# Patient Record
Sex: FEMALE | Race: WHITE | NOT HISPANIC OR LATINO | Employment: OTHER | URBAN - METROPOLITAN AREA
[De-identification: names, ages, dates, MRNs, and addresses within clinical notes are randomized per-mention and may not be internally consistent; named-entity substitution may affect disease eponyms.]

---

## 2017-02-06 ENCOUNTER — GENERIC CONVERSION - ENCOUNTER (OUTPATIENT)
Dept: CARDIOLOGY CLINIC | Facility: CLINIC | Age: 82
End: 2017-02-06

## 2017-05-22 ENCOUNTER — GENERIC CONVERSION - ENCOUNTER (OUTPATIENT)
Dept: CARDIOLOGY CLINIC | Facility: CLINIC | Age: 82
End: 2017-05-22

## 2017-12-14 ENCOUNTER — ALLSCRIPTS OFFICE VISIT (OUTPATIENT)
Dept: OTHER | Facility: OTHER | Age: 82
End: 2017-12-14

## 2017-12-14 DIAGNOSIS — E78.01 FAMILIAL HYPERCHOLESTEROLEMIA: ICD-10-CM

## 2017-12-14 DIAGNOSIS — I25.10 ATHEROSCLEROTIC HEART DISEASE OF NATIVE CORONARY ARTERY WITHOUT ANGINA PECTORIS: ICD-10-CM

## 2017-12-14 DIAGNOSIS — I65.29 OCCLUSION AND STENOSIS OF UNSPECIFIED CAROTID ARTERY: ICD-10-CM

## 2017-12-14 DIAGNOSIS — I72.4 ANEURYSM OF ARTERY OF LOWER EXTREMITY (HCC): ICD-10-CM

## 2017-12-14 DIAGNOSIS — I10 ESSENTIAL (PRIMARY) HYPERTENSION: ICD-10-CM

## 2017-12-15 NOTE — CONSULTS
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)   4  Carotid artery obstruction (433 10) (I65 29)   5  Familial hypercholesteremia (272 0) (E78 01)    Plan  Atrial fibrillation, unspecified type    · EKG/ECG- POC; Status:Complete;   Done: [id number]   Perform: In Office; Due:26Ysn9377; Last Updated By:Tsering Simeon; 12/14/2017 2:49:16 PM;Ordered; For:Atrial fibrillation, unspecified type; Ordered By:Andrew Hull;  Benign essential hypertension, Carotid artery obstruction, Coronary artery disease,Familial hypercholesteremia, Popliteal artery aneurysm    · (1) COMPREHENSIVE METABOLIC PANEL; Status:Active; Requested for:10Yky3854;    Perform:[facility]; Due:70Ana1037; Ordered; For:Benign essential hypertension, Carotid artery obstruction, Coronary artery disease, Familial hypercholesteremia, Popliteal artery aneurysm; Ordered By:Andrew Hull;   · ECHO COMPLETE WITH CONTRAST IF INDICATED; Status:Active; Requestedfor:28Rzb1590;    Perform:[facility]; Due:87Zpr8548; Last Updated By:Megan Noyola; 12/14/2017 3:39:39 PM;Ordered; For:Benign essential hypertension, Carotid artery obstruction, Coronary artery disease, Familial hypercholesteremia, Popliteal artery aneurysm; Ordered By:Andrew Hull;   · Follow-up visit in 3 months Evaluation and Treatment  Follow-up  Status: Complete Done: 47JTO5199   Ordered; For: Benign essential hypertension, Carotid artery obstruction, Coronary artery disease, Familial hypercholesteremia, Popliteal artery aneurysm; Ordered By: Leda Antoine Performed:  Due: 01XJT4242; Last Updated By: Omer Kinney; 12/14/2017 3:39:39 PM  Familial hypercholesteremia    · Rosuvastatin Calcium 5 MG Oral Tablet; TAKE 1 TABLET Weekly   Rx By: Leda Antoine; Dispense: 90 Days ; #:12 Tablet;  Refill: 3;For: Familial hypercholesteremia; RENNY = N; Verified Transmission to [facility]; Last Updated By: System, Torey; 12/14/2017 3:31:33 PM    Discussion/Summary    1  CAD prior PCI LAD and CIRC2  LVEF 60%3  Lone XKSX8  HTN5  HLD with statin intolerance6  Mild Carotid stenosisRec: She has familial hyperlipidemia and coronary artery disease  We'll try rosuvastatin 5 mg one tablet a week and see if she can tolerate this  Recommended she stop red yeast Rice at this time  I'll add coenzyme Q10  Coronary disease is stable with no complaints of angina  Blood pressure is been well-controlled  There was a single episode of lone atrial fibrillation no full anticoagulation she is a fall risk and she's due for an echocardiogram  Follow-up in 3 months  Chief Complaint  New patient  Patient has no cardiac complaints  History of Present Illness  Cardiology HPI Free Text Note Form [facility]: Mrs Cecilia Rehman Is a very pleasant 66-year-old female with a history of coronary artery disease with her PCI of the LAD and circumflex, alone atrial fibrillation, hypertension, hyperlipidemia, mild carotid plaque presents for a new patient evaluation  She is transferring from another cardiologist  Echocardiogram previously has shown normal LV systolic function  There has been mild valvular heart disease  Overall she's doing well with no complaints  She is limited by lower extremity arthritis and overall has a rather sedentary lifestyle area she denies any exertional chest pain or pressure, shortness of breath, palpitations, lightheadedness, dizziness, or syncope  She has familial hyperlipidemia with very high LDL cholesterol she's been tried on multiple statin medications without success  She gets muscle aches and cramps on these pills  She's tried them every other day but nothing less than that  There is been no lower extremity edema, PND, orthopnea  She denies any recurrence of atrial fibrillation        Review of Systems     Cardiac: No complaints of chest pain, no palpitations, no fainting  Skin: No complaints of nonhealing sores or skin rash  Genitourinary: No complaints of recurrent urinary tract infections, frequent urination at night, difficult urination, blood in urine, kidney stones, loss of bladder control, kidney problems, denies any birth control or hormone replacement, is not post menopausal, not currently pregnant  Psychological: No complaints of feeling depressed, anxiety, panic attacks, or difficulty concentrating  General: No complaints of trouble sleeping, lack of energy, fatigue, appetite changes, weight changes, fever, frequent infections, or night sweats  Respiratory: No complaints of shortness of breath, cough with sputum, or wheezing  HEENT: No complaints of serious problems, hearing problems, nose problems, throat problems, or snoring  Gastrointestinal: No complaints of liver problems, nausea, vomiting, heartburn, constipation, bloody stools, diarrhea, problems swallowing, adbominal pain, or rectal bleeding  Hematologic: No complaints of bleeding disorders, anemia, blood clots, or excessive brusing  Neurological: No complaints of numbness, tingling, dizziness, weakness, seizures, headaches, syncope or fainting, AM fatigue, daytime sleepiness, no witnessed apnea episodes  Musculoskeletal: No complaints of arthritis, back pain, or painfull swelling  Active Problems  1  Atrial fibrillation, unspecified type (427 31) (I48 91)   2  Benign essential hypertension (401 1) (I10)   3  Colitis (558 9) (K52 9)   4  Coronary artery disease (414 00) (I25 10)   5  Mesenteric ischemia, chronic (557 1) (K55 1)   6  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)    Past Medical History   · Colitis (558 9) (K52 9)   · History of TIA (transient ischemic attack) and stroke (V12 54) (Z86 73)    The active problems and past medical history were reviewed and updated today        Surgical History   · History of Appendectomy   · History of Cataract Surgery   · History of Cath Stent Placement   · History of Cholecystectomy   · History of Complete Colonoscopy   · History of Hysterectomy   · History of Total Knee Replacement Right   · History of Upper Gastrointestinal Endoscopy (Therapeutic)    The surgical history was reviewed and updated today  Family History  Father    · Family history of leukemia (V16 6) (Z80 6)  Family History Reviewed: The family history was reviewed and updated today  Social History   · Never a smoker   · No alcohol use   · No drug use  The social history was reviewed and updated today  Current Meds   1  Allegra 60 MG CAPS; Therapy: (Recorded:07Apr2016) to Recorded   2  AmLODIPine Besylate 5 MG Oral Tablet; TAKE 1 TABLET DAILY; Therapy: 20NEH6928 to Recorded   3  Aspirin 81 MG TABS; Therapy: (Recorded:07Apr2016) to Recorded   4  Calcium 500 + D TABS; Therapy: (Recorded:07Apr2016) to Recorded   5  Carvedilol 12 5 MG Oral Tablet; take 2 tablet twice daily; Therapy: (Recorded:39Ika8701) to Recorded   6  CloNIDine HCl - 0 1 MG Oral Tablet; Take 1 tablet 4 times daily; Therapy: (Recorded:63Cvh8741) to Recorded   7  Cranberry 500 MG Oral Capsule; take 1 capsule daily; Therapy: (Recorded:68Agz5050) to Recorded   8  Cyanocobalamin 1000 MCG TABS; Therapy: (Recorded:07Apr2016) to Recorded   9  Escitalopram Oxalate 10 MG Oral Tablet; TAKE 1 TABLET DAILY; Therapy: 77BLF2852 to Recorded   10  Famotidine 20 MG Oral Tablet; TAKE 1 TABLET EVERY 12 HOURS DAILY; Therapy: (Recorded:85Jtb2322) to Recorded   11  Fiber CAPS; Therapy: (Recorded:07Apr2016) to Recorded   12  Fish Oil CAPS; Therapy: (Recorded:07Apr2016) to Recorded   13  KLS Natural Psyllium Fiber POWD;  Therapy: (Recorded:07Apr2016) to Recorded   14  Lisinopril 40 MG Oral Tablet; TAKE 1 TABLET DAILY; Therapy: (Recorded:23Ahz4753) to Recorded   15  Ocuvite Eye + Multi TABS; Therapy: (Recorded:07Apr2016) to Recorded   16   Potassium Chloride Vero ER 10 MEQ Oral Tablet Extended Release; TAKE 3 TABLET Daily; Therapy: (Recorded:73Viy1364) to Recorded   17  PredniSONE 2 5 MG Oral Tablet; TAKE 1 TABLET DAILY; Therapy: (Recorded:68Azi9831) to Recorded   18  PredniSONE 5 MG Oral Tablet; Take 1 tablet daily; Therapy: (Recorded:88Zhc8561) to Recorded   19  PreviDent 5000 Booster Plus 1 1 % Dental Paste; Therapy: (Recorded:07Apr2016) to Recorded   20  Repaglinide 0 5 MG Oral Tablet; TAKE 1 TABLET 3 TIMES DAILY ; Therapy: (Recorded:22Mcg2996) to Recorded   21  Vitamin D3 1000 UNIT Oral Capsule; Therapy: (Recorded:07Apr2016) to Recorded   22  Voltaren 1 % Transdermal Gel; Therapy: (Recorded:07Apr2016) to Recorded    Allergies  1  Adhesive Tape TAPE   2  Amoxicillin TABS   3  Erythromycin TABS   4  flecainide   5  Mevacor TABS   6  Sulfa Antibiotics   7  thioridazine   8  Tricor   9  Zetia    Vitals  Signs   Heart Rate: 57  Systolic: 467, RUE, Sitting  Diastolic: 56, RUE, Sitting  Height: 5 ft 2 in  Weight: 137 lb   BMI Calculated: 25 06  BSA Calculated: 1 63    Physical Exam   Constitutional  General appearance: No acute distress, well appearing and well nourished  Eyes  Conjunctiva and Sclera examination: Conjunctiva pink, sclera anicteric  Ears, Nose, Mouth, and Throat - Oropharynx: Clear, nares are clear, mucous membranes are moist   Neck  Neck and thyroid: Normal, supple, trachea midline, no thyromegaly  Pulmonary  Respiratory effort: No increased work of breathing or signs of respiratory distress  Auscultation of lungs: Clear to auscultation, no rales, no rhonchi, no wheezing, good air movement  Cardiovascular  Auscultation of heart: Normal rate and rhythm, normal S1 and S2, no murmurs  Carotid pulses: Normal, 2+ bilaterally  Peripheral vascular exam: Normal pulses throughout, no tenderness, erythema or swelling  Pedal pulses: Normal, 2+ bilaterally  Examination of extremities for edema and/or varicosities: Normal    Abdomen  Abdomen: Non-tender and no distention     Liver and spleen: No hepatomegaly or splenomegaly  Musculoskeletal Gait and station: Normal gait  -- Digits and nails: Normal without clubbing or cyanosis  -- Inspection/palpation of joints, bones, and muscles: Normal, ROM normal    Skin - Skin and subcutaneous tissue: Normal without rashes or lesions  Skin is warm and well perfused, normal turgor  Neurologic - Cranial nerves: II - XII intact  -- Speech: Normal    Psychiatric - Orientation to person, place, and time: Normal -- Mood and affect: Normal       Results/Data  Sinus bradycardia and incomplete right bundle-branch block      End of Encounter Meds  1  Rosuvastatin Calcium 5 MG Oral Tablet; TAKE 1 TABLET Weekly; Therapy: 25BSP2478 to (Jacob Hayden)  Requested for: 52MMM6671; Last Rx:34Qvz9871 Ordered  2  Allegra 60 MG CAPS; Therapy: (Recorded:07Apr2016) to Recorded   3  AmLODIPine Besylate 5 MG Oral Tablet; TAKE 1 TABLET DAILY; Therapy: 09VZP8715 to Recorded   4  Aspirin 81 MG TABS; Therapy: (Recorded:07Apr2016) to Recorded   5  Calcium 500 + D TABS; Therapy: (Recorded:07Apr2016) to Recorded   6  Carvedilol 12 5 MG Oral Tablet; take 2 tablet twice daily; Therapy: (Recorded:46Ybk9558) to Recorded   7  CloNIDine HCl - 0 1 MG Oral Tablet; Take 1 tablet 4 times daily; Therapy: (Recorded:26Qac3387) to Recorded   8  Cranberry 500 MG Oral Capsule; take 1 capsule daily; Therapy: (Recorded:64Iwv1461) to Recorded   9  Cyanocobalamin 1000 MCG TABS; Therapy: (Recorded:07Apr2016) to Recorded   10  Escitalopram Oxalate 10 MG Oral Tablet; TAKE 1 TABLET DAILY; Therapy: 67HIH3552 to Recorded   11  Famotidine 20 MG Oral Tablet; TAKE 1 TABLET EVERY 12 HOURS DAILY; Therapy: (Recorded:50Ctj4150) to Recorded   12  Fiber CAPS; Therapy: (Recorded:07Apr2016) to Recorded   13  Fish Oil CAPS; Therapy: (Recorded:07Apr2016) to Recorded   14  KLS Natural Psyllium Fiber POWD;  Therapy: (Recorded:07Apr2016) to Recorded   15  Lisinopril 40 MG Oral Tablet; TAKE 1 TABLET DAILY;   Therapy: (Recorded:22Rvo4936) to Recorded   16  Ocuvite Eye + Multi TABS; Therapy: (Recorded:07Apr2016) to Recorded   17  Potassium Chloride Vero ER 10 MEQ Oral Tablet Extended Release; TAKE 3 TABLET  Daily; Therapy: (Recorded:78Tkl1051) to Recorded   18  PredniSONE 2 5 MG Oral Tablet; TAKE 1 TABLET DAILY; Therapy: (Recorded:92Npt7911) to Recorded   19  PredniSONE 5 MG Oral Tablet; Take 1 tablet daily; Therapy: (Recorded:02Cpd4530) to Recorded   20  PreviDent 5000 Booster Plus 1 1 % Dental Paste; Therapy: (Recorded:07Apr2016) to Recorded   21  Repaglinide 0 5 MG Oral Tablet; TAKE 1 TABLET 3 TIMES DAILY ; Therapy: (Recorded:55Fos0727) to Recorded   22  Vitamin D3 1000 UNIT Oral Capsule; Therapy: (Recorded:07Apr2016) to Recorded   23  Voltaren 1 % Transdermal Gel (Diclofenac Sodium);   Therapy: (Recorded:07Apr2016) to Recorded    Signatures   Electronically signed by : Mary Rizo DO; Dec 14 2017  4:09PM EST                       (Author)

## 2018-01-10 LAB
A/G RATIO (HISTORICAL): 1.5 (CALC) (ref 1–2.5)
ALBUMIN SERPL BCP-MCNC: 3.8 G/DL (ref 3.6–5.1)
ALP SERPL-CCNC: 76 U/L (ref 33–130)
ALT SERPL W P-5'-P-CCNC: 18 U/L (ref 6–29)
AST SERPL W P-5'-P-CCNC: 15 U/L (ref 10–35)
BILIRUB SERPL-MCNC: 0.4 MG/DL (ref 0.2–1.2)
BUN SERPL-MCNC: 27 MG/DL (ref 7–25)
BUN/CREA RATIO (HISTORICAL): 26 (CALC) (ref 6–22)
CALCIUM SERPL-MCNC: 9.8 MG/DL (ref 8.6–10.4)
CHLORIDE SERPL-SCNC: 105 MMOL/L (ref 98–110)
CO2 SERPL-SCNC: 28 MMOL/L (ref 20–31)
CREAT SERPL-MCNC: 1.05 MG/DL (ref 0.6–0.88)
EGFR AFRICAN AMERICAN (HISTORICAL): 55 ML/MIN/1.73M2
EGFR-AMERICAN CALC (HISTORICAL): 48 ML/MIN/1.73M2
GAMMA GLOBULIN (HISTORICAL): 2.6 G/DL (CALC) (ref 1.9–3.7)
GLUCOSE (HISTORICAL): 109 MG/DL (ref 65–99)
POTASSIUM SERPL-SCNC: 4.6 MMOL/L (ref 3.5–5.3)
SODIUM SERPL-SCNC: 140 MMOL/L (ref 135–146)
TOTAL PROTEIN (HISTORICAL): 6.4 G/DL (ref 6.1–8.1)

## 2018-01-23 VITALS
WEIGHT: 137 LBS | DIASTOLIC BLOOD PRESSURE: 56 MMHG | SYSTOLIC BLOOD PRESSURE: 138 MMHG | HEIGHT: 62 IN | BODY MASS INDEX: 25.21 KG/M2 | HEART RATE: 57 BPM

## 2018-02-14 ENCOUNTER — HOSPITAL ENCOUNTER (OUTPATIENT)
Dept: NON INVASIVE DIAGNOSTICS | Facility: CLINIC | Age: 83
Discharge: HOME | End: 2018-02-14
Payer: MEDICARE

## 2018-02-14 DIAGNOSIS — I72.4 ANEURYSM OF ARTERY OF LOWER EXTREMITY (HCC): ICD-10-CM

## 2018-02-14 DIAGNOSIS — E78.01 FAMILIAL HYPERCHOLESTEROLEMIA: ICD-10-CM

## 2018-02-14 DIAGNOSIS — I25.10 ATHEROSCLEROTIC HEART DISEASE OF NATIVE CORONARY ARTERY WITHOUT ANGINA PECTORIS: ICD-10-CM

## 2018-02-14 DIAGNOSIS — I65.29 OCCLUSION AND STENOSIS OF UNSPECIFIED CAROTID ARTERY: ICD-10-CM

## 2018-02-14 DIAGNOSIS — I10 ESSENTIAL (PRIMARY) HYPERTENSION: ICD-10-CM

## 2018-02-14 PROCEDURE — 93306 TTE W/DOPPLER COMPLETE: CPT

## 2018-02-14 PROCEDURE — 93306 TTE W/DOPPLER COMPLETE: CPT | Performed by: INTERNAL MEDICINE

## 2018-02-28 LAB
ALBUMIN SERPL-MCNC: 3.9 G/DL (ref 3.6–5.1)
ALBUMIN/GLOB SERPL: 1.7 (CALC) (ref 1–2.5)
ALP SERPL-CCNC: 57 U/L (ref 33–130)
ALT SERPL-CCNC: 11 U/L (ref 6–29)
AST SERPL-CCNC: 12 U/L (ref 10–35)
BASOPHILS # BLD AUTO: 54 CELLS/UL (ref 0–200)
BASOPHILS NFR BLD AUTO: 0.8 %
BILIRUB SERPL-MCNC: 0.3 MG/DL (ref 0.2–1.2)
BUN SERPL-MCNC: 22 MG/DL (ref 7–25)
BUN/CREAT SERPL: 21 (CALC) (ref 6–22)
CALCIUM SERPL-MCNC: 9.4 MG/DL (ref 8.6–10.4)
CHLORIDE SERPL-SCNC: 105 MMOL/L (ref 98–110)
CHOLEST SERPL-MCNC: 292 MG/DL
CHOLEST/HDLC SERPL: 5.6 (CALC)
CO2 SERPL-SCNC: 27 MMOL/L (ref 20–31)
CREAT SERPL-MCNC: 1.07 MG/DL (ref 0.6–0.88)
EOSINOPHIL # BLD AUTO: 154 CELLS/UL (ref 15–500)
EOSINOPHIL NFR BLD AUTO: 2.3 %
ERYTHROCYTE [DISTWIDTH] IN BLOOD BY AUTOMATED COUNT: 13.3 % (ref 11–15)
GLOBULIN SER CALC-MCNC: 2.3 G/DL (CALC) (ref 1.9–3.7)
GLUCOSE SERPL-MCNC: 96 MG/DL (ref 65–99)
HBA1C MFR BLD: 5.8 % OF TOTAL HGB
HCT VFR BLD AUTO: 32.7 % (ref 35–45)
HDLC SERPL-MCNC: 52 MG/DL
HGB BLD-MCNC: 10.9 G/DL (ref 11.7–15.5)
LYMPHOCYTES # BLD AUTO: 2586 CELLS/UL (ref 850–3900)
LYMPHOCYTES NFR BLD AUTO: 38.6 %
MCH RBC QN AUTO: 31.6 PG (ref 27–33)
MCHC RBC AUTO-ENTMCNC: 33.3 G/DL (ref 32–36)
MCV RBC AUTO: 94.8 FL (ref 80–100)
MONOCYTES # BLD AUTO: 596 CELLS/UL (ref 200–950)
MONOCYTES NFR BLD AUTO: 8.9 %
NEUTROPHILS # BLD AUTO: 3310 CELLS/UL (ref 1500–7800)
NEUTROPHILS NFR BLD AUTO: 49.4 %
NONHDLC SERPL-MCNC: 240 MG/DL (CALC)
PLATELET # BLD AUTO: 303 THOUSAND/UL (ref 140–400)
PMV BLD REES-ECKER: 9.1 FL (ref 7.5–12.5)
POTASSIUM SERPL-SCNC: 4.5 MMOL/L (ref 3.5–5.3)
PROT SERPL-MCNC: 6.2 G/DL (ref 6.1–8.1)
RBC # BLD AUTO: 3.45 MILLION/UL (ref 3.8–5.1)
SL AMB EGFR AFRICAN AMERICAN: 54 ML/MIN/1.73M2
SL AMB EGFR NON AFRICAN AMERICAN: 47 ML/MIN/1.73M2
SODIUM SERPL-SCNC: 138 MMOL/L (ref 135–146)
TRIGL SERPL-MCNC: 418 MG/DL
WBC # BLD AUTO: 6.7 THOUSAND/UL (ref 3.8–10.8)

## 2018-03-01 RX ORDER — CYCLOPENTOLATE HYDROCHLORIDE 10 MG/ML
SOLUTION/ DROPS OPHTHALMIC
COMMUNITY
End: 2018-03-05 | Stop reason: CLARIF

## 2018-03-01 RX ORDER — ATORVASTATIN CALCIUM 20 MG/1
TABLET, FILM COATED ORAL
COMMUNITY
End: 2018-03-05 | Stop reason: CLARIF

## 2018-03-01 RX ORDER — AMLODIPINE BESYLATE 5 MG/1
1 TABLET ORAL
COMMUNITY
Start: 2017-12-14 | End: 2020-06-03 | Stop reason: SDUPTHER

## 2018-03-01 RX ORDER — TITANIUM DIOXIDE, OCTINOXATE, ZINC OXIDE 4.61; 1.6; .78 G/40ML; G/40ML; G/40ML
1 CREAM TOPICAL 2 TIMES DAILY
COMMUNITY

## 2018-03-01 RX ORDER — CARVEDILOL 12.5 MG/1
2 TABLET ORAL 2 TIMES DAILY
COMMUNITY
End: 2020-06-03 | Stop reason: SDUPTHER

## 2018-03-01 RX ORDER — ROSUVASTATIN CALCIUM 5 MG/1
1 TABLET, COATED ORAL WEEKLY
COMMUNITY
Start: 2017-12-14 | End: 2018-04-24 | Stop reason: SDUPTHER

## 2018-03-01 RX ORDER — PREDNISONE 1 MG/1
7.5 TABLET ORAL DAILY
Refills: 5 | Status: ON HOLD | COMMUNITY
Start: 2018-02-07 | End: 2020-01-04 | Stop reason: SDUPTHER

## 2018-03-01 RX ORDER — BIOTIN 1 MG
TABLET ORAL DAILY
COMMUNITY

## 2018-03-01 RX ORDER — LISINOPRIL 40 MG/1
1 TABLET ORAL DAILY
COMMUNITY
End: 2019-07-01 | Stop reason: HOSPADM

## 2018-03-01 RX ORDER — FEXOFENADINE HYDROCHLORIDE 60 MG/1
TABLET, FILM COATED ORAL
COMMUNITY
End: 2018-03-05 | Stop reason: CLARIF

## 2018-03-01 RX ORDER — REPAGLINIDE 0.5 MG/1
0.5 TABLET ORAL
COMMUNITY
End: 2020-05-20 | Stop reason: ALTCHOICE

## 2018-03-01 RX ORDER — CLONIDINE HYDROCHLORIDE 0.1 MG/1
1 TABLET ORAL 4 TIMES DAILY
COMMUNITY
End: 2020-08-18 | Stop reason: SDUPTHER

## 2018-03-01 RX ORDER — POTASSIUM CHLORIDE 750 MG/1
10 TABLET, EXTENDED RELEASE ORAL 3 TIMES DAILY
COMMUNITY
End: 2020-12-15 | Stop reason: SDUPTHER

## 2018-03-01 RX ORDER — ESCITALOPRAM OXALATE 10 MG/1
1 TABLET ORAL DAILY
COMMUNITY
Start: 2017-12-14 | End: 2020-08-18 | Stop reason: SDUPTHER

## 2018-03-01 RX ORDER — FAMOTIDINE 20 MG/1
20 TABLET, FILM COATED ORAL AS NEEDED
COMMUNITY
End: 2019-08-27 | Stop reason: SDUPTHER

## 2018-03-05 ENCOUNTER — OFFICE VISIT (OUTPATIENT)
Dept: VASCULAR SURGERY | Facility: CLINIC | Age: 83
End: 2018-03-05
Payer: MEDICARE

## 2018-03-05 ENCOUNTER — OFFICE VISIT (OUTPATIENT)
Dept: CARDIOLOGY CLINIC | Facility: CLINIC | Age: 83
End: 2018-03-05
Payer: MEDICARE

## 2018-03-05 VITALS
HEIGHT: 62 IN | HEART RATE: 68 BPM | BODY MASS INDEX: 24.84 KG/M2 | WEIGHT: 135 LBS | SYSTOLIC BLOOD PRESSURE: 142 MMHG | DIASTOLIC BLOOD PRESSURE: 88 MMHG | RESPIRATION RATE: 14 BRPM | TEMPERATURE: 97.3 F

## 2018-03-05 VITALS
WEIGHT: 139.7 LBS | SYSTOLIC BLOOD PRESSURE: 88 MMHG | HEIGHT: 62 IN | HEART RATE: 60 BPM | BODY MASS INDEX: 25.71 KG/M2 | DIASTOLIC BLOOD PRESSURE: 40 MMHG

## 2018-03-05 DIAGNOSIS — I73.9 PERIPHERAL ARTERIAL DISEASE (HCC): Primary | ICD-10-CM

## 2018-03-05 DIAGNOSIS — I65.29 OBSTRUCTION OF CAROTID ARTERY, UNSPECIFIED LATERALITY: ICD-10-CM

## 2018-03-05 DIAGNOSIS — I48.0 PAROXYSMAL ATRIAL FIBRILLATION (HCC): ICD-10-CM

## 2018-03-05 DIAGNOSIS — I10 BENIGN ESSENTIAL HYPERTENSION: ICD-10-CM

## 2018-03-05 DIAGNOSIS — I25.10 CORONARY ARTERY DISEASE INVOLVING NATIVE HEART WITHOUT ANGINA PECTORIS, UNSPECIFIED VESSEL OR LESION TYPE: Primary | ICD-10-CM

## 2018-03-05 DIAGNOSIS — E78.01 FAMILIAL HYPERCHOLESTEREMIA: ICD-10-CM

## 2018-03-05 DIAGNOSIS — I72.4 POPLITEAL ARTERY ANEURYSM (HCC): ICD-10-CM

## 2018-03-05 PROCEDURE — 99213 OFFICE O/P EST LOW 20 MIN: CPT | Performed by: NURSE PRACTITIONER

## 2018-03-05 PROCEDURE — 99214 OFFICE O/P EST MOD 30 MIN: CPT | Performed by: INTERNAL MEDICINE

## 2018-03-05 RX ORDER — LANOLIN ALCOHOL/MO/W.PET/CERES
325 CREAM (GRAM) TOPICAL DAILY
COMMUNITY

## 2018-03-05 RX ORDER — ACETAMINOPHEN 325 MG/1
650 TABLET ORAL EVERY 8 HOURS PRN
COMMUNITY

## 2018-03-05 RX ORDER — MULTIVIT WITH MINERALS/LUTEIN
1000 TABLET ORAL DAILY
COMMUNITY

## 2018-03-05 NOTE — PROGRESS NOTES
Assessment/Plan:  27-year-old female with hypertension, hyperlipidemia, paroxysmal atrial fibrillation, CAD, PAD, mesenteric artery stenosis, left lower extremity varicosities who is referred by Dr Ketty Nina for evaluation slowly healing left 2nd toe wound x 7-8 months   -I believe she has a palpable DP pulse on exam   Given her slowly healing wound and prior arterial study with underlying arterial disease will re-evaluate with lower extremity arterial duplex now  We will also evaluate popliteal artery size  -Return to the office after study to review and make further recommendations  Problem List Items Addressed This Visit        Cardiovascular and Mediastinum    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HCC)    Relevant Orders    VAS lower limb arterial duplex, complete bilateral    Peripheral arterial disease ([facility] [address] ) - Primary    Relevant Orders    VAS lower limb arterial duplex, complete bilateral                 Patient ID: Adina Kincaid is a 80 y o  female  Chief Complaint: Pt is here to eval non-healing sore to left second toe that has been ongoing for 7 months  Pt is going to podiatry every 10 weeks  Pt is putting dressing to toe once daily  Pt states she has numbness to bilateral feet L>R Pt states she has some pain to area  Pt denies drainage  Pt is taking aspirin daily  HPI  27-year-old female with hypertension, hyperlipidemia, paroxysmal atrial fibrillation, CAD, PAD, mesenteric artery stenosis, left lower extremity varicosities who is referred by Dr Ketty Nina for evaluation slowly healing left 2nd toe wound x 7-8 months  Her daughter is present for visit today  She has a small wound to the distal tip of the left second toe  She had an ingrown toenail removed several month ago and since then has wound present  Wound has healed or nearly healed and comes back  She was seen at [facility] 2-3 times and discharged when wound healed  All along she's had pain and varying redness of the left second toe  She recently had a debridement by podiatry and had temporary relief of her pain  She had been on prophylactic dose of cephalexin for recurring UTIs and with initial onset of left 2nd toe cellulitis/wound her cephalexin was increased to therapeutic level  This was several months ago and she is no longer on prophylactic or therapeutic antibiotics  She had previously seen Dr Damon Worley in 2016 for mesenteric artery stenosis for which she was asymptomatic  At that time she had prominent popliteal pulse and was evaluated with arterial duplex which noted popliteal ectasia on the left lower extremity and noted right lower extremity diffuse disease, MONA was unreliable and on the left 50-75% common femoral artery stenosis, MONA unreliable and metatarsal great toe pressures within healing ranges  She continues to be asymptomatic from a mesenteric standpoint  She has left lower extremity varicosities with intermittent ankle edema  She is wearing an over-the-counter foot/ankle support  If she skips wearing compression sleeve for a few days her ankle swells up  She is ambulating with a walker since fall 3 weeks ago  She previously used a cane  She has constant pain in left 2nd toe though no nocturnal pain that relieves with dependency  The following portions of the patient's history were reviewed and updated as appropriate: allergies, current medications, past family history, past medical history, past social history, past surgical history and problem list     Review of Systems   Constitutional: Negative  HENT: Positive for hearing loss, postnasal drip and tinnitus  Eyes: Negative  Respiratory: Negative  Cardiovascular: Negative  Gastrointestinal: Positive for diarrhea  Endocrine: Negative  Genitourinary: Negative  Musculoskeletal: Positive for back pain, gait problem, myalgias, neck pain and neck stiffness  Skin: Negative  Allergic/Immunologic: Negative  Neurological: Positive for numbness  Hematological: Bruises/bleeds easily  Psychiatric/Behavioral: Negative  Objective:    Vitals:    03/05/18 1435   BP: 142/88   BP Location: Right arm   Patient Position: Sitting   Cuff Size: Adult   Pulse: 68   Resp: 14   Temp: (!) 97 3 °F (36 3 °C)   TempSrc: Tympanic   Weight: 61 2 kg (135 lb)   Height: 5' 2" (1 575 m)       Patient Active Problem List   Diagnosis    Atrial fibrillation (HCC)    Benign essential hypertension    Coronary artery disease    Familial hypercholesteremia    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HCC)    Carotid artery obstruction    Peripheral arterial disease (HCC)       Past Surgical History:   Procedure Laterality Date    APPENDECTOMY      CATARACT EXTRACTION      CHOLECYSTECTOMY      CORONARY ANGIOPLASTY      stent x4 07/11/1996x1 10/09/2009 x3    HYSTERECTOMY      REPLACEMENT TOTAL KNEE Right        Family History   Problem Relation Age of Onset    Leukemia Father        Social History     Social History    Marital status:      Spouse name: N/A    Number of children: N/A    Years of education: N/A     Occupational History    Not on file       Social History Main Topics    Smoking status: Never Smoker    Smokeless tobacco: Never Used    Alcohol use No    Drug use: No    Sexual activity: Not on file     Other Topics Concern    Not on file     Social History Narrative    No narrative on file       Allergies   Allergen Reactions    Amoxicillin     Erythromycin     Ezetimibe     Fenofibrate     Flecainide     Lovastatin     Sulfa Antibiotics     Thioridazine          Current Outpatient Prescriptions:     acetaminophen (TYLENOL) 325 mg tablet, Take 650 mg by mouth every 6 (six) hours as needed for mild pain, Disp: , Rfl:     amLODIPine (NORVASC) 5 mg tablet, Take 1 tablet by mouth daily, Disp: , Rfl:     Ascorbic Acid (VITAMIN C) 1000 MG tablet, Take 1,000 mg by mouth daily, Disp: , Rfl:     aspirin 81 MG tablet, Take by mouth, Disp: , Rfl:    Calcium Citrate-Vitamin D (CALCIUM + D PO), Take by mouth, Disp: , Rfl:     carvedilol (COREG) 12 5 mg tablet, Take 2 tablets by mouth 2 (two) times a day, Disp: , Rfl:     Cholecalciferol (VITAMIN D3) 1000 units CAPS, Take by mouth, Disp: , Rfl:     cloNIDine (CATAPRES) 0 1 mg tablet, Take 1 tablet by mouth 4 (four) times a day, Disp: , Rfl:     Cranberry 500 MG CAPS, Take 1 capsule by mouth daily, Disp: , Rfl:     cyanocobalamin 1000 MCG tablet, Take by mouth, Disp: , Rfl:     escitalopram (LEXAPRO) 10 mg tablet, Take 1 tablet by mouth daily, Disp: , Rfl:     famotidine (PEPCID) 20 mg tablet, Take 1 tablet by mouth every 12 (twelve) hours, Disp: , Rfl:     ferrous sulfate 325 (65 FE) MG EC tablet, Take 325 mg by mouth daily, Disp: , Rfl:     lisinopril (ZESTRIL) 40 mg tablet, Take 1 tablet by mouth daily, Disp: , Rfl:     potassium chloride (K-DUR,KLOR-CON) 10 mEq tablet, Take 3 tablets by mouth daily, Disp: , Rfl:     predniSONE 5 mg tablet, Take 7 5 mg by mouth daily  , Disp: , Rfl: 5    repaglinide (PRANDIN) 0 5 mg tablet, Take 0 5 mg by mouth 3 (three) times a day before meals  , Disp: , Rfl:     rosuvastatin (CRESTOR) 5 mg tablet, Take 1 tablet by mouth once a week, Disp: , Rfl:   There were no vitals taken for this visit  Physical Exam   Constitutional: She is oriented to person, place, and time  HENT:   Resolving periorbital ecchymosis   Eyes: EOM are normal    glasses   Cardiovascular: Normal heart sounds  Pulses:       Femoral pulses are 1+ on the right side, and 2+ on the left side  Popliteal pulses are 1+ on the right side, and 1+ on the left side  Dorsalis pedis pulses are 2+ on the right side, and 1+ on the left side  Reticular/truncal varicosities left anterior shin  Skin is dry   Pulmonary/Chest: Breath sounds normal    Abdominal: Soft  Bowel sounds are normal    Neurological: She is alert and oriented to person, place, and time     Skin:   Distal tip of left 2nd toe is mildly erythematous and swollen with small pinpoint area of tissue loss   See photo

## 2018-03-05 NOTE — PATIENT INSTRUCTIONS
59-year-old female with hypertension, hyperlipidemia, paroxysmal atrial fibrillation, CAD, PAD, mesenteric artery stenosis, left lower extremity varicosities who is referred by Dr Diana Sales for evaluation slowly healing left 2nd toe wound x 7-8 months   -I believe she has a palpable DP pulse on exam   Given her slowly healing wound and prior arterial study with underlying arterial disease will re-evaluate with lower extremity arterial duplex now  We will also evaluate popliteal artery size  -Return to the office after study to review and make further recommendations

## 2018-03-05 NOTE — PROGRESS NOTES
Cardiology Follow Up    Rachele Ragland  4/6/1930  [id number]  [address] [facility]  [address] [place] [address]    1  Coronary artery disease involving native heart without angina pectoris, unspecified vessel or lesion type     2  Benign essential hypertension     3  Obstruction of carotid artery, unspecified laterality     4  Paroxysmal atrial fibrillation (HCC)     5  Familial hypercholesteremia         Discussion/Summary:  Overall she has been doing well since our last appointment  Coronary disease stable without active angina  Unfortunately she had a fall which sound mechanical in nature but has recovered nicely  She is tolerating Crestor 5 mg 1 day a week  Will increase to 2 days a week  Blood pressure is a bit low however she is asymptomatic at encouraged her to hydrate  Echocardiogram was reviewed  See her back in 6 months  Interval History:   Presents for 3 month follow-up to go over echocardiogram   Denies any chest pain, shortness of breath, palpitations, lightheadedness, dizziness, or syncope  She suffered a mechanical fall she tripped over her foot there was no lightheadedness dizziness or syncope  She had some bruising but did not seek hospitalization  Overall now she states she is doing well and is using a walker for assistance with ambulation  She is taking all medications as prescribed      Problem List     Atrial fibrillation ([facility] [address] )    Benign essential hypertension    Coronary artery disease    Familial hypercholesteremia    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HCC)    Carotid artery obstruction        Past Medical History:   Diagnosis Date    Atrial fibrillation (HCC)     Benign essential hypertension     CAD (coronary artery disease)     Carotid artery obstruction     Mesenteric ischemia, chronic (HCC)     TIA (transient ischemic attack)      Social History     Social History    Marital status:      Spouse name: N/A    Number of children: N/A    Years of education: N/A     Occupational History    Not on file       Social History Main Topics    Smoking status: Never Smoker    Smokeless tobacco: Never Used    Alcohol use No    Drug use: No    Sexual activity: Not on file     Other Topics Concern    Not on file     Social History Narrative    No narrative on file      Family History   Problem Relation Age of Onset    Leukemia Father      Past Surgical History:   Procedure Laterality Date    APPENDECTOMY      CATARACT EXTRACTION      CHOLECYSTECTOMY      CORONARY ANGIOPLASTY      stent x4 07/11/1996x1 10/09/2009 x3    HYSTERECTOMY      REPLACEMENT TOTAL KNEE Right        Current Outpatient Prescriptions:     acetaminophen (TYLENOL) 325 mg tablet, Take 650 mg by mouth every 6 (six) hours as needed for mild pain, Disp: , Rfl:     amLODIPine (NORVASC) 5 mg tablet, Take 1 tablet by mouth daily, Disp: , Rfl:     Ascorbic Acid (VITAMIN C) 1000 MG tablet, Take 1,000 mg by mouth daily, Disp: , Rfl:     aspirin 81 MG tablet, Take by mouth, Disp: , Rfl:     Calcium Citrate-Vitamin D (CALCIUM + D PO), Take by mouth, Disp: , Rfl:     carvedilol (COREG) 12 5 mg tablet, Take 2 tablets by mouth 2 (two) times a day, Disp: , Rfl:     Cholecalciferol (VITAMIN D3) 1000 units CAPS, Take by mouth, Disp: , Rfl:     cloNIDine (CATAPRES) 0 1 mg tablet, Take 1 tablet by mouth 4 (four) times a day, Disp: , Rfl:     Cranberry 500 MG CAPS, Take 1 capsule by mouth daily, Disp: , Rfl:     cyanocobalamin 1000 MCG tablet, Take by mouth, Disp: , Rfl:     escitalopram (LEXAPRO) 10 mg tablet, Take 1 tablet by mouth daily, Disp: , Rfl:     famotidine (PEPCID) 20 mg tablet, Take 1 tablet by mouth every 12 (twelve) hours, Disp: , Rfl:     ferrous sulfate 325 (65 FE) MG EC tablet, Take 325 mg by mouth daily, Disp: , Rfl:     lisinopril (ZESTRIL) 40 mg tablet, Take 1 tablet by mouth daily, Disp: , Rfl:   potassium chloride (K-DUR,KLOR-CON) 10 mEq tablet, Take 3 tablets by mouth daily, Disp: , Rfl:     predniSONE 5 mg tablet, Take 7 5 mg by mouth daily  , Disp: , Rfl: 5    repaglinide (PRANDIN) 0 5 mg tablet, Take 0 5 mg by mouth 3 (three) times a day before meals  , Disp: , Rfl:     rosuvastatin (CRESTOR) 5 mg tablet, Take 1 tablet by mouth once a week, Disp: , Rfl:   Allergies   Allergen Reactions    Amoxicillin     Erythromycin     Ezetimibe     Fenofibrate     Flecainide     Lovastatin     Sulfa Antibiotics     Thioridazine        Labs:     Chemistry        Component Value Date/Time     01/09/2018 0842    K 4 6 01/09/2018 0842     01/09/2018 0842    CO2 28 01/09/2018 0842    BUN 22 02/27/2018 0933    CREATININE 1 07 (H) 02/27/2018 0933    CREATININE 1 05 (H) 01/09/2018 0842        Component Value Date/Time    CALCIUM 9 4 02/27/2018 0933    ALKPHOS 76 01/09/2018 0842    AST 15 01/09/2018 0842    ALT 18 01/09/2018 0842    BILITOT 0 4 01/09/2018 0842            No results found for: CHOL  No results found for: HDL  No results found for: [facility]  Lab Results   Component Value Date    TRIG 418 (H) 02/27/2018     No components found for: CHOLHDL    Imaging: No results found  ECG:        Review of Systems   Constitution: Negative  HENT: Negative  Eyes: Negative  Cardiovascular: Negative  Respiratory: Negative  Endocrine: Negative  Hematologic/Lymphatic: Negative  Skin: Negative  Musculoskeletal: Negative  Gastrointestinal: Negative  Genitourinary: Negative  Neurological: Negative  Psychiatric/Behavioral: Negative  Vitals:    03/05/18 0913   BP: (!) 88/40   Pulse: 60     Vitals:    03/05/18 0913   Weight: 63 4 kg (139 lb 11 2 oz)     Height: 5' 2" (157 5 cm)   Body mass index is 25 55 kg/m²  Physical Exam:    Vital signs reviewed    General appearance:  Appears stated age, alert, well appearing and in no distress  HEENT:  PERRLA, EOMI, no scleral icterus, no conjunctival pallor  NECK:  Supple, No elevated JVP, no thyromegaly, no carotid bruits  HEART:  Regular rate and rhythm, normal S1/S2, no S3/S4, no murmur or rub  LUNGS:  Clear to auscultation bilaterally, no wheezes rales or rhonchi  ABDOMEN:  Soft, non-tender, positive bowel sounds, no rebound or guarding, no organomegaly   EXTREMITIES:  No edema, normal range of motion  VASCULAR:  Normal pedal pulses, good pulse volume   SKIN: No lesions or rashes on exposed skin  NEURO:  CN II-XII intact, no focal deficits

## 2018-03-06 ENCOUNTER — HOSPITAL ENCOUNTER (OUTPATIENT)
Dept: NON INVASIVE DIAGNOSTICS | Facility: CLINIC | Age: 83
Discharge: HOME | End: 2018-03-06
Payer: MEDICARE

## 2018-03-06 DIAGNOSIS — I72.4 POPLITEAL ARTERY ANEURYSM (HCC): ICD-10-CM

## 2018-03-06 DIAGNOSIS — I73.9 PERIPHERAL ARTERIAL DISEASE (HCC): ICD-10-CM

## 2018-03-06 PROCEDURE — 93923 UPR/LXTR ART STDY 3+ LVLS: CPT

## 2018-03-06 PROCEDURE — 93922 UPR/L XTREMITY ART 2 LEVELS: CPT | Performed by: SURGERY

## 2018-03-06 PROCEDURE — 93925 LOWER EXTREMITY STUDY: CPT

## 2018-03-06 PROCEDURE — 93925 LOWER EXTREMITY STUDY: CPT | Performed by: SURGERY

## 2018-04-24 DIAGNOSIS — E78.5 HYPERLIPIDEMIA, UNSPECIFIED HYPERLIPIDEMIA TYPE: Primary | ICD-10-CM

## 2018-04-24 RX ORDER — ROSUVASTATIN CALCIUM 5 MG/1
5 TABLET, COATED ORAL WEEKLY
Qty: 4 TABLET | Refills: 5 | Status: SHIPPED | OUTPATIENT
Start: 2018-04-24 | End: 2018-04-25 | Stop reason: SDUPTHER

## 2018-04-24 NOTE — TELEPHONE ENCOUNTER
Pt is req a refill on Rosuvastatin 5 mg 2 tablets a week   Please call it in to [facility] [phone]

## 2018-04-25 DIAGNOSIS — E78.5 HYPERLIPIDEMIA, UNSPECIFIED HYPERLIPIDEMIA TYPE: ICD-10-CM

## 2018-04-26 RX ORDER — ROSUVASTATIN CALCIUM 5 MG/1
5 TABLET, COATED ORAL WEEKLY
Qty: 4 TABLET | Refills: 10 | Status: ON HOLD | OUTPATIENT
Start: 2018-04-26 | End: 2019-07-01 | Stop reason: SDUPTHER

## 2018-04-30 ENCOUNTER — TELEPHONE (OUTPATIENT)
Dept: CARDIOLOGY CLINIC | Facility: CLINIC | Age: 83
End: 2018-04-30

## 2018-04-30 NOTE — TELEPHONE ENCOUNTER
Gray Jimenez called upset that she only received 4 tablets of Rosuvastatin 5 mg for "an entire month"  She is trying to take 2 tablets per week since her last visit with you  She is leaving for [place] this week  Can we process Rosuvastatin 1 tablet daily? Or should I try to get the pharmacy to fill an additional 4 pills now so she can have 8 tablets to take on her trip?

## 2018-04-30 NOTE — TELEPHONE ENCOUNTER
I called the pharmacy and explained and they will process an order for 4 more tablets  I called Lenny Pollard and advised

## 2018-06-26 ENCOUNTER — TELEPHONE (OUTPATIENT)
Dept: CARDIOLOGY CLINIC | Facility: CLINIC | Age: 83
End: 2018-06-26

## 2018-06-26 NOTE — TELEPHONE ENCOUNTER
Home health physical therapist visited pt for the first time today, and called to report Tiffany's HR is irregular  He said HR is 60  He was not aware pt has hx of a fib  She has no complaints    JORDAN

## 2018-06-28 ENCOUNTER — CLINICAL SUPPORT (OUTPATIENT)
Dept: CARDIOLOGY CLINIC | Facility: CLINIC | Age: 83
End: 2018-06-28
Payer: MEDICARE

## 2018-06-28 VITALS
SYSTOLIC BLOOD PRESSURE: 124 MMHG | HEIGHT: 62 IN | BODY MASS INDEX: 25.75 KG/M2 | DIASTOLIC BLOOD PRESSURE: 50 MMHG | WEIGHT: 139.9 LBS

## 2018-06-28 DIAGNOSIS — I48.91 ATRIAL FIBRILLATION, UNSPECIFIED TYPE (HCC): Primary | ICD-10-CM

## 2018-06-28 PROCEDURE — 93000 ELECTROCARDIOGRAM COMPLETE: CPT | Performed by: INTERNAL MEDICINE

## 2018-06-28 NOTE — TELEPHONE ENCOUNTER
I spoke to Franchesca  She has to get a ride  She will try to come in today or tomorrow and will call me back

## 2018-09-10 ENCOUNTER — OFFICE VISIT (OUTPATIENT)
Dept: CARDIOLOGY CLINIC | Facility: CLINIC | Age: 83
End: 2018-09-10
Payer: MEDICARE

## 2018-09-10 VITALS
HEIGHT: 62 IN | DIASTOLIC BLOOD PRESSURE: 56 MMHG | SYSTOLIC BLOOD PRESSURE: 142 MMHG | WEIGHT: 140 LBS | OXYGEN SATURATION: 96 % | HEART RATE: 64 BPM | BODY MASS INDEX: 25.76 KG/M2

## 2018-09-10 DIAGNOSIS — I65.23 OBSTRUCTION OF CAROTID ARTERY ON BOTH SIDES: ICD-10-CM

## 2018-09-10 DIAGNOSIS — I73.9 PERIPHERAL ARTERIAL DISEASE (HCC): ICD-10-CM

## 2018-09-10 DIAGNOSIS — I72.4 POPLITEAL ARTERY ANEURYSM (HCC): ICD-10-CM

## 2018-09-10 DIAGNOSIS — E78.01 FAMILIAL HYPERCHOLESTEREMIA: ICD-10-CM

## 2018-09-10 DIAGNOSIS — I25.10 CORONARY ARTERY DISEASE INVOLVING NATIVE CORONARY ARTERY OF NATIVE HEART WITHOUT ANGINA PECTORIS: ICD-10-CM

## 2018-09-10 DIAGNOSIS — I48.91 ATRIAL FIBRILLATION, UNSPECIFIED TYPE (HCC): Primary | ICD-10-CM

## 2018-09-10 DIAGNOSIS — I10 BENIGN ESSENTIAL HYPERTENSION: ICD-10-CM

## 2018-09-10 PROCEDURE — 93000 ELECTROCARDIOGRAM COMPLETE: CPT | Performed by: INTERNAL MEDICINE

## 2018-09-10 PROCEDURE — 99214 OFFICE O/P EST MOD 30 MIN: CPT | Performed by: INTERNAL MEDICINE

## 2018-09-10 RX ORDER — PREDNISONE 2.5 MG
2.5 TABLET ORAL DAILY
Refills: 1 | COMMUNITY
Start: 2018-07-10 | End: 2019-08-27 | Stop reason: SDUPTHER

## 2018-09-10 NOTE — PROGRESS NOTES
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     4  Coronary artery disease involving native coronary artery of native heart without angina pectoris  Lipid Panel with Direct LDL reflex   5  Peripheral arterial disease ([facility] )     6  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[facility] )     7  Familial hypercholesteremia         Discussion/Summary:   Overall she has been doing well  Coronary artery disease stable with no complaints of angina  She has good functional capacity for her age  Blood pressures been controlled  Lipids were significantly elevated in the beginning of the year she has been agreeable to Crestor 2 days a week will check lipid profile to see if we have made any progress  She is not due for any further cardiac testing  I will see her back in 8 months  Interval History:   22-year-old female with a history of coronary artery disease, hypertension, paroxysmal atrial fibrillation, peripheral arterial disease presents for routine scheduled follow-up visit  Denies any chest pain, shortness of breath, palpitations, lightheadedness, dizziness, or syncope  She remains fairly physically active around the house  There has been no lightheadedness dizziness or syncope  She uses a walker for ambulation  She is taking all medications as prescribed      Problem List     [facility] fibrillation ([facility] )    Benign essential hypertension    Coronary artery disease    Familial hypercholesteremia    Popliteal artery aneurysm [facility])    Carotid artery obstruction        Past Medical History:   Diagnosis Date    Atrial fibrillation (HCC)     Benign essential hypertension     CAD (coronary artery disease)     Carotid artery obstruction     Mesenteric ischemia, chronic (HCC)     TIA (transient ischemic attack)      Social History     Social History    Marital status:      Spouse name: N/A    Number of children: N/A    Years of education: N/A     Occupational History    Not on file       Social History Main Topics    Smoking status: Never Smoker    Smokeless tobacco: Never Used    Alcohol use No    Drug use: No    Sexual activity: Not on file     Other Topics Concern    Not on file     Social History Narrative    No narrative on file      Family History   Problem Relation Age of Onset    Leukemia Father     Hypertension Sister     Heart attack Brother 46    Hypertension Brother     Sudden death Brother 46        scd    Heart failure Maternal Grandmother     Hypertension Maternal Grandmother     Stroke Maternal Grandfather     Hypertension Daughter     Anuerysm Neg Hx     Clotting disorder Neg Hx     Arrhythmia Neg Hx     Hyperlipidemia Neg Hx      Past Surgical History:   Procedure Laterality Date    APPENDECTOMY      CATARACT EXTRACTION      CHOLECYSTECTOMY      CORONARY ANGIOPLASTY      stent x4 07/11/1996x1 10/09/2009 x3    HYSTERECTOMY      REPLACEMENT TOTAL KNEE Right        Current Outpatient Prescriptions:     acetaminophen (TYLENOL) 325 mg tablet, Take 650 mg by mouth every 8 (eight) hours as needed for mild pain  , Disp: , Rfl:     amLODIPine (NORVASC) 5 mg tablet, Take 1 tablet by mouth daily, Disp: , Rfl:     Ascorbic Acid (VITAMIN C) 1000 MG tablet, Take 1,000 mg by mouth daily, Disp: , Rfl:     aspirin 81 MG tablet, Take 162 mg by mouth daily  , Disp: , Rfl:     Calcium Citrate-Vitamin D (CALCIUM + D PO), Take 600 mg by mouth every other day  , Disp: , Rfl:     carvedilol (COREG) 12 5 mg tablet, Take 2 tablets by mouth 2 (two) times a day, Disp: , Rfl:     Cholecalciferol (VITAMIN D3) 1000 units CAPS, Take by mouth daily  , Disp: , Rfl:     cloNIDine (CATAPRES) 0 1 mg tablet, Take 1 tablet by mouth 4 (four) times a day, Disp: , Rfl:     Cranberry 500 MG CAPS, Take 1 capsule by mouth daily, Disp: , Rfl:     cyanocobalamin 1000 MCG tablet, Take by mouth daily  , Disp: , Rfl:     escitalopram (LEXAPRO) 10 mg tablet, Take 1 tablet by mouth daily, Disp: , Rfl:     famotidine (PEPCID) 20 mg tablet, Take 1 tablet by mouth every 12 (twelve) hours, Disp: , Rfl:     ferrous sulfate 325 (65 FE) MG EC tablet, Take 325 mg by mouth daily, Disp: , Rfl:     lisinopril (ZESTRIL) 40 mg tablet, Take 1 tablet by mouth daily, Disp: , Rfl:     potassium chloride (K-DUR,KLOR-CON) 10 mEq tablet, Take 3 tablets by mouth daily, Disp: , Rfl:     predniSONE 2 5 mg tablet, Take 2 5 mg by mouth daily Take one with a 5 mg pill to make it 7 5mg daily , Disp: , Rfl: 1    predniSONE 5 mg tablet, Take 7 5 mg by mouth daily  , Disp: , Rfl: 5    repaglinide (PRANDIN) 0 5 mg tablet, Take 0 5 mg by mouth 3 (three) times a day before meals  , Disp: , Rfl:     rosuvastatin (CRESTOR) 5 mg tablet, Take 1 tablet (5 mg total) by mouth once a week (Patient taking differently: Take 5 mg by mouth 2 (two) times a week  ), Disp: 4 tablet, Rfl: 10  Allergies   Allergen Reactions    Amoxicillin     Erythromycin     Ezetimibe     Fenofibrate     Flecainide     Lovastatin     Sulfa Antibiotics     Thioridazine        Labs:     Chemistry        Component Value Date/Time     01/09/2018 0842    K 4 6 01/09/2018 0842     02/27/2018 0933    CO2 27 02/27/2018 0933    BUN 22 02/27/2018 0933    CREATININE 1 07 (H) 02/27/2018 0933    CREATININE 1 05 (H) 01/09/2018 0842        Component Value Date/Time    CALCIUM 9 4 02/27/2018 0933    ALKPHOS 57 02/27/2018 0933    AST 15 01/09/2018 0842    ALT 18 01/09/2018 0842    BILITOT 0 4 01/09/2018 0842            No results found for: CHOL  Lab Results   Component Value Date    HDL 52 02/27/2018     No results found for: [facility]  Lab Results   Component Value Date TRIG 418 (H) 02/27/2018     No components found for: CHOLHDL    Imaging: No results found  ECG:  Sinus bradycardia incomplete right bundle      Review of Systems   Constitution: Negative  HENT: Negative  Eyes: Negative  Cardiovascular: Negative  Respiratory: Negative  Endocrine: Negative  Hematologic/Lymphatic: Negative  Skin: Negative  Musculoskeletal: Negative  Gastrointestinal: Negative  Genitourinary: Negative  Neurological: Negative  Psychiatric/Behavioral: Negative  Vitals:    09/10/18 1329   BP: 142/56   Pulse: 64   SpO2: 96%     Vitals:    09/10/18 1329   Weight: 63 5 kg (140 lb)     Height: 5' 2" (157 5 cm)   Body mass index is 25 61 kg/m²  Physical Exam:    Vital signs reviewed    General appearance:  Appears stated age, alert, well appearing and in no distress  HEENT:  PERRLA, EOMI, no scleral icterus, no conjunctival pallor  NECK:  Supple, No elevated JVP, no thyromegaly, no carotid bruits  HEART:  Regular rate and rhythm, normal S1/S2, no S3/S4, no murmur or rub  LUNGS:  Clear to auscultation bilaterally, no wheezes rales or rhonchi  ABDOMEN:  Soft, non-tender, positive bowel sounds, no rebound or guarding, no organomegaly   EXTREMITIES:  No edema, normal range of motion  VASCULAR:  Normal pedal pulses, good pulse volume   SKIN: No lesions or rashes on exposed skin  NEURO:  CN II-XII intact, no focal deficits

## 2018-09-19 LAB
CHOLEST SERPL-MCNC: 245 MG/DL
CHOLEST/HDLC SERPL: 4.2 (CALC)
HDLC SERPL-MCNC: 59 MG/DL
LDLC SERPL CALC-MCNC: 144 MG/DL (CALC)
NONHDLC SERPL-MCNC: 186 MG/DL (CALC)
TRIGL SERPL-MCNC: 297 MG/DL

## 2018-12-03 DIAGNOSIS — E78.5 HYPERLIPIDEMIA, UNSPECIFIED HYPERLIPIDEMIA TYPE: ICD-10-CM

## 2018-12-03 RX ORDER — ROSUVASTATIN CALCIUM 5 MG/1
TABLET, COATED ORAL
Qty: 8 TABLET | Refills: 0 | Status: SHIPPED | OUTPATIENT
Start: 2018-12-03 | End: 2019-01-08 | Stop reason: SDUPTHER

## 2019-01-08 DIAGNOSIS — E78.5 HYPERLIPIDEMIA, UNSPECIFIED HYPERLIPIDEMIA TYPE: ICD-10-CM

## 2019-01-08 RX ORDER — ROSUVASTATIN CALCIUM 5 MG/1
TABLET, COATED ORAL
Qty: 8 TABLET | Refills: 0 | Status: SHIPPED | OUTPATIENT
Start: 2019-01-08 | End: 2019-02-13 | Stop reason: SDUPTHER

## 2019-02-12 DIAGNOSIS — E78.5 HYPERLIPIDEMIA, UNSPECIFIED HYPERLIPIDEMIA TYPE: ICD-10-CM

## 2019-02-13 RX ORDER — ROSUVASTATIN CALCIUM 5 MG/1
TABLET, COATED ORAL
Qty: 8 TABLET | Refills: 0 | Status: SHIPPED | OUTPATIENT
Start: 2019-02-13 | End: 2019-03-25 | Stop reason: SDUPTHER

## 2019-03-25 DIAGNOSIS — E78.5 HYPERLIPIDEMIA, UNSPECIFIED HYPERLIPIDEMIA TYPE: ICD-10-CM

## 2019-03-25 RX ORDER — ROSUVASTATIN CALCIUM 5 MG/1
TABLET, COATED ORAL
Qty: 8 TABLET | Refills: 0 | Status: SHIPPED | OUTPATIENT
Start: 2019-03-25 | End: 2019-04-21 | Stop reason: SDUPTHER

## 2019-04-21 DIAGNOSIS — E78.5 HYPERLIPIDEMIA, UNSPECIFIED HYPERLIPIDEMIA TYPE: ICD-10-CM

## 2019-04-23 RX ORDER — ROSUVASTATIN CALCIUM 5 MG/1
TABLET, COATED ORAL
Qty: 8 TABLET | Refills: 0 | Status: SHIPPED | OUTPATIENT
Start: 2019-04-23 | End: 2019-07-01 | Stop reason: HOSPADM

## 2019-05-31 LAB
CREAT ?TM UR-SCNC: 126 UMOL/L
EXT MICROALBUMIN URINE RANDOM: 0.7
HBA1C MFR BLD HPLC: 6.3 %
MICROALBUMIN/CREAT UR: 6 MG/G{CREAT}

## 2019-06-25 ENCOUNTER — TELEPHONE (OUTPATIENT)
Dept: CARDIOLOGY CLINIC | Facility: CLINIC | Age: 84
End: 2019-06-25

## 2019-06-28 ENCOUNTER — HOSPITAL ENCOUNTER (INPATIENT)
Facility: HOSPITAL | Age: 84
LOS: 3 days | Discharge: HOME | DRG: 281 | End: 2019-07-01
Attending: EMERGENCY MEDICINE | Admitting: INTERNAL MEDICINE
Payer: MEDICARE

## 2019-06-28 ENCOUNTER — APPOINTMENT (EMERGENCY)
Dept: RADIOLOGY | Facility: HOSPITAL | Age: 84
DRG: 281 | End: 2019-06-28
Payer: MEDICARE

## 2019-06-28 DIAGNOSIS — I21.4 NSTEMI (NON-ST ELEVATED MYOCARDIAL INFARCTION) (HCC): Primary | ICD-10-CM

## 2019-06-28 DIAGNOSIS — I25.10 CORONARY ARTERY DISEASE INVOLVING NATIVE CORONARY ARTERY OF NATIVE HEART WITHOUT ANGINA PECTORIS: ICD-10-CM

## 2019-06-28 DIAGNOSIS — I21.4 NSTEMI (NON-ST ELEVATED MYOCARDIAL INFARCTION) (HCC): ICD-10-CM

## 2019-06-28 DIAGNOSIS — E78.5 HYPERLIPIDEMIA, UNSPECIFIED HYPERLIPIDEMIA TYPE: ICD-10-CM

## 2019-06-28 LAB
ALBUMIN SERPL BCP-MCNC: 3.4 G/DL (ref 3.5–5)
ALP SERPL-CCNC: 57 U/L (ref 46–116)
ALT SERPL W P-5'-P-CCNC: 21 U/L (ref 12–78)
ANION GAP SERPL CALCULATED.3IONS-SCNC: 12 MMOL/L (ref 4–13)
APTT PPP: 23 SECONDS (ref 23–37)
AST SERPL W P-5'-P-CCNC: 12 U/L (ref 5–45)
BASOPHILS # BLD AUTO: 0.02 THOUSANDS/ΜL (ref 0–0.1)
BASOPHILS NFR BLD AUTO: 0 % (ref 0–1)
BILIRUB SERPL-MCNC: 0.2 MG/DL (ref 0.2–1)
BUN SERPL-MCNC: 42 MG/DL (ref 5–25)
CALCIUM SERPL-MCNC: 8.4 MG/DL (ref 8.3–10.1)
CHLORIDE SERPL-SCNC: 104 MMOL/L (ref 100–108)
CO2 SERPL-SCNC: 21 MMOL/L (ref 21–32)
CREAT SERPL-MCNC: 1.47 MG/DL (ref 0.6–1.3)
EOSINOPHIL # BLD AUTO: 0 THOUSAND/ΜL (ref 0–0.61)
EOSINOPHIL NFR BLD AUTO: 0 % (ref 0–6)
ERYTHROCYTE [DISTWIDTH] IN BLOOD BY AUTOMATED COUNT: 13.3 % (ref 11.6–15.1)
GFR SERPL CREATININE-BSD FRML MDRD: 31 ML/MIN/1.73SQ M
GLUCOSE SERPL-MCNC: 185 MG/DL (ref 65–140)
HCT VFR BLD AUTO: 30.6 % (ref 34.8–46.1)
HGB BLD-MCNC: 10 G/DL (ref 11.5–15.4)
IMM GRANULOCYTES # BLD AUTO: 0.15 THOUSAND/UL (ref 0–0.2)
IMM GRANULOCYTES NFR BLD AUTO: 1 % (ref 0–2)
INR PPP: 1.08 (ref 0.84–1.19)
LYMPHOCYTES # BLD AUTO: 1.25 THOUSANDS/ΜL (ref 0.6–4.47)
LYMPHOCYTES NFR BLD AUTO: 8 % (ref 14–44)
MCH RBC QN AUTO: 32.1 PG (ref 26.8–34.3)
MCHC RBC AUTO-ENTMCNC: 32.7 G/DL (ref 31.4–37.4)
MCV RBC AUTO: 98 FL (ref 82–98)
MONOCYTES # BLD AUTO: 0.64 THOUSAND/ΜL (ref 0.17–1.22)
MONOCYTES NFR BLD AUTO: 4 % (ref 4–12)
NEUTROPHILS # BLD AUTO: 13 THOUSANDS/ΜL (ref 1.85–7.62)
NEUTS SEG NFR BLD AUTO: 87 % (ref 43–75)
NRBC BLD AUTO-RTO: 0 /100 WBCS
NT-PROBNP SERPL-MCNC: 3346 PG/ML
PLATELET # BLD AUTO: 234 THOUSANDS/UL (ref 149–390)
PMV BLD AUTO: 8.9 FL (ref 8.9–12.7)
POTASSIUM SERPL-SCNC: 4.5 MMOL/L (ref 3.5–5.3)
PROT SERPL-MCNC: 6.5 G/DL (ref 6.4–8.2)
PROTHROMBIN TIME: 13.4 SECONDS (ref 11.6–14.5)
RBC # BLD AUTO: 3.12 MILLION/UL (ref 3.81–5.12)
SODIUM SERPL-SCNC: 137 MMOL/L (ref 136–145)
TROPONIN I SERPL-MCNC: 0.65 NG/ML
TROPONIN I SERPL-MCNC: 0.92 NG/ML
WBC # BLD AUTO: 15.06 THOUSAND/UL (ref 4.31–10.16)

## 2019-06-28 PROCEDURE — 84484 ASSAY OF TROPONIN QUANT: CPT | Performed by: EMERGENCY MEDICINE

## 2019-06-28 PROCEDURE — 83880 ASSAY OF NATRIURETIC PEPTIDE: CPT | Performed by: INTERNAL MEDICINE

## 2019-06-28 PROCEDURE — 80053 COMPREHEN METABOLIC PANEL: CPT | Performed by: EMERGENCY MEDICINE

## 2019-06-28 PROCEDURE — 99285 EMERGENCY DEPT VISIT HI MDM: CPT

## 2019-06-28 PROCEDURE — 96375 TX/PRO/DX INJ NEW DRUG ADDON: CPT

## 2019-06-28 PROCEDURE — 93005 ELECTROCARDIOGRAM TRACING: CPT

## 2019-06-28 PROCEDURE — 84484 ASSAY OF TROPONIN QUANT: CPT | Performed by: INTERNAL MEDICINE

## 2019-06-28 PROCEDURE — 71046 X-RAY EXAM CHEST 2 VIEWS: CPT

## 2019-06-28 PROCEDURE — 96374 THER/PROPH/DIAG INJ IV PUSH: CPT

## 2019-06-28 PROCEDURE — 36415 COLL VENOUS BLD VENIPUNCTURE: CPT | Performed by: EMERGENCY MEDICINE

## 2019-06-28 PROCEDURE — 85610 PROTHROMBIN TIME: CPT | Performed by: EMERGENCY MEDICINE

## 2019-06-28 PROCEDURE — 85025 COMPLETE CBC W/AUTO DIFF WBC: CPT | Performed by: EMERGENCY MEDICINE

## 2019-06-28 PROCEDURE — 1123F ACP DISCUSS/DSCN MKR DOCD: CPT | Performed by: INTERNAL MEDICINE

## 2019-06-28 PROCEDURE — 99285 EMERGENCY DEPT VISIT HI MDM: CPT | Performed by: EMERGENCY MEDICINE

## 2019-06-28 PROCEDURE — 85730 THROMBOPLASTIN TIME PARTIAL: CPT | Performed by: EMERGENCY MEDICINE

## 2019-06-28 PROCEDURE — 99223 1ST HOSP IP/OBS HIGH 75: CPT | Performed by: INTERNAL MEDICINE

## 2019-06-28 RX ORDER — REPAGLINIDE 0.5 MG/1
0.5 TABLET ORAL
Status: DISCONTINUED | OUTPATIENT
Start: 2019-06-29 | End: 2019-07-01 | Stop reason: HOSPADM

## 2019-06-28 RX ORDER — LISINOPRIL 20 MG/1
40 TABLET ORAL DAILY
Status: DISCONTINUED | OUTPATIENT
Start: 2019-06-29 | End: 2019-06-28

## 2019-06-28 RX ORDER — PANTOPRAZOLE SODIUM 40 MG/1
40 TABLET, DELAYED RELEASE ORAL DAILY
COMMUNITY
End: 2020-11-30 | Stop reason: SDUPTHER

## 2019-06-28 RX ORDER — SODIUM CHLORIDE 9 MG/ML
50 INJECTION, SOLUTION INTRAVENOUS ONCE
Status: COMPLETED | OUTPATIENT
Start: 2019-06-28 | End: 2019-06-28

## 2019-06-28 RX ORDER — CARVEDILOL 12.5 MG/1
25 TABLET ORAL 2 TIMES DAILY
Status: DISCONTINUED | OUTPATIENT
Start: 2019-06-28 | End: 2019-07-01 | Stop reason: HOSPADM

## 2019-06-28 RX ORDER — ASPIRIN 81 MG/1
324 TABLET, CHEWABLE ORAL ONCE
Status: COMPLETED | OUTPATIENT
Start: 2019-06-28 | End: 2019-06-28

## 2019-06-28 RX ORDER — PANTOPRAZOLE SODIUM 40 MG/1
40 TABLET, DELAYED RELEASE ORAL
Status: DISCONTINUED | OUTPATIENT
Start: 2019-06-29 | End: 2019-07-01 | Stop reason: HOSPADM

## 2019-06-28 RX ORDER — ONDANSETRON 2 MG/ML
4 INJECTION INTRAMUSCULAR; INTRAVENOUS EVERY 6 HOURS PRN
Status: DISCONTINUED | OUTPATIENT
Start: 2019-06-28 | End: 2019-07-01 | Stop reason: HOSPADM

## 2019-06-28 RX ORDER — HEPARIN SODIUM 1000 [USP'U]/ML
3900 INJECTION, SOLUTION INTRAVENOUS; SUBCUTANEOUS ONCE
Status: COMPLETED | OUTPATIENT
Start: 2019-06-28 | End: 2019-06-28

## 2019-06-28 RX ORDER — MELATONIN
1000 DAILY
Status: DISCONTINUED | OUTPATIENT
Start: 2019-06-29 | End: 2019-07-01 | Stop reason: HOSPADM

## 2019-06-28 RX ORDER — ESCITALOPRAM OXALATE 10 MG/1
10 TABLET ORAL
Status: DISCONTINUED | OUTPATIENT
Start: 2019-06-29 | End: 2019-07-01 | Stop reason: HOSPADM

## 2019-06-28 RX ORDER — FAMOTIDINE 20 MG/1
20 TABLET, FILM COATED ORAL EVERY 12 HOURS
Status: DISCONTINUED | OUTPATIENT
Start: 2019-06-28 | End: 2019-07-01 | Stop reason: HOSPADM

## 2019-06-28 RX ORDER — ACETAMINOPHEN 325 MG/1
650 TABLET ORAL EVERY 8 HOURS PRN
Status: DISCONTINUED | OUTPATIENT
Start: 2019-06-28 | End: 2019-07-01 | Stop reason: HOSPADM

## 2019-06-28 RX ORDER — CHOLECALCIFEROL (VITAMIN D3) 125 MCG
1000 CAPSULE ORAL DAILY
Status: DISCONTINUED | OUTPATIENT
Start: 2019-06-29 | End: 2019-07-01 | Stop reason: HOSPADM

## 2019-06-28 RX ORDER — B-COMPLEX WITH VITAMIN C
1 TABLET ORAL EVERY OTHER DAY
Status: DISCONTINUED | OUTPATIENT
Start: 2019-06-28 | End: 2019-07-01 | Stop reason: HOSPADM

## 2019-06-28 RX ORDER — CLONIDINE HYDROCHLORIDE 0.1 MG/1
0.1 TABLET ORAL 4 TIMES DAILY
Status: DISCONTINUED | OUTPATIENT
Start: 2019-06-28 | End: 2019-07-01 | Stop reason: HOSPADM

## 2019-06-28 RX ORDER — PRAVASTATIN SODIUM 40 MG
40 TABLET ORAL
Status: DISCONTINUED | OUTPATIENT
Start: 2019-07-02 | End: 2019-06-30

## 2019-06-28 RX ORDER — AMLODIPINE BESYLATE 5 MG/1
5 TABLET ORAL DAILY
Status: DISCONTINUED | OUTPATIENT
Start: 2019-06-29 | End: 2019-07-01 | Stop reason: HOSPADM

## 2019-06-28 RX ORDER — ASPIRIN 81 MG/1
162 TABLET, CHEWABLE ORAL DAILY
Status: DISCONTINUED | OUTPATIENT
Start: 2019-06-29 | End: 2019-07-01 | Stop reason: HOSPADM

## 2019-06-28 RX ORDER — ASCORBIC ACID 500 MG
1000 TABLET ORAL DAILY
Status: DISCONTINUED | OUTPATIENT
Start: 2019-06-29 | End: 2019-07-01 | Stop reason: HOSPADM

## 2019-06-28 RX ORDER — HEPARIN SODIUM 10000 [USP'U]/100ML
3-20 INJECTION, SOLUTION INTRAVENOUS
Status: DISCONTINUED | OUTPATIENT
Start: 2019-06-28 | End: 2019-06-30

## 2019-06-28 RX ADMIN — FAMOTIDINE 20 MG: 20 TABLET ORAL at 21:52

## 2019-06-28 RX ADMIN — OYSTER SHELL CALCIUM WITH VITAMIN D 1 TABLET: 500; 200 TABLET, FILM COATED ORAL at 21:48

## 2019-06-28 RX ADMIN — SODIUM CHLORIDE 50 ML/HR: 0.9 INJECTION, SOLUTION INTRAVENOUS at 21:52

## 2019-06-28 RX ADMIN — ASPIRIN 81 MG 324 MG: 81 TABLET ORAL at 17:47

## 2019-06-28 RX ADMIN — CLONIDINE HYDROCHLORIDE 0.1 MG: 0.1 TABLET ORAL at 21:52

## 2019-06-28 RX ADMIN — HEPARIN SODIUM 12 UNITS/KG/HR: 10000 INJECTION, SOLUTION INTRAVENOUS at 17:19

## 2019-06-28 RX ADMIN — HEPARIN SODIUM 3900 UNITS: 1000 INJECTION INTRAVENOUS; SUBCUTANEOUS at 17:19

## 2019-06-28 RX ADMIN — CARVEDILOL 25 MG: 12.5 TABLET, FILM COATED ORAL at 21:48

## 2019-06-28 NOTE — ED NOTES
Patient transported to room 301 via misty (RN)  Patient placed on telemetry and confirmed on central station   Attending RN notified of arrival       Gabriel Height  06/28/19 [id number]

## 2019-06-28 NOTE — ASSESSMENT & PLAN NOTE
· Anticoagulation - heparin drip  · Antiplatelet - aspirin given x4 in the emergency department  Patient is normal on aspirin 162 mg daily  · Beta Blocker - carvedilol 25 mg b i d  Dexter Glass · Statin - pravastatin substitution for Crestor  · Nitrate - nitroglycerin paste  · Pain Control - low-dose morphine if severe pain  · Testing -   · Trend troponins  · Determination by cardiology whether to go for cardiac catheterization  With rising troponin, patient will be NPO after midnight to allow for potential cardiac catheterization tomorrow  · Echo can be ordered but unlikely to be done till Sunday  · Telemetry monitoring  · Cardiology consultation being requested

## 2019-06-28 NOTE — ASSESSMENT & PLAN NOTE
· Continue current medication regimen  · Monitor blood pressures  Need to make sure we avoid hypotension as she has significant vascular disease (PAD, carotid, etc)

## 2019-06-28 NOTE — H&P
H&P- Rosanna Parry 4/6/1930, 80 y o  female MRN: [id number]    Unit/Bed#: -01 Encounter: [id number]    Primary Care Provider: Vanessa Issa MD   Date and time admitted to hospital: 6/28/2019  3:45 PM        * NSTEMI (non-ST elevated myocardial infarction) with known CAD [facility])  Assessment & Plan  · Anticoagulation - heparin drip  · Antiplatelet - aspirin given x4 in the emergency department  Patient is normal on aspirin 162 mg daily  · Beta Blocker - carvedilol 25 mg b i d  Karthik Ellyn · Statin - pravastatin substitution for Crestor  · Nitrate - nitroglycerin paste  · Pain Control - low-dose morphine if severe pain  · Testing -   · Trend troponins  · Determination by cardiology whether to go for cardiac catheterization  With rising troponin, patient will be NPO after midnight to allow for potential cardiac catheterization tomorrow  · Echo can be ordered but unlikely to be done till Sunday  · Telemetry monitoring  · Cardiology consultation being requested  Carotid artery obstruction  Assessment & Plan  · Monitor  Avoid letting blood pressures go too low  Familial hypercholesteremia  Assessment & Plan  · Statin therapy  · Check lipid profile  Benign essential hypertension  Assessment & Plan  · Continue current medication regimen  · Monitor blood pressures  Need to make sure we avoid hypotension as she has significant vascular disease (PAD, carotid, etc)  Atrial fibrillation (HCC)  Assessment & Plan  · Rate / Rhythm Control -   · Carvedilol  · Patient religiously takes her potassium 3 times a day stating that if she misses any potassium doses she goes into atrial fibrillation  · Anticoagulation - Will be on heparin drip currently, as noted above      VTE Prophylaxis: Heparin Drip  / sequential compression device   Code Status:  DNR, level 3  POLST: There is no POLST form on file for this patient (pre-hospital)    Anticipated Length of Stay:  Patient will be admitted on an Inpatient basis with an anticipated length of stay of  > 2 midnights  Justification for Hospital Stay: Evaluation and treatment for suspected NSTEMI in patient with known CAD  Total Time for Visit, including Counseling / Coordination of Care: 45 minutes  Greater than 50% of this total time spent on direct patient counseling and coordination of care  Chief Complaint:   Chest pain    History of Present Illness:    Marilyn Gold is a 80 y o  female who presents with chest pain that has been going on off and on for the last 1 week but became more constant in the last 24 hours  The patient stated that she initially thought it was indigestion as she would get a funny feeling in her neck when she would eat  The patient stated that this would only occur occasionally  The patient was recently prescribed Protonix in addition to her famotidine that she was already on  However she has not noticed any significant improvement with the Protonix  The patient stated that last night the pain became more constant to the point that she was even unable to get into a comfortable position  At 1 point the patient was even crying in pain but did not want to bother her daughter who needed to get up early to help her sister move today  The patient stated that the pain was in the right arm and under the right breast and radiated up into the neck  There is some increasing belching which she stated was awful  The patient states that the pain was persistent even in the emergency department  In the emergency department the patient's troponin was noted to be 0 65 and she was started on a heparin drip  The patient was also given 4 baby aspirin as well  The patient states that currently her chest pain is completely gone  The patient states that yesterday she had a cortisone injection in the left arm due to her bad rotator cuff  The patient denies any dyspnea    She does have some mild edema but she states that this is chronic for her and unchanged recently  She denies any palpitations or dizziness  Review of Systems:    Review of Systems   Constitutional: Negative  Negative for appetite change, chills, diaphoresis and fever  HENT: Negative  Negative for congestion, nosebleeds, rhinorrhea, sinus pressure, sinus pain and sore throat  Eyes: Negative  Respiratory: Negative for cough, shortness of breath and wheezing  Cardiovascular: Positive for chest pain and leg swelling (Chronic and unchanged from baseline)  Negative for palpitations  Gastrointestinal: Negative for abdominal distention, abdominal pain, constipation, diarrhea, nausea and vomiting  Increased belching   Endocrine: Negative  Genitourinary: Negative for decreased urine volume, dysuria, flank pain and frequency  Musculoskeletal: Positive for arthralgias ( chronic in all my joints)  Negative for back pain, myalgias and neck pain  Skin: Negative  Negative for rash  Allergic/Immunologic: Negative  Neurological: Negative for dizziness, syncope, light-headedness and headaches  Hematological: Negative  Psychiatric/Behavioral: Negative  All other systems reviewed and are negative  Past Medical and Surgical History:     Past Medical History:   Diagnosis Date    Atrial fibrillation ([facility] [address] )     Benign essential hypertension     CAD (coronary artery disease)     Carotid artery obstruction     Mesenteric ischemia, chronic (HCC)     TIA (transient ischemic attack)        Past Surgical History:   Procedure Laterality Date    APPENDECTOMY      CATARACT EXTRACTION      CHOLECYSTECTOMY      CORONARY ANGIOPLASTY      stent x4 07/11/1996x1 10/09/2009 x3    HYSTERECTOMY      REPLACEMENT TOTAL KNEE Right        Meds/Allergies:    Prior to Admission medications    Medication Sig Start Date End Date Taking?  Authorizing Provider   acetaminophen (TYLENOL) 325 mg tablet Take 650 mg by mouth every 8 (eight) hours as needed for mild pain     Yes Historical Provider, MD   amLODIPine (NORVASC) 5 mg tablet Take 1 tablet by mouth daily 12/14/17  Yes Historical Provider, MD   Ascorbic Acid (VITAMIN C) 1000 MG tablet Take 1,000 mg by mouth daily   Yes Historical Provider, MD   aspirin 81 MG tablet Take 81 mg by mouth 2 (two) times a week    Yes Historical Provider, MD   Calcium Citrate-Vitamin D (CALCIUM + D PO) Take 600 mg by mouth every other day     Yes Historical Provider, MD   carvedilol (COREG) 12 5 mg tablet Take 2 tablets by mouth 2 (two) times a day   Yes Historical Provider, MD   Cholecalciferol (VITAMIN D3) 1000 units CAPS Take by mouth daily     Yes Historical Provider, MD   cloNIDine (CATAPRES) 0 1 mg tablet Take 1 tablet by mouth every 12 (twelve) hours    Yes Historical Provider, MD   Cranberry 500 MG CAPS Take 1 capsule by mouth daily   Yes Historical Provider, MD   famotidine (PEPCID) 20 mg tablet Take 1 tablet by mouth every 12 (twelve) hours   Yes Historical Provider, MD   ferrous sulfate 325 (65 FE) MG EC tablet Take 325 mg by mouth daily   Yes Historical Provider, MD   lisinopril (ZESTRIL) 40 mg tablet Take 1 tablet by mouth daily   Yes Historical Provider, MD   pantoprazole (PROTONIX) 40 mg tablet Take 40 mg by mouth daily   Yes Historical Provider, MD   potassium chloride (K-DUR,KLOR-CON) 10 mEq tablet Take 3 tablets by mouth daily   Yes Historical Provider, MD   predniSONE 2 5 mg tablet Take 2 5 mg by mouth daily Take one with a 5 mg pill to make it 7 5mg daily  7/10/18  Yes Historical Provider, MD   predniSONE 5 mg tablet Take 7 5 mg by mouth daily   2/7/18  Yes Historical Provider, MD   repaglinide (PRANDIN) 0 5 mg tablet Take 0 5 mg by mouth 3 (three) times a day before meals     Yes Historical Provider, MD   rosuvastatin (CRESTOR) 5 mg tablet Take 1 tablet (5 mg total) by mouth once a week  Patient taking differently: Take 5 mg by mouth 2 (two) times a week   4/26/18  Yes Andrew Hull DO   cyanocobalamin 1000 MCG tablet Take by mouth daily      Historical Provider, MD   escitalopram (LEXAPRO) 10 mg tablet Take 1 tablet by mouth daily 12/14/17   Historical Provider, MD   rosuvastatin (CRESTOR) 5 mg tablet TAKE 2 TABLETS BY MOUTH WEEKLY AS DIRECTED 4/23/19   Trenton Bhatt DO     I have reviewed home medications with patient personally  Family also participated in medication review    Allergies: Allergies   Allergen Reactions    Adhesive [Medical Tape]      Paper tape okay    Amoxicillin     Erythromycin     Ezetimibe     Fenofibrate     Flecainide     Lovastatin     Sulfa Antibiotics     Thioridazine        Social History:     Marital Status:      Substance Use History:   Social History     Substance and Sexual Activity   Alcohol Use No     Social History     Tobacco Use   Smoking Status Never Smoker   Smokeless Tobacco Never Used     Social History     Substance and Sexual Activity   Drug Use No       Family History:    Family History   Problem Relation Age of Onset    Leukemia Father     Hypertension Sister     Heart attack Brother 46    Hypertension Brother     Sudden death Brother 46        scd    Heart failure Maternal Grandmother     Hypertension Maternal Grandmother     Stroke Maternal Grandfather     Hypertension Daughter     Anuerysm Neg Hx     Clotting disorder Neg Hx     Arrhythmia Neg Hx     Hyperlipidemia Neg Hx        Physical Exam:     Vitals:   Blood Pressure: 157/64 (06/28/19 1949)  Pulse: (!) 51 (06/28/19 1949)  Temperature: 97 5 °F (36 4 °C) (06/28/19 1949)  Temp Source: Oral (06/28/19 1949)  Respirations: 18 (06/28/19 1949)  Weight - Scale: 65 6 kg (144 lb 10 oz) (06/28/19 1550)  SpO2: 95 % (06/28/19 1949)    Physical Exam   Constitutional: She is oriented to person, place, and time  No distress  HENT:   Mouth/Throat: Oropharynx is clear and moist  No oropharyngeal exudate  Eyes: Pupils are equal, round, and reactive to light  Neck: Neck supple  No JVD present   No tracheal deviation present  Cardiovascular: Normal rate and regular rhythm  No murmur heard  Pulmonary/Chest: Effort normal  No stridor  She has no wheezes  She has rales (Bilateral lower lung fields and bases, mild)  Musculoskeletal: She exhibits edema ( mild bilaterally symmetric lower extremity edema)  She exhibits no tenderness  Lymphadenopathy:     She has no cervical adenopathy  Neurological: She is alert and oriented to person, place, and time  No cranial nerve deficit or sensory deficit  She exhibits normal muscle tone  Skin: Skin is warm and dry  No rash noted  She is not diaphoretic  No pallor  Psychiatric: She has a normal mood and affect  Vitals reviewed  Additional Data:     Lab Results: I have personally reviewed pertinent reports  Results from last 7 days   Lab Units 06/28/19  1615   WBC Thousand/uL 15 06*   HEMOGLOBIN g/dL 10 0*   HEMATOCRIT % 30 6*   PLATELETS Thousands/uL 234   NEUTROS PCT % 87*   LYMPHS PCT % 8*   MONOS PCT % 4   EOS PCT % 0     Results from last 7 days   Lab Units 06/28/19  1615   POTASSIUM mmol/L 4 5   CHLORIDE mmol/L 104   CO2 mmol/L 21   BUN mg/dL 42*   CREATININE mg/dL 1 47*   CALCIUM mg/dL 8 4   ALK PHOS U/L 57   ALT U/L 21   AST U/L 12     Results from last 7 days   Lab Units 06/28/19  1656   INR  1 08       Imaging: I have personally reviewed pertinent reports  Xr Chest 2 Views    Result Date: 6/28/2019  Narrative: CHEST INDICATION:   chest pain  COMPARISON:  None EXAM PERFORMED/VIEWS:  XR CHEST PA & LATERAL FINDINGS: Cardiomegaly  The lungs are clear  No pneumothorax or pleural effusion  Osseous structures appear within normal limits for patient age  Impression: Cardiomegaly  No acute cardiopulmonary disease  Workstation performed: [id number]       EKG, Pathology, and Other Studies Reviewed on Admission:   · EKG:  Rate of 60 beats per minute  No significant ST or T-wave abnormalities to suggest any acute ischemic disease currently      Allscripts / [facility] Records Reviewed: Yes     ** Please Note: This note has been constructed using a voice recognition system   **

## 2019-06-28 NOTE — ASSESSMENT & PLAN NOTE
· Rate / Rhythm Control -   · Carvedilol  · Patient religiously takes her potassium 3 times a day stating that if she misses any potassium doses she goes into atrial fibrillation  · Anticoagulation - Will be on heparin drip currently, as noted above

## 2019-06-28 NOTE — ED PROVIDER NOTES
History  Chief Complaint   Patient presents with    Chest Pain     Pt presents to ED from home c/o intermittent "indigestion" for the last week  Worsened in last 24 hours  Pt states pain is at base of throat and is not resolved unless she is able to burp  Episodes last 30 minutes or more  Additionally, pt states she had intermittent RIGHT breast pain and SOB during events   Heartburn     80 y o  Female presents with chief complaint of substernal pain which she describes as a pressure with radiation to her left armpit and right chest   Patient reports she has had this off and on for the past 2 weeks  She was seen by pcp who changed her from famotidine to pantaprazole without improvement  Patient reports she had nausea and vomiting last night with shortness of breath as well during an episode of this pain  Patient reports she has a cad history with 4 stents  Patient reports that belching relieves the discomfort  History provided by:  Patient   used: No    Chest Pain   Pain location:  Substernal area  Pain quality: aching and pressure    Radiates to: right chest and left armpit  Pain radiates to the back: yes    Pain severity:  Moderate  Onset quality:  Sudden  Duration:  1 week  Timing:  Intermittent  Progression:  Waxing and waning  Chronicity:  New  Context: at rest    Relieved by: belching  Worsened by:  Nothing tried  Ineffective treatments:  None tried  Associated symptoms: nausea, shortness of breath and vomiting    Associated symptoms: no abdominal pain, no diaphoresis, no fever and no palpitations    Risk factors: coronary artery disease, high cholesterol and hypertension    Heartburn   Associated symptoms: chest pain, nausea, shortness of breath and vomiting    Associated symptoms: no abdominal pain, no diarrhea, no fever and no rash        Prior to Admission Medications   Prescriptions Last Dose Informant Patient Reported? Taking?    Ascorbic Acid (VITAMIN C) 1000 MG tablet Self Yes No   Sig: Take 1,000 mg by mouth daily   Calcium Citrate-Vitamin D (CALCIUM + D PO)  Self Yes No   Sig: Take 600 mg by mouth every other day     Cholecalciferol (VITAMIN D3) 1000 units CAPS  Self Yes No   Sig: Take by mouth daily     Cranberry 500 MG CAPS  Self Yes No   Sig: Take 1 capsule by mouth daily   acetaminophen (TYLENOL) 325 mg tablet  Self Yes No   Sig: Take 650 mg by mouth every 8 (eight) hours as needed for mild pain     amLODIPine (NORVASC) 5 mg tablet  Self Yes No   Sig: Take 1 tablet by mouth daily   aspirin 81 MG tablet  Self Yes No   Sig: Take 162 mg by mouth daily     carvedilol (COREG) 12 5 mg tablet  Self Yes No   Sig: Take 2 tablets by mouth 2 (two) times a day   cloNIDine (CATAPRES) 0 1 mg tablet  Self Yes No   Sig: Take 1 tablet by mouth 4 (four) times a day   cyanocobalamin 1000 MCG tablet  Self Yes No   Sig: Take by mouth daily     escitalopram (LEXAPRO) 10 mg tablet  Self Yes No   Sig: Take 1 tablet by mouth daily   famotidine (PEPCID) 20 mg tablet  Self Yes No   Sig: Take 1 tablet by mouth every 12 (twelve) hours   ferrous sulfate 325 (65 FE) MG EC tablet  Self Yes No   Sig: Take 325 mg by mouth daily   lisinopril (ZESTRIL) 40 mg tablet  Self Yes No   Sig: Take 1 tablet by mouth daily   potassium chloride (K-DUR,KLOR-CON) 10 mEq tablet  Self Yes No   Sig: Take 3 tablets by mouth daily   predniSONE 2 5 mg tablet  Self Yes No   Sig: Take 2 5 mg by mouth daily Take one with a 5 mg pill to make it 7 5mg daily    predniSONE 5 mg tablet  Self Yes No   Sig: Take 7 5 mg by mouth daily     repaglinide (PRANDIN) 0 5 mg tablet  Self Yes No   Sig: Take 0 5 mg by mouth 3 (three) times a day before meals     rosuvastatin (CRESTOR) 5 mg tablet  Self No No   Sig: Take 1 tablet (5 mg total) by mouth once a week   Patient taking differently: Take 5 mg by mouth 2 (two) times a week     rosuvastatin (CRESTOR) 5 mg tablet   No No   Sig: TAKE 2 TABLETS BY MOUTH WEEKLY AS DIRECTED Facility-Administered Medications: None       Past Medical History:   Diagnosis Date    Atrial fibrillation (HCC)     Benign essential hypertension     CAD (coronary artery disease)     Carotid artery obstruction     Mesenteric ischemia, chronic (HCC)     TIA (transient ischemic attack)        Past Surgical History:   Procedure Laterality Date    APPENDECTOMY      CATARACT EXTRACTION      CHOLECYSTECTOMY      CORONARY ANGIOPLASTY      stent x4 07/11/1996x1 10/09/2009 x3    HYSTERECTOMY      REPLACEMENT TOTAL KNEE Right        Family History   Problem Relation Age of Onset    Leukemia Father     Hypertension Sister     Heart attack Brother 46    Hypertension Brother     Sudden death Brother 46        scd    Heart failure Maternal Grandmother     Hypertension Maternal Grandmother     Stroke Maternal Grandfather     Hypertension Daughter     Anuerysm Neg Hx     Clotting disorder Neg Hx     Arrhythmia Neg Hx     Hyperlipidemia Neg Hx      I have reviewed and agree with the history as documented  Social History     Tobacco Use    Smoking status: Never Smoker    Smokeless tobacco: Never Used   Substance Use Topics    Alcohol use: No    Drug use: No        Review of Systems   Constitutional: Negative for chills, diaphoresis and fever  Respiratory: Positive for shortness of breath  Cardiovascular: Positive for chest pain  Negative for palpitations  Gastrointestinal: Positive for nausea and vomiting  Negative for abdominal pain and diarrhea  Genitourinary: Negative for dysuria and frequency  Skin: Negative for rash  All other systems reviewed and are negative  Physical Exam  Physical Exam   Constitutional: She is oriented to person, place, and time  She appears well-developed and well-nourished  She appears distressed (mild)  HENT:   Head: Normocephalic and atraumatic  Eyes: Pupils are equal, round, and reactive to light  EOM are normal    Neck: Normal range of motion  No JVD present  Cardiovascular: Normal rate, regular rhythm, normal heart sounds and intact distal pulses  Exam reveals no gallop and no friction rub  No murmur heard  Pulmonary/Chest: Effort normal and breath sounds normal  No respiratory distress  She has no wheezes  She has no rales  She exhibits no tenderness  Musculoskeletal: Normal range of motion  She exhibits no tenderness  Neurological: She is alert and oriented to person, place, and time  Skin: Skin is warm and dry  Psychiatric: She has a normal mood and affect  Her behavior is normal  Judgment and thought content normal    Nursing note and vitals reviewed        Vital Signs  ED Triage Vitals   Temperature Pulse Respirations Blood Pressure SpO2   06/28/19 1550 06/28/19 1550 06/28/19 1548 06/28/19 1548 06/28/19 1550   97 8 °F (36 6 °C) 63 18 153/65 95 %      Temp Source Heart Rate Source Patient Position - Orthostatic VS BP Location FiO2 (%)   06/28/19 1550 06/28/19 1548 06/28/19 1548 06/28/19 1548 --   Oral Monitor Sitting Right arm       Pain Score       06/28/19 1548       No Pain           Vitals:    06/28/19 1548 06/28/19 1550   BP: 153/65    Pulse:  63   Patient Position - Orthostatic VS: Sitting          Visual Acuity      ED Medications  Medications   heparin (porcine) 25,000 units in 250 mL infusion (premix) (12 Units/kg/hr × 65 kg (Order-Specific) Intravenous New Bag 6/28/19 1719)   aspirin chewable tablet 324 mg (has no administration in time range)   heparin (porcine) injection 3,900 Units (3,900 Units Intravenous Given 6/28/19 1719)       Diagnostic Studies  Results Reviewed     Procedure Component Value Units Date/Time    APTT six (6) hours after Heparin bolus/drip initiation or dosing change [[id number]]  (Normal) Collected:  06/28/19 1656    Lab Status:  Final result Specimen:  Blood from Arm, Right Updated:  06/28/19 1714     PTT 23 seconds     Protime-INR [[id number]]  (Normal) Collected:  06/28/19 1656    Lab Status:  Final result Specimen:  Blood from Arm, Right Updated:  06/28/19 1714     Protime 13 4 seconds      INR 1 08    Troponin I [[id number]]  (Abnormal) Collected:  06/28/19 1615    Lab Status:  Final result Specimen:  Blood from Arm, Right Updated:  06/28/19 1641     Troponin I 0 65 ng/mL     Comprehensive metabolic panel [[id number]]  (Abnormal) Collected:  06/28/19 1615    Lab Status:  Final result Specimen:  Blood from Arm, Right Updated:  06/28/19 1639     Sodium 137 mmol/L      Potassium 4 5 mmol/L      Chloride 104 mmol/L      CO2 21 mmol/L      ANION GAP 12 mmol/L      BUN 42 mg/dL      Creatinine 1 47 mg/dL      Glucose 185 mg/dL      Calcium 8 4 mg/dL      AST 12 U/L      ALT 21 U/L      Alkaline Phosphatase 57 U/L      Total Protein 6 5 g/dL      Albumin 3 4 g/dL      Total Bilirubin 0 20 mg/dL      eGFR 31 ml/min/1 73sq m     Narrative:       National Kid[employer] Disease Foundation guidelines for Chronic Kidney Disease (CKD):     Stage 1 with normal or high GFR (GFR > 90 mL/min/1 73 square meters)    Stage 2 Mild CKD (GFR = 60-89 mL/min/1 73 square meters)    Stage 3A Moderate CKD (GFR = 45-59 mL/min/1 73 square meters)    Stage 3B Moderate CKD (GFR = 30-44 mL/min/1 73 square meters)    Stage 4 Severe CKD (GFR = 15-29 mL/min/1 73 square meters)    Stage 5 End Stage CKD (GFR <15 mL/min/1 73 square meters)  Note: GFR calculation is accurate only with a steady state creatinine    CBC and differential [[id number]]  (Abnormal) Collected:  06/28/19 1615    Lab Status:  Final result Specimen:  Blood from Arm, Right Updated:  06/28/19 1623     WBC 15 06 Thousand/uL      RBC 3 12 Million/uL      Hemoglobin 10 0 g/dL      Hematocrit 30 6 %      MCV 98 fL      MCH 32 1 pg      MCHC 32 7 g/dL      RDW 13 3 %      MPV 8 9 fL      Platelets 935 Thousands/uL      nRBC 0 /100 WBCs      Neutrophils Relative 87 %      Immat GRANS % 1 %      Lymphocytes Relative 8 %      Monocytes Relative 4 %      Eosinophils Relative 0 % Basophils Relative 0 %      Neutrophils Absolute 13 00 Thousands/µL      Immature Grans Absolute 0 15 Thousand/uL      Lymphocytes Absolute 1 25 Thousands/µL      Monocytes Absolute 0 64 Thousand/µL      Eosinophils Absolute 0 00 Thousand/µL      Basophils Absolute 0 02 Thousands/µL                  XR chest 2 views   ED Interpretation by Denise England MD (06/28 5938)   This film was interpreted independently by me  Cardiomegaly  OTW no infiltrate, effusion, edema  Final Result by Chadd Sheikh MD (06/28 7029)      Cardiomegaly  No acute cardiopulmonary disease              Workstation performed: [id number]                    Procedures  ECG 12 Lead Documentation Only  Date/Time: 6/28/2019 4:05 PM  Performed by: Denise England MD  Authorized by: Denise England MD     Indications / Diagnosis:  Chest pain  Patient location:  ED  Previous ECG:     Previous ECG:  Compared to current    Comparison ECG info:  6/28/2018    Similarity:  No change  Interpretation:     Interpretation: non-specific    Rate:     ECG rate:  60    ECG rate assessment: normal    Rhythm:     Rhythm: sinus rhythm    Ectopy:     Ectopy: PAC    Conduction:     Conduction: abnormal      Abnormal conduction: complete RBBB    ST segments:     ST segments:  Non-specific  T waves:     T waves: normal      CriticalCare Time  Performed by: Denise England MD  Authorized by: Denise England MD     Critical care provider statement:     Critical care time (minutes):  45    Critical care was necessary to treat or prevent imminent or life-threatening deterioration of the following conditions:  Cardiac failure    Critical care was time spent personally by me on the following activities:  Obtaining history from patient or surrogate, development of treatment plan with patient or surrogate, evaluation of patient's response to treatment, examination of patient, interpretation of cardiac output measurements, ordering and performing treatments and interventions, ordering and review of laboratory studies, ordering and review of radiographic studies, re-evaluation of patient's condition and review of old charts           ED Course                               MDM  Number of Diagnoses or Management Options  NSTEMI (non-ST elevated myocardial infarction) [facility]): new and requires workup  Diagnosis management comments: Background: 80 y o  female with chest pain    Differential DX includes but is not limited to: acs/mi, pe, pleurisy, dissection, pneumonia, musculoskeletal chest pain    Plan: cardiac workup         Amount and/or Complexity of Data Reviewed  Clinical lab tests: ordered and reviewed    Risk of Complications, Morbidity, and/or Mortality  Presenting problems: high  Diagnostic procedures: high  Management options: high    Patient Progress  Patient progress: stable      Disposition  Final diagnoses:   NSTEMI (non-ST elevated myocardial infarction) ([facility] [address] )     Time reflects when diagnosis was documented in both MDM as applicable and the Disposition within this note     Time User Action Codes Description Comment    6/28/2019  4:57 PM Gabrielle ROSADO Add [I21 4] NSTEMI (non-ST elevated myocardial infarction) [facility])       ED Disposition     ED Disposition Condition Date/Time Comment    Admit Stable Fri Jun 28, 2019  5:23 PM Case was discussed with Dr Juan Abreu and the patient's admission status was agreed to be Admission Status: inpatient status to the service of Dr Juan Abreu   Follow-up Information    None         Patient's Medications   Discharge Prescriptions    No medications on file     No discharge procedures on file      ED Provider  Electronically Signed by           Patrick Colon MD  06/28/19 9969

## 2019-06-29 PROBLEM — D72.829 LEUKOCYTOSIS: Status: ACTIVE | Noted: 2019-06-29

## 2019-06-29 LAB
ANION GAP SERPL CALCULATED.3IONS-SCNC: 11 MMOL/L (ref 4–13)
APTT PPP: 116 SECONDS (ref 23–37)
APTT PPP: 52 SECONDS (ref 23–37)
APTT PPP: 61 SECONDS (ref 23–37)
APTT PPP: 69 SECONDS (ref 23–37)
ATRIAL RATE: 54 BPM
ATRIAL RATE: 63 BPM
BASOPHILS # BLD AUTO: 0.01 THOUSANDS/ΜL (ref 0–0.1)
BASOPHILS NFR BLD AUTO: 0 % (ref 0–1)
BUN SERPL-MCNC: 39 MG/DL (ref 5–25)
CALCIUM SERPL-MCNC: 8.5 MG/DL (ref 8.3–10.1)
CHLORIDE SERPL-SCNC: 106 MMOL/L (ref 100–108)
CHOLEST SERPL-MCNC: 217 MG/DL (ref 50–200)
CO2 SERPL-SCNC: 24 MMOL/L (ref 21–32)
CREAT SERPL-MCNC: 1.23 MG/DL (ref 0.6–1.3)
EOSINOPHIL # BLD AUTO: 0 THOUSAND/ΜL (ref 0–0.61)
EOSINOPHIL NFR BLD AUTO: 0 % (ref 0–6)
ERYTHROCYTE [DISTWIDTH] IN BLOOD BY AUTOMATED COUNT: 13.4 % (ref 11.6–15.1)
EST. AVERAGE GLUCOSE BLD GHB EST-MCNC: 140 MG/DL
GFR SERPL CREATININE-BSD FRML MDRD: 39 ML/MIN/1.73SQ M
GLUCOSE SERPL-MCNC: 167 MG/DL (ref 65–140)
HBA1C MFR BLD: 6.5 % (ref 4.2–6.3)
HCT VFR BLD AUTO: 31 % (ref 34.8–46.1)
HDLC SERPL-MCNC: 64 MG/DL (ref 40–60)
HGB BLD-MCNC: 10 G/DL (ref 11.5–15.4)
IMM GRANULOCYTES # BLD AUTO: 0.12 THOUSAND/UL (ref 0–0.2)
IMM GRANULOCYTES NFR BLD AUTO: 1 % (ref 0–2)
LDLC SERPL CALC-MCNC: 126 MG/DL (ref 0–100)
LYMPHOCYTES # BLD AUTO: 1.34 THOUSANDS/ΜL (ref 0.6–4.47)
LYMPHOCYTES NFR BLD AUTO: 10 % (ref 14–44)
MCH RBC QN AUTO: 31.9 PG (ref 26.8–34.3)
MCHC RBC AUTO-ENTMCNC: 32.3 G/DL (ref 31.4–37.4)
MCV RBC AUTO: 99 FL (ref 82–98)
MONOCYTES # BLD AUTO: 1 THOUSAND/ΜL (ref 0.17–1.22)
MONOCYTES NFR BLD AUTO: 7 % (ref 4–12)
NEUTROPHILS # BLD AUTO: 11.09 THOUSANDS/ΜL (ref 1.85–7.62)
NEUTS SEG NFR BLD AUTO: 82 % (ref 43–75)
NRBC BLD AUTO-RTO: 0 /100 WBCS
P AXIS: 55 DEGREES
P AXIS: 60 DEGREES
PLATELET # BLD AUTO: 230 THOUSANDS/UL (ref 149–390)
PMV BLD AUTO: 9.4 FL (ref 8.9–12.7)
POTASSIUM SERPL-SCNC: 4 MMOL/L (ref 3.5–5.3)
PR INTERVAL: 158 MS
PR INTERVAL: 160 MS
QRS AXIS: 17 DEGREES
QRS AXIS: 23 DEGREES
QRSD INTERVAL: 128 MS
QRSD INTERVAL: 130 MS
QT INTERVAL: 440 MS
QT INTERVAL: 494 MS
QTC INTERVAL: 440 MS
QTC INTERVAL: 468 MS
RBC # BLD AUTO: 3.13 MILLION/UL (ref 3.81–5.12)
SODIUM SERPL-SCNC: 141 MMOL/L (ref 136–145)
T WAVE AXIS: -14 DEGREES
T WAVE AXIS: 5 DEGREES
TRIGL SERPL-MCNC: 136 MG/DL
TROPONIN I SERPL-MCNC: 0.81 NG/ML
TROPONIN I SERPL-MCNC: 0.85 NG/ML
TROPONIN I SERPL-MCNC: 0.88 NG/ML
TROPONIN I SERPL-MCNC: 0.94 NG/ML
VENTRICULAR RATE: 54 BPM
VENTRICULAR RATE: 60 BPM
WBC # BLD AUTO: 13.56 THOUSAND/UL (ref 4.31–10.16)

## 2019-06-29 PROCEDURE — 85730 THROMBOPLASTIN TIME PARTIAL: CPT | Performed by: INTERNAL MEDICINE

## 2019-06-29 PROCEDURE — 93010 ELECTROCARDIOGRAM REPORT: CPT | Performed by: INTERNAL MEDICINE

## 2019-06-29 PROCEDURE — 80061 LIPID PANEL: CPT | Performed by: INTERNAL MEDICINE

## 2019-06-29 PROCEDURE — 84484 ASSAY OF TROPONIN QUANT: CPT | Performed by: INTERNAL MEDICINE

## 2019-06-29 PROCEDURE — 99232 SBSQ HOSP IP/OBS MODERATE 35: CPT | Performed by: PHYSICIAN ASSISTANT

## 2019-06-29 PROCEDURE — 99221 1ST HOSP IP/OBS SF/LOW 40: CPT | Performed by: INTERNAL MEDICINE

## 2019-06-29 PROCEDURE — 93005 ELECTROCARDIOGRAM TRACING: CPT

## 2019-06-29 PROCEDURE — 80048 BASIC METABOLIC PNL TOTAL CA: CPT | Performed by: INTERNAL MEDICINE

## 2019-06-29 PROCEDURE — 83036 HEMOGLOBIN GLYCOSYLATED A1C: CPT | Performed by: INTERNAL MEDICINE

## 2019-06-29 PROCEDURE — 85025 COMPLETE CBC W/AUTO DIFF WBC: CPT | Performed by: INTERNAL MEDICINE

## 2019-06-29 RX ORDER — ISOSORBIDE MONONITRATE 30 MG/1
30 TABLET, EXTENDED RELEASE ORAL DAILY
Status: DISCONTINUED | OUTPATIENT
Start: 2019-06-29 | End: 2019-07-01 | Stop reason: HOSPADM

## 2019-06-29 RX ADMIN — FAMOTIDINE 20 MG: 20 TABLET ORAL at 21:32

## 2019-06-29 RX ADMIN — AMLODIPINE BESYLATE 5 MG: 5 TABLET ORAL at 08:59

## 2019-06-29 RX ADMIN — CLONIDINE HYDROCHLORIDE 0.1 MG: 0.1 TABLET ORAL at 11:47

## 2019-06-29 RX ADMIN — POTASSIUM CHLORIDE 30 MEQ: 1500 TABLET, EXTENDED RELEASE ORAL at 11:49

## 2019-06-29 RX ADMIN — CARVEDILOL 25 MG: 12.5 TABLET, FILM COATED ORAL at 08:57

## 2019-06-29 RX ADMIN — CLONIDINE HYDROCHLORIDE 0.1 MG: 0.1 TABLET ORAL at 16:39

## 2019-06-29 RX ADMIN — POTASSIUM CHLORIDE 30 MEQ: 1500 TABLET, EXTENDED RELEASE ORAL at 11:45

## 2019-06-29 RX ADMIN — FAMOTIDINE 20 MG: 20 TABLET ORAL at 11:47

## 2019-06-29 RX ADMIN — CARVEDILOL 25 MG: 12.5 TABLET, FILM COATED ORAL at 16:38

## 2019-06-29 RX ADMIN — POTASSIUM CHLORIDE 30 MEQ: 1500 TABLET, EXTENDED RELEASE ORAL at 16:38

## 2019-06-29 RX ADMIN — ACETAMINOPHEN 650 MG: 325 TABLET, FILM COATED ORAL at 11:43

## 2019-06-29 RX ADMIN — ASPIRIN 81 MG 162 MG: 81 TABLET ORAL at 08:57

## 2019-06-29 RX ADMIN — OXYCODONE HYDROCHLORIDE AND ACETAMINOPHEN 1000 MG: 500 TABLET ORAL at 11:46

## 2019-06-29 RX ADMIN — CLONIDINE HYDROCHLORIDE 0.1 MG: 0.1 TABLET ORAL at 08:58

## 2019-06-29 RX ADMIN — VITAMIN D, TAB 1000IU (100/BT) 1000 UNITS: 25 TAB at 11:45

## 2019-06-29 RX ADMIN — REPAGLINIDE 0.5 MG: 0.5 TABLET ORAL at 11:48

## 2019-06-29 RX ADMIN — REPAGLINIDE 0.5 MG: 0.5 TABLET ORAL at 16:39

## 2019-06-29 RX ADMIN — ESCITALOPRAM OXALATE 10 MG: 10 TABLET ORAL at 16:38

## 2019-06-29 RX ADMIN — ISOSORBIDE MONONITRATE 30 MG: 30 TABLET, EXTENDED RELEASE ORAL at 11:47

## 2019-06-29 RX ADMIN — CYANOCOBALAMIN TAB 500 MCG 1000 MCG: 500 TAB at 11:44

## 2019-06-29 RX ADMIN — PREDNISONE 7.5 MG: 1 TABLET ORAL at 08:57

## 2019-06-29 RX ADMIN — PANTOPRAZOLE SODIUM 40 MG: 40 TABLET, DELAYED RELEASE ORAL at 05:44

## 2019-06-29 NOTE — PROGRESS NOTES
Progress Note - Edwina Mejia 4/6/1930, 80 y o  female MRN: [id number]    Unit/Bed#: -01 Encounter: [id number]    Primary Care Provider: Chong Qiu MD   Date and time admitted to hospital: 6/28/2019  3:45 PM        * NSTEMI (non-ST elevated myocardial infarction) with known CAD [facility])  Assessment & Plan  · Presented with right sided chest pain  · CXR: "Cardiomegaly  No acute cardiopulmonary disease "  · Troponin 0 65, 0 92, 0 81, 0 94, 0 85  · BNP: 3,346  · Received aspirin 324 mg in the ED yesterday  · Patient takes aspirin 162 mg daily  · Continue heparin drip  · Continue carvedilol 25 mg b i d  Lester Schmid · Continue statin  · Pain Control - nitro paste; low-dose morphine if severe pain  · Denies pain since coming to the hospital   · Await cardiology evaluation; determination by cardiology whether to go for cardiac catheterization  · Keep NPO for now  · Consider echo  · Telemetry monitoring  Leukocytosis  Assessment & Plan  · WBC 13 56 this AM from 15 06 on admission  · Patient is on chronic prednisone reportedly for "arthritis" pain  · Afebrile; no evidence of infection on CXR  · Repeat CBC in AM      Benign essential hypertension  Assessment & Plan  · BP acceptable  · Continue amlodipine, clonidine and carvedilol  · Continue to monitor BP  · Avoid hypotension as patient has significant vascular disease (PAD, carotid, etc)  Atrial fibrillation (HCC)  Assessment & Plan  · Rate / Rhythm Control -   · Continue carvedilol  · Continue potassium supplements as patient reports if she misses any potassium doses she goes into atrial fibrillation  · Anticoagulation - continue heparin drip for now as per above  Carotid artery obstruction  Assessment & Plan  · Monitor  Avoid hypotension  Familial hypercholesteremia  Assessment & Plan  · Continue sttatin  · Lipid panel: cholesterol 217, , HDL 64,       VTE Pharmacologic Prophylaxis:   Pharmacologic: Heparin Drip  Mechanical VTE Prophylaxis in Place: Yes    Patient Centered Rounds: I have performed bedside rounds with nursing staff today  Discussions with Specialists or Other Care Team Provider: nursing  Education and Discussions with Family / Patient: will update patient's daughter once patient is evaluated by cardiology    Time Spent for Care: 20 minutes  More than 50% of total time spent on counseling and coordination of care as described above  Current Length of Stay: 1 day(s)    Current Patient Status: Inpatient   Certification Statement: The patient will continue to require additional inpatient hospital stay due to suspected NSTEMI, need for cardiology evaluation, possible cardiac cath  Discharge Plan: pending cardiology evaluation  Code Status: Level 3 - DNAR and DNI      Subjective:   Patient sitting in bed, reports that she slept well  Denies any recurrent chest pain since coming to the hospital  Denies SOB, cough, abdominal pain, nausea, headache or dizziness  Objective:     Vitals:   Temp (24hrs), Av 8 °F (36 6 °C), Min:97 5 °F (36 4 °C), Max:98 2 °F (36 8 °C)    Temp:  [97 5 °F (36 4 °C)-98 2 °F (36 8 °C)] 98 2 °F (36 8 °C)  HR:  [51-63] 53  Resp:  [16-18] 18  BP: (132-161)/(50-70) 153/70  SpO2:  [94 %-95 %] 94 %  Body mass index is 26 25 kg/m²  Input and Output Summary (last 24 hours): Intake/Output Summary (Last 24 hours) at 2019 0801  Last data filed at 2019 0600  Gross per 24 hour   Intake    Output 0 ml   Net 0 ml       Physical Exam:     Physical Exam   Constitutional: She is oriented to person, place, and time  She appears well-developed and well-nourished  No distress  HENT:   Head: Normocephalic and atraumatic  Eyes: Conjunctivae are normal    Neck: Normal range of motion  Cardiovascular: Normal rate, regular rhythm and normal heart sounds  Pulmonary/Chest: Effort normal and breath sounds normal  No respiratory distress  Abdominal: Soft   Bowel sounds are normal  She exhibits no distension  There is no tenderness  Musculoskeletal: She exhibits no edema  Neurological: She is alert and oriented to person, place, and time  Skin: Skin is warm and dry  She is not diaphoretic  No erythema  Psychiatric: She has a normal mood and affect  Her behavior is normal    Nursing note and vitals reviewed  Additional Data:     Labs:    Results from last 7 days   Lab Units 06/29/19  0337   WBC Thousand/uL 13 56*   HEMOGLOBIN g/dL 10 0*   HEMATOCRIT % 31 0*   PLATELETS Thousands/uL 230   NEUTROS PCT % 82*   LYMPHS PCT % 10*   MONOS PCT % 7   EOS PCT % 0     Results from last 7 days   Lab Units 06/29/19  0337 06/28/19  1615   POTASSIUM mmol/L 4 0 4 5   CHLORIDE mmol/L 106 104   CO2 mmol/L 24 21   BUN mg/dL 39* 42*   CREATININE mg/dL 1 23 1 47*   CALCIUM mg/dL 8 5 8 4   ALK PHOS U/L  --  57   ALT U/L  --  21   AST U/L  --  12     Results from last 7 days   Lab Units 06/28/19  1656   INR  1 08       * I Have Reviewed All Lab Data Listed Above  * Additional Pertinent Lab Tests Reviewed:  Darren 66 Admission Reviewed    Imaging:    Imaging Reports Reviewed Today Include: CXR  Imaging Personally Reviewed by Myself Includes:  none    Recent Cultures (last 7 days):           Last 24 Hours Medication List:     Current Facility-Administered Medications:  acetaminophen 650 mg Oral Q8H PRN Akosua Schaffer, DO    amLODIPine 5 mg Oral Daily South Sarbjit, DO    vitamin C 1,000 mg Oral Daily Akosua Schaffer, DO    aspirin 162 mg Oral Daily Akosua Schaffer, DO    calcium carbonate-vitamin D 1 tablet Oral Every Other Day Akosua Schaffer, DO    carvedilol 25 mg Oral BID Akosua Schaffer, DO    cholecalciferol 1,000 Units Oral Daily South Sarbjit, DO    cloNIDine 0 1 mg Oral 4x Daily [place] Sarbjit, DO    cyanocobalamin 1,000 mcg Oral Daily Southzaid Schaffer, DO    escitalopram 10 mg Oral Daily With Cool Lumens, DO    famotidine 20 mg Oral Q12H Rosy Feliz DO    heparin (porcine) 3-20 Units/kg/hr (Order-Specific) Intravenous Titrated Rosy Feliz DO Last Rate: 9 Units/kg/hr (06/29/19 0147)   morphine injection 2 mg Intravenous Q4H PRN Rosy Feliz DO    nitroglycerin 0 5 inch Topical Daily Rosy Feliz DO    ondansetron 4 mg Intravenous Q6H PRN Rosy Feliz DO    pantoprazole 40 mg Oral Early Morning Rosy Feliz DO    potassium chloride 30 mEq Oral TID With Meals Rosy Feliz DO    [START ON 7/2/2019] pravastatin 40 mg Oral Once per day on Tue Thu Cruz Colby DO    predniSONE 7 5 mg Oral Daily Rosy Feliz DO    repaglinide 0 5 mg Oral TID [facility] Rosy Feliz DO         Today, Patient Was Seen By: Kathi Graham PA-C    ** Please Note: Dictation voice to text software may have been used in the creation of this document   **

## 2019-06-29 NOTE — ASSESSMENT & PLAN NOTE
· Rate / Rhythm Control -   · Continue carvedilol  · Continue potassium supplements as patient reports if she misses any potassium doses she goes into atrial fibrillation  · Anticoagulation - continue heparin drip for now as per above

## 2019-06-29 NOTE — ASSESSMENT & PLAN NOTE
· WBC 13 56 this AM from 15 06 on admission  · Patient is on chronic prednisone reportedly for "arthritis" pain  · Afebrile; no evidence of infection on CXR     · Repeat CBC in AM

## 2019-06-29 NOTE — ASSESSMENT & PLAN NOTE
· Presented with right sided chest pain  · CXR: "Cardiomegaly  No acute cardiopulmonary disease "  · Troponin 0 65, 0 92, 0 81, 0 94, 0 85  · BNP: 3,346  · Received aspirin 324 mg in the ED yesterday  · Patient takes aspirin 162 mg daily  · Continue heparin drip  · Continue carvedilol 25 mg b i d  Blanca Lightning · Continue statin  · Pain Control - nitro paste; low-dose morphine if severe pain  · Denies pain since coming to the hospital   · Await cardiology evaluation; determination by cardiology whether to go for cardiac catheterization  · Keep NPO for now  · Consider echo  · Telemetry monitoring

## 2019-06-29 NOTE — CONSULTS
Consultation - Cardiology   Arvid Bridegroom 80 y o  female MRN: [id number]  Unit/Bed#: -01 Encounter: [id number]  06/29/19  10:04 AM      Assessment:  Principal Problem:    NSTEMI (non-ST elevated myocardial infarction) with known CAD [facility])  Active Problems:    Atrial fibrillation ([facility] )    Benign essential hypertension    Familial hypercholesteremia    Carotid artery obstruction    Leukocytosis    Chief Complaint   Patient presents with    Chest Pain     Pt presents to ED from home c/o intermittent "indigestion" for the last week  Worsened in last 24 hours  Pt states pain is at base of throat and is not resolved unless she is able to burp  Episodes last 30 minutes or more  Additionally, pt states she had intermittent RIGHT breast pain and SOB during events   Heartburn     Admitting diagnosis:  Chest pain [R07 9]  NSTEMI (non-ST elevated myocardial infarction) ([facility] ) [I21 4]      Impression:    80year-old with known coronary artery disease, presenting with symptoms suggestive of angina-reminiscent of her prior anginal symptoms, with a mild but flat troponin elevation, without any specific ECG changes  Plan:    Troponin elevation: At this point would treat this as a non ST elevation myocardial infarction/acute coronary syndrome  She is currently chest pain-free and troponins have already started trending down, can stop checking troponins  Continue heparin for now  Ideally, she might benefit from coronary angiography-however limited by her advanced at age, deconditioning, limited functional status and chronic kidney disease  At this point I would check an echocardiogram and a pharmacologic stress test tomorrow to assess the burden of ischemia-if significant further invasive evaluation could be considered  She is already on 25 b i d  Of carvedilol with heart rates in the 50-60 range  She appears to be on maximal beta-blockade, blood pressures are still high, will add Rj Vásquez has another option if blood pressures do not allows  Atrial fibrillation:  Apparently only had 1 episode of  atrial fibrillation  Not on anticoagulation currently  Currently in sinus rhythm  Prior coronary artery disease:  Details of stenting was obtained through her stent cards-described above  No recent coronary angiography report available  Hypertension:  Currently mildly elevated, watch with changes as above  Dyslipidemia with a history of statin intolerance:  LDL of 126, improved from 144,  she is on Crestor 5 mg twice weekly - -increase to 5 mg daily    History of Present Illness   Physician Requesting Consult: Di Reyes DO   Reason for Consult / Principal Problem:  Chest pain  HPI: Manpreet Rangel is a 70-year-old lady with a history of hypertension, dyslipidemia, coronary artery disease-prior stenting-mid LAD-1996, proximal circumflex and OM 2 with drug-eluting stents-2009, 2 stents also to the LAD-2009, probably in the setting of throat discomfort followed by positive stress testing - -all performed at [facility], had in the past followed with Minda Borjas but now follows with Dr Meet Chan of our practice  Apparently also had a prior single episode of atrial fibrillation  At baseline is on 62 mg daily alone as an antiplatelet/anticoagulant agent  She is now admitted to [facility] with right-sided chest discomfort, which she described to me mostly as lower throat discomfort-reminiscent of her prior anginal symptoms, sometimes radiating up to the jaw  Initially she thought this was indigestion  ECGs have shown sinus rhythm with right bundle-branch block with nonspecific ST/T-wave changes, not typical for ischemia  Troponins have been elevated although overall showing a flat pattern between 0 65-0 94, currently still flat  She has a right bundle branch block at least for the last 1 year    Currently she denies any chest pains    At baseline she is physically not very active-limited by severe bilateral shoulder arthritis that she needs even help to get dressed  At 1 point she admitted to becoming more short of breath for the last 2 months although later on she was not sure of it  Overall her physical capacity has been limited and she is only mildly active at baseline-getting around with a walker and is mostly housebound  Inpatient consult to Cardiology  Consult performed by: Deniz Lo MD  Consult ordered by: Rosy Feliz DO          Review of Systems:  feels well  Review of Systems   Constitutional: Negative  HENT: Negative  Eyes: Negative  Respiratory: Positive for shortness of breath  Negative for apnea, cough, choking, chest tightness, wheezing and stridor  Cardiovascular: Positive for chest pain  Negative for palpitations and leg swelling  Musculoskeletal: Positive for arthralgias, back pain and gait problem  Negative for joint swelling  Skin: Negative  Hematological: Negative          Historical Information   Past Medical History:   Diagnosis Date    Atrial fibrillation (HCC)     Benign essential hypertension     CAD (coronary artery disease)     Carotid artery obstruction     Mesenteric ischemia, chronic (HCC)     TIA (transient ischemic attack)      Past Surgical History:   Procedure Laterality Date    APPENDECTOMY      CATARACT EXTRACTION      CHOLECYSTECTOMY      CORONARY ANGIOPLASTY      stent x4 07/11/1996x1 10/09/2009 x3    HYSTERECTOMY      REPLACEMENT TOTAL KNEE Right      Social History     Substance and Sexual Activity   Alcohol Use No     Social History     Substance and Sexual Activity   Drug Use No     Social History     Tobacco Use   Smoking Status Never Smoker   Smokeless Tobacco Never Used     Family History:   Family History   Problem Relation Age of Onset    Leukemia Father     Hypertension Sister     Heart attack Brother 46    Hypertension Brother     Sudden death Brother 46        scd    Heart failure Maternal Grandmother     Hypertension Maternal Grandmother     Stroke Maternal Grandfather     Hypertension Daughter     Anuerysm Neg Hx     Clotting disorder Neg Hx     Arrhythmia Neg Hx     Hyperlipidemia Neg Hx      No family history of premature CAD or Sudden Cardiac Death    Meds/Allergies     Current Facility-Administered Medications:     acetaminophen (TYLENOL) tablet 650 mg, 650 mg, Oral, Q8H PRN, Refugia Bare, DO    amLODIPine (NORVASC) tablet 5 mg, 5 mg, Oral, Daily, Refugia Bare, DO, 5 mg at 06/29/19 0109    ascorbic acid (VITAMIN C) tablet 1,000 mg, 1,000 mg, Oral, Daily, Refugia Bare, DO    aspirin chewable tablet 162 mg, 162 mg, Oral, Daily, Refugia Bare, DO, 162 mg at 06/29/19 0857    calcium carbonate-vitamin D (OSCAL-D) 500 mg-200 units per tablet 1 tablet, 1 tablet, Oral, Every Other Day, Refugia Bare, DO, 1 tablet at 06/28/19 2148    carvedilol (COREG) tablet 25 mg, 25 mg, Oral, BID, Refugia Bare, DO, 25 mg at 06/29/19 0857    cholecalciferol (VITAMIN D3) tablet 1,000 Units, 1,000 Units, Oral, Daily, Refugia Bare, DO    cloNIDine (CATAPRES) tablet 0 1 mg, 0 1 mg, Oral, 4x Daily, Refugia Bare, DO, 0 1 mg at 06/29/19 0858    cyanocobalamin (VITAMIN B-12) tablet 1,000 mcg, 1,000 mcg, Oral, Daily, Refugia Bare, DO    escitalopram (LEXAPRO) tablet 10 mg, 10 mg, Oral, Daily With Dinner, Refugia Bare, DO    famotidine (PEPCID) tablet 20 mg, 20 mg, Oral, Q12H, Refugia Bare, DO, 20 mg at 06/28/19 2152    heparin (porcine) 25,000 units in 250 mL infusion (premix), 3-20 Units/kg/hr (Order-Specific), Intravenous, Titrated, Refugia Bare, DO, Last Rate: 5 9 mL/hr at 06/29/19 0147, 9 Units/kg/hr at 06/29/19 0147    morphine injection 2 mg, 2 mg, Intravenous, Q4H PRN, Refugia Bare, DO    nitroglycerin (NITRO-BID) 2 % TD ointment 0 5 inch, 0 5 inch, Topical, Daily, Refugia Bare, DO    ondansetron [facility]) injection 4 mg, 4 mg, Intravenous, Q6H PRN, Dali Dias DO Earnestine    pantoprazole (PROTONIX) EC tablet 40 mg, 40 mg, Oral, Early Morning, Esther Travisus, DO, 40 mg at 06/29/19 0544    potassium chloride (K-DUR,KLOR-CON) CR tablet 30 mEq, 30 mEq, Oral, TID With Meals, Esther Travisus, DO    [START ON 7/2/2019] pravastatin (PRAVACHOL) tablet 40 mg, 40 mg, Oral, Once per day on Tue Thu, Richard DO Earnestine    predniSONE tablet 7 5 mg, 7 5 mg, Oral, Daily, Esther Krishna, DO, 7 5 mg at 06/29/19 0857    repaglinide (PRANDIN) tablet 0 5 mg, 0 5 mg, Oral, TID AC, Esther Travisus, DO  Allergies   Allergen Reactions    Adhesive [Medical Tape]      Paper tape okay    Amoxicillin     Erythromycin     Ezetimibe     Fenofibrate     Flecainide     Lovastatin     Sulfa Antibiotics     Thioridazine        Objective   Vitals: Blood pressure 153/70, pulse (!) 53, temperature 98 2 °F (36 8 °C), temperature source Oral, resp  rate 18, weight 65 1 kg (143 lb 8 oz), SpO2 94 %  , Body mass index is 26 25 kg/m² ,   Orthostatic Blood Pressures      Most Recent Value   Blood Pressure  153/70 filed at 06/29/2019 0700   Patient Position - Orthostatic VS  Lying filed at 06/29/2019 0700            Intake/Output Summary (Last 24 hours) at 6/29/2019 1004  Last data filed at 6/29/2019 0900  Gross per 24 hour   Intake 0 ml   Output 0 ml   Net 0 ml       Weight (last 2 days)     Date/Time   Weight    06/29/19 0600   65 1 (143 5)    06/28/19 2123   65 3 (144)    06/28/19 1550   65 6 (144 62)              Invasive Devices     Peripheral Intravenous Line            Peripheral IV 06/28/19 Right Antecubital less than 1 day                  Physical Exam:  GEN: Gabi Sagastume appears well, alert and oriented x 3, pleasant and cooperative   HEENT: pupils equal, round, and reactive to light; extraocular muscles intact  NECK: supple, no carotid bruits or JVD  HEART: regular rhythm, normal S1 and S2, no murmurs, no clicks, gallops or rubs   LUNGS: clear to auscultation bilaterally; no wheezes or rhonchi, no rales  ABDOMEN/GI: normal bowel sounds, soft, no tenderness, no distention  EXTREMITIES/Musculoskeltal: peripheral pulses normal; no clubbing, cyanosis, or edema  NEURO: no focal motor findings   SKIN: normal without suspicious lesions on exposed skin      Lab Results:   Admission on 06/28/2019   Component Date Value    WBC 06/28/2019 15 06*    RBC 06/28/2019 3 12*    Hemoglobin 06/28/2019 10 0*    Hematocrit 06/28/2019 30 6*    MCV 06/28/2019 98     MCH 06/28/2019 32 1     MCHC 06/28/2019 32 7     RDW 06/28/2019 13 3     MPV 06/28/2019 8 9     Platelets 22/55/7748 234     nRBC 06/28/2019 0     Neutrophils Relative 06/28/2019 87*    Immat GRANS % 06/28/2019 1     Lymphocytes Relative 06/28/2019 8*    Monocytes Relative 06/28/2019 4     Eosinophils Relative 06/28/2019 0     Basophils Relative 06/28/2019 0     Neutrophils Absolute 06/28/2019 13 00*    Immature Grans Absolute 06/28/2019 0 15     Lymphocytes Absolute 06/28/2019 1 25     Monocytes Absolute 06/28/2019 0 64     Eosinophils Absolute 06/28/2019 0 00     Basophils Absolute 06/28/2019 0 02     Sodium 06/28/2019 137     Potassium 06/28/2019 4 5     Chloride 06/28/2019 104     CO2 06/28/2019 21     ANION GAP 06/28/2019 12     BUN 06/28/2019 42*    Creatinine 06/28/2019 1 47*    Glucose 06/28/2019 185*    Calcium 06/28/2019 8 4     AST 06/28/2019 12     ALT 06/28/2019 21     Alkaline Phosphatase 06/28/2019 57     Total Protein 06/28/2019 6 5     Albumin 06/28/2019 3 4*    Total Bilirubin 06/28/2019 0 20     eGFR 06/28/2019 31     Troponin I 06/28/2019 0 65*    PTT 06/28/2019 23     Protime 06/28/2019 13 4     INR 06/28/2019 1 08     Troponin I 06/28/2019 0 92*    Troponin I 06/28/2019 0 81*    NT-proBNP 06/28/2019 3,346*    Troponin I 06/29/2019 0 88*    PTT 06/29/2019 116*    Troponin I 06/29/2019 0 94*    Sodium 06/29/2019 141     Potassium 06/29/2019 4 0     Chloride 06/29/2019 106     CO2 06/29/2019 24     ANION GAP 06/29/2019 11     BUN 06/29/2019 39*    Creatinine 06/29/2019 1 23     Glucose 06/29/2019 167*    Calcium 06/29/2019 8 5     eGFR 06/29/2019 39     WBC 06/29/2019 13 56*    RBC 06/29/2019 3 13*    Hemoglobin 06/29/2019 10 0*    Hematocrit 06/29/2019 31 0*    MCV 06/29/2019 99*    MCH 06/29/2019 31 9     MCHC 06/29/2019 32 3     RDW 06/29/2019 13 4     MPV 06/29/2019 9 4     Platelets 30/14/2269 230     nRBC 06/29/2019 0     Neutrophils Relative 06/29/2019 82*    Immat GRANS % 06/29/2019 1     Lymphocytes Relative 06/29/2019 10*    Monocytes Relative 06/29/2019 7     Eosinophils Relative 06/29/2019 0     Basophils Relative 06/29/2019 0     Neutrophils Absolute 06/29/2019 11 09*    Immature Grans Absolute 06/29/2019 0 12     Lymphocytes Absolute 06/29/2019 1 34     Monocytes Absolute 06/29/2019 1 00     Eosinophils Absolute 06/29/2019 0 00     Basophils Absolute 06/29/2019 0 01     Cholesterol 06/29/2019 217*    Triglycerides 06/29/2019 136     HDL, Direct 06/29/2019 64*    LDL Calculated 06/29/2019 126*    PTT 06/29/2019 52*    Troponin I 06/29/2019 0 85*    Ventricular Rate 06/28/2019 60     Atrial Rate 06/28/2019 63     AR Interval 06/28/2019 160     QRSD Interval 06/28/2019 128     QT Interval 06/28/2019 440     QTC Interval 06/28/2019 440     P Axis 06/28/2019 60     QRS Axis 06/28/2019 23     T Wave Axis 06/28/2019 5     Ventricular Rate 06/29/2019 54     Atrial Rate 06/29/2019 54     AR Interval 06/29/2019 158     QRSD Interval 06/29/2019 130     QT Interval 06/29/2019 494     QTC Interval 06/29/2019 468     P Axis 06/29/2019 55     QRS Axis 06/29/2019 17     T Wave Axis 06/29/2019 -14          Imaging: I have personally reviewed pertinent reports  EKG/Telemetry:  No events on telemetry    Counseling / Coordination of Care  Total floor / unit time spent today 45 minutes    Greater than 50% of total time was spent with the patient and / or family counseling and / or coordination of care  We will continue to follow with the patient throughout their hospitalization  Thank you for allowing us to participate in their care     Sherral Closs, MD

## 2019-06-29 NOTE — ASSESSMENT & PLAN NOTE
· BP acceptable  · Continue amlodipine, clonidine and carvedilol  · Continue to monitor BP  · Avoid hypotension as patient has significant vascular disease (PAD, carotid, etc)

## 2019-06-30 ENCOUNTER — APPOINTMENT (INPATIENT)
Dept: NUCLEAR MEDICINE | Facility: HOSPITAL | Age: 84
DRG: 281 | End: 2019-06-30
Payer: MEDICARE

## 2019-06-30 ENCOUNTER — APPOINTMENT (INPATIENT)
Dept: NON INVASIVE DIAGNOSTICS | Facility: HOSPITAL | Age: 84
DRG: 281 | End: 2019-06-30
Payer: MEDICARE

## 2019-06-30 PROBLEM — D72.829 LEUKOCYTOSIS: Status: RESOLVED | Noted: 2019-06-29 | Resolved: 2019-06-30

## 2019-06-30 PROBLEM — D64.9 ANEMIA: Status: ACTIVE | Noted: 2019-06-30

## 2019-06-30 LAB
ANION GAP SERPL CALCULATED.3IONS-SCNC: 8 MMOL/L (ref 4–13)
APTT PPP: 78 SECONDS (ref 23–37)
BUN SERPL-MCNC: 30 MG/DL (ref 5–25)
CALCIUM SERPL-MCNC: 8.5 MG/DL (ref 8.3–10.1)
CHLORIDE SERPL-SCNC: 108 MMOL/L (ref 100–108)
CO2 SERPL-SCNC: 25 MMOL/L (ref 21–32)
CREAT SERPL-MCNC: 1.14 MG/DL (ref 0.6–1.3)
ERYTHROCYTE [DISTWIDTH] IN BLOOD BY AUTOMATED COUNT: 13.4 % (ref 11.6–15.1)
GFR SERPL CREATININE-BSD FRML MDRD: 43 ML/MIN/1.73SQ M
GLUCOSE SERPL-MCNC: 106 MG/DL (ref 65–140)
HCT VFR BLD AUTO: 29.5 % (ref 34.8–46.1)
HGB BLD-MCNC: 9.4 G/DL (ref 11.5–15.4)
MAGNESIUM SERPL-MCNC: 2.3 MG/DL (ref 1.6–2.6)
MCH RBC QN AUTO: 31.9 PG (ref 26.8–34.3)
MCHC RBC AUTO-ENTMCNC: 31.9 G/DL (ref 31.4–37.4)
MCV RBC AUTO: 100 FL (ref 82–98)
PLATELET # BLD AUTO: 208 THOUSANDS/UL (ref 149–390)
PMV BLD AUTO: 9 FL (ref 8.9–12.7)
POTASSIUM SERPL-SCNC: 4.3 MMOL/L (ref 3.5–5.3)
RBC # BLD AUTO: 2.95 MILLION/UL (ref 3.81–5.12)
SODIUM SERPL-SCNC: 141 MMOL/L (ref 136–145)
WBC # BLD AUTO: 9.81 THOUSAND/UL (ref 4.31–10.16)

## 2019-06-30 PROCEDURE — 93018 CV STRESS TEST I&R ONLY: CPT | Performed by: INTERNAL MEDICINE

## 2019-06-30 PROCEDURE — 85730 THROMBOPLASTIN TIME PARTIAL: CPT | Performed by: INTERNAL MEDICINE

## 2019-06-30 PROCEDURE — 93017 CV STRESS TEST TRACING ONLY: CPT

## 2019-06-30 PROCEDURE — 99232 SBSQ HOSP IP/OBS MODERATE 35: CPT | Performed by: PHYSICIAN ASSISTANT

## 2019-06-30 PROCEDURE — 99232 SBSQ HOSP IP/OBS MODERATE 35: CPT | Performed by: INTERNAL MEDICINE

## 2019-06-30 PROCEDURE — 78452 HT MUSCLE IMAGE SPECT MULT: CPT | Performed by: INTERNAL MEDICINE

## 2019-06-30 PROCEDURE — 78452 HT MUSCLE IMAGE SPECT MULT: CPT

## 2019-06-30 PROCEDURE — A9502 TC99M TETROFOSMIN: HCPCS

## 2019-06-30 PROCEDURE — 80048 BASIC METABOLIC PNL TOTAL CA: CPT | Performed by: PHYSICIAN ASSISTANT

## 2019-06-30 PROCEDURE — 85027 COMPLETE CBC AUTOMATED: CPT | Performed by: PHYSICIAN ASSISTANT

## 2019-06-30 PROCEDURE — 83735 ASSAY OF MAGNESIUM: CPT | Performed by: PHYSICIAN ASSISTANT

## 2019-06-30 PROCEDURE — 93306 TTE W/DOPPLER COMPLETE: CPT

## 2019-06-30 PROCEDURE — 93016 CV STRESS TEST SUPVJ ONLY: CPT | Performed by: INTERNAL MEDICINE

## 2019-06-30 PROCEDURE — 93306 TTE W/DOPPLER COMPLETE: CPT | Performed by: INTERNAL MEDICINE

## 2019-06-30 RX ORDER — FERROUS SULFATE 325(65) MG
325 TABLET ORAL
Status: DISCONTINUED | OUTPATIENT
Start: 2019-07-01 | End: 2019-07-01 | Stop reason: HOSPADM

## 2019-06-30 RX ORDER — PRAVASTATIN SODIUM 40 MG
40 TABLET ORAL
Status: DISCONTINUED | OUTPATIENT
Start: 2019-06-30 | End: 2019-07-01 | Stop reason: HOSPADM

## 2019-06-30 RX ADMIN — CLONIDINE HYDROCHLORIDE 0.1 MG: 0.1 TABLET ORAL at 17:37

## 2019-06-30 RX ADMIN — POTASSIUM CHLORIDE 30 MEQ: 1500 TABLET, EXTENDED RELEASE ORAL at 12:33

## 2019-06-30 RX ADMIN — VITAMIN D, TAB 1000IU (100/BT) 1000 UNITS: 25 TAB at 08:09

## 2019-06-30 RX ADMIN — PREDNISONE 7.5 MG: 1 TABLET ORAL at 08:09

## 2019-06-30 RX ADMIN — CARVEDILOL 25 MG: 12.5 TABLET, FILM COATED ORAL at 17:37

## 2019-06-30 RX ADMIN — OXYCODONE HYDROCHLORIDE AND ACETAMINOPHEN 1000 MG: 500 TABLET ORAL at 08:09

## 2019-06-30 RX ADMIN — POTASSIUM CHLORIDE 30 MEQ: 1500 TABLET, EXTENDED RELEASE ORAL at 08:08

## 2019-06-30 RX ADMIN — REPAGLINIDE 0.5 MG: 0.5 TABLET ORAL at 12:33

## 2019-06-30 RX ADMIN — CLONIDINE HYDROCHLORIDE 0.1 MG: 0.1 TABLET ORAL at 21:45

## 2019-06-30 RX ADMIN — ESCITALOPRAM OXALATE 10 MG: 10 TABLET ORAL at 17:38

## 2019-06-30 RX ADMIN — PRAVASTATIN SODIUM 40 MG: 40 TABLET ORAL at 17:38

## 2019-06-30 RX ADMIN — CLONIDINE HYDROCHLORIDE 0.1 MG: 0.1 TABLET ORAL at 08:08

## 2019-06-30 RX ADMIN — REPAGLINIDE 0.5 MG: 0.5 TABLET ORAL at 08:07

## 2019-06-30 RX ADMIN — FAMOTIDINE 20 MG: 20 TABLET ORAL at 08:09

## 2019-06-30 RX ADMIN — REGADENOSON 0.4 MG: 0.08 INJECTION, SOLUTION INTRAVENOUS at 10:07

## 2019-06-30 RX ADMIN — PANTOPRAZOLE SODIUM 40 MG: 40 TABLET, DELAYED RELEASE ORAL at 04:56

## 2019-06-30 RX ADMIN — POTASSIUM CHLORIDE 30 MEQ: 1500 TABLET, EXTENDED RELEASE ORAL at 17:37

## 2019-06-30 RX ADMIN — REPAGLINIDE 0.5 MG: 0.5 TABLET ORAL at 17:38

## 2019-06-30 RX ADMIN — OYSTER SHELL CALCIUM WITH VITAMIN D 1 TABLET: 500; 200 TABLET, FILM COATED ORAL at 08:09

## 2019-06-30 RX ADMIN — ISOSORBIDE MONONITRATE 30 MG: 30 TABLET, EXTENDED RELEASE ORAL at 08:09

## 2019-06-30 RX ADMIN — FAMOTIDINE 20 MG: 20 TABLET ORAL at 21:45

## 2019-06-30 RX ADMIN — HEPARIN SODIUM 11 UNITS/KG/HR: 10000 INJECTION, SOLUTION INTRAVENOUS at 05:56

## 2019-06-30 RX ADMIN — CARVEDILOL 25 MG: 12.5 TABLET, FILM COATED ORAL at 08:09

## 2019-06-30 RX ADMIN — ACETAMINOPHEN 650 MG: 325 TABLET, FILM COATED ORAL at 08:08

## 2019-06-30 RX ADMIN — AMLODIPINE BESYLATE 5 MG: 5 TABLET ORAL at 08:08

## 2019-06-30 RX ADMIN — CYANOCOBALAMIN TAB 500 MCG 1000 MCG: 500 TAB at 08:09

## 2019-06-30 RX ADMIN — ASPIRIN 81 MG 162 MG: 81 TABLET ORAL at 08:08

## 2019-06-30 RX ADMIN — CLONIDINE HYDROCHLORIDE 0.1 MG: 0.1 TABLET ORAL at 12:33

## 2019-06-30 NOTE — PROGRESS NOTES
Progress Note - Edwina Mejia 4/6/1930, 80 y o  female MRN: [id number]    Unit/Bed#: -01 Encounter: [id number]    Primary Care Provider: Chong Qiu MD   Date and time admitted to hospital: 6/28/2019  3:45 PM        * NSTEMI (non-ST elevated myocardial infarction) with known CAD [facility])  Assessment & Plan  · Presented with chest pain  · CXR: "Cardiomegaly  No acute cardiopulmonary disease "  · Troponin 0 65, 0 92, 0 81, 0 94, 0 85  · BNP: 3,346  · Continue aspirin 162 mg daily  · Continue heparin drip  · Continue carvedilol 25 mg b i d  Lester Schmid · Continue statin  · Started on Imdur by cardiology  · Cardiology consulted, recommending echocardiogram and pharmacologic stress test to assess the burden of ischemia; possible cardiac cath if significant ischemia  · Telemetry monitoring  Benign essential hypertension  Assessment & Plan  · BP acceptable  · Continue amlodipine, clonidine and carvedilol  · Started on Imdur by cardiology  · Continue to monitor BP  · Avoid hypotension as patient has significant vascular disease (PAD, carotid, etc)  Atrial fibrillation (HCC)  Assessment & Plan  · Rate / Rhythm Control -   · Continue carvedilol  · Continue potassium supplements as patient reports if she misses any potassium doses she goes into atrial fibrillation  · Anticoagulation - continue heparin drip for now as per above  Anemia  Assessment & Plan  · Hg 9 4 today from 10 on admission  · Only prior labs available from February 2018 show Hg of 10 9  · Patient takes iron supplements at home; not ordered on admission, will resume  · No evidence of bleeding; denies hematuria and hematochezia  · Repeat CBC in AM      Carotid artery obstruction  Assessment & Plan  · Monitor  Avoid hypotension  Familial hypercholesteremia  Assessment & Plan  · Continue statin  · Patient taking Crestor 5 mg twice weekly; increased to 5 mg daily by cardiology     · Lipid panel: cholesterol 217, , HDL 64,   VTE Pharmacologic Prophylaxis:   Pharmacologic: Heparin Drip  Mechanical VTE Prophylaxis in Place: Yes    Patient Centered Rounds: I have performed bedside rounds with nursing staff today  Discussions with Specialists or Other Care Team Provider: Nursing    Education and Discussions with Family / Patient: patient    Time Spent for Care: 20 minutes  More than 50% of total time spent on counseling and coordination of care as described above  Current Length of Stay: 2 day(s)    Current Patient Status: Inpatient   Certification Statement: The patient will continue to require additional inpatient hospital stay due to NSTEMI, need for stress test and echo to determine need for cardiac cath  Discharge Plan: when medically stable, cleared by cardiology  Code Status: Level 3 - DNAR and DNI      Subjective:   Patient sitting up in chair, awaiting her breakfast tray  No complaints at this time, denies chest pain or SOB  Reports that she slept well  Last bowel movement was yesterday, no urinary complaints  Objective:     Vitals:   Temp (24hrs), Av °F (36 7 °C), Min:97 7 °F (36 5 °C), Max:98 5 °F (36 9 °C)    Temp:  [97 7 °F (36 5 °C)-98 5 °F (36 9 °C)] 98 3 °F (36 8 °C)  HR:  [55-67] 59  Resp:  [16-18] 18  BP: (110-165)/(42-92) 165/92  SpO2:  [94 %-96 %] 94 %  Body mass index is 26 13 kg/m²  Input and Output Summary (last 24 hours): Intake/Output Summary (Last 24 hours) at 2019 0831  Last data filed at 2019 0554  Gross per 24 hour   Intake 1296 ml   Output 700 ml   Net 596 ml       Physical Exam:     Physical Exam   Constitutional: She is oriented to person, place, and time  She appears well-developed and well-nourished  No distress  HENT:   Head: Normocephalic and atraumatic  Eyes: EOM are normal    Cardiovascular: Normal rate, regular rhythm and normal heart sounds  Pulmonary/Chest: Effort normal and breath sounds normal  No respiratory distress     Abdominal: Soft  Bowel sounds are normal  She exhibits no distension  There is no tenderness  Musculoskeletal: She exhibits no edema  Neurological: She is alert and oriented to person, place, and time  Skin: Skin is warm and dry  She is not diaphoretic  No erythema  Psychiatric: She has a normal mood and affect  Nursing note and vitals reviewed  Additional Data:     Labs:    Results from last 7 days   Lab Units 06/30/19  0422 06/29/19  0337   WBC Thousand/uL 9 81 13 56*   HEMOGLOBIN g/dL 9 4* 10 0*   HEMATOCRIT % 29 5* 31 0*   PLATELETS Thousands/uL 208 230   NEUTROS PCT %  --  82*   LYMPHS PCT %  --  10*   MONOS PCT %  --  7   EOS PCT %  --  0     Results from last 7 days   Lab Units 06/30/19  0422  06/28/19  1615   POTASSIUM mmol/L 4 3   < > 4 5   CHLORIDE mmol/L 108   < > 104   CO2 mmol/L 25   < > 21   BUN mg/dL 30*   < > 42*   CREATININE mg/dL 1 14   < > 1 47*   CALCIUM mg/dL 8 5   < > 8 4   ALK PHOS U/L  --   --  57   ALT U/L  --   --  21   AST U/L  --   --  12    < > = values in this interval not displayed  Results from last 7 days   Lab Units 06/28/19  1656   INR  1 08       * I Have Reviewed All Lab Data Listed Above  * Additional Pertinent Lab Tests Reviewed:  All Labs Within Last 24 Hours Reviewed    Imaging:    Imaging Reports Reviewed Today Include: n/a  Imaging Personally Reviewed by Myself Includes:  n/a    Recent Cultures (last 7 days):           Last 24 Hours Medication List:     Current Facility-Administered Medications:  acetaminophen 650 mg Oral Q8H PRN [facility], DO    amLODIPine 5 mg Oral Daily [facility], DO    vitamin C 1,000 mg Oral Daily [facility], DO    aspirin 162 mg Oral Daily [facility], DO    calcium carbonate-vitamin D 1 tablet Oral Every Other Day [facility], DO    carvedilol 25 mg Oral BID [facility], DO    cholecalciferol 1,000 Units Oral Daily [facility], DO    cloNIDine 0 1 mg Oral 4x Daily [facility], DO    cyanocobalamin 1,000 mcg Oral Daily Marrian Brewster, DO    escitalopram 10 mg Oral Daily With [employer], DO    famotidine 20 mg Oral Q12H Cruz Garrett DO    [START ON 7/1/2019] ferrous sulfate 325 mg Oral Daily With Breakfast Andreea Thrasher PA-C    heparin (porcine) 3-20 Units/kg/hr (Order-Specific) Intravenous Titrated Marrian Brewster, DO Last Rate: 11 Units/kg/hr (06/30/19 0556)   isosorbide mononitrate 30 mg Oral Daily Bear Smith MD    morphine injection 2 mg Intravenous Q4H PRN Marrian Anna, DO    nitroglycerin 0 5 inch Topical Daily Marrian Brewster, DO    ondansetron 4 mg Intravenous Q6H PRN Marrian Anna, DO    pantoprazole 40 mg Oral Early Morning Marrian Brewster, DO    potassium chloride 30 mEq Oral TID With Meals Marrian Brewster, DO    pravastatin 40 mg Oral Daily With [employer]    predniSONE 7 5 mg Oral Daily Marrian Brewster, DO    repaglinide 0 5 mg Oral TID AC Marrian Brewster, DO         Today, Patient Was Seen By: Andreea Thrasher PA-C    ** Please Note: Dictation voice to text software may have been used in the creation of this document   **

## 2019-06-30 NOTE — ASSESSMENT & PLAN NOTE
· Continue statin  · Patient taking Crestor 5 mg twice weekly; increased to 5 mg daily by cardiology  · Lipid panel: cholesterol 217, , HDL 64,

## 2019-06-30 NOTE — PROGRESS NOTES
Progress Note - Cardiology   Manpreet Rangel 80 y o  female MRN: [id number]  Unit/Bed#: -01 Encounter: [id number]  06/30/19  11:53 AM          Impression and Plan:    59-year-old with known coronary artery disease, presenting with symptoms suggestive of angina-reminiscent of her prior anginal symptoms, with a mild but flat troponin elevation, without any specific ECG changes        Plan:     Troponin elevation: At this point would treat this as a non ST elevation myocardial infarction/acute coronary syndrome  She is currently chest pain-free and troponins have already started trending down, can stop checking troponins  Continue heparin for now  Ideally, she might benefit from coronary angiography-however limited by her advanced at age, deconditioning, limited functional status and chronic kidney disease  She is already on 25 b i d  Of carvedilol with heart rates in the 50-60 range  She appears to be on maximal beta-blockade, added Imdur  Ranexa is another option if blood pressures does not allow further up titration  Await pharmacologic stress test-if burden of ischemia is minimal or small, medical management alone      Atrial fibrillation:  Apparently only had 1 episode of  atrial fibrillation  Not on anticoagulation currently  Currently in sinus rhythm      Prior coronary artery disease:  Details of stenting was obtained through her stent cards-described above  No recent coronary angiography report available      Hypertension:  Currently mildly elevated, watch with changes as above      Dyslipidemia with a history of statin intolerance:  LDL of 126, improved from 144,  she is on Crestor 5 mg twice weekly - -increase to 5 mg daily      ===================================================================    Chief Complaint:   Chief Complaint   Patient presents with    Chest Pain     Pt presents to ED from home c/o intermittent "indigestion" for the last week  Worsened in last 24 hours   Pt states pain is at base of throat and is not resolved unless she is able to burp  Episodes last 30 minutes or more  Additionally, pt states she had intermittent RIGHT breast pain and SOB during events   Heartburn         Subjective/Objective     Subjective:  Feels well, denies any complaints except for shoulder arthritis    Objective:  Appears frail, no distress at the time of exam    Patient Active Problem List   Diagnosis    Atrial fibrillation ([place] [address] )    Benign essential hypertension    Coronary artery disease    Familial hypercholesteremia    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HCC)    Carotid artery obstruction    Peripheral arterial disease ([place] [address] )    NSTEMI (non-ST elevated myocardial infarction) with known CAD ([place] [address] )    Anemia       Vitals: /92 (BP Location: Left arm)   Pulse 59   Temp 98 3 °F (36 8 °C) (Oral)   Resp 18   Wt 64 8 kg (142 lb 13 7 oz)   SpO2 94%   BMI 26 13 kg/m²     I/O this shift:  In: 120 [P O :120]  Out: 600 [Urine:600]  Wt Readings from Last 3 Encounters:   06/30/19 64 8 kg (142 lb 13 7 oz)   04/11/19 63 5 kg (140 lb)   09/10/18 63 5 kg (140 lb)       Intake/Output Summary (Last 24 hours) at 6/30/2019 1153  Last data filed at 6/30/2019 0808  Gross per 24 hour   Intake 1416 ml   Output 1300 ml   Net 116 ml     I/O last 3 completed shifts:   In: 8598 [P O :296; I V :1000]  Out: 700 [Urine:700]    Invasive Devices     Peripheral Intravenous Line            Peripheral IV 06/28/19 Right Antecubital 1 day    Peripheral IV 06/30/19 Left Hand less than 1 day                  Physical Exam:  GEN: Verona Harris appears frail, alert and oriented x 3, pleasant and cooperative   HEENT: pupils equal, round, and reactive to light; extraocular muscles intact  NECK: supple, no carotid bruits or JVD  HEART: regular rhythm, normal S1 and S2, no murmur, no clicks, gallops or rubs   LUNGS: clear to auscultation bilaterally; no wheezes or rhonchi, no rales  ABDOMEN/GI: normal bowel sounds, soft, no tenderness, no distention  EXTREMITIES/Musculoskeltal: peripheral pulses normal; no clubbing, cyanosis, no edema  NEURO: no focal motor findings   SKIN: normal without suspicious lesions on exposed skin              Lab Results:   Results from last 7 days   Lab Units 06/29/19  0926 06/29/19  0618 06/29/19  0248   TROPONIN I ng/mL 0 88* 0 85* 0 94*     Results from last 7 days   Lab Units 06/30/19  0422 06/29/19  0337 06/28/19  1615   WBC Thousand/uL 9 81 13 56* 15 06*   HEMOGLOBIN g/dL 9 4* 10 0* 10 0*   HEMATOCRIT % 29 5* 31 0* 30 6*   PLATELETS Thousands/uL 208 230 234         Results from last 7 days   Lab Units 06/30/19  0422 06/29/19  0337 06/28/19  1615   POTASSIUM mmol/L 4 3 4 0 4 5   CHLORIDE mmol/L 108 106 104   CO2 mmol/L 25 24 21   BUN mg/dL 30* 39* 42*   CREATININE mg/dL 1 14 1 23 1 47*   CALCIUM mg/dL 8 5 8 5 8 4   ALK PHOS U/L  --   --  57   ALT U/L  --   --  21   AST U/L  --   --  12     Results from last 7 days   Lab Units 06/28/19  1656   INR  1 08       Imaging: I have personally reviewed pertinent reports      EKG/Telemtry:  No events    Scheduled Meds:    Current Facility-Administered Medications:  acetaminophen 650 mg Oral Q8H PRN Kamini Harrow, DO    amLODIPine 5 mg Oral Daily Kamini Harrow, DO    vitamin C 1,000 mg Oral Daily Kamini Harrow, DO    aspirin 162 mg Oral Daily Kamini Harrow, DO    calcium carbonate-vitamin D 1 tablet Oral Every Other Day Kamini Harrow, DO    carvedilol 25 mg Oral BID Kamini Harrow, DO    cholecalciferol 1,000 Units Oral Daily Kamini Harrow, DO    cloNIDine 0 1 mg Oral 4x Daily Kamini Harrow, DO    cyanocobalamin 1,000 mcg Oral Daily Kamini Harrow, DO    escitalopram 10 mg Oral Daily With [place] [place], DO    famotidine 20 mg Oral Q12H Cruz Colby DO    [START ON 7/1/2019] ferrous sulfate 325 mg Oral Daily With Breakfast Farhana Reeves PA-C    heparin (porcine) 3-20 Units/kg/hr (Order-Specific) Intravenous Titrated Kamini Harrow, DO Last Rate: 11 Units/kg/hr (06/30/19 0556)   isosorbide mononitrate 30 mg Oral Daily Mauricio Burns MD    morphine injection 2 mg Intravenous Q4H PRN [place], DO    nitroglycerin 0 5 inch Topical [facility]    ondansetron 4 mg Intravenous Q6H PRN [place], DO    pantoprazole 40 mg Oral Early Morning [facility], [facility]    potassium chloride 30 mEq Oral TID With Me[facility], DO    pravastatin 40 mg Oral Daily With [employer], PA-C    predniSONE 7 5 mg Oral Daily [facility], [facility]    repaglinide 0 5 mg Oral TID AC Cruz Quick, DO      Continuous Infusions:    heparin (porcine) 3-20 Units/kg/hr (Order-Specific) Last Rate: 11 Units/kg/hr (06/30/19 0556)       VTE Pharmacologic Prophylaxis: Heparin  VTE Mechanical Prophylaxis: sequential compression device      Counseling / Coordination of Care  Total time spent 15 minutes including teaching and family updates  More than 50% was spent counseling pt and family

## 2019-06-30 NOTE — ASSESSMENT & PLAN NOTE
· BP acceptable  · Continue amlodipine, clonidine and carvedilol  · Started on Imdur by cardiology  · Continue to monitor BP  · Avoid hypotension as patient has significant vascular disease (PAD, carotid, etc)

## 2019-06-30 NOTE — ASSESSMENT & PLAN NOTE
· Rate / Rhythm Control -   · Continue carvedilol  · Continue potassium supplements as patient reports if she misses any potassium doses she goes into atrial fibrillation    · Anticoagulation - per outpatient Cardiology notes, patient not on AC secondary to fall risk

## 2019-06-30 NOTE — ASSESSMENT & PLAN NOTE
· Hg 9 4 today from 10 on admission  · Only prior labs available from February 2018 show Hg of 10 9  · Patient takes iron supplements at home; not ordered on admission, will resume  · No evidence of bleeding; denies hematuria and hematochezia     · Repeat CBC in AM

## 2019-06-30 NOTE — ASSESSMENT & PLAN NOTE
· Presented with chest pain  · CXR: "Cardiomegaly  No acute cardiopulmonary disease "  · Troponin 0 65, 0 92, 0 81, 0 94, 0 85  · BNP: 3,346  · Continue aspirin 162 mg daily  · Continue carvedilol 25 mg b i d  Kandy Davies · Continue statin  · S/P stress test - mild amount of inferior ischemia  · Medical management per Cardiology  · Started on Imdur 30 mg daily by cardiology     · Patient currently chest pain free

## 2019-06-30 NOTE — ASSESSMENT & PLAN NOTE
Clinical Pharmacy Progress Note: Vancomycin      Labs:      Estimated CrCl: 35.1 mL/min   WBC: 13.95 (decrease x 2 days)  SCr: 0.9 (as of 3/1)      Cultures:   E faecalis in urine, vanc-sensitive   MRSA in respiratory culture, vanc-sensitive   Staph capitis in blood x 2 on 2/26, susceptibility pending   Blood on 2/28- NGTD x 2     Vitals:   Tmax/24h: 98   RR: 17-22      Level: random level drawn 03/02/2017 @ 0446: 15.9   Goal trough: 15-20 mcg/mL (Dx: pneumonia, UTI, bacteremia)      Plan: Pharmacy will continue current vancomycin pulse dose of 1000 mg IV until renal function is stable. Patient will receive 1000 mg dose today.     Next level due: vancomycin random level due 03/03/17 @ 0430 with AM labs.      Thank you for allowing us to participate in this patient's care.      Amalia Gottlieb, PharmD 3/2/2017 8:22 AM                     · Monitor  Avoid hypotension

## 2019-06-30 NOTE — ASSESSMENT & PLAN NOTE
· WBC improved to 9 81 today from 15 06 on admission  · Possibly reactive; patient is also on chronic prednisone reportedly for "arthritis" pain  · Afebrile; no evidence of infection on CXR

## 2019-06-30 NOTE — ASSESSMENT & PLAN NOTE
· Presented with chest pain  · CXR: "Cardiomegaly  No acute cardiopulmonary disease "  · Troponin 0 65, 0 92, 0 81, 0 94, 0 85  · BNP: 3,346  · Continue aspirin 162 mg daily  · Continue heparin drip  · Continue carvedilol 25 mg b i d  [place]ter Glass · Continue statin  · Cardiology consulted, recommending echocardiogram and pharmacologic stress test to assess the burden of ischemia; possible cardiac cath if significant ischemia  · Telemetry monitoring

## 2019-07-01 VITALS
DIASTOLIC BLOOD PRESSURE: 54 MMHG | SYSTOLIC BLOOD PRESSURE: 124 MMHG | BODY MASS INDEX: 25.31 KG/M2 | OXYGEN SATURATION: 95 % | WEIGHT: 138.4 LBS | RESPIRATION RATE: 18 BRPM | HEART RATE: 57 BPM | TEMPERATURE: 98 F

## 2019-07-01 PROBLEM — N17.9 AKI (ACUTE KIDNEY INJURY) (HCC): Status: ACTIVE | Noted: 2019-07-01

## 2019-07-01 PROBLEM — I47.2 NSVT (NONSUSTAINED VENTRICULAR TACHYCARDIA) (HCC): Status: ACTIVE | Noted: 2019-07-01

## 2019-07-01 LAB — APTT PPP: 23 SECONDS (ref 23–37)

## 2019-07-01 PROCEDURE — 99239 HOSP IP/OBS DSCHRG MGMT >30: CPT | Performed by: PHYSICIAN ASSISTANT

## 2019-07-01 PROCEDURE — 99232 SBSQ HOSP IP/OBS MODERATE 35: CPT | Performed by: INTERNAL MEDICINE

## 2019-07-01 PROCEDURE — 85730 THROMBOPLASTIN TIME PARTIAL: CPT | Performed by: INTERNAL MEDICINE

## 2019-07-01 RX ORDER — ISOSORBIDE MONONITRATE 30 MG/1
30 TABLET, EXTENDED RELEASE ORAL DAILY
Qty: 30 TABLET | Refills: 0 | Status: SHIPPED | OUTPATIENT
Start: 2019-07-02 | End: 2019-07-26

## 2019-07-01 RX ORDER — ROSUVASTATIN CALCIUM 5 MG/1
5 TABLET, COATED ORAL DAILY
Qty: 30 TABLET | Refills: 0 | Status: SHIPPED | OUTPATIENT
Start: 2019-07-01 | End: 2019-07-15 | Stop reason: SDUPTHER

## 2019-07-01 RX ADMIN — VITAMIN D, TAB 1000IU (100/BT) 1000 UNITS: 25 TAB at 08:58

## 2019-07-01 RX ADMIN — OXYCODONE HYDROCHLORIDE AND ACETAMINOPHEN 1000 MG: 500 TABLET ORAL at 08:59

## 2019-07-01 RX ADMIN — FERROUS SULFATE TAB 325 MG (65 MG ELEMENTAL FE) 325 MG: 325 (65 FE) TAB at 09:00

## 2019-07-01 RX ADMIN — REPAGLINIDE 0.5 MG: 0.5 TABLET ORAL at 06:19

## 2019-07-01 RX ADMIN — PREDNISONE 7.5 MG: 1 TABLET ORAL at 08:57

## 2019-07-01 RX ADMIN — AMLODIPINE BESYLATE 5 MG: 5 TABLET ORAL at 08:58

## 2019-07-01 RX ADMIN — FAMOTIDINE 20 MG: 20 TABLET ORAL at 08:59

## 2019-07-01 RX ADMIN — CLONIDINE HYDROCHLORIDE 0.1 MG: 0.1 TABLET ORAL at 12:40

## 2019-07-01 RX ADMIN — ISOSORBIDE MONONITRATE 30 MG: 30 TABLET, EXTENDED RELEASE ORAL at 08:58

## 2019-07-01 RX ADMIN — REPAGLINIDE 0.5 MG: 0.5 TABLET ORAL at 12:39

## 2019-07-01 RX ADMIN — POTASSIUM CHLORIDE 30 MEQ: 1500 TABLET, EXTENDED RELEASE ORAL at 08:58

## 2019-07-01 RX ADMIN — POTASSIUM CHLORIDE 30 MEQ: 1500 TABLET, EXTENDED RELEASE ORAL at 12:40

## 2019-07-01 RX ADMIN — CLONIDINE HYDROCHLORIDE 0.1 MG: 0.1 TABLET ORAL at 09:00

## 2019-07-01 RX ADMIN — ASPIRIN 81 MG 162 MG: 81 TABLET ORAL at 08:58

## 2019-07-01 RX ADMIN — PANTOPRAZOLE SODIUM 40 MG: 40 TABLET, DELAYED RELEASE ORAL at 06:19

## 2019-07-01 RX ADMIN — CARVEDILOL 25 MG: 12.5 TABLET, FILM COATED ORAL at 08:59

## 2019-07-01 RX ADMIN — CYANOCOBALAMIN TAB 500 MCG 1000 MCG: 500 TAB at 08:59

## 2019-07-01 NOTE — PROGRESS NOTES
General Cardiology   Progress Note -  [facility]   Sharan Needs 80 y o  female MRN: [id number]    Unit/Bed#: -01 Encounter: [id number]    Assessment/ Plan  Troponin elevation/NSTEMI likely type 1  -Presented to the ED with complaints of "indigestion sensation" over the past week, 24 hours prior to admission complained of right-sided chest pain, SOB, with radiation into her lower jaw  -Troponin elevations 0 65--0 92--0 94--0 85--0 88  -12 Lead ECG 6/28:  NSR, RBBB, nonspecific ST T-wave abnormalities  NM perfusion stress test 6/30:  Demonstrated mild amount of inferior ischemia  -2D echocardiogram 6/30:  LVEF 60%, no regional wall motion abnormalities, grade 1 diastolic dysfunction, RV normal size systolic function, trace TR, trace MR    Plan  -In discussion with the patient, she is opting for medical management and does not want any invasive cardiac catheterization; she is currently without major cardiac complaints at rest or with exertion  -Will continue current current medical therapies and up titrate these therapies as an outpatient as tolerated; she is currently on aspirin 162 mg daily, carvedilol 25 mg b i d , Imdur 30 mg daily, and pravastatin 40 mg daily---on -Crestor as an outpatient can resume on discharge  -She will need close outpatient cardiology follow-up with Dr Pastor Nieves    CAD  -Prior stenting-mid LAD-1996, proximal circumflex and OM 2 with drug-eluting stents-2009, 2 stents also to the LAD-2009,  -On aspirin, statin, and beta-blocker    Hypertension  -Average /63, last recorded 140/67, HR 57  -Current BP regimen:  Amlodipine 5 mg daily, carvedilol 25 mg b i d , clonidine 0 1 mg q i d , and Imdur 30 mg daily    Dyslipidemia  -Lipid profile 6/29/2019:  Cholesterol 217, triglycerides 136, HDL 64, LDL direct 126  -On pravastatin 40 mg daily (on Crestor as an outpatient can switch back to this on discharge)    Subjective  Review of Systems   Constitution: Negative for chills, fever and malaise/fatigue  HENT: Negative for congestion  Eyes: Negative for visual disturbance  Cardiovascular: Negative for chest pain, dyspnea on exertion, irregular heartbeat, leg swelling, orthopnea and palpitations  Respiratory: Negative for cough and shortness of breath  Musculoskeletal: Negative for arthritis and myalgias  Gastrointestinal: Negative for abdominal pain, constipation, diarrhea, nausea and vomiting  Genitourinary: Negative for dysuria  Neurological: Negative for dizziness, focal weakness, headaches and weakness  All other systems reviewed and are negative  Objective:   Vitals: Blood pressure 148/67, pulse 57, temperature 98 °F (36 7 °C), temperature source Oral, resp  rate 18, weight 62 8 kg (138 lb 6 4 oz), SpO2 95 %  ,     Body mass index is 25 31 kg/m²  ,     Systolic (65OHY), NZV:398 , Min:125 , WWY:192     Diastolic (08VZR), HDX:35, Min:60, Max:67      Intake/Output Summary (Last 24 hours) at 7/1/2019 1105  Last data filed at 7/1/2019 0418  Gross per 24 hour   Intake 420 ml   Output 1200 ml   Net -780 ml     Weight (last 2 days)     Date/Time   Weight    07/01/19 0418   62 8 (138 4)    06/30/19 0600   64 8 (142 86)    06/30/19 0418   64 8 (142 86)    06/29/19 0600   65 1 (143 5)            Telemetry Review: No significant arrhythmias seen on telemetry review    EKG personally reviewed by RADHA Sorenson    Physical Exam   Constitutional: She is oriented to person, place, and time  She appears well-developed and well-nourished  No distress  HENT:   Head: Normocephalic and atraumatic  Mouth/Throat: Oropharynx is clear and moist    Eyes: No scleral icterus  Neck: No JVD present  Cardiovascular: Normal rate, regular rhythm, normal heart sounds and intact distal pulses  No murmur heard  Pulmonary/Chest: Effort normal and breath sounds normal  She has no wheezes  She has no rales  Abdominal: Soft   Bowel sounds are normal    Obese   Musculoskeletal: Normal range of motion  She exhibits no edema  Neurological: She is alert and oriented to person, place, and time  Skin: Skin is warm and dry  Capillary refill takes less than 2 seconds  She is not diaphoretic  Left arm/hand brusiing   Psychiatric: She has a normal mood and affect  Nursing note and vitals reviewed        LABORATORY RESULTS  Results from last 7 days   Lab Units 06/29/19  0926 06/29/19  0618 06/29/19  0248   TROPONIN I ng/mL 0 88* 0 85* 0 94*     CBC with diff:   Results from last 7 days   Lab Units 06/30/19  0422 06/29/19  0337 06/28/19  1615   WBC Thousand/uL 9 81 13 56* 15 06*   HEMOGLOBIN g/dL 9 4* 10 0* 10 0*   HEMATOCRIT % 29 5* 31 0* 30 6*   MCV fL 100* 99* 98   PLATELETS Thousands/uL 208 230 234   MCH pg 31 9 31 9 32 1   MCHC g/dL 31 9 32 3 32 7   RDW % 13 4 13 4 13 3   MPV fL 9 0 9 4 8 9   NRBC AUTO /100 WBCs  --  0 0       CMP:  Results from last 7 days   Lab Units 06/30/19 0422 06/29/19  0337 06/28/19  1615   POTASSIUM mmol/L 4 3 4 0 4 5   CHLORIDE mmol/L 108 106 104   CO2 mmol/L 25 24 21   BUN mg/dL 30* 39* 42*   CREATININE mg/dL 1 14 1 23 1 47*   CALCIUM mg/dL 8 5 8 5 8 4   AST U/L  --   --  12   ALT U/L  --   --  21   ALK PHOS U/L  --   --  57   EGFR ml/min/1 73sq m 43 39 31       BMP:  Results from last 7 days   Lab Units 06/30/19 0422 06/29/19  0337 06/28/19  1615   POTASSIUM mmol/L 4 3 4 0 4 5   CHLORIDE mmol/L 108 106 104   CO2 mmol/L 25 24 21   BUN mg/dL 30* 39* 42*   CREATININE mg/dL 1 14 1 23 1 47*   CALCIUM mg/dL 8 5 8 5 8 4       Lab Results   Component Value Date    NTBNP 3,346 (H) 06/28/2019             Results from last 7 days   Lab Units 06/30/19  0836   MAGNESIUM mg/dL 2 3          Results from last 7 days   Lab Units 06/29/19  0337   HEMOGLOBIN A1C % 6 5*              Results from last 7 days   Lab Units 06/28/19  1656   INR  1 08       Lipid Profile:   No results found for: CHOL  Lab Results   Component Value Date    HDL 64 (H) 06/29/2019    HDL 59 09/19/2018    HDL 52 2018     Lab Results   Component Value Date    LDLCALC 126 (H) 2019     Lab Results   Component Value Date    TRIG 136 2019    TRIG 297 (H) 2018    TRIG 418 (H) 2018       Cardiac testing:   Results for orders placed during the hospital encounter of 19   Echo complete with contrast if indicated    Narrative [address] [facility]  [phone]    Transthoracic Echocardiogram  2D, M-mode, Doppler, and Color Doppler    Study date:  2019    Patient: Delfina Hendrix  MR number: [id number]  Account number: [de-identified]  : 1930  Age: 80 years  Gender: Female  Status: Inpatient  Location: Bedside  Height: 62 in  Weight: 142 lb  BP:    Indications: Chest pain, NSTEMI  Diagnoses: R07 9 - Chest pain, unspecified    Sonographer:  ZULEIMA De Oliveira  Primary Physician:  Chidi Porter MD  Referring Physician:  Emma Flores MD  Group:  [facility] [id number] [facility]  Interpreting Physician:  Emma Flores MD    SUMMARY    LEFT VENTRICLE:  Systolic function was normal  Ejection fraction was estimated to be 60 %  There were no regional wall motion abnormalities  Doppler parameters were consistent with abnormal left ventricular relaxation (grade 1 diastolic dysfunction)  RIGHT VENTRICLE:  The size was normal   Systolic function was normal     MITRAL VALVE:  There was trace regurgitation  TRICUSPID VALVE:  There was trace regurgitation  HISTORY: PRIOR HISTORY: A-fib, HTN, CAD, carotid disease, TIA  PROCEDURE: The procedure was performed at the bedside  This was a routine study  The transthoracic approach was used  The study included complete 2D imaging, M-mode, complete spectral Doppler, and color Doppler  Images were obtained from  the parasternal, apical, subcostal, and suprasternal notch acoustic windows  Echocardiographic views were limited due to poor acoustic window availability   This was a technically difficult study     LEFT VENTRICLE: Size was normal  Systolic function was normal  Ejection fraction was estimated to be 60 %  There were no regional wall motion abnormalities  Wall thickness was normal  DOPPLER: Doppler parameters were consistent with  abnormal left ventricular relaxation (grade 1 diastolic dysfunction)  RIGHT VENTRICLE: The size was normal  Systolic function was normal  Wall thickness was normal     LEFT ATRIUM: Size was normal     RIGHT ATRIUM: Size was normal     MITRAL VALVE: Valve structure was normal  There was normal leaflet separation  DOPPLER: The transmitral velocity was within the normal range  There was no evidence for stenosis  There was trace regurgitation  AORTIC VALVE: The valve was trileaflet  Leaflets exhibited normal thickness and normal cuspal separation  DOPPLER: Transaortic velocity was within the normal range  There was no evidence for stenosis  There was no significant  regurgitation  TRICUSPID VALVE: The valve structure was normal  There was normal leaflet separation  DOPPLER: The transtricuspid velocity was within the normal range  There was no evidence for stenosis  There was trace regurgitation  PULMONIC VALVE: Leaflets exhibited normal thickness, no calcification, and normal cuspal separation  DOPPLER: The transpulmonic velocity was within the normal range  There was no significant regurgitation  PERICARDIUM: There was no pericardial effusion  The pericardium was normal in appearance  AORTA: The root exhibited normal size  SYSTEMIC VEINS: IVC: The inferior vena cava was normal in size  SYSTEM MEASUREMENT TABLES    CW  AV Env  Ti: 327 57 ms  AV MaxP 36 mmHg  AV VTI: 25 43 cm  AV Vmax: 1 26 m/s  AV Vmean: 0 78 m/s  AV meanP 78 mmHg    MM  TAPSE: 2 54 cm    PW  E': 0 06 m/s  E/E': 16 44  LVOT Env  Ti: 364 48 ms  LVOT VTI: 24 87 cm  LVOT Vmax: 1 1 m/s  LVOT Vmean: 0 68 m/s  LVOT maxP 88 mmHg  LVOT meanP 25 mmHg  MV A Alexandru: 1 23 m/s  MV Dec Dixon: 3 86 m/s2  MV DecT: 277 ms  MV E Alexandru: 1 07 m/s  MV E/A Ratio: 0 87  MV PHT: 80 33 ms  MVA By PHT: 2 74 cm2    [employer] Accredited Echocardiography Laboratory    Prepared and electronically signed by    Tae Lewis MD  Signed 30-Jun-2019 15:55:46       No results found for this or any previous visit  No results found for this or any previous visit  No procedure found  No results found for this or any previous visit        Meds/Allergies   all current active meds have been reviewed, current meds:   Current Facility-Administered Medications   Medication Dose Route Frequency    acetaminophen (TYLENOL) tablet 650 mg  650 mg Oral Q8H PRN    amLODIPine (NORVASC) tablet 5 mg  5 mg Oral Daily    ascorbic acid (VITAMIN C) tablet 1,000 mg  1,000 mg Oral Daily    aspirin chewable tablet 162 mg  162 mg Oral Daily    calcium carbonate-vitamin D (OSCAL-D) 500 mg-200 units per tablet 1 tablet  1 tablet Oral Every Other Day    carvedilol (COREG) tablet 25 mg  25 mg Oral BID    cholecalciferol (VITAMIN D3) tablet 1,000 Units  1,000 Units Oral Daily    cloNIDine (CATAPRES) tablet 0 1 mg  0 1 mg Oral 4x Daily    cyanocobalamin (VITAMIN B-12) tablet 1,000 mcg  1,000 mcg Oral Daily    escitalopram (LEXAPRO) tablet 10 mg  10 mg Oral Daily With Dinner    famotidine (PEPCID) tablet 20 mg  20 mg Oral Q12H    ferrous sulfate tablet 325 mg  325 mg Oral Daily With Breakfast    isosorbide mononitrate (IMDUR) 24 hr tablet 30 mg  30 mg Oral Daily    morphine injection 2 mg  2 mg Intravenous Q4H PRN    nitroglycerin (NITRO-BID) 2 % TD ointment 0 5 inch  0 5 inch Topical Daily    ondansetron (ZOFRAN) injection 4 mg  4 mg Intravenous Q6H PRN    pantoprazole (PROTONIX) EC tablet 40 mg  40 mg Oral Early Morning    potassium chloride (K-DUR,KLOR-CON) CR tablet 30 mEq  30 mEq Oral TID With Meals    pravastatin (PRAVACHOL) tablet 40 mg  40 mg Oral Daily With Dinner    predniSONE tablet 7 5 mg  7 5 mg Oral Daily    repaglinide (PRANDIN) tablet 0 5 mg  0 5 mg Oral TID AC    and PTA meds:   Prior to Admission Medications   Prescriptions Last Dose Informant Patient Reported? Taking?    Ascorbic Acid (VITAMIN C) 1000 MG tablet  Self Yes Yes   Sig: Take 1,000 mg by mouth daily   Calcium Citrate-Vitamin D (CALCIUM + D PO)  Self Yes Yes   Sig: Take 600 mg by mouth every other day     Cholecalciferol (VITAMIN D3) 1000 units CAPS  Self Yes Yes   Sig: Take by mouth daily     Cranberry 500 MG CAPS  Self Yes Yes   Sig: Take 1 capsule by mouth daily   acetaminophen (TYLENOL) 325 mg tablet  Self Yes Yes   Sig: Take 650 mg by mouth every 8 (eight) hours as needed for mild pain     amLODIPine (NORVASC) 5 mg tablet  Self Yes Yes   Sig: Take 1 tablet by mouth daily   aspirin 81 MG tablet  Self Yes Yes   Sig: Take 162 mg by mouth daily    carvedilol (COREG) 12 5 mg tablet  Self Yes Yes   Sig: Take 2 tablets by mouth 2 (two) times a day   cloNIDine (CATAPRES) 0 1 mg tablet  Self Yes Yes   Sig: Take 1 tablet by mouth 4 (four) times a day    cyanocobalamin 1000 MCG tablet  Self Yes No   Sig: Take by mouth daily     escitalopram (LEXAPRO) 10 mg tablet  Self Yes No   Sig: Take 1 tablet by mouth daily   famotidine (PEPCID) 20 mg tablet  Self Yes Yes   Sig: Take 1 tablet by mouth every 12 (twelve) hours   ferrous sulfate 325 (65 FE) MG EC tablet  Self Yes Yes   Sig: Take 325 mg by mouth daily   lisinopril (ZESTRIL) 40 mg tablet  Self Yes Yes   Sig: Take 1 tablet by mouth daily   pantoprazole (PROTONIX) 40 mg tablet   Yes Yes   Sig: Take 40 mg by mouth daily   potassium chloride (K-DUR,KLOR-CON) 10 mEq tablet  Self Yes Yes   Sig: Take 3 tablets by mouth 3 (three) times a day with meals    predniSONE 2 5 mg tablet  Self Yes Yes   Sig: Take 2 5 mg by mouth daily Take one with a 5 mg pill to make it 7 5mg daily    predniSONE 5 mg tablet  Self Yes Yes   Sig: Take 7 5 mg by mouth daily     repaglinide (PRANDIN) 0 5 mg tablet  Self Yes Yes   Sig: Take 0 5 mg by mouth 3 (three) times a day before meals     rosuvastatin (CRESTOR) 5 mg tablet  Self No Yes   Sig: Take 1 tablet (5 mg total) by mouth once a week   Patient taking differently: Take 5 mg by mouth 2 (two) times a week     rosuvastatin (CRESTOR) 5 mg tablet   No No   Sig: TAKE 2 TABLETS BY MOUTH WEEKLY AS DIRECTED      Facility-Administered Medications: None     Medications Prior to Admission   Medication    acetaminophen (TYLENOL) 325 mg tablet    amLODIPine (NORVASC) 5 mg tablet    Ascorbic Acid (VITAMIN C) 1000 MG tablet    aspirin 81 MG tablet    Calcium Citrate-Vitamin D (CALCIUM + D PO)    carvedilol (COREG) 12 5 mg tablet    Cholecalciferol (VITAMIN D3) 1000 units CAPS    cloNIDine (CATAPRES) 0 1 mg tablet    Cranberry 500 MG CAPS    famotidine (PEPCID) 20 mg tablet    ferrous sulfate 325 (65 FE) MG EC tablet    lisinopril (ZESTRIL) 40 mg tablet    pantoprazole (PROTONIX) 40 mg tablet    potassium chloride (K-DUR,KLOR-CON) 10 mEq tablet    predniSONE 2 5 mg tablet    predniSONE 5 mg tablet    repaglinide (PRANDIN) 0 5 mg tablet    rosuvastatin (CRESTOR) 5 mg tablet    cyanocobalamin 1000 MCG tablet    escitalopram (LEXAPRO) 10 mg tablet    rosuvastatin (CRESTOR) 5 mg tablet          Assessment:  Principal Problem:    NSTEMI (non-ST elevated myocardial infarction) with known CAD ([facility])  Active Problems:    Atrial fibrillation (HCC)    Benign essential hypertension    Familial hypercholesteremia    Carotid artery obstruction    Anemia    NSVT (nonsustained ventricular tachycardia) ([facility])    MEGAN (acute kidney injury) ([facility] [address] )    Counseling / Coordination of Care  Total floor / unit time spent today 20 minutes  Greater than 50% of total time was spent with the patient and / or family counseling and / or coordination of care  ** Please Note: Dragon 360 Dictation voice to text software may have been used in the creation of this document   **

## 2019-07-01 NOTE — DISCHARGE SUMMARY
Discharge- Edwina Mejia 4/6/1930, 80 y o  female MRN: [id number]    Unit/Bed#: -01 Encounter: [id number]    Primary Care Provider: Chong Qiu MD   Date and time admitted to hospital: 6/28/2019  3:45 PM        * NSTEMI (non-ST elevated myocardial infarction) with known CAD [facility])  Assessment & Plan  · Presented with chest pain  · CXR: "Cardiomegaly  No acute cardiopulmonary disease "  · Troponin 0 65, 0 92, 0 81, 0 94, 0 85  · BNP: 3,346  · Continue aspirin 162 mg daily  · Continue carvedilol 25 mg b i d  Batista Sharmaine · Continue statin  · S/P stress test - mild amount of inferior ischemia  · Medical management per Cardiology  · Started on Imdur 30 mg daily by cardiology  · Patient currently chest pain free    Atrial fibrillation (HCC)  Assessment & Plan  · Rate / Rhythm Control -   · Continue carvedilol  · Continue potassium supplements as patient reports if she misses any potassium doses she goes into atrial fibrillation  · Anticoagulation - per outpatient Cardiology notes, patient not on AC secondary to fall risk     MEGAN (acute kidney injury) ([facility] [address] )  Assessment & Plan  · POA  · Improved with IVF  · Baseline around 1    NSVT (nonsustained ventricular tachycardia) ([facility])  Assessment & Plan  · 9 beat run earlier in admission  · Continue BB    Benign essential hypertension  Assessment & Plan  · BP acceptable  · Zestril on hold  Will keep on hold for now given lower BP's and MEGAN  · Continue amlodipine, carvedilol  · Started on Imdur by cardiology  · Continue to monitor BP  · Avoid hypotension as patient has significant vascular disease (PAD, carotid, etc)  Anemia  Assessment & Plan  · Chronic  · Stable    Familial hypercholesteremia  Assessment & Plan  · Continue statin  · Patient taking Crestor 5 mg twice weekly; increased to 5 mg daily by cardiology  · Lipid panel: cholesterol 217, , HDL 64,   Carotid artery obstruction  Assessment & Plan  · Monitor  Avoid hypotension  Discharging Physician / Practitioner: Marely Gutierrez PA-C  PCP: Elva Albarado MD  Admission Date:   Admission Orders (From admission, onward)    Ordered        06/28/19 1724  Inpatient Admission  Once             Discharge Date: 07/01/19    Resolved Problems  Date Reviewed: 7/1/2019          Resolved    Leukocytosis 6/30/2019     Resolved by  Rigo Jimenez, [facility] Stay:  · Dr Kevin Reese    Procedures Performed:   · Stress test - IMPRESSIONS: Abnormal study after pharmacologic vasodilation without reproduction of symptoms due to the presence of a mild amount of inferior ischemia  · Echocardiogram - LEFT VENTRICLE:  Systolic function was normal  Ejection fraction was estimated to be 60 %  There were no regional wall motion abnormalities  Doppler parameters were consistent with abnormal left ventricular relaxation (grade 1 diastolic dysfunction)      RIGHT VENTRICLE:  The size was normal   Systolic function was normal      MITRAL VALVE:  There was trace regurgitation      TRICUSPID VALVE:  There was trace regurgitation    Significant Findings / Test Results:   · See above    Incidental Findings:   · none     Test Results Pending at Discharge (will require follow up):   · none     Outpatient Tests Requested:  · none    Complications:  none    Reason for Admission: chest pain    Hospital Course:     Javier Lawler is a 80 y o  female patient who originally presented to the hospital on 6/28/2019 due to chest pain  She was felt to have a NSTEMI  She was started on heparin drip  She was seen in consultation by Cardiology  She underwent nuclear stress testing and echocardiogram   Plan was to manage her medically  She was started on Imdur  Patient remains chest pain-free on discharge  Please see above list of diagnoses and related plan for additional information  Condition at Discharge: good     Discharge Day Visit / Exam:     Subjective:  Offers no complaints   No further chest pain  Vitals: Blood Pressure: 148/67 (07/01/19 0656)  Pulse: 57 (07/01/19 0656)  Temperature: 98 °F (36 7 °C) (07/01/19 0656)  Temp Source: Oral (07/01/19 0656)  Respirations: 18 (07/01/19 0656)  Weight - Scale: 62 8 kg (138 lb 6 4 oz) (07/01/19 0418)  SpO2: 95 % (07/01/19 0656)  Exam:   Physical Exam   Constitutional: She is oriented to person, place, and time  She appears well-developed and well-nourished  No distress  HENT:   Head: Normocephalic and atraumatic  Cardiovascular: Normal rate and regular rhythm  Exam reveals no friction rub  No murmur heard  Pulmonary/Chest: Effort normal and breath sounds normal  No respiratory distress  She has no wheezes  Abdominal: Soft  Bowel sounds are normal  She exhibits no distension  There is no tenderness  There is no rebound and no guarding  Musculoskeletal: She exhibits no edema  Neurological: She is alert and oriented to person, place, and time  No cranial nerve deficit  Skin: Skin is warm and dry  No rash noted  Psychiatric: She has a normal mood and affect  Nursing note and vitals reviewed  Discussion with Family: daughter called with update    Discharge instructions/Information to patient and family:   See after visit summary for information provided to patient and family  Provisions for Follow-Up Care:  See after visit summary for information related to follow-up care and any pertinent home health orders  Disposition:     Home    For Discharges to [facility] SNF:   · Not Applicable to this Patient - Not Applicable to this Patient    Planned Readmission: none     Discharge Statement:  I spent 40 minutes discharging the patient  This time was spent on the day of discharge  I had direct contact with the patient on the day of discharge   Greater than 50% of the total time was spent examining patient, answering all patient questions, arranging and discussing plan of care with patient as well as directly providing post-discharge instructions  Additional time then spent on discharge activities  Discharge Medications:  See after visit summary for reconciled discharge medications provided to patient and family        ** Please Note: This note has been constructed using a voice recognition system **

## 2019-07-01 NOTE — SOCIAL WORK
VIPUL notified by [facility] that patient is medically cleared for discharge and would like to use [facility]  CM met with patient at bedside and called daughter Sujatha Nolen on her cell phone ([phone])  Patient lives with daughter who is retired and available 24/7, along with patient's son in law who is also retired  Daughter/patient report that they have everything they need at home as far as DME  Patient/daughter would like to use [facility] and daughter will  the prescriptions when she comes to take patient home; CM sent prescriptions down to [facility] for Imdur and rosuvastatin  Per Omar Huitron at [facility], the patient's rosuvastatin cannot be filled until July 12, and patient's chart noted an allergy to another statin  CM notified [facility] who states that the allergy is known and that the patient has already been using the rosuvastatin without a reaction  Omar Sos at [facility] to return the prescription to the patient to be filled on July 12  IMM reviewed with patient at bedside  Daughter to transport home

## 2019-07-01 NOTE — UTILIZATION REVIEW
Initial Clinical Review    Admission: Date/Time/Statement: 6/28/19 @ 1724     Orders Placed This Encounter   Procedures    Inpatient Admission     Standing Status:   Standing     Number of Occurrences:   1     Order Specific Question:   Admitting Physician     Answer:   Reinier Weir [[id number]]     Order Specific Question:   Level of Care     Answer:   Med Surg [[id number]]     Order Specific Question:   Estimated length of stay     Answer:   More than 2 Midnights     Order Specific Question:   Certification     Answer:   I certify that inpatient services are medically necessary for this patient for a duration of greater than two midnights  See H&P and MD Progress Notes for additional information about the patient's course of treatment  ED Arrival Information     Expected Arrival Acuity Means of Arrival Escorted By Service Admission Type    - 6/28/2019 15:37 Urgent Walk-In Family Member General Medicine Urgent    Arrival Complaint    -        Chief Complaint   Patient presents with    Chest Pain     Pt presents to ED from home c/o intermittent "indigestion" for the last week  Worsened in last 24 hours  Pt states pain is at base of throat and is not resolved unless she is able to burp  Episodes last 30 minutes or more  Additionally, pt states she had intermittent RIGHT breast pain and SOB during events   Heartburn     Assessment/Plan: 80 y o  female who presents with chest pain that has been going on off and on for the last 1 week but became more constant in the last 24 hours  The patient stated that she initially thought it was indigestion as she would get a funny feeling in her neck when she would eat  The patient stated that this would only occur occasionally  The patient was recently prescribed Protonix in addition to her famotidine that she was already on  However she has not noticed any significant improvement with the Protonix    The patient stated that last night the pain became more constant to the point that she was even unable to get into a comfortable position  At 1 point the patient was even crying in pain but did not want to bother her daughter who needed to get up early to help her sister move today  The patient stated that the pain was in the right arm and under the right breast and radiated up into the neck  There is some increasing belching which she stated was awful  The patient states that the pain was persistent even in the emergency department  In the emergency department the patient's troponin was noted to be 0 65 and she was started on a heparin drip  The patient was also given 4 baby aspirin as well  The patient states that currently her chest pain is completely gone  6/28 attending note:   NSTEMI (non-ST elevated myocardial infarction) with known CAD [facility])  Assessment & Plan  · Anticoagulation - heparin drip  · Antiplatelet - aspirin given x4 in the emergency department  Patient is normal on aspirin 162 mg daily  · Beta Blocker - carvedilol 25 mg b i d  Poppy Nath · Statin - pravastatin substitution for Crestor  · Nitrate - nitroglycerin paste  · Pain Control - low-dose morphine if severe pain  · Testing -   ? Trend troponins  ? Determination by cardiology whether to go for cardiac catheterization  With rising troponin, patient will be NPO after midnight to allow for potential cardiac catheterization tomorrow  ? Echo can be ordered but unlikely to be done till Sunday  ? Telemetry monitoring  · Cardiology consultation being requested  Carotid artery obstruction  Assessment & Plan  · Monitor  Avoid letting blood pressures go too low  Familial hypercholesteremia  Assessment & Plan  · Statin therapy  · Check lipid profile  Benign essential hypertension  Assessment & Plan  · Continue current medication regimen  · Monitor blood pressures  Need to make sure we avoid hypotension as she has significant vascular disease (PAD, carotid, etc)    Atrial fibrillation ([address] )  Assessment & Plan  · Rate / Rhythm Control -   ? Carvedilol  ? Patient religiously takes her potassium 3 times a day stating that if she misses any potassium doses she goes into atrial fibrillation  · Anticoagulation - Will be on heparin drip currently, as noted above      6/29 Cardiology note:  presenting with symptoms suggestive of angina-reminiscent of her prior anginal symptoms, with a mild but flat troponin elevation, without any specific ECG changes  Plan:  Troponin elevation: At this point would treat this as a non ST elevation myocardial infarction/acute coronary syndrome  She is currently chest pain-free and troponins have already started trending down, can stop checking troponins  Continue heparin for now  Ideally, she might benefit from coronary angiography-however limited by her advanced at age, deconditioning, limited functional status and chronic kidney disease  At this point I would check an echocardiogram and a pharmacologic stress test tomorrow to assess the burden of ischemia-if significant further invasive evaluation could be considered  She is already on 25 b i d  Of carvedilol with heart rates in the 50-60 range  She appears to be on maximal beta-blockade, blood pressures are still high, will add Imdur  Ranexa has another option if blood pressures do not allows  Atrial fibrillation:  Apparently only had 1 episode of  atrial fibrillation  Not on anticoagulation currently  Currently in sinus rhythm  Prior coronary artery disease:  Details of stenting was obtained through her stent cards-described above  No recent coronary angiography report available  Hypertension:  Currently mildly elevated, watch with changes as above    Dyslipidemia with a history of statin intolerance:  LDL of 126, improved from 144,  she is on Crestor 5 mg twice weekly - -increase to 5 mg daily  ED Triage Vitals   Temperature Pulse Respirations Blood Pressure SpO2   06/28/19 1550 06/28/19 1550 06/28/19 1548 06/28/19 1548 06/28/19 1550 97 8 °F (36 6 °C) 63 18 153/65 95 %      Temp Source Heart Rate Source Patient Position - Orthostatic VS BP Location FiO2 (%)   06/28/19 1550 06/28/19 1548 06/28/19 1548 06/28/19 1548 --   Oral Monitor Sitting Right arm       Pain Score       06/28/19 1548       No Pain        Wt Readings from Last 1 Encounters:   07/01/19 62 8 kg (138 lb 6 4 oz)     Additional Vital Signs:   07/01/19 0656  98 °F (36 7 °C)  57  18  148/67  52  95 %  None (Room air)  Lying   06/30/19 2200  97 6 °F (36 4 °C)  53Abnormal   18  125/60  87  94 %  None (Room air)  Lying   06/30/19 1900  98 2 °F (36 8 °C)  58  16  135/60  87  96 %  None (Room air)  Lying   06/30/19 1500  98 3 °F (36 8 °C)  57  18  144/65  93  94 %  None (Room air)  Lying   06/30/19 0900              None (Room air)     06/30/19 0700  98 3 °F (36 8 °C)  59  18  165/92    94 %  None (Room air)  Lying   06/30/19 0300    67  16  138/61    96 %  None (Room air)  Lying   06/29/19 2200  98 5 °F (36 9 °C)  62  16  122/42Abnormal     94 %  None (Room air)  Lying   06/29/19 2132        110/60           06/29/19 1900  97 8 °F (36 6 °C)  60  16  114/53  76  94 %  None (Room air)  Lying   06/29/19 1500  97 7 °F (36 5 °C)  55  18  133/58  83  94 %  None (Room air)  Lying   06/29/19 1100  97 9 °F (36 6 °C)  56  18  138/63    94 %  None (Room air)  Lying   06/29/19 0740              None (Room air)     06/29/19 0700  98 2 °F (36 8 °C)  53Abnormal   18  153/70    94 %  None (Room air)  Lying   06/29/19 0300    54Abnormal   16  161/50    94 %  None (Room air)  Lying      Weights (last 14 days)     Date/Time  Weight   07/01/19 0418  62 8 kg (138 lb 6 4 oz)   06/30/19 0600  64 8 kg (142 lb 13 7 oz)   06/30/19 0418  64 8 kg (142 lb 13 7 oz)   06/29/19 0600  65 1 kg (143 lb 8 oz)   06/28/19 2123  65 3 kg (144 lb)   06/28/19 1550  65 6 kg (144 lb 10 oz)       Pertinent Labs/Diagnostic Test Results: 6/28EKG:  Rate of 60 beats per minute    No significant ST or T-wave abnormalities to suggest any acute ischemic disease currently  6/28 cxr: Cardiomegaly   No acute cardiopulmonary disease  Results from last 7 days   Lab Units 06/30/19  0422 06/29/19  0337 06/28/19  1615   WBC Thousand/uL 9 81 13 56* 15 06*   HEMOGLOBIN g/dL 9 4* 10 0* 10 0*   HEMATOCRIT % 29 5* 31 0* 30 6*   PLATELETS Thousands/uL 208 230 234   NEUTROS ABS Thousands/µL  --  11 09* 13 00*         Results from last 7 days   Lab Units 06/30/19  0836 06/30/19  0422 06/29/19  0337 06/28/19  1615   SODIUM mmol/L  --  141 141 137   POTASSIUM mmol/L  --  4 3 4 0 4 5   CHLORIDE mmol/L  --  108 106 104   CO2 mmol/L  --  25 24 21   ANION GAP mmol/L  --  8 11 12   BUN mg/dL  --  30* 39* 42*   CREATININE mg/dL  --  1 14 1 23 1 47*   EGFR ml/min/1 73sq m  --  43 39 31   CALCIUM mg/dL  --  8 5 8 5 8 4   MAGNESIUM mg/dL 2 3  --   --   --      Results from last 7 days   Lab Units 06/28/19  1615   AST U/L 12   ALT U/L 21   ALK PHOS U/L 57   TOTAL PROTEIN g/dL 6 5   ALBUMIN g/dL 3 4*   TOTAL BILIRUBIN mg/dL 0 20         Results from last 7 days   Lab Units 06/30/19  0422 06/29/19  0337 06/28/19  1615   GLUCOSE RANDOM mg/dL 106 167* 185*     Results from last 7 days   Lab Units 06/29/19  0337   HEMOGLOBIN A1C % 6 5*   EAG mg/dl 140     Results from last 7 days   Lab Units 06/29/19  0926 06/29/19  0618 06/29/19  0248 06/28/19  2350 06/28/19  2020   TROPONIN I ng/mL 0 88* 0 85* 0 94* 0 81* 0 92*         Results from last 7 days   Lab Units 07/01/19  0402 06/30/19  0410 06/29/19  2246  06/28/19  1656   PROTIME seconds  --   --   --   --  13 4   INR   --   --   --   --  1 08   PTT seconds 23 78* 69*   < > 23    < > = values in this interval not displayed       Results from last 7 days   Lab Units 06/28/19  1615   NT-PRO BNP pg/mL 3,346*     ED Treatment:   Medication Administration from 06/28/2019 1537 to 06/28/2019 1909       Date/Time Order Dose Route Action     06/28/2019 1719 heparin (porcine) injection 3,900 Units 3,900 Units Intravenous Given     06/28/2019 1719 heparin (porcine) 25,000 units in 250 mL infusion (premix) 12 Units/kg/hr Intravenous New Bag     06/28/2019 1747 aspirin chewable tablet 324 mg 324 mg Oral Given        Past Medical History:   Diagnosis Date    Atrial fibrillation (HCC)     Benign essential hypertension     CAD (coronary artery disease)     Carotid artery obstruction     Mesenteric ischemia, chronic (HCC)     TIA (transient ischemic attack)      Present on Admission:   Atrial fibrillation (HCC)   Benign essential hypertension   Familial hypercholesteremia   Carotid artery obstruction   MEGAN (acute kidney injury) ([facility] [address] )    Admitting Diagnosis: Chest pain [R07 9]  NSTEMI (non-ST elevated myocardial infarction) ([facility] [address] ) [I21 4]  Age/Sex: 80 y o  female  Admission Orders:  48 hr telemetry  Peripheral IV  I&O  Daily wt  Nasal cannula  scd  IP CONSULT TO CARDIOLOGY  Current Facility-Administered Medications:  acetaminophen 650 mg Oral Q8H PRN   amLODIPine 5 mg Oral Daily   vitamin C 1,000 mg Oral Daily   aspirin 162 mg Oral Daily   calcium carbonate-vitamin D 1 tablet Oral Every Other Day   carvedilol 25 mg Oral BID   cholecalciferol 1,000 Units Oral Daily   cloNIDine 0 1 mg Oral 4x Daily   cyanocobalamin 1,000 mcg Oral Daily   escitalopram 10 mg Oral Daily With Dinner   famotidine 20 mg Oral Q12H   ferrous sulfate 325 mg Oral Daily With Breakfast   isosorbide mononitrate 30 mg Oral Daily   morphine injection 2 mg Intravenous Q4H PRN   nitroglycerin 0 5 inch Topical Daily   ondansetron 4 mg Intravenous Q6H PRN   pantoprazole 40 mg Oral Early Morning   potassium chloride 30 mEq Oral TID With Meals   pravastatin 40 mg Oral Daily With Dinner   predniSONE 7 5 mg Oral Daily   repaglinide 0 5 [facility]ID [facility]       Network Utilization Review Department  Phone: [phone]; Fax [phone]  [email or web address]  ATTENTION: Please call with any questions or concerns to [phone]  and carefully listen to the prompts so that you are directed to the right person  Send all requests for admission clinical reviews, approved or denied determinations and any other requests to fax [phone]   All voicemails are confidential

## 2019-07-10 NOTE — PROGRESS NOTES
MI Follow Up Office Visit Note  Sarah Reis   80 y o    female   MRN: [id number]  [address]  [address]  [place] [address] [place] [id number] [phone]    PCP: Ana Tiwari MD  Cardiologist: Dr Kristie Davies- furosemide 20 mg daily x3 days then stop  Low-sodium diet  BMP prior to the next visit  Acute coronary syndrome  Troponin to 0 8, 0 9-flat  Conservative approach given her advanced age, renal function, deconditioning and limited functional status  --on aspirin, statin, beta-blocker  Nitrate added  On calcium channel blocker  Hx coronary artery disease with prior multivessel stenting lastly 2009  Paroxysmal Atrial fibrillation:  Apparently only had 1 episode of  atrial fibrillation   Not on anticoagulation currently  EKG today:  Normal sinus rhythm 62 beats per minute  Right bundle branch block  Hypertension  /56,On amlodipine 5, carvedilol 25 b i d , clonidine 0 1 mg q i d , Imdur 30  Intermittently, she had continued to take lisinopril which was stopped on her discharge med sheet  I asked her to stop it as the nitrate was added  We do not want to drop her blood pressure is she does have chronic kidney disease  Dyslipidemia with a history of statin intolerance:  LDL of 126, improved from 144,  she was on Crestor 5 mg twice weekly - -increase to 5 mg daily  DM2,on prandin  History TIA  Frequent belching-her PCP switched her from Pepcid to Protonix  She still has some symptoms but they are less frequent  Advised follow-up with PCP  Cardiac testing  --TTE 6/30/19  EF 60%  No RWMA  RV normal   no significant valve disease  --NM myocardial SPECT There was a moderate-sized, partially and mildly reversible myocardial perfusion defect of the inferior wall suggestive of a mild amount of inferior ischemia  EF 62%         Summary of recommendations  Stop lisinopril  Start furosemide 20 mg daily x3 days then stop  Rosuvastatin 5 mg 3 times weekly, previously 2 times weekly  BMP prior to the next office visit  Low-sodium diet  Follow up will be scheduled with Dr Valere Eisenmenger 7/26              HPI  Cassandra Ramsay is an 80-year-old lady with a history of hypertension, dyslipidemia, and coronary artery disease with prior multivessel stenting (LAD, pr L Cx, OM2, lastly stented in 2009)  She previously followed with Dr Pendleton Daily from [facility] where all of her cardiac catheterizations were performed, more recently, Dr Valere Eisenmenger  She has a history of a single episode of atrial fibrillation, maintained on aspirin  Recently she presented to [facility] with symptoms suggestive of am [place], reminiscent of her prior cardiac events  Her EKG showed no acute ST segment changes  She did develop a troponin elevation,, centrally flat and 0 8-0 9, which was felt to be related to an ACS  Given her advanced age, renal function and deconditioning, a conservative approach was recommended  Her medications were enhanced      7/15/19 she presents for cardiology follow-up, accompanied by her daughter  She continues to complain of belching but it is less frequent than it had been, now that she is on Protonix instead of Pepcid  She still has some jaw pain particularly when she lays down at night  She does sleep with the head of the bed elevated  She is still short of breath with exertion  She denies any chest pain  She denies palpitation  No falls  She has been using a walker  She has a fair amount of ecchymosis and bruising noted on her arms that occurred during the hospitalization not since discharge  She does not add salt to her foods however she does not avoid dietary sodium ask her to start being more careful about this, and avoiding dietary sodium  She inadvertently continued to take the lisinopril which was discontinued at the hospital given chronic kidney disease and the addition of Imdur  I asked her to stop    Will start Lasix 20 mg daily x3 and then stop  Will get some blood work before the next visit  Her EKG today showed sinus rhythm with occasional PACs          Assessment  Diagnoses and all orders for this visit:    BRASHER (dyspnea on exertion)    Paroxysmal atrial fibrillation (HCC)  -     POCT ECG    Benign essential hypertension  -     furosemide (LASIX) 20 mg tablet; One tablet daily x3, the p r n  For weight gain 2-3 lb in 2 days  -     Basic metabolic panel; Future    Coronary artery disease involving native coronary artery of native heart without angina pectoris    Hyperlipidemia, unspecified hyperlipidemia type  -     Discontinue: rosuvastatin (CRESTOR) 5 mg tablet; Take 1 tablet (5 mg total) by mouth daily One tablet on Tuesdays Thursdays and Saturday  -     rosuvastatin (CRESTOR) 5 mg tablet; Take 1 tablet (5 mg total) by mouth daily One tablet on Tuesdays Thursdays and Saturday          Past Medical History:   Diagnosis Date    Atrial fibrillation (HCC)     Benign essential hypertension     CAD (coronary artery disease)     Carotid artery obstruction     Mesenteric ischemia, chronic (HCC)     TIA (transient ischemic attack)        Review of Systems   Constitution: Negative for chills  Cardiovascular: Positive for dyspnea on exertion  Negative for chest pain, claudication, cyanosis, irregular heartbeat, leg swelling, near-syncope, palpitations, paroxysmal nocturnal dyspnea and syncope  Respiratory: Negative for cough and shortness of breath  Gastrointestinal: Negative for heartburn and nausea  Frequent belching   Neurological: Negative for dizziness, focal weakness, headaches, light-headedness and weakness  All other systems reviewed and are negative  Allergies   Allergen Reactions    Adhesive [Medical Tape]      Paper tape okay    Amoxicillin     Erythromycin     Ezetimibe     Fenofibrate     Flecainide     Lovastatin     Sulfa Antibiotics     Thioridazine            Current Outpatient Medications:     acetaminophen (TYLENOL) 325 mg tablet, Take 650 mg by mouth every 8 (eight) hours as needed for mild pain  , Disp: , Rfl:     amLODIPine (NORVASC) 5 mg tablet, Take 1 tablet by mouth daily, Disp: , Rfl:     Ascorbic Acid (VITAMIN C) 1000 MG tablet, Take 1,000 mg by mouth daily, Disp: , Rfl:     aspirin 81 MG tablet, Take 162 mg by mouth daily , Disp: , Rfl:     Calcium Citrate-Vitamin D (CALCIUM + D PO), Take 600 mg by mouth every other day  , Disp: , Rfl:     carvedilol (COREG) 12 5 mg tablet, Take 2 tablets by mouth 2 (two) times a day, Disp: , Rfl:     Cholecalciferol (VITAMIN D3) 1000 units CAPS, Take by mouth daily  , Disp: , Rfl:     cloNIDine (CATAPRES) 0 1 mg tablet, Take 1 tablet by mouth 4 (four) times a day , Disp: , Rfl:     Cranberry 500 MG CAPS, Take 1 capsule by mouth daily, Disp: , Rfl:     cyanocobalamin 1000 MCG tablet, Take by mouth daily  , Disp: , Rfl:     escitalopram (LEXAPRO) 10 mg tablet, Take 1 tablet by mouth daily, Disp: , Rfl:     famotidine (PEPCID) 20 mg tablet, Take 1 tablet by mouth every 12 (twelve) hours, Disp: , Rfl:     ferrous sulfate 325 (65 FE) MG EC tablet, Take 325 mg by mouth daily, Disp: , Rfl:     isosorbide mononitrate (IMDUR) 30 mg 24 hr tablet, Take 1 tablet (30 mg total) by mouth daily, Disp: 30 tablet, Rfl: 0    pantoprazole (PROTONIX) 40 mg tablet, Take 40 mg by mouth daily, Disp: , Rfl:     potassium chloride (K-DUR,KLOR-CON) 10 mEq tablet, Take 3 tablets by mouth 3 (three) times a day with meals , Disp: , Rfl:     predniSONE 2 5 mg tablet, Take 2 5 mg by mouth daily Take one with a 5 mg pill to make it 7 5mg daily , Disp: , Rfl: 1    predniSONE 5 mg tablet, Take 7 5 mg by mouth daily  , Disp: , Rfl: 5    repaglinide (PRANDIN) 0 5 mg tablet, Take 0 5 mg by mouth 3 (three) times a day before meals  , Disp: , Rfl:     rosuvastatin (CRESTOR) 5 mg tablet, Take 1 tablet (5 mg total) by mouth daily One tablet on Tuesdays Thursdays and Saturday, Disp: 30 tablet, Rfl: 0    furosemide (LASIX) 20 mg tablet, One tablet daily x3, the p r n  For weight gain 2-3 lb in 2 days, Disp: 10 tablet, Rfl: 1        Social History     Socioeconomic History    Marital status:       Spouse name: Not on file    Number of children: Not on file    Years of education: Not on file    Highest education level: Not on file   Occupational History    Not on file   Social Needs    Financial resource strain: Not on file    Food insecurity:     Worry: Not on file     Inability: Not on file    Transportation needs:     Medical: Not on file     Non-medical: Not on file   Tobacco Use    Smoking status: Never Smoker    Smokeless tobacco: Never Used   Substance and Sexual Activity    Alcohol use: No    Drug use: No    Sexual activity: Not on file   Lifestyle    Physical activity:     Days per week: Not on file     Minutes per session: Not on file    Stress: Not on file   Relationships    Social connections:     Talks on phone: Not on file     Gets together: Not on file     Attends Latter day service: Not on file     Active member of club or organization: Not on file     Attends meetings of clubs or organizations: Not on file     Relationship status: Not on file    Intimate partner violence:     Fear of current or ex partner: Not on file     Emotionally abused: Not on file     Physically abused: Not on file     Forced sexual activity: Not on file   Other Topics Concern    Not on file   Social History Narrative    Not on file       Family History   Problem Relation Age of Onset    Leukemia Father     Hypertension Sister     Heart attack Brother 46    Hypertension Brother     Sudden death Brother 46        scd    Heart failure Maternal Grandmother     Hypertension Maternal Grandmother     Stroke Maternal Grandfather     Hypertension Daughter     Anuerysm Neg Hx     Clotting disorder Neg Hx     Arrhythmia Neg Hx     Hyperlipidemia Neg Hx Physical Exam   Constitutional: She is oriented to person, place, and time  No distress  HENT:   Head: Normocephalic and atraumatic  Eyes: Conjunctivae and EOM are normal    Neck: Normal range of motion  Neck supple  Cardiovascular: Normal rate, regular rhythm, normal heart sounds and intact distal pulses  Chronically left lower extremity edema  A stocking is in place  Pulmonary/Chest: She has rales  Abdominal: Soft  Bowel sounds are normal    Musculoskeletal: Normal range of motion  Neurological: She is alert and oriented to person, place, and time  Skin: Skin is warm and dry  Bruising and ecchymosis noted  She is not diaphoretic  Psychiatric: She has a normal mood and affect  Nursing note and vitals reviewed  Vitals: Blood pressure 114/56, pulse 69, height 5' 2" (1 575 m), weight 64 9 kg (143 lb), SpO2 96 %  Wt Readings from Last 3 Encounters:   07/15/19 64 9 kg (143 lb)   07/01/19 62 8 kg (138 lb 6 4 oz)   04/11/19 63 5 kg (140 lb)         Labs & Results:  Lab Results   Component Value Date    WBC 9 81 06/30/2019    HGB 9 4 (L) 06/30/2019    HCT 29 5 (L) 06/30/2019     (H) 06/30/2019     06/30/2019     No results found for: BNP  No components found for: CHEM  Troponin I   Date Value Ref Range Status   06/29/2019 0 88 (H) <=0 04 ng/mL Final     Comment:       Siemens Chemistry analyzer 99% cutoff is > 0 04 ng/mL in network labs     o cTnI 99% cutoff is useful only when applied to patients in the clinical setting of myocardial ischemia   o cTnI 99% cutoff should be interpreted in the context of clinical history, ECG findings and possibly cardiac imaging to establish correct diagnosis     o cTnI 99% cutoff may be suggestive but clearly not indicative of a coronary event without the clinical setting of myocardial ischemia      06/29/2019 0 85 (H) <=0 04 ng/mL Final     Comment:       Siemens Chemistry analyzer 99% cutoff is > 0 04 ng/mL in network labs     o cTnI 99% cutoff is useful only when applied to patients in the clinical setting of myocardial ischemia   o cTnI 99% cutoff should be interpreted in the context of clinical history, ECG findings and possibly cardiac imaging to establish correct diagnosis  o cTnI 99% cutoff may be suggestive but clearly not indicative of a coronary event without the clinical setting of myocardial ischemia      2019 0 94 (H) <=0 04 ng/mL Final     Comment:       Siemens Chemistry analyzer 99% cutoff is > 0 04 ng/mL in network labs     o cTnI 99% cutoff is useful only when applied to patients in the clinical setting of myocardial ischemia   o cTnI 99% cutoff should be interpreted in the context of clinical history, ECG findings and possibly cardiac imaging to establish correct diagnosis  o cTnI 99% cutoff may be suggestive but clearly not indicative of a coronary event without the clinical setting of myocardial ischemia  Results for orders placed during the hospital encounter of 19   Echo complete with contrast if indicated    Narrative [place] [address], [address] [place] [address]  [phone]    Transthoracic Echocardiogram  2D, M-mode, Doppler, and Color Doppler    Study date:  2019    Patient: Delfina Hendrix  MR number: [id number]  Account number: [de-identified]  : 1930  Age: 80 years  Gender: Female  Status: Inpatient  Location: Bedside  Height: 62 in  Weight: 142 lb  BP:    Indications: Chest pain, NSTEMI  Diagnoses: R07 9 - Chest pain, unspecified    Sonographer:  ZULEIMA De Oliveira  Primary Physician:  Chidi Porter MD  Referring Physician:  Emma Flores MD  Group:  [place] [id number] [facility]  Interpreting Physician:  Emma Flores MD    SUMMARY    LEFT VENTRICLE:  Systolic function was normal  Ejection fraction was estimated to be 60 %  There were no regional wall motion abnormalities    Doppler parameters were consistent with abnormal left ventricular relaxation (grade 1 diastolic dysfunction)  RIGHT VENTRICLE:  The size was normal   Systolic function was normal     MITRAL VALVE:  There was trace regurgitation  TRICUSPID VALVE:  There was trace regurgitation  HISTORY: PRIOR HISTORY: A-fib, HTN, CAD, carotid disease, TIA  PROCEDURE: The procedure was performed at the bedside  This was a routine study  The transthoracic approach was used  The study included complete 2D imaging, M-mode, complete spectral Doppler, and color Doppler  Images were obtained from  the parasternal, apical, subcostal, and suprasternal notch acoustic windows  Echocardiographic views were limited due to poor acoustic window availability  This was a technically difficult study  LEFT VENTRICLE: Size was normal  Systolic function was normal  Ejection fraction was estimated to be 60 %  There were no regional wall motion abnormalities  Wall thickness was normal  DOPPLER: Doppler parameters were consistent with  abnormal left ventricular relaxation (grade 1 diastolic dysfunction)  RIGHT VENTRICLE: The size was normal  Systolic function was normal  Wall thickness was normal     LEFT ATRIUM: Size was normal     RIGHT ATRIUM: Size was normal     MITRAL VALVE: Valve structure was normal  There was normal leaflet separation  DOPPLER: The transmitral velocity was within the normal range  There was no evidence for stenosis  There was trace regurgitation  AORTIC VALVE: The valve was trileaflet  Leaflets exhibited normal thickness and normal cuspal separation  DOPPLER: Transaortic velocity was within the normal range  There was no evidence for stenosis  There was no significant  regurgitation  TRICUSPID VALVE: The valve structure was normal  There was normal leaflet separation  DOPPLER: The transtricuspid velocity was within the normal range  There was no evidence for stenosis  There was trace regurgitation      PULMONIC VALVE: Leaflets exhibited normal thickness, no calcification, and normal cuspal separation  DOPPLER: The transpulmonic velocity was within the normal range  There was no significant regurgitation  PERICARDIUM: There was no pericardial effusion  The pericardium was normal in appearance  AORTA: The root exhibited normal size  SYSTEMIC VEINS: IVC: The inferior vena cava was normal in size  SYSTEM MEASUREMENT TABLES    CW  AV Env  Ti: 327 57 ms  AV MaxP 36 mmHg  AV VTI: 25 43 cm  AV Vmax: 1 26 m/s  AV Vmean: 0 78 m/s  AV meanP 78 mmHg    MM  TAPSE: 2 54 cm    PW  E': 0 06 m/s  E/E': 16 44  LVOT Env  Ti: 364 48 ms  LVOT VTI: 24 87 cm  LVOT Vmax: 1 1 m/s  LVOT Vmean: 0 68 m/s  LVOT maxP 88 mmHg  LVOT meanP 25 mmHg  MV A Alexandru: 1 23 m/s  MV Dec [place]: 3 86 m/s2  MV DecT: 277 ms  MV E Alexandru: 1 07 m/s  MV E/A Ratio: 0 87  MV PHT: 80 33 ms  MVA By PHT: 2 74 cm2    [employer] Accredited Echocardi[facility]    Prepared and electronically signed by    Elizabeth Hastings MD  Signed 2019 15:55:46       No results found for this or any previous visit  This note was completed in part utilizing m-modal fluency direct voice recognition software  Grammatical errors, random word insertion, spelling mistakes, and incomplete sentences may be an occasional consequence of the system secondary to software limitations, ambient noise and hardware issues  At the time of dictation, efforts were made to edit, clarify and /or correct errors  Please read the chart carefully and recognize, using context, where substitutions have occurred    If you have any questions or concerns about the context, text or information contained within the body of this dictation, please contact myself, the provider, for further clarification

## 2019-07-15 ENCOUNTER — OFFICE VISIT (OUTPATIENT)
Dept: CARDIOLOGY CLINIC | Facility: CLINIC | Age: 84
End: 2019-07-15
Payer: MEDICARE

## 2019-07-15 ENCOUNTER — TELEPHONE (OUTPATIENT)
Dept: CARDIOLOGY CLINIC | Facility: CLINIC | Age: 84
End: 2019-07-15

## 2019-07-15 VITALS
OXYGEN SATURATION: 96 % | DIASTOLIC BLOOD PRESSURE: 56 MMHG | HEIGHT: 62 IN | WEIGHT: 143 LBS | SYSTOLIC BLOOD PRESSURE: 114 MMHG | HEART RATE: 69 BPM | BODY MASS INDEX: 26.31 KG/M2

## 2019-07-15 DIAGNOSIS — E78.5 HYPERLIPIDEMIA, UNSPECIFIED HYPERLIPIDEMIA TYPE: ICD-10-CM

## 2019-07-15 DIAGNOSIS — I10 BENIGN ESSENTIAL HYPERTENSION: ICD-10-CM

## 2019-07-15 DIAGNOSIS — R06.00 DOE (DYSPNEA ON EXERTION): Primary | ICD-10-CM

## 2019-07-15 DIAGNOSIS — I48.0 PAROXYSMAL ATRIAL FIBRILLATION (HCC): ICD-10-CM

## 2019-07-15 DIAGNOSIS — I25.10 CORONARY ARTERY DISEASE INVOLVING NATIVE CORONARY ARTERY OF NATIVE HEART WITHOUT ANGINA PECTORIS: ICD-10-CM

## 2019-07-15 PROCEDURE — 93000 ELECTROCARDIOGRAM COMPLETE: CPT | Performed by: NURSE PRACTITIONER

## 2019-07-15 PROCEDURE — 99214 OFFICE O/P EST MOD 30 MIN: CPT | Performed by: NURSE PRACTITIONER

## 2019-07-15 RX ORDER — ROSUVASTATIN CALCIUM 5 MG/1
5 TABLET, COATED ORAL DAILY
Qty: 30 TABLET | Refills: 0
Start: 2019-07-15 | End: 2019-08-12 | Stop reason: SDUPTHER

## 2019-07-15 RX ORDER — ROSUVASTATIN CALCIUM 5 MG/1
5 TABLET, COATED ORAL DAILY
Qty: 30 TABLET | Refills: 0
Start: 2019-07-15 | End: 2019-07-15 | Stop reason: SDUPTHER

## 2019-07-15 RX ORDER — FUROSEMIDE 20 MG/1
TABLET ORAL
Qty: 10 TABLET | Refills: 1 | Status: SHIPPED | OUTPATIENT
Start: 2019-07-15 | End: 2019-07-26 | Stop reason: SDUPTHER

## 2019-07-15 NOTE — LETTER
July 15, 2019     Sandi Tolentino MD  [address] [place] [address] 1  [facility] 105    Patient: Verona Harris   YOB: 1930   Date of Visit: 7/15/2019       Dear Dr Shafer Cassette: Thank you for referring Alisia Archer to me for evaluation  Below are my notes for this consultation  If you have questions, please do not hesitate to call me  I look forward to following your patient along with you  Sincerely,        RADHA Haas        CC: Moni Romero, DO Cameron Hunt, [address]  7/15/2019  9:26 AM  Sign at close encounter  MI Follow Up Office Visit Note  Verona Harris   80 y o    female   MRN: [id number]  [address]  [address] [place] [id number] [phone]    PCP: Boy Ta MD  Cardiologist: Dr Estuardo Carrillo- furosemide 20 mg daily x3 days then stop  Low-sodium diet  BMP prior to the next visit  Acute coronary syndrome  Troponin to 0 8, 0 9-flat  Conservative approach given her advanced age, renal function, deconditioning and limited functional status  --on aspirin, statin, beta-blocker  Nitrate added  On calcium channel blocker  Hx coronary artery disease with prior multivessel stenting lastly 2009  Paroxysmal Atrial fibrillation:  Apparently only had 1 episode of  atrial fibrillation   Not on anticoagulation currently  EKG today:  Normal sinus rhythm 62 beats per minute  Right bundle branch block  Hypertension  /56,On amlodipine 5, carvedilol 25 b i d , clonidine 0 1 mg q i d , Imdur 30  Intermittently, she had continued to take lisinopril which was stopped on her discharge med sheet  I asked her to stop it as the nitrate was added    We do not want to drop her blood pressure is she does have chronic kidney disease  Dyslipidemia with a history of statin intolerance:  LDL of 126, improved from 144,  she was on Crestor 5 mg twice weekly - -increase to 5 mg daily  DM2,on prandin  History TIA  Frequent belching-her PCP switched her from Pepcid to Protonix  She still has some symptoms but they are less frequent  Advised follow-up with PCP  Cardiac testing  --TTE 6/30/19  EF 60%  No RWMA  RV normal   no significant valve disease  --NM myocardial SPECT There was a moderate-sized, partially and mildly reversible myocardial perfusion defect of the inferior wall suggestive of a mild amount of inferior ischemia  EF 62%         Summary of recommendations  Stop lisinopril  Start furosemide 20 mg daily x3 days then stop  Rosuvastatin 5 mg 3 times weekly, previously 2 times weekly  BMP prior to the next office visit  Low-sodium diet  Follow up will be scheduled with Dr Missy Gooden 7/26              [facility]  Matt Honeycutt is an 19-year-old lady with a history of hypertension, dyslipidemia, and coronary artery disease with prior multivessel stenting (LAD, pr L Cx, OM2, lastly stented in 2009)  She previously followed with Dr Ruth Pandey from [place] where all of her cardiac catheterizations were performed, more recently, Dr Missy Gooden  She has a history of a single episode of atrial fibrillation, maintained on aspirin  Recently she presented to [facility] with symptoms suggestive of am [place], reminiscent of her prior cardiac events  Her EKG showed no acute ST segment changes  She did develop a troponin elevation,, centrally flat and 0 8-0 9, which was felt to be related to an ACS  Given her advanced age, renal function and deconditioning, a conservative approach was recommended  Her medications were enhanced      7/15/19 she presents for cardiology follow-up, accompanied by her daughter  She continues to complain of belching but it is less frequent than it had been, now that she is on Protonix instead of Pepcid  She still has some jaw pain particularly when she lays down at night  She does sleep with the head of the bed elevated    She is still short of breath with exertion  She denies any chest pain  She denies palpitation  No falls  She has been using a walker  She has a fair amount of ecchymosis and bruising noted on her arms that occurred during the hospitalization not since discharge  She does not add salt to her foods however she does not avoid dietary sodium ask her to start being more careful about this, and avoiding dietary sodium  She inadvertently continued to take the lisinopril which was discontinued at the hospital given chronic kidney disease and the addition of Imdur  I asked her to stop  Will start Lasix 20 mg daily x3 and then stop  Will get some blood work before the next visit  Her EKG today showed sinus rhythm with occasional PACs          Assessment  Diagnoses and all orders for this visit:    BRASHER (dyspnea on exertion)    Paroxysmal atrial fibrillation (HCC)  -     POCT ECG    Benign essential hypertension  -     furosemide (LASIX) 20 mg tablet; One tablet daily x3, the p r n  For weight gain 2-3 lb in 2 days  -     Basic metabolic panel; Future    Coronary artery disease involving native coronary artery of native heart without angina pectoris    Hyperlipidemia, unspecified hyperlipidemia type  -     Discontinue: rosuvastatin (CRESTOR) 5 mg tablet; Take 1 tablet (5 mg total) by mouth daily One tablet on Tuesdays Thursdays and Saturday  -     rosuvastatin (CRESTOR) 5 mg tablet; Take 1 tablet (5 mg total) by mouth daily One tablet on Tuesdays Thursdays and Saturday          Past Medical History:   Diagnosis Date    Atrial fibrillation (HCC)     Benign essential hypertension     CAD (coronary artery disease)     Carotid artery obstruction     Mesenteric ischemia, chronic (HCC)     TIA (transient ischemic attack)        Review of Systems   Constitution: Negative for chills  Cardiovascular: Positive for dyspnea on exertion   Negative for chest pain, claudication, cyanosis, irregular heartbeat, leg swelling, near-syncope, palpitations, paroxysmal nocturnal dyspnea and syncope  Respiratory: Negative for cough and shortness of breath  Gastrointestinal: Negative for heartburn and nausea  Frequent belching   Neurological: Negative for dizziness, focal weakness, headaches, light-headedness and weakness  All other systems reviewed and are negative  Allergies   Allergen Reactions    Adhesive [Medical Tape]      Paper tape okay    Amoxicillin     Erythromycin     Ezetimibe     Fenofibrate     Flecainide     Lovastatin     Sulfa Antibiotics     Thioridazine            Current Outpatient Medications:     acetaminophen (TYLENOL) 325 mg tablet, Take 650 mg by mouth every 8 (eight) hours as needed for mild pain  , Disp: , Rfl:     amLODIPine (NORVASC) 5 mg tablet, Take 1 tablet by mouth daily, Disp: , Rfl:     Ascorbic Acid (VITAMIN C) 1000 MG tablet, Take 1,000 mg by mouth daily, Disp: , Rfl:     aspirin 81 MG tablet, Take 162 mg by mouth daily , Disp: , Rfl:     Calcium Citrate-Vitamin D (CALCIUM + D PO), Take 600 mg by mouth every other day  , Disp: , Rfl:     carvedilol (COREG) 12 5 mg tablet, Take 2 tablets by mouth 2 (two) times a day, Disp: , Rfl:     Cholecalciferol (VITAMIN D3) 1000 units CAPS, Take by mouth daily  , Disp: , Rfl:     cloNIDine (CATAPRES) 0 1 mg tablet, Take 1 tablet by mouth 4 (four) times a day , Disp: , Rfl:     Cranberry 500 MG CAPS, Take 1 capsule by mouth daily, Disp: , Rfl:     cyanocobalamin 1000 MCG tablet, Take by mouth daily  , Disp: , Rfl:     escitalopram (LEXAPRO) 10 mg tablet, Take 1 tablet by mouth daily, Disp: , Rfl:     famotidine (PEPCID) 20 mg tablet, Take 1 tablet by mouth every 12 (twelve) hours, Disp: , Rfl:     ferrous sulfate 325 (65 FE) MG EC tablet, Take 325 mg by mouth daily, Disp: , Rfl:     isosorbide mononitrate (IMDUR) 30 mg 24 hr tablet, Take 1 tablet (30 mg total) by mouth daily, Disp: 30 tablet, Rfl: 0    pantoprazole (PROTONIX) 40 mg tablet, Take 40 mg by mouth daily, Disp: , Rfl:     potassium chloride (K-DUR,KLOR-CON) 10 mEq tablet, Take 3 tablets by mouth 3 (three) times a day with meals , Disp: , Rfl:     predniSONE 2 5 mg tablet, Take 2 5 mg by mouth daily Take one with a 5 mg pill to make it 7 5mg daily , Disp: , Rfl: 1    predniSONE 5 mg tablet, Take 7 5 mg by mouth daily  , Disp: , Rfl: 5    repaglinide (PRANDIN) 0 5 mg tablet, Take 0 5 mg by mouth 3 (three) times a day before meals  , Disp: , Rfl:     rosuvastatin (CRESTOR) 5 mg tablet, Take 1 tablet (5 mg total) by mouth daily One tablet on Tuesdays Thursdays and Saturday, Disp: 30 tablet, Rfl: 0    furosemide (LASIX) 20 mg tablet, One tablet daily x3, the p r n  For weight gain 2-3 lb in 2 days, Disp: 10 tablet, Rfl: 1        Social History     Socioeconomic History    Marital status:       Spouse name: Not on file    Number of children: Not on file    Years of education: Not on file    Highest education level: Not on file   Occupational History    Not on file   Social Needs    Financial resource strain: Not on file    Food insecurity:     Worry: Not on file     Inability: Not on file    Transportation needs:     Medical: Not on file     Non-medical: Not on file   Tobacco Use    Smoking status: Never Smoker    Smokeless tobacco: Never Used   Substance and Sexual Activity    Alcohol use: No    Drug use: No    Sexual activity: Not on file   Lifestyle    Physical activity:     Days per week: Not on file     Minutes per session: Not on file    Stress: Not on file   Relationships    Social connections:     Talks on phone: Not on file     Gets together: Not on file     Attends [employer] service: Not on file     Active member of club or organization: Not on file     Attends meetings of clubs or organizations: Not on file     Relationship status: Not on file    Intimate partner violence:     Fear of current or ex partner: Not on file     Emotionally abused: Not on file     Physically abused: Not on file     Forced sexual activity: Not on file   Other Topics Concern    Not on file   Social History Narrative    Not on file       Family History   Problem Relation Age of Onset    Leukemia Father     Hypertension Sister     Heart attack Brother 46    Hypertension Brother     Sudden death Brother 46        scd    Heart failure Maternal Grandmother     Hypertension Maternal Grandmother     Stroke Maternal Grandfather     Hypertension Daughter     Anuerysm Neg Hx     Clotting disorder Neg Hx     Arrhythmia Neg Hx     Hyperlipidemia Neg Hx        Physical Exam   Constitutional: She is oriented to person, place, and time  No distress  HENT:   Head: Normocephalic and atraumatic  Eyes: Conjunctivae and EOM are normal    Neck: Normal range of motion  Neck supple  Cardiovascular: Normal rate, regular rhythm, normal heart sounds and intact distal pulses  Chronically left lower extremity edema  A stocking is in place  Pulmonary/Chest: She has rales  Abdominal: Soft  Bowel sounds are normal    Musculoskeletal: Normal range of motion  Neurological: She is alert and oriented to person, place, and time  Skin: Skin is warm and dry  Bruising and ecchymosis noted  She is not diaphoretic  Psychiatric: She has a normal mood and affect  Nursing note and vitals reviewed  Vitals: Blood pressure 114/56, pulse 69, height 5' 2" (1 575 m), weight 64 9 kg (143 lb), SpO2 96 %     Wt Readings from Last 3 Encounters:   07/15/19 64 9 kg (143 lb)   07/01/19 62 8 kg (138 lb 6 4 oz)   04/11/19 63 5 kg (140 lb)         Labs & Results:  Lab Results   Component Value Date    WBC 9 81 06/30/2019    HGB 9 4 (L) 06/30/2019    HCT 29 5 (L) 06/30/2019     (H) 06/30/2019     06/30/2019     No results found for: BNP  No components found for: CHEM  Troponin I   Date Value Ref Range Status   06/29/2019 0 88 (H) <=0 04 ng/mL Final     Comment:       S[employer] Chemistry analyzer 99% cutoff is > 0 04 ng/mL in network labs     o cTnI 99% cutoff is useful only when applied to patients in the clinical setting of myocardial ischemia   o cTnI 99% cutoff should be interpreted in the context of clinical history, ECG findings and possibly cardiac imaging to establish correct diagnosis  o cTnI 99% cutoff may be suggestive but clearly not indicative of a coronary event without the clinical setting of myocardial ischemia      2019 0 85 (H) <=0 04 ng/mL Final     Comment:       Siemens Chemistry analyzer 99% cutoff is > 0 04 ng/mL in network labs     o cTnI 99% cutoff is useful only when applied to patients in the clinical setting of myocardial ischemia   o cTnI 99% cutoff should be interpreted in the context of clinical history, ECG findings and possibly cardiac imaging to establish correct diagnosis  o cTnI 99% cutoff may be suggestive but clearly not indicative of a coronary event without the clinical setting of myocardial ischemia      2019 0 94 (H) <=0 04 ng/mL Final     Comment:       Siemens Chemistry analyzer 99% cutoff is > 0 04 ng/mL in network labs     o cTnI 99% cutoff is useful only when applied to patients in the clinical setting of myocardial ischemia   o cTnI 99% cutoff should be interpreted in the context of clinical history, ECG findings and possibly cardiac imaging to establish correct diagnosis  o cTnI 99% cutoff may be suggestive but clearly not indicative of a coronary event without the clinical setting of myocardial ischemia         Results for orders placed during the hospital encounter of 19   Echo complete with contrast if indicated    Narrative [facility], [address] [facility]  [phone]    Transthoracic Echocardiogram  2D, M-mode, Doppler, and Color Doppler    Study date:  2019    Patient: Marilyn Loyola  MR number: [id number]  Account number: [de-identified]  : 1930  Age: 80 years  Gender: Female  Status: Inpatient  Location: Bedside  Height: 62 in  Weight: 142 lb  BP:    Indications: Chest pain, NSTEMI  Diagnoses: R07 9 - Chest pain, unspecified    Sonographer:  ZULEIMA Hernandez  Primary Physician:  Frank Light MD  Referring Physician:  Mauricio Burns MD  Group:  [facility]  Interpreting Physician:  Mauricio Burns MD    SUMMARY    LEFT VENTRICLE:  Systolic function was normal  Ejection fraction was estimated to be 60 %  There were no regional wall motion abnormalities  Doppler parameters were consistent with abnormal left ventricular relaxation (grade 1 diastolic dysfunction)  RIGHT VENTRICLE:  The size was normal   Systolic function was normal     MITRAL VALVE:  There was trace regurgitation  TRICUSPID VALVE:  There was trace regurgitation  HISTORY: PRIOR HISTORY: A-fib, HTN, CAD, carotid disease, TIA  PROCEDURE: The procedure was performed at the bedside  This was a routine study  The transthoracic approach was used  The study included complete 2D imaging, M-mode, complete spectral Doppler, and color Doppler  Images were obtained from  the parasternal, apical, subcostal, and suprasternal notch acoustic windows  Echocardiographic views were limited due to poor acoustic window availability  This was a technically difficult study  LEFT VENTRICLE: Size was normal  Systolic function was normal  Ejection fraction was estimated to be 60 %  There were no regional wall motion abnormalities  Wall thickness was normal  DOPPLER: Doppler parameters were consistent with  abnormal left ventricular relaxation (grade 1 diastolic dysfunction)  RIGHT VENTRICLE: The size was normal  Systolic function was normal  Wall thickness was normal     LEFT ATRIUM: Size was normal     RIGHT ATRIUM: Size was normal     MITRAL VALVE: Valve structure was normal  There was normal leaflet separation  DOPPLER: The transmitral velocity was within the normal range  There was no evidence for stenosis  There was trace regurgitation  AORTIC VALVE: The valve was trileaflet  Leaflets exhibited normal thickness and normal cuspal separation  DOPPLER: Transaortic velocity was within the normal range  There was no evidence for stenosis  There was no significant  regurgitation  TRICUSPID VALVE: The valve structure was normal  There was normal leaflet separation  DOPPLER: The transtricuspid velocity was within the normal range  There was no evidence for stenosis  There was trace regurgitation  PULMONIC VALVE: Leaflets exhibited normal thickness, no calcification, and normal cuspal separation  DOPPLER: The transpulmonic velocity was within the normal range  There was no significant regurgitation  PERICARDIUM: There was no pericardial effusion  The pericardium was normal in appearance  AORTA: The root exhibited normal size  SYSTEMIC VEINS: IVC: The inferior vena cava was normal in size  SYSTEM MEASUREMENT TABLES    CW  AV Env  Ti: 327 57 ms  AV MaxP 36 mmHg  AV VTI: 25 43 cm  AV Vmax: 1 26 m/s  AV Vmean: 0 78 m/s  AV meanP 78 mmHg    MM  TAPSE: 2 54 cm    PW  E': 0 06 m/s  E/E': 16 44  LVOT Env  Ti: 364 48 ms  LVOT VTI: 24 87 cm  LVOT Vmax: 1 1 m/s  LVOT Vmean: 0 68 m/s  LVOT maxP 88 mmHg  LVOT meanP 25 mmHg  MV A Alexandru: 1 23 m/s  MV Dec [place]: 3 86 m/s2  MV DecT: 277 ms  MV E Alexandru: 1 07 m/s  MV E/A Ratio: 0 87  MV PHT: 80 33 ms  MVA By PHT: 2 74 cm2    [employer] Accredited Echocardiography Laboratory    Prepared and electronically signed by    Marisol Goodman MD  Signed 2019 15:55:46       No results found for this or any previous visit  This note was completed in part utilizing [employer] direct voice recognition software  Grammatical errors, random word insertion, spelling mistakes, and incomplete sentences may be an occasional consequence of the system secondary to software limitations, ambient noise and hardware issues   At the time of dictation, efforts were made to edit, clarify and /or correct errors  Please read the chart carefully and recognize, using context, where substitutions have occurred    If you have any questions or concerns about the context, text or information contained within the body of this dictation, please contact myself, the provider, for further clarification

## 2019-07-15 NOTE — PATIENT INSTRUCTIONS
Rosuvastatin 5 mg 3 times a week  Stop Zestril  Furosemide 20 mg x3 days then stop  Blood work prior to next office visit  Low-sodium diet

## 2019-07-15 NOTE — TELEPHONE ENCOUNTER
The Pharmacist called to confirm that Shae Moreno is supposed to take Furosemide, a new prescription sent in by [facility]  The patient is listed as having a sulfa allergy and he said Furosemide is flagged containing some sulfa  Please advise

## 2019-07-23 ENCOUNTER — APPOINTMENT (OUTPATIENT)
Dept: LAB | Facility: CLINIC | Age: 84
End: 2019-07-23
Payer: MEDICARE

## 2019-07-23 DIAGNOSIS — I10 BENIGN ESSENTIAL HYPERTENSION: ICD-10-CM

## 2019-07-23 LAB
ANION GAP SERPL CALCULATED.3IONS-SCNC: 7 MMOL/L (ref 4–13)
BUN SERPL-MCNC: 24 MG/DL (ref 5–25)
CALCIUM SERPL-MCNC: 8.8 MG/DL (ref 8.3–10.1)
CHLORIDE SERPL-SCNC: 105 MMOL/L (ref 100–108)
CO2 SERPL-SCNC: 28 MMOL/L (ref 21–32)
CREAT SERPL-MCNC: 1.24 MG/DL (ref 0.6–1.3)
GFR SERPL CREATININE-BSD FRML MDRD: 39 ML/MIN/1.73SQ M
GLUCOSE P FAST SERPL-MCNC: 108 MG/DL (ref 65–99)
POTASSIUM SERPL-SCNC: 4.3 MMOL/L (ref 3.5–5.3)
SODIUM SERPL-SCNC: 140 MMOL/L (ref 136–145)

## 2019-07-23 PROCEDURE — 36415 COLL VENOUS BLD VENIPUNCTURE: CPT

## 2019-07-23 PROCEDURE — 80048 BASIC METABOLIC PNL TOTAL CA: CPT

## 2019-07-26 ENCOUNTER — OFFICE VISIT (OUTPATIENT)
Dept: CARDIOLOGY CLINIC | Facility: CLINIC | Age: 84
End: 2019-07-26
Payer: MEDICARE

## 2019-07-26 VITALS
WEIGHT: 142.7 LBS | BODY MASS INDEX: 26.26 KG/M2 | SYSTOLIC BLOOD PRESSURE: 130 MMHG | HEART RATE: 66 BPM | DIASTOLIC BLOOD PRESSURE: 60 MMHG | HEIGHT: 62 IN

## 2019-07-26 DIAGNOSIS — I73.9 PERIPHERAL ARTERIAL DISEASE (HCC): ICD-10-CM

## 2019-07-26 DIAGNOSIS — I25.10 CORONARY ARTERY DISEASE INVOLVING NATIVE CORONARY ARTERY OF NATIVE HEART WITHOUT ANGINA PECTORIS: ICD-10-CM

## 2019-07-26 DIAGNOSIS — I21.4 NSTEMI (NON-ST ELEVATED MYOCARDIAL INFARCTION) (HCC): ICD-10-CM

## 2019-07-26 DIAGNOSIS — I72.4 POPLITEAL ARTERY ANEURYSM (HCC): ICD-10-CM

## 2019-07-26 DIAGNOSIS — I65.23 OBSTRUCTION OF CAROTID ARTERY ON BOTH SIDES: ICD-10-CM

## 2019-07-26 DIAGNOSIS — I10 BENIGN ESSENTIAL HYPERTENSION: Primary | ICD-10-CM

## 2019-07-26 DIAGNOSIS — I48.0 PAROXYSMAL ATRIAL FIBRILLATION (HCC): ICD-10-CM

## 2019-07-26 PROCEDURE — 93000 ELECTROCARDIOGRAM COMPLETE: CPT | Performed by: INTERNAL MEDICINE

## 2019-07-26 PROCEDURE — 99214 OFFICE O/P EST MOD 30 MIN: CPT | Performed by: INTERNAL MEDICINE

## 2019-07-26 RX ORDER — ISOSORBIDE MONONITRATE 30 MG/1
30 TABLET, EXTENDED RELEASE ORAL 2 TIMES DAILY
Qty: 60 TABLET | Refills: 6 | Status: SHIPPED | OUTPATIENT
Start: 2019-07-26 | End: 2019-09-26 | Stop reason: CLARIF

## 2019-07-26 RX ORDER — FUROSEMIDE 20 MG/1
TABLET ORAL
Qty: 10 TABLET | Refills: 1 | Status: SHIPPED | OUTPATIENT
Start: 2019-07-26 | End: 2019-07-26 | Stop reason: SDUPTHER

## 2019-07-26 RX ORDER — FUROSEMIDE 20 MG/1
TABLET ORAL
Qty: 20 TABLET | Refills: 3 | Status: SHIPPED | OUTPATIENT
Start: 2019-07-26 | End: 2019-08-05 | Stop reason: SDUPTHER

## 2019-07-26 NOTE — PROGRESS NOTES
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     4  Paroxysmal atrial fibrillation (HCC)     5  Peripheral arterial disease ([facility] )     6  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[facility] )     7  NSTEMI (non-ST elevated myocardial infarction) with known CAD ([facility] )  isosorbide mononitrate (IMDUR) 30 mg 24 hr tablet       Discussion/Summary:   There was a recent hospitalization for chest pain  She had minimal troponin elevation and nuclear stress test showed inferior ischemia  She was like did to proceed with conservative medical therapy  She still has some minimal chest discomfort I have recommended increasing Imdur to 30 mg b i d   Continue other cardiac medications  I provided her with 20 mg of Lasix to use 1-2 times per week as needed for lower extremity edema  Interval History:   29-year-old female with a history of coronary artery disease, hypertension, paroxysmal atrial fibrillation, peripheral arterial disease presents for routine scheduled follow-up visit  Denies any chest pain, shortness of breath, palpitations, lightheadedness, dizziness, or syncope  She remains fairly physically active around the house  There has been no lightheadedness dizziness or syncope  She uses a walker for ambulation  Following a last visit she had an episode of burping and belching this was felt to be anginal equivalent  Admitted to the hospital and decision was made to be conservative with medical management only no intervention    She has been doing well since getting out of the hospital   Chest pain is significantly improved with isosorbide mononitrate  She still has a little discomfort but much much better  She has occasional lower extremity edema  Denies any PND orthopnea  His been no palpitations, lightheadedness, dizziness, or syncope  Problem List     Atrial fibrillation ([facility] )    Benign essential hypertension    Coronary artery disease    Familial hypercholesteremia    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HCC)    Carotid artery obstruction        Past Medical History:   Diagnosis Date    Atrial fibrillation (HCC)     Benign essential hypertension     CAD (coronary artery disease)     Carotid artery obstruction     Mesenteric ischemia, chronic (HCC)     TIA (transient ischemic attack)      Social History     Socioeconomic History    Marital status:       Spouse name: Not on file    Number of children: Not on file    Years of education: Not on file    Highest education level: Not on file   Occupational History    Not on file   Social Needs    Financial resource strain: Not on file    Food insecurity:     Worry: Not on file     Inability: Not on file    Transportation needs:     Medical: Not on file     Non-medical: Not on file   Tobacco Use    Smoking status: Never Smoker    Smokeless tobacco: Never Used   Substance and Sexual Activity    Alcohol use: No    Drug use: No    Sexual activity: Not on file   Lifestyle    Physical activity:     Days per week: Not on file     Minutes per session: Not on file    Stress: Not on file   Relationships    Social connections:     Talks on phone: Not on file     Gets together: Not on file     Attends Yarsani service: Not on file     Active member of club or organization: Not on file     Attends meetings of clubs or organizations: Not on file     Relationship status: Not on file    Intimate partner violence:     Fear of current or ex partner: Not on file     Emotionally abused: Not on file     Physically abused: Not on file Forced sexual activity: Not on file   Other Topics Concern    Not on file   Social History Narrative    Not on file      Family History   Problem Relation Age of Onset    Leukemia Father     Hypertension Sister     Heart attack Brother 46    Hypertension Brother     Sudden death Brother 46        scd    Heart failure Maternal Grandmother     Hypertension Maternal Grandmother     Stroke Maternal Grandfather     Hypertension Daughter     Anuerysm Neg Hx     Clotting disorder Neg Hx     Arrhythmia Neg Hx     Hyperlipidemia Neg Hx      Past Surgical History:   Procedure Laterality Date    APPENDECTOMY      CATARACT EXTRACTION      CHOLECYSTECTOMY      CORONARY ANGIOPLASTY      stent x4 07/11/1996x1 10/09/2009 x3    HYSTERECTOMY      REPLACEMENT TOTAL KNEE Right        Current Outpatient Medications:     acetaminophen (TYLENOL) 325 mg tablet, Take 650 mg by mouth every 8 (eight) hours as needed for mild pain  , Disp: , Rfl:     amLODIPine (NORVASC) 5 mg tablet, Take 1 tablet by mouth daily, Disp: , Rfl:     Ascorbic Acid (VITAMIN C) 1000 MG tablet, Take 1,000 mg by mouth daily, Disp: , Rfl:     aspirin 81 MG tablet, Take 162 mg by mouth daily , Disp: , Rfl:     Calcium Citrate-Vitamin D (CALCIUM + D PO), Take 600 mg by mouth every other day  , Disp: , Rfl:     carvedilol (COREG) 12 5 mg tablet, Take 2 tablets by mouth 2 (two) times a day, Disp: , Rfl:     Cholecalciferol (VITAMIN D3) 1000 units CAPS, Take by mouth daily  , Disp: , Rfl:     cloNIDine (CATAPRES) 0 1 mg tablet, Take 1 tablet by mouth 4 (four) times a day , Disp: , Rfl:     Cranberry 500 MG CAPS, Take 1 capsule by mouth daily, Disp: , Rfl:     cyanocobalamin 1000 MCG tablet, Take by mouth daily  , Disp: , Rfl:     escitalopram (LEXAPRO) 10 mg tablet, Take 1 tablet by mouth daily, Disp: , Rfl:     famotidine (PEPCID) 20 mg tablet, Take 1 tablet by mouth every 12 (twelve) hours, Disp: , Rfl:     ferrous sulfate 325 (65 FE) MG EC tablet, Take 325 mg by mouth daily, Disp: , Rfl:     furosemide (LASIX) 20 mg tablet, Take 1-2 times a week, Disp: 20 tablet, Rfl: 3    isosorbide mononitrate (IMDUR) 30 mg 24 hr tablet, Take 1 tablet (30 mg total) by mouth 2 (two) times a day, Disp: 60 tablet, Rfl: 6    pantoprazole (PROTONIX) 40 mg tablet, Take 40 mg by mouth daily, Disp: , Rfl:     potassium chloride (K-DUR,KLOR-CON) 10 mEq tablet, Take 3 tablets by mouth 3 (three) times a day with meals , Disp: , Rfl:     predniSONE 2 5 mg tablet, Take 2 5 mg by mouth daily Take one with a 5 mg pill to make it 7 5mg daily , Disp: , Rfl: 1    predniSONE 5 mg tablet, Take 7 5 mg by mouth daily  , Disp: , Rfl: 5    repaglinide (PRANDIN) 0 5 mg tablet, Take 0 5 mg by mouth 3 (three) times a day before meals  , Disp: , Rfl:     rosuvastatin (CRESTOR) 5 mg tablet, Take 1 tablet (5 mg total) by mouth daily One tablet on Tuesdays Thursdays and Saturday, Disp: 30 tablet, Rfl: 0  Allergies   Allergen Reactions    Adhesive [Medical Tape]      Paper tape okay    Amoxicillin     Erythromycin     Ezetimibe     Fenofibrate     Flecainide     Lovastatin     Sulfa Antibiotics     Thioridazine        Labs:     Chemistry        Component Value Date/Time     01/09/2018 0842    K 4 3 07/23/2019 0956    K 4 5 02/27/2018 0933     07/23/2019 0956     02/27/2018 0933    CO2 28 07/23/2019 0956    CO2 27 02/27/2018 0933    BUN 24 07/23/2019 0956    BUN 22 02/27/2018 0933    CREATININE 1 24 07/23/2019 0956    CREATININE 1 05 (H) 01/09/2018 0842        Component Value Date/Time    CALCIUM 8 8 07/23/2019 0956    CALCIUM 9 4 02/27/2018 0933    ALKPHOS 57 06/28/2019 1615    ALKPHOS 57 02/27/2018 0933    AST 12 06/28/2019 1615    AST 12 02/27/2018 0933    ALT 21 06/28/2019 1615    ALT 11 02/27/2018 0933    BILITOT 0 4 01/09/2018 0842            No results found for: CHOL  Lab Results   Component Value Date    HDL 64 (H) 06/29/2019    HDL 59 09/19/2018 HDL 52 02/27/2018     Lab Results   Component Value Date    LDLCALC 126 (H) 06/29/2019     Lab Results   Component Value Date    TRIG 136 06/29/2019    TRIG 297 (H) 09/19/2018    TRIG 418 (H) 02/27/2018     No results found for: CHOLHDL    Imaging: No results found  ECG:  Normal sinus rhythm incomplete right bundle-branch block      Review of Systems   Constitution: Negative  HENT: Negative  Eyes: Negative  Cardiovascular: Negative  Respiratory: Negative  Endocrine: Negative  Hematologic/Lymphatic: Negative  Skin: Negative  Musculoskeletal: Negative  Gastrointestinal: Negative  Genitourinary: Negative  Neurological: Negative  Psychiatric/Behavioral: Negative  Vitals:    07/26/19 1453   BP: 130/60   Pulse: 66     Vitals:    07/26/19 1453   Weight: 64 7 kg (142 lb 11 2 oz)     Height: 5' 2" (157 5 cm)   Body mass index is 26 1 kg/m²      Physical Exam:  General:  Alert and cooperative, appears stated age  HEENT:  PERRLA, EOMI, no scleral icterus, no conjunctival pallor  Neck:  No lymphadenopathy, no thyromegaly, no carotid bruits, no elevated JVP  Heart:  Regular rate and rhythm, normal S1/S2, no S3/S4, no murmur  Lungs:  Clear to auscultation bilaterally   Abdomen:  Soft, non-tender, positive bowel sounds, no rebound or guarding,   no organomegaly   Extremities:  No clubbing, cyanosis or edema   Vascular:  2+ pedal pulses  Skin:  No rashes or lesions on exposed skin  Neurologic:  Cranial nerves II-XII grossly intact without focal deficits

## 2019-08-02 ENCOUNTER — TELEPHONE (OUTPATIENT)
Dept: CARDIOLOGY CLINIC | Facility: CLINIC | Age: 84
End: 2019-08-02

## 2019-08-02 NOTE — TELEPHONE ENCOUNTER
Daughter called and states it has been 1 wk since the increase in her medication per LOV  Pt continues with Edema of ankles and feet, not improved, alsop has had 1 bad  and few mild episodes with fluid in the lungs and pressure in throat        Taking: Lasix 20 mg 2x's wk ( Mon/ Fri)               Imdur 30 mg bid        Please advise

## 2019-08-05 ENCOUNTER — TELEPHONE (OUTPATIENT)
Dept: CARDIOLOGY CLINIC | Facility: CLINIC | Age: 84
End: 2019-08-05

## 2019-08-05 DIAGNOSIS — I10 BENIGN ESSENTIAL HYPERTENSION: ICD-10-CM

## 2019-08-05 RX ORDER — FUROSEMIDE 20 MG/1
20 TABLET ORAL EVERY OTHER DAY
Qty: 45 TABLET | Refills: 3 | Status: SHIPPED | OUTPATIENT
Start: 2019-08-05 | End: 2020-05-20 | Stop reason: SDUPTHER

## 2019-08-05 NOTE — TELEPHONE ENCOUNTER
Pt took lasix 20 mg for 3 days  Daughter called to say she still has edema feet and ankles( just a little)  Has had angina 2 x's last week  No other  symptoms        Takes: Lasix 20 mg 2x's week              Imdur 30 mg bid        Please advise

## 2019-08-07 ENCOUNTER — APPOINTMENT (OUTPATIENT)
Dept: LAB | Facility: AMBULARY SURGERY CENTER | Age: 84
End: 2019-08-07
Payer: MEDICARE

## 2019-08-07 DIAGNOSIS — I25.10 CORONARY ARTERY DISEASE INVOLVING NATIVE CORONARY ARTERY OF NATIVE HEART WITHOUT ANGINA PECTORIS: ICD-10-CM

## 2019-08-07 DIAGNOSIS — I10 BENIGN ESSENTIAL HYPERTENSION: ICD-10-CM

## 2019-08-07 LAB
CHOLEST SERPL-MCNC: 240 MG/DL (ref 50–200)
HDLC SERPL-MCNC: 70 MG/DL (ref 40–60)
LDLC SERPL CALC-MCNC: 123 MG/DL (ref 0–100)
TRIGL SERPL-MCNC: 237 MG/DL

## 2019-08-07 PROCEDURE — 36415 COLL VENOUS BLD VENIPUNCTURE: CPT

## 2019-08-07 PROCEDURE — 80061 LIPID PANEL: CPT

## 2019-08-07 PROCEDURE — 80048 BASIC METABOLIC PNL TOTAL CA: CPT

## 2019-08-09 DIAGNOSIS — I10 BENIGN ESSENTIAL HYPERTENSION: Primary | ICD-10-CM

## 2019-08-09 LAB
ANION GAP SERPL CALCULATED.3IONS-SCNC: 6 MMOL/L (ref 4–13)
BUN SERPL-MCNC: 25 MG/DL (ref 5–25)
CALCIUM SERPL-MCNC: 9.1 MG/DL (ref 8.3–10.1)
CHLORIDE SERPL-SCNC: 104 MMOL/L (ref 100–108)
CO2 SERPL-SCNC: 27 MMOL/L (ref 21–32)
CREAT SERPL-MCNC: 1.12 MG/DL (ref 0.6–1.3)
GFR SERPL CREATININE-BSD FRML MDRD: 44 ML/MIN/1.73SQ M
GLUCOSE SERPL-MCNC: 85 MG/DL (ref 65–140)
POTASSIUM SERPL-SCNC: 4.2 MMOL/L (ref 3.5–5.3)
SODIUM SERPL-SCNC: 137 MMOL/L (ref 136–145)

## 2019-08-09 NOTE — TELEPHONE ENCOUNTER
Dtr called for BW results  Only lipid panel was completed  Placed order for BMP, lab was able to retrieve blood drawn from Wednesday and used that tube  Dtr will c/b on Monday for results     Please review abnormal lipid results

## 2019-08-12 DIAGNOSIS — E78.5 HYPERLIPIDEMIA, UNSPECIFIED HYPERLIPIDEMIA TYPE: ICD-10-CM

## 2019-08-12 RX ORDER — ROSUVASTATIN CALCIUM 5 MG/1
TABLET, COATED ORAL
Qty: 30 TABLET | Refills: 11 | Status: SHIPPED | OUTPATIENT
Start: 2019-08-12 | End: 2019-09-17 | Stop reason: HOSPADM

## 2019-08-20 ENCOUNTER — TELEPHONE (OUTPATIENT)
Dept: CARDIOLOGY CLINIC | Facility: CLINIC | Age: 84
End: 2019-08-20

## 2019-08-20 NOTE — TELEPHONE ENCOUNTER
Dr Mali Tolbert pt who was in hospital 6/28 for NSTEMI  Stress showed inferior ischemia  Dtr stated she has 4 stents  Pt decided to proceed with medical therapy  OV with Dr Mali Tolbert 7/26/19    Pt c/o painful angina increasing to 1-2 times daily  #9/10 lasting a few minutes  Taking Imdur 30mg BID    Increased Lasix 20mg from 2x weekly to QOD, c/o dependent b/l foot and ankle edema  Does wear compression stockings- Wt stable    Any recommendations?

## 2019-08-27 ENCOUNTER — OFFICE VISIT (OUTPATIENT)
Dept: CARDIOLOGY CLINIC | Facility: CLINIC | Age: 84
End: 2019-08-27
Payer: MEDICARE

## 2019-08-27 VITALS
OXYGEN SATURATION: 96 % | HEART RATE: 72 BPM | HEIGHT: 62 IN | DIASTOLIC BLOOD PRESSURE: 54 MMHG | WEIGHT: 143.3 LBS | SYSTOLIC BLOOD PRESSURE: 132 MMHG | BODY MASS INDEX: 26.37 KG/M2

## 2019-08-27 DIAGNOSIS — I15.9 SECONDARY HYPERTENSION: ICD-10-CM

## 2019-08-27 DIAGNOSIS — R07.9 CHEST PAIN, UNSPECIFIED TYPE: Primary | ICD-10-CM

## 2019-08-27 DIAGNOSIS — K21.9 GASTROESOPHAGEAL REFLUX DISEASE WITHOUT ESOPHAGITIS: ICD-10-CM

## 2019-08-27 DIAGNOSIS — R60.0 LOWER LEG EDEMA: ICD-10-CM

## 2019-08-27 DIAGNOSIS — E78.5 DYSLIPIDEMIA: ICD-10-CM

## 2019-08-27 PROCEDURE — 99214 OFFICE O/P EST MOD 30 MIN: CPT | Performed by: NURSE PRACTITIONER

## 2019-08-27 RX ORDER — RANOLAZINE 500 MG/1
500 TABLET, EXTENDED RELEASE ORAL 2 TIMES DAILY
Qty: 60 TABLET | Refills: 3 | Status: SHIPPED | OUTPATIENT
Start: 2019-08-27 | End: 2019-09-17 | Stop reason: HOSPADM

## 2019-08-27 RX ORDER — FAMOTIDINE 20 MG/1
20 TABLET, FILM COATED ORAL DAILY
Qty: 30 TABLET | Refills: 3 | Status: ON HOLD
Start: 2019-08-27 | End: 2019-09-17 | Stop reason: SDUPTHER

## 2019-08-27 NOTE — PROGRESS NOTES
Cardiology Follow Up    Anand Enriquez  4/6/1930  [id number]  [facility] [place] [address] [address] [phone]    Acute visit for Chest pain and Lower leg edema     Interval History:     Ms Lenny Rolon was seen by Dr Collins Ellison on 7/26/19  Imdur increased to 30mg BID  Lasix 20mg 1-2 times a week for lower leg edema  Ms Lenny Rolon presents our office today due to complaints of almost daily chest pain  Chest pain lasts occurred last evening waking her from sleep, 10/10, radiating up neck, across chest into left arm  Once she burps the pain will subside  Nilay Shah is accompanied by her daughter  She admits to lower extremity edema her weight at home was 139-140 lb her weight in the office is 143 lb  She denies dietary indiscretion  She is taking all medications as prescribed          CAD prior stenting LAD, L Cx, OM2, lastly stented in 2009 6/30/19 Nuclear stress test, mild amount of inferior ischemia it was decided on conservative medical management  6/30/19 TTE LVEF 60%, grade 1 DD, RV normal size and function, trace MR  Single episode of Atrial Fibrillation maintained on ASA  HTN  PAD   Patient Active Problem List   Diagnosis    Atrial fibrillation (HCC)    Benign essential hypertension    Coronary artery disease    Familial hypercholesteremia    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HCC)    Carotid artery obstruction    Peripheral arterial disease ([facility] )    NSTEMI (non-ST elevated myocardial infarction) with known CAD ([facility] )    Anemia    NSVT (nonsustained ventricular tachycardia) ([address] )    MEGAN (acute kidney injury) ([facility] )     Past Medical History:   Diagnosis Date    Atrial fibrillation (HCC)     Benign essential hypertension     CAD (coronary artery disease)     Carotid artery obstruction     Mesenteric ischemia, chronic (HCC)     TIA (transient ischemic attack)      Social History Socioeconomic History    Marital status:       Spouse name: Not on file    Number of children: Not on file    Years of education: Not on file    Highest education level: Not on file   Occupational History    Not on file   Social Needs    Financial resource strain: Not on file    Food insecurity:     Worry: Not on file     Inability: Not on file    Transportation needs:     Medical: Not on file     Non-medical: Not on file   Tobacco Use    Smoking status: Never Smoker    Smokeless tobacco: Never Used   Substance and Sexual Activity    Alcohol use: No    Drug use: No    Sexual activity: Not on file   Lifestyle    Physical activity:     Days per week: Not on file     Minutes per session: Not on file    Stress: Not on file   Relationships    Social connections:     Talks on phone: Not on file     Gets together: Not on file     Attends Advent service: Not on file     Active member of club or organization: Not on file     Attends meetings of clubs or organizations: Not on file     Relationship status: Not on file    Intimate partner violence:     Fear of current or ex partner: Not on file     Emotionally abused: Not on file     Physically abused: Not on file     Forced sexual activity: Not on file   Other Topics Concern    Not on file   Social History Narrative    Not on file      Family History   Problem Relation Age of Onset    Leukemia Father     Hypertension Sister     Heart attack Brother 46    Hypertension Brother     Sudden death Brother 46        scd    Heart failure Maternal Grandmother     Hypertension Maternal Grandmother     Stroke Maternal Grandfather     Hypertension Daughter     Anuerysm Neg Hx     Clotting disorder Neg Hx     Arrhythmia Neg Hx     Hyperlipidemia Neg Hx      Past Surgical History:   Procedure Laterality Date    APPENDECTOMY      CATARACT EXTRACTION      CHOLECYSTECTOMY      CORONARY ANGIOPLASTY      stent x4 07/11/1996x1 10/09/2009 x3    HYSTERECTOMY      REPLACEMENT TOTAL KNEE Right        Current Outpatient Medications:     acetaminophen (TYLENOL) 325 mg tablet, Take 650 mg by mouth every 8 (eight) hours as needed for mild pain  , Disp: , Rfl:     amLODIPine (NORVASC) 5 mg tablet, Take 1 tablet by mouth daily, Disp: , Rfl:     Ascorbic Acid (VITAMIN C) 1000 MG tablet, Take 1,000 mg by mouth daily, Disp: , Rfl:     aspirin 81 MG tablet, Take 162 mg by mouth daily , Disp: , Rfl:     Calcium Citrate-Vitamin D (CALCIUM + D PO), Take 600 mg by mouth every other day  , Disp: , Rfl:     carvedilol (COREG) 12 5 mg tablet, Take 2 tablets by mouth 2 (two) times a day, Disp: , Rfl:     Cholecalciferol (VITAMIN D3) 1000 units CAPS, Take by mouth daily  , Disp: , Rfl:     cloNIDine (CATAPRES) 0 1 mg tablet, Take 1 tablet by mouth 4 (four) times a day , Disp: , Rfl:     Cranberry 500 MG CAPS, Take 1 capsule by mouth daily, Disp: , Rfl:     cyanocobalamin 1000 MCG tablet, Take by mouth daily  , Disp: , Rfl:     escitalopram (LEXAPRO) 10 mg tablet, Take 1 tablet by mouth daily, Disp: , Rfl:     famotidine (PEPCID) 20 mg tablet, Take 1 tablet by mouth every 12 (twelve) hours, Disp: , Rfl:     ferrous sulfate 325 (65 FE) MG EC tablet, Take 325 mg by mouth daily, Disp: , Rfl:     furosemide (LASIX) 20 mg tablet, Take 1 tablet (20 mg total) by mouth every other day, Disp: 45 tablet, Rfl: 3    isosorbide mononitrate (IMDUR) 30 mg 24 hr tablet, Take 1 tablet (30 mg total) by mouth 2 (two) times a day, Disp: 60 tablet, Rfl: 6    pantoprazole (PROTONIX) 40 mg tablet, Take 40 mg by mouth daily, Disp: , Rfl:     potassium chloride (K-DUR,KLOR-CON) 10 mEq tablet, Take 3 tablets by mouth 3 (three) times a day with meals , Disp: , Rfl:     predniSONE 2 5 mg tablet, Take 2 5 mg by mouth daily Take one with a 5 mg pill to make it 7 5mg daily , Disp: , Rfl: 1    predniSONE 5 mg tablet, Take 7 5 mg by mouth daily  , Disp: , Rfl: 5    repaglinide (PRANDIN) 0 5 mg tablet, Take 0 5 mg by mouth 3 (three) times a day before meals  , Disp: , Rfl:     rosuvastatin (CRESTOR) 5 mg tablet, 1 TABLET THREE DAYS A WEEK (Tuesdays Thursdays and Saturday), Disp: 30 tablet, Rfl: 11  Allergies   Allergen Reactions    Adhesive [Medical Tape]      Paper tape okay    Amoxicillin     Erythromycin     Ezetimibe     Fenofibrate     Flecainide     Lovastatin     Sulfa Antibiotics     Thioridazine        Labs:  Appointment on 08/07/2019   Component Date Value    Cholesterol 08/07/2019 240*    Triglycerides 08/07/2019 237*    HDL, Direct 08/07/2019 70*    LDL Calculated 08/07/2019 123*    Sodium 08/07/2019 137     Potassium 08/07/2019 4 2     Chloride 08/07/2019 104     CO2 08/07/2019 27     ANION GAP 08/07/2019 6     BUN 08/07/2019 25     Creatinine 08/07/2019 1 12     Glucose 08/07/2019 85     Calcium 08/07/2019 9 1     eGFR 08/07/2019 44    Appointment on 07/23/2019   Component Date Value    Sodium 07/23/2019 140     Potassium 07/23/2019 4 3     Chloride 07/23/2019 105     CO2 07/23/2019 28     ANION GAP 07/23/2019 7     BUN 07/23/2019 24     Creatinine 07/23/2019 1 24     Glucose, Fasting 07/23/2019 108*    Calcium 07/23/2019 8 8     eGFR 07/23/2019 39    Admission on 06/28/2019, Discharged on 07/01/2019   Component Date Value    WBC 06/28/2019 15 06*    RBC 06/28/2019 3 12*    Hemoglobin 06/28/2019 10 0*    Hematocrit 06/28/2019 30 6*    MCV 06/28/2019 98     MCH 06/28/2019 32 1     MCHC 06/28/2019 32 7     RDW 06/28/2019 13 3     MPV 06/28/2019 8 9     Platelets 99/02/7585 234     nRBC 06/28/2019 0     Neutrophils Relative 06/28/2019 87*    Immat GRANS % 06/28/2019 1     Lymphocytes Relative 06/28/2019 8*    Monocytes Relative 06/28/2019 4     Eosinophils Relative 06/28/2019 0     Basophils Relative 06/28/2019 0     Neutrophils Absolute 06/28/2019 13 00*    Immature Grans Absolute 06/28/2019 0 15     Lymphocytes Absolute 06/28/2019 1 25  Monocytes Absolute 06/28/2019 0 64     Eosinophils Absolute 06/28/2019 0 00     Basophils Absolute 06/28/2019 0 02     Sodium 06/28/2019 137     Potassium 06/28/2019 4 5     Chloride 06/28/2019 104     CO2 06/28/2019 21     ANION GAP 06/28/2019 12     BUN 06/28/2019 42*    Creatinine 06/28/2019 1 47*    Glucose 06/28/2019 185*    Calcium 06/28/2019 8 4     AST 06/28/2019 12     ALT 06/28/2019 21     Alkaline Phosphatase 06/28/2019 57     Total Protein 06/28/2019 6 5     Albumin 06/28/2019 3 4*    Total Bilirubin 06/28/2019 0 20     eGFR 06/28/2019 31     Troponin I 06/28/2019 0 65*    PTT 06/28/2019 23     Protime 06/28/2019 13 4     INR 06/28/2019 1 08     Troponin I 06/28/2019 0 92*    Troponin I 06/28/2019 0 81*    NT-proBNP 06/28/2019 3,346*    Troponin I 06/29/2019 0 88*    PTT 06/29/2019 116*    Troponin I 06/29/2019 0 94*    Sodium 06/29/2019 141     Potassium 06/29/2019 4 0     Chloride 06/29/2019 106     CO2 06/29/2019 24     ANION GAP 06/29/2019 11     BUN 06/29/2019 39*    Creatinine 06/29/2019 1 23     Glucose 06/29/2019 167*    Calcium 06/29/2019 8 5     eGFR 06/29/2019 39     WBC 06/29/2019 13 56*    RBC 06/29/2019 3 13*    Hemoglobin 06/29/2019 10 0*    Hematocrit 06/29/2019 31 0*    MCV 06/29/2019 99*    MCH 06/29/2019 31 9     MCHC 06/29/2019 32 3     RDW 06/29/2019 13 4     MPV 06/29/2019 9 4     Platelets 51/34/9295 230     nRBC 06/29/2019 0     Neutrophils Relative 06/29/2019 82*    Immat GRANS % 06/29/2019 1     Lymphocytes Relative 06/29/2019 10*    Monocytes Relative 06/29/2019 7     Eosinophils Relative 06/29/2019 0     Basophils Relative 06/29/2019 0     Neutrophils Absolute 06/29/2019 11 09*    Immature Grans Absolute 06/29/2019 0 12     Lymphocytes Absolute 06/29/2019 1 34     Monocytes Absolute 06/29/2019 1 00     Eosinophils Absolute 06/29/2019 0 00     Basophils Absolute 06/29/2019 0 01     Hemoglobin A1C 06/29/2019 6 5*  EAG 06/29/2019 140     Cholesterol 06/29/2019 217*    Triglycerides 06/29/2019 136     HDL, Direct 06/29/2019 64*    LDL Calculated 06/29/2019 126*    PTT 06/29/2019 52*    Troponin I 06/29/2019 0 85*    Ventricular Rate 06/28/2019 60     Atrial Rate 06/28/2019 63     NM Interval 06/28/2019 160     QRSD Interval 06/28/2019 128     QT Interval 06/28/2019 440     QTC Interval 06/28/2019 440     P Axis 06/28/2019 60     QRS Axis 06/28/2019 23     T Wave Axis 06/28/2019 5     Ventricular Rate 06/29/2019 54     Atrial Rate 06/29/2019 54     NM Interval 06/29/2019 158     QRSD Interval 06/29/2019 130     QT Interval 06/29/2019 494     QTC Interval 06/29/2019 468     P Axis 06/29/2019 55     QRS Axis 06/29/2019 17     T Wave Axis 06/29/2019 -14     PTT 06/29/2019 61*    PTT 06/29/2019 69*    PTT 06/30/2019 78*    WBC 06/30/2019 9 81     RBC 06/30/2019 2 95*    Hemoglobin 06/30/2019 9 4*    Hematocrit 06/30/2019 29 5*    MCV 06/30/2019 100*    MCH 06/30/2019 31 9     MCHC 06/30/2019 31 9     RDW 06/30/2019 13 4     Platelets 30/25/2402 208     MPV 06/30/2019 9 0     Sodium 06/30/2019 141     Potassium 06/30/2019 4 3     Chloride 06/30/2019 108     CO2 06/30/2019 25     ANION GAP 06/30/2019 8     BUN 06/30/2019 30*    Creatinine 06/30/2019 1 14     Glucose 06/30/2019 106     Calcium 06/30/2019 8 5     eGFR 06/30/2019 43     Magnesium 06/30/2019 2 3     PTT 07/01/2019 23      Imaging: No results found  Review of Systems:  Review of Systems   Constitutional: Positive for fatigue  Cardiovascular: Positive for chest pain and leg swelling  Musculoskeletal: Positive for arthralgias and gait problem  Physical Exam:  Physical Exam   Constitutional: She is oriented to person, place, and time  She appears well-developed  HENT:   Head: Normocephalic  Eyes: Pupils are equal, round, and reactive to light  Neck: Normal range of motion     Cardiovascular: Normal rate and normal heart sounds  Pulmonary/Chest: Effort normal  She has rales in the right middle field, the right lower field, the left middle field and the left lower field  Abdominal: Soft  Bowel sounds are normal    Musculoskeletal: Normal range of motion  Right lower leg: She exhibits edema  Left lower leg: She exhibits edema  Neurological: She is alert and oriented to person, place, and time  Skin: Skin is warm and dry  Capillary refill takes less than 2 seconds  1+ jayson LE ankle edema    Psychiatric: She has a normal mood and affect  Vitals reviewed  Discussion/Summary:  1  Chest pain occurring most days, Shae Moreno would like to continue with medical management, start Ranexa 500mg BID, continue on Imdur 30mg BID, Coreg 12 5mg BID,   2  LE edema , Grade 1 DD, + JVD, bibasilar rales and 1+ ankle edema  Take lasix 20mg when you return home and and extra dose of lasix 20mg in am tomorrow  Take and extra K dur 10meq po today, 2gm sodiumd iet   3  HTN- BP RUE sitting 136/64 , continue on Norvasc 5 mg daily, Coreg 12 5 mg b i d , clonidine 0 1 mg 4 times daily  4  Dyslipidemia- 8/07/19 cholesterol 240, triglycerides 237, HDL 70, , continue on Crestor 5mg three days a week    5  DM2 Hgb A1C 6 5 on 6/29/19- continue on Prandin 0,5mg TID follow up with PCP   6   GERD- continue on Protonix 40mg daily

## 2019-08-27 NOTE — PATIENT INSTRUCTIONS
2gm sodium low fat low cholesterol diet, eating fresh is best    Lasix 20mg today and extra dose tomorrow, continue on EOD dosing  Take k dur 10meq extra dose today  Start Ranexa 500mg BID

## 2019-08-28 ENCOUNTER — TELEPHONE (OUTPATIENT)
Dept: CARDIOLOGY CLINIC | Facility: CLINIC | Age: 84
End: 2019-08-28

## 2019-08-28 NOTE — TELEPHONE ENCOUNTER
Daughter called back  Agreeable to 30 day supply of the generic at cost of 47 00  Per pharmacy, the generic is on order and will fill the 30 day supply when it arrives

## 2019-08-28 NOTE — TELEPHONE ENCOUNTER
Confirmed with pharmacy they received generic  Spoke with [facility] pharmacy  Pharmacy was running 90 day supply  A 30 day supply will cost $47   Called daughter left message to return call  Otherwise she does not qualify for the copay card and [facility] is only accepting patients with no insurance at all

## 2019-08-28 NOTE — TELEPHONE ENCOUNTER
Roland Boss was seen in the office yesterday  Ranexa 500 mg bid was prescribed  Daughter called, said med will cost over $100 for a 30 day supply which is too expensive for her and not sure if the med will help  She asked if there is an alternative med  Two week f/u scheduled for 9/10  Please advise

## 2019-09-10 ENCOUNTER — OFFICE VISIT (OUTPATIENT)
Dept: CARDIOLOGY CLINIC | Facility: CLINIC | Age: 84
End: 2019-09-10
Payer: MEDICARE

## 2019-09-10 VITALS
DIASTOLIC BLOOD PRESSURE: 50 MMHG | HEIGHT: 62 IN | HEART RATE: 66 BPM | BODY MASS INDEX: 26.83 KG/M2 | SYSTOLIC BLOOD PRESSURE: 138 MMHG | OXYGEN SATURATION: 97 % | WEIGHT: 145.8 LBS

## 2019-09-10 DIAGNOSIS — E78.5 DYSLIPIDEMIA: ICD-10-CM

## 2019-09-10 DIAGNOSIS — I10 HYPERTENSION, UNSPECIFIED TYPE: ICD-10-CM

## 2019-09-10 DIAGNOSIS — R07.9 CHEST PAIN, UNSPECIFIED TYPE: Primary | ICD-10-CM

## 2019-09-10 PROCEDURE — 93000 ELECTROCARDIOGRAM COMPLETE: CPT | Performed by: NURSE PRACTITIONER

## 2019-09-10 PROCEDURE — 99214 OFFICE O/P EST MOD 30 MIN: CPT | Performed by: NURSE PRACTITIONER

## 2019-09-10 PROCEDURE — 1123F ACP DISCUSS/DSCN MKR DOCD: CPT | Performed by: NURSE PRACTITIONER

## 2019-09-10 RX ORDER — AMOXICILLIN 500 MG/1
CAPSULE ORAL
Refills: 3 | COMMUNITY
Start: 2019-09-01

## 2019-09-10 NOTE — PROGRESS NOTES
Cardiology Follow Up    Rohit Ruelas  4/6/1930  [id number]  [facility] Julio Murphy  [address] [address] [phone]    Acute visit for Chest pain and Lower leg edema     Interval History:     Ms Mike Heck was seen by Dr Renetta Bowie on 7/26/19  Imdur increased to 30mg BID  Lasix 20mg 1-2 times a week for lower leg edema  On 8/27/19 Ms Mike Heck was seen in our office due to complaints of almost daily chest pain  Chest pain lasts occurred the evening prior to this office visit waking her from sleep, 10/10, radiating up neck, across chest into left arm  Once she burps the pain will subside  George Ortiz was accompanied by her daughter  She admitted to lower extremity edema  Her weight at home was 139-140 lb her weight in the office is 143 lb  She denied dietary indiscretion  She is taking all medications as prescribed  Ranexa 500mg BID started for CP  She was instructed to take an extra dose of Lasix on 8/27/ and a dose on 8/28/19  Ms Mike Heck presents To our office for follow-up visit  She is accompanied by her daughter  New me was chest pain free for 1 week after starting Ranexa  Yesterday with walking into cold air she began to experience bilateral neck pain  She denies mid sternal CP  She became severely fatigued  She continues with fatigue today  I had a long discussion with George Ortiz and her daughter in regards to treatment options  She does not want to be on any new medications or change medications at this point        CAD prior stenting LAD, L Cx, OM2, lastly stented in 2009 6/30/19 Nuclear stress test, mild amount of inferior ischemia it was decided on conservative medical management  6/30/19 TTE LVEF 60%, grade 1 DD, RV normal size and function, trace MR  Single episode of Atrial Fibrillation maintained on ASA  HTN  PAD   Patient Active Problem List   Diagnosis    Atrial fibrillation ([place] [address] )    Benign essential hypertension    Coronary artery disease    Familial hypercholesteremia    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[facility])    Carotid artery obstruction    Peripheral arterial disease ([facility])    NSTEMI (non-ST elevated myocardial infarction) with known CAD ([place] [address] )    Anemia    NSVT (nonsustained ventricular tachycardia) ([facility])    MEGAN (acute kidney injury) ([place] [address] )     Past Medical History:   Diagnosis Date    Atrial fibrillation ([facility])     Benign essential hypertension     CAD (coronary artery disease)     Carotid artery obstruction     Mesenteric ischemia, chronic ([facility])     TIA (transient ischemic attack)      Social History     Socioeconomic History    Marital status:       Spouse name: Not on file    Number of children: Not on file    Years of education: Not on file    Highest education level: Not on file   Occupational History    Not on file   Social Needs    Financial resource strain: Not on file    Food insecurity:     Worry: Not on file     Inability: Not on file    Transportation needs:     Medical: Not on file     Non-medical: Not on file   Tobacco Use    Smoking status: Never Smoker    Smokeless tobacco: Never Used   Substance and Sexual Activity    Alcohol use: No    Drug use: No    Sexual activity: Not on file   Lifestyle    Physical activity:     Days per week: Not on file     Minutes per session: Not on file    Stress: Not on file   Relationships    Social connections:     Talks on phone: Not on file     Gets together: Not on file     Attends Sikh service: Not on file     Active member of club or organization: Not on file     Attends meetings of clubs or organizations: Not on file     Relationship status: Not on file    Intimate partner violence:     Fear of current or ex partner: Not on file     Emotionally abused: Not on file     Physically abused: Not on file     Forced sexual activity: Not on file   Other Topics Concern    Not on file Social History Narrative    Not on file      Family History   Problem Relation Age of Onset    Leukemia Father     Hypertension Sister     Heart attack Brother 46    Hypertension Brother     Sudden death Brother 46        scd    Heart failure Maternal Grandmother     Hypertension Maternal Grandmother     Stroke Maternal Grandfather     Hypertension Daughter     Anuerysm Neg Hx     Clotting disorder Neg Hx     Arrhythmia Neg Hx     Hyperlipidemia Neg Hx      Past Surgical History:   Procedure Laterality Date    APPENDECTOMY      CATARACT EXTRACTION      CHOLECYSTECTOMY      CORONARY ANGIOPLASTY      stent x4 07/11/1996x1 10/09/2009 x3    HYSTERECTOMY      REPLACEMENT TOTAL KNEE Right        Current Outpatient Medications:     acetaminophen (TYLENOL) 325 mg tablet, Take 650 mg by mouth every 8 (eight) hours as needed for mild pain  , Disp: , Rfl:     amLODIPine (NORVASC) 5 mg tablet, Take 1 tablet by mouth daily, Disp: , Rfl:     Ascorbic Acid (VITAMIN C) 1000 MG tablet, Take 1,000 mg by mouth daily, Disp: , Rfl:     aspirin 81 MG tablet, Take 162 mg by mouth daily , Disp: , Rfl:     Calcium Citrate-Vitamin D (CALCIUM + D PO), Take 600 mg by mouth every other day  , Disp: , Rfl:     carvedilol (COREG) 12 5 mg tablet, Take 2 tablets by mouth 2 (two) times a day, Disp: , Rfl:     Cholecalciferol (VITAMIN D3) 1000 units CAPS, Take by mouth daily  , Disp: , Rfl:     cloNIDine (CATAPRES) 0 1 mg tablet, Take 1 tablet by mouth 4 (four) times a day , Disp: , Rfl:     Cranberry 500 MG CAPS, Take 1 capsule by mouth daily, Disp: , Rfl:     cyanocobalamin 1000 MCG tablet, Take by mouth daily  , Disp: , Rfl:     escitalopram (LEXAPRO) 10 mg tablet, Take 1 tablet by mouth daily, Disp: , Rfl:     famotidine (PEPCID) 20 mg tablet, Take 1 tablet (20 mg total) by mouth daily, Disp: 30 tablet, Rfl: 3    ferrous sulfate 325 (65 FE) MG EC tablet, Take 325 mg by mouth daily, Disp: , Rfl:     furosemide (LASIX) 20 mg tablet, Take 1 tablet (20 mg total) by mouth every other day, Disp: 45 tablet, Rfl: 3    isosorbide mononitrate (IMDUR) 30 mg 24 hr tablet, Take 1 tablet (30 mg total) by mouth 2 (two) times a day, Disp: 60 tablet, Rfl: 6    pantoprazole (PROTONIX) 40 mg tablet, Take 40 mg by mouth daily, Disp: , Rfl:     potassium chloride (K-DUR,KLOR-CON) 10 mEq tablet, Take 10 mEq by mouth 3 (three) times a day , Disp: , Rfl:     predniSONE 5 mg tablet, Take 7 5 mg by mouth daily  , Disp: , Rfl: 5    ranolazine (RANEXA) 500 mg 12 hr tablet, Take 1 tablet (500 mg total) by mouth 2 (two) times a day, Disp: 60 tablet, Rfl: 3    repaglinide (PRANDIN) 0 5 mg tablet, Take 0 5 mg by mouth 3 (three) times a day before meals  , Disp: , Rfl:     rosuvastatin (CRESTOR) 5 mg tablet, 1 TABLET THREE DAYS A WEEK (Tuesdays Thursdays and Saturday), Disp: 30 tablet, Rfl: 11  Allergies   Allergen Reactions    Adhesive [Medical Tape]      Paper tape okay    Amoxicillin     Erythromycin     Ezetimibe     Fenofibrate     Flecainide     Lovastatin     Sulfa Antibiotics     Thioridazine        Labs:  Appointment on 08/07/2019   Component Date Value    Cholesterol 08/07/2019 240*    Triglycerides 08/07/2019 237*    HDL, Direct 08/07/2019 70*    LDL Calculated 08/07/2019 123*    Sodium 08/07/2019 137     Potassium 08/07/2019 4 2     Chloride 08/07/2019 104     CO2 08/07/2019 27     ANION GAP 08/07/2019 6     BUN 08/07/2019 25     Creatinine 08/07/2019 1 12     Glucose 08/07/2019 85     Calcium 08/07/2019 9 1     eGFR 08/07/2019 44    Appointment on 07/23/2019   Component Date Value    Sodium 07/23/2019 140     Potassium 07/23/2019 4 3     Chloride 07/23/2019 105     CO2 07/23/2019 28     ANION GAP 07/23/2019 7     BUN 07/23/2019 24     Creatinine 07/23/2019 1 24     Glucose, Fasting 07/23/2019 108*    Calcium 07/23/2019 8 8     eGFR 07/23/2019 39      Imaging: No results found      Review of Systems:  Review of Systems   Constitutional: Positive for fatigue  Respiratory: Positive for chest tightness  Cardiovascular: Positive for chest pain  Negative for leg swelling  Musculoskeletal: Positive for arthralgias and gait problem  Physical Exam:  Physical Exam   Constitutional: She is oriented to person, place, and time  She appears well-developed  HENT:   Head: Normocephalic  Eyes: Pupils are equal, round, and reactive to light  Neck: Normal range of motion  + V wave   Cardiovascular: Normal rate and normal heart sounds  Pulmonary/Chest: Effort normal  She has rales  Bibasilar rales     Abdominal: Soft  Bowel sounds are normal    Musculoskeletal: Normal range of motion  Right lower leg: She exhibits edema  Left lower leg: She exhibits edema  Neurological: She is alert and oriented to person, place, and time  Skin: Skin is warm and dry  Capillary refill takes less than 2 seconds  jayson LE Compression stockings intact   Psychiatric: She has a normal mood and affect  Vitals reviewed  Discussion/Summary:  1  Chest pain - neck pain, continue with medical management, She does not want any new medications at this time,  Continue on  Ranexa 500mg BID, continue on Imdur 30mg BID, Coreg 12 5mg BID,   I have discussed increasing Ranexa to 1000mg BID or Imdur from 30mg BID to 30mg in am and 60mg in pm   I have also discussed the possibility of a cardiac catheterization if symptoms do not improve  She will go home and think about her options  I have given Tiffany face masks to wear when she is out in the cold  With next office visit evaluate if this helps prevent neck pain  2   HTN-  continue on Norvasc 5 mg daily, Coreg 12 5 mg b i d , clonidine 0 1 mg 4 times daily  3  Dyslipidemia- 8/07/19 cholesterol 240, triglycerides 237, HDL 70, , continue on Crestor 5mg three days a week    4   DM2 Hgb A1C 6 5 on 6/29/19- continue on Prandin 0,5mg TID follow up with PCP 5  GERD- continue on Protonix 40mg daily

## 2019-09-12 ENCOUNTER — APPOINTMENT (OUTPATIENT)
Dept: LAB | Facility: AMBULARY SURGERY CENTER | Age: 84
End: 2019-09-12
Payer: MEDICARE

## 2019-09-12 ENCOUNTER — TRANSCRIBE ORDERS (OUTPATIENT)
Dept: LAB | Facility: AMBULARY SURGERY CENTER | Age: 84
End: 2019-09-12

## 2019-09-12 DIAGNOSIS — E11.22 TYPE 2 DIABETES MELLITUS WITH ESRD (END-STAGE RENAL DISEASE) (HCC): Primary | ICD-10-CM

## 2019-09-12 DIAGNOSIS — N18.6 TYPE 2 DIABETES MELLITUS WITH ESRD (END-STAGE RENAL DISEASE) (HCC): ICD-10-CM

## 2019-09-12 DIAGNOSIS — E11.22 TYPE 2 DIABETES MELLITUS WITH ESRD (END-STAGE RENAL DISEASE) (HCC): ICD-10-CM

## 2019-09-12 DIAGNOSIS — N18.6 TYPE 2 DIABETES MELLITUS WITH ESRD (END-STAGE RENAL DISEASE) (HCC): Primary | ICD-10-CM

## 2019-09-12 LAB
ANION GAP SERPL CALCULATED.3IONS-SCNC: 7 MMOL/L (ref 4–13)
BUN SERPL-MCNC: 28 MG/DL (ref 5–25)
CALCIUM SERPL-MCNC: 8.9 MG/DL (ref 8.3–10.1)
CHLORIDE SERPL-SCNC: 104 MMOL/L (ref 100–108)
CO2 SERPL-SCNC: 26 MMOL/L (ref 21–32)
CREAT SERPL-MCNC: 1.23 MG/DL (ref 0.6–1.3)
EST. AVERAGE GLUCOSE BLD GHB EST-MCNC: 131 MG/DL
GFR SERPL CREATININE-BSD FRML MDRD: 39 ML/MIN/1.73SQ M
GLUCOSE SERPL-MCNC: 153 MG/DL (ref 65–140)
HBA1C MFR BLD: 6.2 % (ref 4.2–6.3)
POTASSIUM SERPL-SCNC: 4 MMOL/L (ref 3.5–5.3)
SODIUM SERPL-SCNC: 137 MMOL/L (ref 136–145)

## 2019-09-12 PROCEDURE — 80048 BASIC METABOLIC PNL TOTAL CA: CPT

## 2019-09-12 PROCEDURE — 83036 HEMOGLOBIN GLYCOSYLATED A1C: CPT

## 2019-09-12 PROCEDURE — 36415 COLL VENOUS BLD VENIPUNCTURE: CPT

## 2019-09-16 ENCOUNTER — APPOINTMENT (EMERGENCY)
Dept: CT IMAGING | Facility: HOSPITAL | Age: 84
DRG: 247 | End: 2019-09-16
Payer: MEDICARE

## 2019-09-16 ENCOUNTER — APPOINTMENT (EMERGENCY)
Dept: NON INVASIVE DIAGNOSTICS | Facility: HOSPITAL | Age: 84
DRG: 247 | End: 2019-09-16
Attending: EMERGENCY MEDICINE
Payer: MEDICARE

## 2019-09-16 ENCOUNTER — HOSPITAL ENCOUNTER (INPATIENT)
Facility: HOSPITAL | Age: 84
LOS: 1 days | Discharge: HOME | DRG: 247 | End: 2019-09-17
Attending: EMERGENCY MEDICINE | Admitting: INTERNAL MEDICINE
Payer: MEDICARE

## 2019-09-16 ENCOUNTER — APPOINTMENT (EMERGENCY)
Dept: RADIOLOGY | Facility: HOSPITAL | Age: 84
DRG: 247 | End: 2019-09-16
Payer: MEDICARE

## 2019-09-16 DIAGNOSIS — K21.9 GASTROESOPHAGEAL REFLUX DISEASE WITHOUT ESOPHAGITIS: ICD-10-CM

## 2019-09-16 DIAGNOSIS — E78.01 FAMILIAL HYPERCHOLESTEREMIA: ICD-10-CM

## 2019-09-16 DIAGNOSIS — I21.9 ACUTE MI (HCC): Primary | ICD-10-CM

## 2019-09-16 LAB
ALBUMIN SERPL BCP-MCNC: 3.5 G/DL (ref 3.5–5)
ALP SERPL-CCNC: 70 U/L (ref 46–116)
ALT SERPL W P-5'-P-CCNC: 21 U/L (ref 12–78)
ANION GAP SERPL CALCULATED.3IONS-SCNC: 13 MMOL/L (ref 4–13)
APTT PPP: 24 SECONDS (ref 23–37)
AST SERPL W P-5'-P-CCNC: 16 U/L (ref 5–45)
BASOPHILS # BLD AUTO: 0.03 THOUSANDS/ΜL (ref 0–0.1)
BASOPHILS NFR BLD AUTO: 0 % (ref 0–1)
BILIRUB SERPL-MCNC: 0.4 MG/DL (ref 0.2–1)
BUN SERPL-MCNC: 28 MG/DL (ref 5–25)
CALCIUM SERPL-MCNC: 9.1 MG/DL (ref 8.3–10.1)
CHLORIDE SERPL-SCNC: 104 MMOL/L (ref 100–108)
CK SERPL-CCNC: 58 U/L (ref 26–192)
CO2 SERPL-SCNC: 26 MMOL/L (ref 21–32)
CREAT SERPL-MCNC: 1.01 MG/DL (ref 0.6–1.3)
EOSINOPHIL # BLD AUTO: 0.18 THOUSAND/ΜL (ref 0–0.61)
EOSINOPHIL NFR BLD AUTO: 2 % (ref 0–6)
ERYTHROCYTE [DISTWIDTH] IN BLOOD BY AUTOMATED COUNT: 13.7 % (ref 11.6–15.1)
GFR SERPL CREATININE-BSD FRML MDRD: 49 ML/MIN/1.73SQ M
GLUCOSE SERPL-MCNC: 108 MG/DL (ref 65–140)
HCT VFR BLD AUTO: 34.3 % (ref 34.8–46.1)
HGB BLD-MCNC: 11.1 G/DL (ref 11.5–15.4)
IMM GRANULOCYTES # BLD AUTO: 0.06 THOUSAND/UL (ref 0–0.2)
IMM GRANULOCYTES NFR BLD AUTO: 1 % (ref 0–2)
INR PPP: 0.99 (ref 0.84–1.19)
KCT BLD-ACNC: 316 SEC (ref 89–137)
LIPASE SERPL-CCNC: 185 U/L (ref 73–393)
LYMPHOCYTES # BLD AUTO: 2.54 THOUSANDS/ΜL (ref 0.6–4.47)
LYMPHOCYTES NFR BLD AUTO: 27 % (ref 14–44)
MCH RBC QN AUTO: 32.3 PG (ref 26.8–34.3)
MCHC RBC AUTO-ENTMCNC: 32.4 G/DL (ref 31.4–37.4)
MCV RBC AUTO: 100 FL (ref 82–98)
MONOCYTES # BLD AUTO: 0.82 THOUSAND/ΜL (ref 0.17–1.22)
MONOCYTES NFR BLD AUTO: 9 % (ref 4–12)
NEUTROPHILS # BLD AUTO: 5.77 THOUSANDS/ΜL (ref 1.85–7.62)
NEUTS SEG NFR BLD AUTO: 61 % (ref 43–75)
NRBC BLD AUTO-RTO: 0 /100 WBCS
PLATELET # BLD AUTO: 241 THOUSANDS/UL (ref 149–390)
PMV BLD AUTO: 8.2 FL (ref 8.9–12.7)
POTASSIUM SERPL-SCNC: 3.5 MMOL/L (ref 3.5–5.3)
PROT SERPL-MCNC: 7.1 G/DL (ref 6.4–8.2)
PROTHROMBIN TIME: 12.5 SECONDS (ref 11.6–14.5)
RBC # BLD AUTO: 3.44 MILLION/UL (ref 3.81–5.12)
SODIUM SERPL-SCNC: 143 MMOL/L (ref 136–145)
SPECIMEN SOURCE: ABNORMAL
TROPONIN I SERPL-MCNC: <0.02 NG/ML
TROPONIN I SERPL-MCNC: <0.02 NG/ML
WBC # BLD AUTO: 9.4 THOUSAND/UL (ref 4.31–10.16)

## 2019-09-16 PROCEDURE — 80053 COMPREHEN METABOLIC PANEL: CPT | Performed by: EMERGENCY MEDICINE

## 2019-09-16 PROCEDURE — 70450 CT HEAD/BRAIN W/O DYE: CPT

## 2019-09-16 PROCEDURE — 99152 MOD SED SAME PHYS/QHP 5/>YRS: CPT | Performed by: EMERGENCY MEDICINE

## 2019-09-16 PROCEDURE — C1769 GUIDE WIRE: HCPCS | Performed by: EMERGENCY MEDICINE

## 2019-09-16 PROCEDURE — 85025 COMPLETE CBC W/AUTO DIFF WBC: CPT | Performed by: EMERGENCY MEDICINE

## 2019-09-16 PROCEDURE — C1874 STENT, COATED/COV W/DEL SYS: HCPCS

## 2019-09-16 PROCEDURE — B201YZZ PLAIN RADIOGRAPHY OF MULTIPLE CORONARY ARTERIES USING OTHER CONTRAST: ICD-10-PCS | Performed by: INTERNAL MEDICINE

## 2019-09-16 PROCEDURE — 93454 CORONARY ARTERY ANGIO S&I: CPT | Performed by: INTERNAL MEDICINE

## 2019-09-16 PROCEDURE — 36415 COLL VENOUS BLD VENIPUNCTURE: CPT | Performed by: EMERGENCY MEDICINE

## 2019-09-16 PROCEDURE — C1725 CATH, TRANSLUMIN NON-LASER: HCPCS | Performed by: EMERGENCY MEDICINE

## 2019-09-16 PROCEDURE — 92928 PRQ TCAT PLMT NTRAC ST 1 LES: CPT | Performed by: INTERNAL MEDICINE

## 2019-09-16 PROCEDURE — 99223 1ST HOSP IP/OBS HIGH 75: CPT | Performed by: INTERNAL MEDICINE

## 2019-09-16 PROCEDURE — 71045 X-RAY EXAM CHEST 1 VIEW: CPT

## 2019-09-16 PROCEDURE — 027035Z DILATION OF CORONARY ARTERY, ONE ARTERY WITH TWO DRUG-ELUTING INTRALUMINAL DEVICES, PERCUTANEOUS APPROACH: ICD-10-PCS | Performed by: INTERNAL MEDICINE

## 2019-09-16 PROCEDURE — 93455 CORONARY ART/GRFT ANGIO S&I: CPT | Performed by: EMERGENCY MEDICINE

## 2019-09-16 PROCEDURE — 85730 THROMBOPLASTIN TIME PARTIAL: CPT | Performed by: EMERGENCY MEDICINE

## 2019-09-16 PROCEDURE — 93005 ELECTROCARDIOGRAM TRACING: CPT

## 2019-09-16 PROCEDURE — 99153 MOD SED SAME PHYS/QHP EA: CPT | Performed by: EMERGENCY MEDICINE

## 2019-09-16 PROCEDURE — 99152 MOD SED SAME PHYS/QHP 5/>YRS: CPT | Performed by: INTERNAL MEDICINE

## 2019-09-16 PROCEDURE — C9600 PERC DRUG-EL COR STENT SING: HCPCS | Performed by: EMERGENCY MEDICINE

## 2019-09-16 PROCEDURE — 99291 CRITICAL CARE FIRST HOUR: CPT | Performed by: EMERGENCY MEDICINE

## 2019-09-16 PROCEDURE — C1887 CATHETER, GUIDING: HCPCS | Performed by: EMERGENCY MEDICINE

## 2019-09-16 PROCEDURE — 84484 ASSAY OF TROPONIN QUANT: CPT | Performed by: EMERGENCY MEDICINE

## 2019-09-16 PROCEDURE — 84484 ASSAY OF TROPONIN QUANT: CPT | Performed by: NURSE PRACTITIONER

## 2019-09-16 PROCEDURE — 96374 THER/PROPH/DIAG INJ IV PUSH: CPT

## 2019-09-16 PROCEDURE — 83690 ASSAY OF LIPASE: CPT | Performed by: EMERGENCY MEDICINE

## 2019-09-16 PROCEDURE — 96375 TX/PRO/DX INJ NEW DRUG ADDON: CPT

## 2019-09-16 PROCEDURE — 99221 1ST HOSP IP/OBS SF/LOW 40: CPT | Performed by: INTERNAL MEDICINE

## 2019-09-16 PROCEDURE — 82550 ASSAY OF CK (CPK): CPT | Performed by: EMERGENCY MEDICINE

## 2019-09-16 PROCEDURE — 99285 EMERGENCY DEPT VISIT HI MDM: CPT

## 2019-09-16 PROCEDURE — 85347 COAGULATION TIME ACTIVATED: CPT

## 2019-09-16 PROCEDURE — 85610 PROTHROMBIN TIME: CPT | Performed by: EMERGENCY MEDICINE

## 2019-09-16 PROCEDURE — C1894 INTRO/SHEATH, NON-LASER: HCPCS | Performed by: EMERGENCY MEDICINE

## 2019-09-16 RX ORDER — HEPARIN SODIUM 1000 [USP'U]/ML
INJECTION, SOLUTION INTRAVENOUS; SUBCUTANEOUS CODE/TRAUMA/SEDATION MEDICATION
Status: COMPLETED | OUTPATIENT
Start: 2019-09-16 | End: 2019-09-16

## 2019-09-16 RX ORDER — CARVEDILOL 12.5 MG/1
12.5 TABLET ORAL 2 TIMES DAILY WITH MEALS
Status: DISCONTINUED | OUTPATIENT
Start: 2019-09-17 | End: 2019-09-17 | Stop reason: HOSPADM

## 2019-09-16 RX ORDER — SODIUM CHLORIDE 9 MG/ML
100 INJECTION, SOLUTION INTRAVENOUS CONTINUOUS
Status: DISPENSED | OUTPATIENT
Start: 2019-09-16 | End: 2019-09-16

## 2019-09-16 RX ORDER — MELATONIN
1000 DAILY
Status: DISCONTINUED | OUTPATIENT
Start: 2019-09-16 | End: 2019-09-17 | Stop reason: HOSPADM

## 2019-09-16 RX ORDER — FUROSEMIDE 20 MG/1
20 TABLET ORAL EVERY OTHER DAY
Status: DISCONTINUED | OUTPATIENT
Start: 2019-09-17 | End: 2019-09-17 | Stop reason: HOSPADM

## 2019-09-16 RX ORDER — CLONIDINE HYDROCHLORIDE 0.1 MG/1
0.1 TABLET ORAL 4 TIMES DAILY
Status: DISCONTINUED | OUTPATIENT
Start: 2019-09-16 | End: 2019-09-17 | Stop reason: HOSPADM

## 2019-09-16 RX ORDER — RANOLAZINE 500 MG/1
500 TABLET, EXTENDED RELEASE ORAL 2 TIMES DAILY
Status: DISCONTINUED | OUTPATIENT
Start: 2019-09-16 | End: 2019-09-17

## 2019-09-16 RX ORDER — SENNOSIDES 8.6 MG
1 TABLET ORAL DAILY
Status: DISCONTINUED | OUTPATIENT
Start: 2019-09-16 | End: 2019-09-17 | Stop reason: HOSPADM

## 2019-09-16 RX ORDER — ACETAMINOPHEN 325 MG/1
975 TABLET ORAL ONCE
Status: COMPLETED | OUTPATIENT
Start: 2019-09-16 | End: 2019-09-16

## 2019-09-16 RX ORDER — ONDANSETRON 2 MG/ML
4 INJECTION INTRAMUSCULAR; INTRAVENOUS ONCE
Status: COMPLETED | OUTPATIENT
Start: 2019-09-16 | End: 2019-09-16

## 2019-09-16 RX ORDER — CARVEDILOL 12.5 MG/1
12.5 TABLET ORAL 2 TIMES DAILY WITH MEALS
Status: DISCONTINUED | OUTPATIENT
Start: 2019-09-16 | End: 2019-09-16

## 2019-09-16 RX ORDER — LIDOCAINE HYDROCHLORIDE 10 MG/ML
INJECTION, SOLUTION INFILTRATION; PERINEURAL CODE/TRAUMA/SEDATION MEDICATION
Status: COMPLETED | OUTPATIENT
Start: 2019-09-16 | End: 2019-09-16

## 2019-09-16 RX ORDER — LANOLIN ALCOHOL/MO/W.PET/CERES
6 CREAM (GRAM) TOPICAL
Status: DISCONTINUED | OUTPATIENT
Start: 2019-09-16 | End: 2019-09-17 | Stop reason: HOSPADM

## 2019-09-16 RX ORDER — CALCIUM CARBONATE 500(1250)
1 TABLET ORAL
Status: DISCONTINUED | OUTPATIENT
Start: 2019-09-17 | End: 2019-09-17 | Stop reason: HOSPADM

## 2019-09-16 RX ORDER — FERROUS SULFATE 325(65) MG
325 TABLET ORAL
Status: DISCONTINUED | OUTPATIENT
Start: 2019-09-17 | End: 2019-09-17 | Stop reason: HOSPADM

## 2019-09-16 RX ORDER — ASCORBIC ACID 500 MG
1000 TABLET ORAL DAILY
Status: DISCONTINUED | OUTPATIENT
Start: 2019-09-16 | End: 2019-09-17 | Stop reason: HOSPADM

## 2019-09-16 RX ORDER — SODIUM CHLORIDE 9 MG/ML
INJECTION, SOLUTION INTRAVENOUS
Status: COMPLETED | OUTPATIENT
Start: 2019-09-16 | End: 2019-09-16

## 2019-09-16 RX ORDER — ASPIRIN 81 MG/1
162 TABLET, CHEWABLE ORAL ONCE
Status: COMPLETED | OUTPATIENT
Start: 2019-09-16 | End: 2019-09-16

## 2019-09-16 RX ORDER — HEPARIN SODIUM 5000 [USP'U]/ML
5000 INJECTION, SOLUTION INTRAVENOUS; SUBCUTANEOUS EVERY 8 HOURS SCHEDULED
Status: DISCONTINUED | OUTPATIENT
Start: 2019-09-16 | End: 2019-09-17 | Stop reason: HOSPADM

## 2019-09-16 RX ORDER — FENTANYL CITRATE 50 UG/ML
INJECTION, SOLUTION INTRAMUSCULAR; INTRAVENOUS CODE/TRAUMA/SEDATION MEDICATION
Status: COMPLETED | OUTPATIENT
Start: 2019-09-16 | End: 2019-09-16

## 2019-09-16 RX ORDER — CALCIUM CARBONATE 200(500)MG
1000 TABLET,CHEWABLE ORAL DAILY PRN
Status: DISCONTINUED | OUTPATIENT
Start: 2019-09-16 | End: 2019-09-17 | Stop reason: HOSPADM

## 2019-09-16 RX ORDER — ISOSORBIDE MONONITRATE 30 MG/1
30 TABLET, EXTENDED RELEASE ORAL 2 TIMES DAILY
Status: DISCONTINUED | OUTPATIENT
Start: 2019-09-16 | End: 2019-09-17

## 2019-09-16 RX ORDER — ASPIRIN 81 MG/1
81 TABLET, CHEWABLE ORAL DAILY
Status: DISCONTINUED | OUTPATIENT
Start: 2019-09-17 | End: 2019-09-17 | Stop reason: HOSPADM

## 2019-09-16 RX ORDER — ATORVASTATIN CALCIUM 40 MG/1
40 TABLET, FILM COATED ORAL
Status: DISCONTINUED | OUTPATIENT
Start: 2019-09-16 | End: 2019-09-17 | Stop reason: HOSPADM

## 2019-09-16 RX ORDER — ACETAMINOPHEN 325 MG/1
650 TABLET ORAL EVERY 8 HOURS PRN
Status: DISCONTINUED | OUTPATIENT
Start: 2019-09-16 | End: 2019-09-16

## 2019-09-16 RX ORDER — CARVEDILOL 12.5 MG/1
12.5 TABLET ORAL ONCE
Status: COMPLETED | OUTPATIENT
Start: 2019-09-16 | End: 2019-09-16

## 2019-09-16 RX ORDER — ONDANSETRON 2 MG/ML
4 INJECTION INTRAMUSCULAR; INTRAVENOUS EVERY 6 HOURS PRN
Status: DISCONTINUED | OUTPATIENT
Start: 2019-09-16 | End: 2019-09-17 | Stop reason: HOSPADM

## 2019-09-16 RX ORDER — VERAPAMIL HYDROCHLORIDE 2.5 MG/ML
INJECTION, SOLUTION INTRAVENOUS CODE/TRAUMA/SEDATION MEDICATION
Status: COMPLETED | OUTPATIENT
Start: 2019-09-16 | End: 2019-09-16

## 2019-09-16 RX ORDER — NITROGLYCERIN 20 MG/100ML
INJECTION INTRAVENOUS CODE/TRAUMA/SEDATION MEDICATION
Status: COMPLETED | OUTPATIENT
Start: 2019-09-16 | End: 2019-09-16

## 2019-09-16 RX ORDER — PANTOPRAZOLE SODIUM 40 MG/1
40 TABLET, DELAYED RELEASE ORAL
Status: DISCONTINUED | OUTPATIENT
Start: 2019-09-16 | End: 2019-09-17 | Stop reason: HOSPADM

## 2019-09-16 RX ORDER — ESCITALOPRAM OXALATE 10 MG/1
10 TABLET ORAL DAILY
Status: DISCONTINUED | OUTPATIENT
Start: 2019-09-16 | End: 2019-09-17 | Stop reason: HOSPADM

## 2019-09-16 RX ORDER — CLOPIDOGREL BISULFATE 75 MG/1
75 TABLET ORAL DAILY
Status: DISCONTINUED | OUTPATIENT
Start: 2019-09-17 | End: 2019-09-17 | Stop reason: HOSPADM

## 2019-09-16 RX ORDER — CLOPIDOGREL BISULFATE 75 MG/1
300 TABLET ORAL ONCE
Status: COMPLETED | OUTPATIENT
Start: 2019-09-16 | End: 2019-09-16

## 2019-09-16 RX ORDER — AMLODIPINE BESYLATE 5 MG/1
5 TABLET ORAL DAILY
Status: DISCONTINUED | OUTPATIENT
Start: 2019-09-16 | End: 2019-09-17 | Stop reason: HOSPADM

## 2019-09-16 RX ORDER — HEPARIN SODIUM 1000 [USP'U]/ML
4000 INJECTION, SOLUTION INTRAVENOUS; SUBCUTANEOUS ONCE
Status: COMPLETED | OUTPATIENT
Start: 2019-09-16 | End: 2019-09-16

## 2019-09-16 RX ORDER — REPAGLINIDE 0.5 MG/1
0.5 TABLET ORAL
Status: DISCONTINUED | OUTPATIENT
Start: 2019-09-16 | End: 2019-09-17 | Stop reason: HOSPADM

## 2019-09-16 RX ORDER — POTASSIUM CHLORIDE 750 MG/1
10 TABLET, EXTENDED RELEASE ORAL 3 TIMES DAILY
Status: DISCONTINUED | OUTPATIENT
Start: 2019-09-16 | End: 2019-09-17

## 2019-09-16 RX ORDER — ACETAMINOPHEN 325 MG/1
975 TABLET ORAL EVERY 8 HOURS SCHEDULED
Status: DISCONTINUED | OUTPATIENT
Start: 2019-09-17 | End: 2019-09-17 | Stop reason: HOSPADM

## 2019-09-16 RX ORDER — MIDAZOLAM HYDROCHLORIDE 1 MG/ML
INJECTION INTRAMUSCULAR; INTRAVENOUS CODE/TRAUMA/SEDATION MEDICATION
Status: COMPLETED | OUTPATIENT
Start: 2019-09-16 | End: 2019-09-16

## 2019-09-16 RX ORDER — POTASSIUM CHLORIDE 20 MEQ/1
40 TABLET, EXTENDED RELEASE ORAL ONCE
Status: COMPLETED | OUTPATIENT
Start: 2019-09-16 | End: 2019-09-16

## 2019-09-16 RX ADMIN — LIDOCAINE HYDROCHLORIDE 1 ML: 10 INJECTION, SOLUTION INFILTRATION; PERINEURAL at 11:28

## 2019-09-16 RX ADMIN — PREDNISONE 7.5 MG: 1 TABLET ORAL at 17:21

## 2019-09-16 RX ADMIN — ESCITALOPRAM OXALATE 10 MG: 10 TABLET ORAL at 17:23

## 2019-09-16 RX ADMIN — AMLODIPINE BESYLATE 5 MG: 5 TABLET ORAL at 17:23

## 2019-09-16 RX ADMIN — MIDAZOLAM HYDROCHLORIDE 1 MG: 1 INJECTION, SOLUTION INTRAMUSCULAR; INTRAVENOUS at 11:45

## 2019-09-16 RX ADMIN — FENTANYL CITRATE 25 MCG: 50 INJECTION, SOLUTION INTRAMUSCULAR; INTRAVENOUS at 11:22

## 2019-09-16 RX ADMIN — CARVEDILOL 12.5 MG: 12.5 TABLET, FILM COATED ORAL at 14:40

## 2019-09-16 RX ADMIN — ISOSORBIDE MONONITRATE 30 MG: 30 TABLET, EXTENDED RELEASE ORAL at 17:22

## 2019-09-16 RX ADMIN — MORPHINE SULFATE 1 MG: 2 INJECTION, SOLUTION INTRAMUSCULAR; INTRAVENOUS at 08:04

## 2019-09-16 RX ADMIN — HEPARIN SODIUM 5000 UNITS: 5000 INJECTION INTRAVENOUS; SUBCUTANEOUS at 21:21

## 2019-09-16 RX ADMIN — MELATONIN 1000 UNITS: at 17:28

## 2019-09-16 RX ADMIN — ACETAMINOPHEN 975 MG: 325 TABLET, FILM COATED ORAL at 06:47

## 2019-09-16 RX ADMIN — HEPARIN SODIUM 5000 UNITS: 5000 INJECTION INTRAVENOUS; SUBCUTANEOUS at 17:24

## 2019-09-16 RX ADMIN — FENTANYL CITRATE 50 MCG: 50 INJECTION, SOLUTION INTRAMUSCULAR; INTRAVENOUS at 11:44

## 2019-09-16 RX ADMIN — REPAGLINIDE 0.5 MG: 0.5 TABLET ORAL at 17:29

## 2019-09-16 RX ADMIN — CLONIDINE HYDROCHLORIDE 0.1 MG: 0.1 TABLET ORAL at 21:21

## 2019-09-16 RX ADMIN — HEPARIN SODIUM 4000 UNITS: 1000 INJECTION INTRAVENOUS; SUBCUTANEOUS at 11:29

## 2019-09-16 RX ADMIN — POTASSIUM CHLORIDE 10 MEQ: 10 TABLET, EXTENDED RELEASE ORAL at 17:23

## 2019-09-16 RX ADMIN — HEPARIN SODIUM 3000 UNITS: 1000 INJECTION INTRAVENOUS; SUBCUTANEOUS at 11:32

## 2019-09-16 RX ADMIN — NITROGLYCERIN 200 MCG: 20 INJECTION INTRAVENOUS at 11:29

## 2019-09-16 RX ADMIN — ATORVASTATIN CALCIUM 40 MG: 40 TABLET, FILM COATED ORAL at 21:21

## 2019-09-16 RX ADMIN — CLONIDINE HYDROCHLORIDE 0.1 MG: 0.1 TABLET ORAL at 17:23

## 2019-09-16 RX ADMIN — RANOLAZINE 500 MG: 500 TABLET, FILM COATED, EXTENDED RELEASE ORAL at 17:21

## 2019-09-16 RX ADMIN — POTASSIUM CHLORIDE 40 MEQ: 1500 TABLET, EXTENDED RELEASE ORAL at 14:40

## 2019-09-16 RX ADMIN — CLOPIDOGREL BISULFATE 300 MG: 75 TABLET ORAL at 21:21

## 2019-09-16 RX ADMIN — IOHEXOL 20 ML: 350 INJECTION, SOLUTION INTRAVENOUS at 11:59

## 2019-09-16 RX ADMIN — PANTOPRAZOLE SODIUM 40 MG: 40 TABLET, DELAYED RELEASE ORAL at 17:23

## 2019-09-16 RX ADMIN — POTASSIUM CHLORIDE 10 MEQ: 10 TABLET, EXTENDED RELEASE ORAL at 21:21

## 2019-09-16 RX ADMIN — IOHEXOL 80 ML: 350 INJECTION, SOLUTION INTRAVENOUS at 11:43

## 2019-09-16 RX ADMIN — ONDANSETRON 4 MG: 2 INJECTION INTRAMUSCULAR; INTRAVENOUS at 07:49

## 2019-09-16 RX ADMIN — OXYCODONE HYDROCHLORIDE AND ACETAMINOPHEN 1000 MG: 500 TABLET ORAL at 17:23

## 2019-09-16 RX ADMIN — HEPARIN SODIUM 4000 UNITS: 1000 INJECTION INTRAVENOUS; SUBCUTANEOUS at 08:04

## 2019-09-16 RX ADMIN — ACETAMINOPHEN 650 MG: 325 TABLET, FILM COATED ORAL at 19:44

## 2019-09-16 RX ADMIN — SENNOSIDES 8.6 MG: 8.6 TABLET, FILM COATED ORAL at 17:24

## 2019-09-16 RX ADMIN — NITROGLYCERIN 0.5 INCH: 20 OINTMENT TOPICAL at 06:48

## 2019-09-16 RX ADMIN — TICAGRELOR 180 MG: 90 TABLET ORAL at 07:58

## 2019-09-16 RX ADMIN — VERAPAMIL HYDROCHLORIDE 2.5 MG: 2.5 INJECTION, SOLUTION INTRAVENOUS at 11:29

## 2019-09-16 RX ADMIN — MELATONIN 6 MG: at 21:21

## 2019-09-16 RX ADMIN — ASPIRIN 81 MG 162 MG: 81 TABLET ORAL at 07:49

## 2019-09-16 RX ADMIN — MIDAZOLAM HYDROCHLORIDE 1 MG: 1 INJECTION, SOLUTION INTRAMUSCULAR; INTRAVENOUS at 11:22

## 2019-09-16 RX ADMIN — SODIUM CHLORIDE 100 ML/HR: 0.9 INJECTION, SOLUTION INTRAVENOUS at 12:15

## 2019-09-16 NOTE — ED NOTES
Pt not going emergently to cath lab per Dr Medley Ou  Pt will continued to be monitored        Mattie Mancilla RN  09/16/19 [id number]

## 2019-09-16 NOTE — BRIEF OP NOTE (RAD/CATH)
Right radial catheterization for unstable angina    Findings:  Left main coronary artery, lad and circumflex are free of significant disease  There calcified  Patent stents  Right coronary artery has calcific complex mid 90-95% stenosis, distal vessel faintly collateralized  This was reduced to 0-10% residual with a 3 5 x 12 and 3 5 x 16 mm drug-eluting stent with use of microcatheter to unable stent delivery  Plan:  Aspirin 81 indefinitely, Plavix x1 year    If patient is on anticoagulation can just used anticoagulation with Plavix and no aspirin for a year

## 2019-09-16 NOTE — CONSULTS
Consultation - Cardiology Team One  Edwina Mejia 80 y o  female MRN: [id number]  Unit/Bed#: MUNDO Encounter: [id number]    Consults    Physician Requesting Consult: Erwin Mejia DO  Reason for Consult / Principal Problem: chest pain/MI    Late documentation; pt evaluated 9/16/19 at 8:00am        Assessment/ Plan:    1  Unstable Angina: Attending had long discussion with patient and her daughter at bedside  It appears her anginal symptoms are not being controlled with medical/conservative therapy and they would now like to proceed with cardiac catheterization despite risks involved  Will be done today  She is currently pain free; initial troponin negative  Has been given ASA 325mg, Brilinta 180mg and 4000 units heparin  Continue nitropaste; can use nitroglycerin infusion if pain returns/persists  - NPO, start gentle IVF in preparation of caridac cath/constrast; Cr 1 01 today  Further plan pending results of cath; may need to intensify anti-anginal regimen  2  CAD: prior stenting to LAD, left circ and OM2; last stent 2009  Repeat cath today; continue remainder of home meds: ASA, statin, BB      3  HTN: BP currently elevated; possibly secondary to pain; continue home meds; coreg, Norvasc, imdur; can titrate up if needed  4  PAF: maintaining SR on coreg 12 5mg BID; she is not anticoagulated per primary cardiologist 2/2 fall risk    5  Chronic diastolic congestive heart failure: mildly volume overloaded on exam today; may need dose of IV lasix but will hold off for now given her need for contrast today  Take lasix 20mg 1-2/week for LE edema or weight gain  6  HLD: continue statin; takes Crestor 5mg daily       History of Present Illness      HPI: Edwina Mejia is a 80y o  year old female who has a history of CAD with prior stenting and chronic angina, HTN, PAF not on AC, PAD, carotid stenosis  She follows with cardiologist Dr Basilio Correa          Pt presented to ED at 6:30am with complaints of chest pain; felt like someone sitting on chest with associated nausea  Symptoms started at 1:00am at home  EKG in ED was concerning for ST elevations in inferior leads and an MI alert was activated at 8:00am  Pt evaluated at 8:05am in ED with attending cardiologist; at that time she has been given morphine for pain with significant relief  Initial troponin was negative  EKG reviewed which showed sinus rhythm with RBBB without significant changes compared to EKG 7/2019; MI alert was cancelled as no STEMI criteria  Pt has past hx of CAD; prior stenting to LAD, left circ, OM2; last stent 2009  Was hospitalized 7/2019 with NSTEMI; nuclear ST showed mild amount of inferior ischemic; the patient elected to proceed with conservative/medical management at that time  Since then she has been experiencing chest pain on a regular basis; almost daily but also sometimes has a few pain free days  Her current antianginal regimen is Ranexa 500mg BID, Imdur 30mg daily, ASA, Norvasc 5mg daily, coreg 12 5mg BID  Echo 6/2019 with preserved LV systolic function without RWMA, grade 1 DD, trace MR and trace TR  She takes lasix 20mg 1-2 times per week; has increased LE edema currently; baseline weight 140lbs  EKG reviewed personally:  9/16/2019  Sinus rhythm with RBBB; no significant change compared to EKG 7/2019    Review of Systems   Constitution: Negative for fever  Cardiovascular: Positive for chest pain (as noted in HPI) and leg swelling  Respiratory: Negative for cough and shortness of breath  Gastrointestinal: Negative for nausea and vomiting  Neurological: Negative for dizziness and light-headedness  Psychiatric/Behavioral: Negative for altered mental status        Historical Information   Past Medical History:   Diagnosis Date    Atrial fibrillation ([facility] [address] )     Benign essential hypertension     CAD (coronary artery disease)     Carotid artery obstruction     Mesenteric ischemia, chronic (HCC)  TIA (transient ischemic attack)      Past Surgical History:   Procedure Laterality Date    APPENDECTOMY      CATARACT EXTRACTION      CHOLECYSTECTOMY      CORONARY ANGIOPLASTY      stent x4 07/11/1996x1 10/09/2009 x3    HYSTERECTOMY      REPLACEMENT TOTAL KNEE Right      Social History     Substance and Sexual Activity   Alcohol Use No     Social History     Substance and Sexual Activity   Drug Use No     Social History     Tobacco Use   Smoking Status Never Smoker   Smokeless Tobacco Never Used     Family History:   Family History   Problem Relation Age of Onset    Leukemia Father     Hypertension Sister     Heart attack Brother 46    Hypertension Brother     Sudden death Brother 46        scd    Heart failure Maternal Grandmother     Hypertension Maternal Grandmother     Stroke Maternal Grandfather     Hypertension Daughter     Anuerysm Neg Hx     Clotting disorder Neg Hx     Arrhythmia Neg Hx     Hyperlipidemia Neg Hx        Meds/Allergies   current meds:   No current facility-administered medications for this encounter  No current facility-administered medications for this encounter  Allergies   Allergen Reactions    Adhesive [Medical Tape]      Paper tape okay    Amoxicillin     Erythromycin     Ezetimibe     Fenofibrate     Flecainide     Lovastatin     Sulfa Antibiotics     Thioridazine        Objective   Vitals: Blood pressure 170/72, pulse 66, temperature 97 7 °F (36 5 °C), temperature source Oral, resp  rate 14, SpO2 97 %  ,     There is no height or weight on file to calculate BMI ,     Systolic (89VWV), IYX:045 , Min:170 , IDR:267     Diastolic (08KSX), KBU:63, Min:72, Max:82          No intake or output data in the 24 hours ending 09/16/19 0929  Weight (last 2 days)     None        Invasive Devices     Peripheral Intravenous Line            Peripheral IV 09/16/19 Left Antecubital less than 1 day    Peripheral IV 09/16/19 Right Antecubital less than 1 day Physical Exam   Constitutional: She is oriented to person, place, and time  No distress  Pt laying in bed in NAD; alert; [place]   Cardiovascular: Normal rate, regular rhythm, S1 normal and S2 normal    No murmur heard  B/L; +1 pitting LE edema   Pulmonary/Chest: Effort normal  No respiratory distress  Musculoskeletal: She exhibits edema  Neurological: She is alert and oriented to person, place, and time  Skin: Skin is warm and dry  She is not diaphoretic  Psychiatric: She has a normal mood and affect  Nursing note and vitals reviewed      LABORATORY RESULTS:  Results from last 7 days   Lab Units 09/16/19  0652   CK TOTAL U/L 58   TROPONIN I ng/mL <0 02     CBC with diff: Results from last 7 days   Lab Units 09/16/19  0652   WBC Thousand/uL 9 40   HEMOGLOBIN g/dL 11 1*   HEMATOCRIT % 34 3*   MCV fL 100*   PLATELETS Thousands/uL 241   MCH pg 32 3   MCHC g/dL 32 4   RDW % 13 7   MPV fL 8 2*   NRBC AUTO /100 WBCs 0     CMP:  Results from last 7 days   Lab Units 09/16/19  0652 09/12/19  0845   POTASSIUM mmol/L 3 5 4 0   CHLORIDE mmol/L 104 104   CO2 mmol/L 26 26   BUN mg/dL 28* 28*   CREATININE mg/dL 1 01 1 23   CALCIUM mg/dL 9 1 8 9   AST U/L 16  --    ALT U/L 21  --    ALK PHOS U/L 70  --    EGFR ml/min/1 73sq m 49 39     BMP:  Results from last 7 days   Lab Units 09/16/19  0652 09/12/19  0845   POTASSIUM mmol/L 3 5 4 0   CHLORIDE mmol/L 104 104   CO2 mmol/L 26 26   BUN mg/dL 28* 28*   CREATININE mg/dL 1 01 1 23   CALCIUM mg/dL 9 1 8 9     Lab Results   Component Value Date    NTBNP 3,346 (H) 06/28/2019      Results from last 7 days   Lab Units 09/12/19  0845   HEMOGLOBIN A1C % 6 2     Results from last 7 days   Lab Units 09/16/19  0652   INR  0 99     Lipid Profile:   No results found for: CHOL  Lab Results   Component Value Date    HDL 70 (H) 08/07/2019    HDL 64 (H) 06/29/2019    HDL 59 09/19/2018     Lab Results   Component Value Date    LDLCALC 123 (H) 08/07/2019    LDLCALC 126 (H) 06/29/2019 Lab Results   Component Value Date    TRIG 237 (H) 2019    TRIG 136 2019    TRIG 297 (H) 2018     Cardiac testing:   Results for orders placed during the hospital encounter of 19   Echo complete with contrast if indicated    Narrative [address] [facility]  [phone]    Transthoracic Echocardiogram  2D, M-mode, Doppler, and Color Doppler    Study date:  2019    Patient: Andrew Millan  MR number: [id number]  Account number: [de-identified]  : 1930  Age: 80 years  Gender: Female  Status: Inpatient  Location: Bedside  Height: 62 in  Weight: 142 lb  BP:    Indications: Chest pain, NSTEMI  Diagnoses: R07 9 - Chest pain, unspecified    Sonographer:  ZULEIMA Thornton  Primary Physician:  Kyra Sow MD  Referring Physician:  Francis Rankin MD  Group:  [facility]  Interpreting Physician:  Francis Rankin MD    SUMMARY    LEFT VENTRICLE:  Systolic function was normal  Ejection fraction was estimated to be 60 %  There were no regional wall motion abnormalities  Doppler parameters were consistent with abnormal left ventricular relaxation (grade 1 diastolic dysfunction)  RIGHT VENTRICLE:  The size was normal   Systolic function was normal     MITRAL VALVE:  There was trace regurgitation  TRICUSPID VALVE:  There was trace regurgitation  HISTORY: PRIOR HISTORY: A-fib, HTN, CAD, carotid disease, TIA  PROCEDURE: The procedure was performed at the bedside  This was a routine study  The transthoracic approach was used  The study included complete 2D imaging, M-mode, complete spectral Doppler, and color Doppler  Images were obtained from  the parasternal, apical, subcostal, and suprasternal notch acoustic windows  Echocardiographic views were limited due to poor acoustic window availability  This was a technically difficult study      LEFT VENTRICLE: Size was normal  Systolic function was normal  Ejection fraction was estimated to be 60 %  There were no regional wall motion abnormalities  Wall thickness was normal  DOPPLER: Doppler parameters were consistent with  abnormal left ventricular relaxation (grade 1 diastolic dysfunction)  RIGHT VENTRICLE: The size was normal  Systolic function was normal  Wall thickness was normal     LEFT ATRIUM: Size was normal     RIGHT ATRIUM: Size was normal     MITRAL VALVE: Valve structure was normal  There was normal leaflet separation  DOPPLER: The transmitral velocity was within the normal range  There was no evidence for stenosis  There was trace regurgitation  AORTIC VALVE: The valve was trileaflet  Leaflets exhibited normal thickness and normal cuspal separation  DOPPLER: Transaortic velocity was within the normal range  There was no evidence for stenosis  There was no significant  regurgitation  TRICUSPID VALVE: The valve structure was normal  There was normal leaflet separation  DOPPLER: The transtricuspid velocity was within the normal range  There was no evidence for stenosis  There was trace regurgitation  PULMONIC VALVE: Leaflets exhibited normal thickness, no calcification, and normal cuspal separation  DOPPLER: The transpulmonic velocity was within the normal range  There was no significant regurgitation  PERICARDIUM: There was no pericardial effusion  The pericardium was normal in appearance  AORTA: The root exhibited normal size  SYSTEMIC VEINS: IVC: The inferior vena cava was normal in size  SYSTEM MEASUREMENT TABLES    CW  AV Env  Ti: 327 57 ms  AV MaxP 36 mmHg  AV VTI: 25 43 cm  AV Vmax: 1 26 m/s  AV Vmean: 0 78 m/s  AV meanP 78 mmHg    MM  TAPSE: 2 54 cm    PW  E': 0 06 m/s  E/E': 16 44  LVOT Env  Ti: 364 48 ms  LVOT VTI: 24 87 cm  LVOT Vmax: 1 1 m/s  LVOT Vmean: 0 68 m/s  LVOT maxP 88 mmHg  LVOT meanP 25 mmHg  MV A Alexand[place]: 1 23 m/s  MV Dec [place]: 3 86 m/s2  MV DecT: 277 ms  MV E Alexandru: 1 07 m/s  MV E/A Ratio: 0 87  MV PHT: 80 33 ms  MVA By PHT: 2 74 cm2    [facility] [facility] [employer] Accredited Echocardiography Laboratory    Prepared and electronically signed by    Mauricio Burns MD  Signed 30-Jun-2019 15:55:46       Imaging: I have personally reviewed pertinent reports  Xr Chest 1 View Portable    Result Date: 9/16/2019  Narrative: CHEST INDICATION:   chest pain  COMPARISON:  6/28/2019 EXAM PERFORMED/VIEWS:  XR CHEST PORTABLE FINDINGS: Stable cardiac mediastinal silhouette  The lungs are clear  No pneumothorax or pleural effusion  Osseous structures appear within normal limits for patient age  Impression: No acute cardiopulmonary disease  Workstation performed: [id number]     Ct Head Without Contrast    Result Date: 9/16/2019  Narrative: CT BRAIN - WITHOUT CONTRAST INDICATION:   headache  Pain radiating to jaw  COMPARISON:  None  TECHNIQUE:  CT examination of the brain was performed  In addition to axial images, coronal 2D reformatted images were created and submitted for interpretation  Radiation dose length product (DLP) for this visit:  495 mGy-cm   This examination, like all CT scans performed in the [facility], was performed utilizing techniques to minimize radiation dose exposure, including the use of iterative reconstruction and automated exposure control  IMAGE QUALITY:  Diagnostic  FINDINGS: PARENCHYMA: Decreased attenuation is noted in periventricular and subcortical white matter demonstrating an appearance that is statistically most likely to represent mild microangiopathic change  Focal areas of encephalomalacia noted in the left frontal  lobe and left temporal occipital regions  No CT signs of acute infarction  No intracranial mass, mass effect or midline shift  No acute parenchymal hemorrhage  VENTRICLES AND EXTRA-AXIAL SPACES:  Normal for the patient's age  VISUALIZED ORBITS AND PARANASAL SINUSES:  Unremarkable   CALVARIUM AND EXTRACRANIAL SOFT TISSUES:  Normal      Impression: No acute intracranial abnormality  Microangiopathic changes  More focal areas of chronic encephalomalacia noted in the left frontal lobe and left temporal occipital region  Workstation performed: [id number]     Assessment  Active Problems:    * No active hospital problems  *    Thank you for allowing us to participate in this patient's care  This pt will follow up with Dr Rita Zeng once discharged  Counseling / Coordination of Care  Total floor / unit time spent today 45 minutes  Greater than 50% of total time was spent with the patient and / or family counseling and / or coordination of care  A description of the counseling / coordination of care: Review of history, current assessment, development of a plan  Code Status: Prior    ** Please Note: Dragon 360 Dictation voice to text software may have been used in the creation of this document   **

## 2019-09-16 NOTE — ASSESSMENT & PLAN NOTE
· Patient with CP early this AM-- some EKG changes concerning for STEMI but not meeting acute MI criteria   Has OP stress test with inferior ischemia that was attempted to be managed medically  · S/P JESUSITA to RCA today   · Continue home medications: ASA, Imdur, Ranexa, BB; cardiology may adjust regimen prior to discharge  · Maintain telemetry  · Post-cath recovery

## 2019-09-16 NOTE — PLAN OF CARE
Problem: Potential for Falls  Goal: Patient will remain free of falls  Description  INTERVENTIONS:  - Assess patient frequently for physical needs  -  Identify cognitive and physical deficits and behaviors that affect risk of falls    -  [place] fall precautions as indicated by assessment   - Educate patient/family on patient safety including physical limitations  - Instruct patient to call for assistance with activity based on assessment  - Modify environment to reduce risk of injury  - Consider OT/PT consult to assist with strengthening/mobility  Outcome: Progressing     Problem: CARDIOVASCULAR - ADULT  Goal: Maintains optimal cardiac output and hemodynamic stability  Description  INTERVENTIONS:  - Monitor I/O, vital signs and rhythm  - Monitor for S/S and trends of decreased cardiac output  - Administer and titrate ordered vasoactive medications to optimize hemodynamic stability  - Assess quality of pulses, skin color and temperature  - Assess for signs of decreased coronary artery perfusion  - Instruct patient to report change in severity of symptoms  Outcome: Progressing  Goal: Absence of cardiac dysrhythmias or at baseline rhythm  Description  INTERVENTIONS:  - Continuous cardiac monitoring, vital signs, obtain 12 lead EKG if ordered  - Administer antiarrhythmic and heart rate control medications as ordered  - Monitor electrolytes and administer replacement therapy as ordered  Outcome: Progressing     Problem: PAIN - ADULT  Goal: Verbalizes/displays adequate comfort level or baseline comfort level  Description  Interventions:  - Encourage patient to monitor pain and request assistance  - Assess pain using appropriate pain scale  - Administer analgesics based on type and severity of pain and evaluate response  - Implement non-pharmacological measures as appropriate and evaluate response  - Consider cultural and social influences on pain and pain management  - Notify physician/advanced practitioner if interventions unsuccessful or patient reports new pain  Outcome: Progressing     Problem: DISCHARGE PLANNING  Goal: Discharge to home or other facility with appropriate resources  Description  INTERVENTIONS:  - Identify barriers to discharge w/patient and caregiver  - Arrange for needed discharge resources and transportation as appropriate  - Identify discharge learning needs (meds, wound care, etc )  - Arrange for interpretive services to assist at discharge as needed  - Refer to Case Management Department for coordinating discharge planning if the patient needs post-hospital services based on physician/advanced practitioner order or complex needs related to functional status, cognitive ability, or social support system  Outcome: Progressing     Problem: Knowledge Deficit  Goal: Patient/family/caregiver demonstrates understanding of disease process, treatment plan, medications, and discharge instructions  Description  Complete learning assessment and assess knowledge base    Interventions:  - Provide teaching at level of understanding  - Provide teaching via preferred learning methods  Outcome: Progressing

## 2019-09-16 NOTE — ED NOTES
Pt states that she is having an increase of pain in her chest and states that it feels like that someone is sitting on it  Dr Tod Snellen made aware, EKG is being done         Bettina Mata RN  09/16/19 9718

## 2019-09-16 NOTE — ASSESSMENT & PLAN NOTE
· Rate controlled on Coreg  · Not on [facility] per primary cardiologist 2/2 fall risk

## 2019-09-16 NOTE — ED PROVIDER NOTES
History  Chief Complaint   Patient presents with    Chest Pain     PT  complains of upper chest pain radiating in to jaw and headache, started at 1 am  States was nauseous during initial pain  Patient is a 80year old female with constant worsening upper chest pain with radiation to neck since 0100  No vomiting but has nausea  No fever  (+) cough  No sob  No travel  Denies smoking  Was last seen in this ED on 6/28/19 for NSTEMI  [place] [facility] website checked on this patient and patient not found  BP at home was 160s/60s  (+) headache  Patient needed my urgent attention  History provided by:  Patient and relative (daughter)   used: No    Chest Pain   Associated symptoms: cough, headache and nausea    Associated symptoms: no fever, no shortness of breath and not vomiting        Prior to Admission Medications   Prescriptions Last Dose Informant Patient Reported? Taking?    Ascorbic Acid (VITAMIN C) 1000 MG tablet  Family Member Yes Yes   Sig: Take 1,000 mg by mouth daily   Calcium Citrate-Vitamin D (CALCIUM + D PO)  Family Member Yes Yes   Sig: Take 600 mg by mouth every other day     Cholecalciferol (VITAMIN D3) 1000 units CAPS  Family Member Yes Yes   Sig: Take by mouth daily     Cranberry 500 MG CAPS  Family Member Yes Yes   Sig: Take 1 capsule by mouth daily   acetaminophen (TYLENOL) 325 mg tablet  Family Member Yes Yes   Sig: Take 650 mg by mouth every 8 (eight) hours as needed for mild pain     amLODIPine (NORVASC) 5 mg tablet  Family Member Yes Yes   Sig: Take 1 tablet by mouth daily   amoxicillin (AMOXIL) 500 mg capsule  Family Member Yes Yes   Sig: TAKE 4 CAPSULES BY MOUTH 1 HOUR BEFORE DENTAL APPOINTMENT   aspirin 81 MG tablet  Family Member Yes Yes   Sig: Take 162 mg by mouth daily    carvedilol (COREG) 12 5 mg tablet  Family Member Yes Yes   Sig: Take 2 tablets by mouth 2 (two) times a day   cloNIDine (CATAPRES) 0 1 mg tablet  Family Member Yes Yes   Sig: Take 1 tablet by mouth 4 (four) times a day    cyanocobalamin 1000 MCG tablet  Family Member Yes Yes   Sig: Take by mouth daily     escitalopram (LEXAPRO) 10 mg tablet  Family Member Yes Yes   Sig: Take 1 tablet by mouth daily   famotidine (PEPCID) 20 mg tablet  Family Member No Yes   Sig: Take 1 tablet (20 mg total) by mouth daily   Patient taking differently: Take 20 mg by mouth as needed    ferrous sulfate 325 (65 FE) MG EC tablet  Family Member Yes Yes   Sig: Take 325 mg by mouth daily   furosemide (LASIX) 20 mg tablet  Family Member No Yes   Sig: Take 1 tablet (20 mg total) by mouth every other day   isosorbide mononitrate (IMDUR) 30 mg 24 hr tablet  Family Member No Yes   Sig: Take 1 tablet (30 mg total) by mouth 2 (two) times a day   pantoprazole (PROTONIX) 40 mg tablet  Family Member Yes Yes   Sig: Take 40 mg by mouth daily   potassium chloride (K-DUR,KLOR-CON) 10 mEq tablet  Family Member Yes Yes   Sig: Take 10 mEq by mouth 3 (three) times a day    predniSONE 5 mg tablet  Family Member Yes Yes   Sig: Take 7 5 mg by mouth daily     ranolazine (RANEXA) 500 mg 12 hr tablet  Family Member No Yes   Sig: Take 1 tablet (500 mg total) by mouth 2 (two) times a day   repaglinide (PRANDIN) 0 5 mg tablet  Family Member Yes Yes   Sig: Take 0 5 mg by mouth 3 (three) times a day before meals     rosuvastatin (CRESTOR) 5 mg tablet  Family Member No Yes   Si TABLET THREE DAYS A WEEK ( and Saturday)      Facility-Administered Medications: None       Past Medical History:   Diagnosis Date    Atrial fibrillation (HCC)     Benign essential hypertension     CAD (coronary artery disease)     Carotid artery obstruction     Mesenteric ischemia, chronic (HCC)     TIA (transient ischemic attack)        Past Surgical History:   Procedure Laterality Date    APPENDECTOMY      CATARACT EXTRACTION      CHOLECYSTECTOMY      CORONARY ANGIOPLASTY      stent x4 07/11/1996x1 10/09/2009 x3    HYSTERECTOMY      REPLACEMENT TOTAL KNEE Right        Family History   Problem Relation Age of Onset    Leukemia Father     Hypertension Sister     Heart attack Brother 46    Hypertension Brother     Sudden death Brother 46        scd    Heart failure Maternal Grandmother     Hypertension Maternal Grandmother     Stroke Maternal Grandfather     Hypertension Daughter     Anuerysm Neg Hx     Clotting disorder Neg Hx     Arrhythmia Neg Hx     Hyperlipidemia Neg Hx      I have reviewed and agree with the history as documented  Social History     Tobacco Use    Smoking status: Never Smoker    Smokeless tobacco: Never Used   Substance Use Topics    Alcohol use: No    Drug use: No        Review of Systems   Constitutional: Negative for fever  Respiratory: Positive for cough  Negative for shortness of breath  Cardiovascular: Positive for chest pain  Gastrointestinal: Positive for nausea  Negative for vomiting  Musculoskeletal: Positive for neck pain  Neurological: Positive for headaches  All other systems reviewed and are negative  Physical Exam  Physical Exam   Constitutional: She is oriented to person, place, and time  She appears well-developed and well-nourished  She appears distressed (moderate)  HENT:   Head: Normocephalic and atraumatic  Mouth/Throat: Oropharynx is clear and moist    Eyes: Pupils are equal, round, and reactive to light  EOM are normal  No scleral icterus  Neck: Normal range of motion  Neck supple  No JVD present  No tracheal deviation present  Cardiovascular: Normal rate, regular rhythm and normal heart sounds  No murmur heard  Pulmonary/Chest: Effort normal and breath sounds normal  No stridor  No respiratory distress  She has no wheezes  She has no rales  She exhibits no tenderness  Abdominal: Soft  Bowel sounds are normal  She exhibits no distension  There is no tenderness  Musculoskeletal: She exhibits edema (mild bilateral LE edema)   She exhibits no tenderness (No calf tenderness) or deformity  Neurological: She is alert and oriented to person, place, and time  Skin: Skin is warm and dry  No rash noted  No erythema  Psychiatric:   Somewhat anxious  Nursing note and vitals reviewed        Vital Signs  ED Triage Vitals [09/16/19 0638]   Temperature Pulse Respirations Blood Pressure SpO2   97 7 °F (36 5 °C) 69 17 (!) 210/82 96 %      Temp Source Heart Rate Source Patient Position - Orthostatic VS BP Location FiO2 (%)   Oral Monitor Lying Left arm --      Pain Score       4           Vitals:    09/16/19 0638 09/16/19 0739 09/16/19 0805 09/16/19 0814   BP: (!) 210/82 (!) 183/77 (!) 192/81 (!) 177/77   Pulse: 69 66 73 67   Patient Position - Orthostatic VS: Lying Lying Lying Lying         Visual Acuity      ED Medications  Medications   acetaminophen (TYLENOL) tablet 975 mg (975 mg Oral Given 9/16/19 0647)   nitroglycerin (NITRO-BID) 2 % TD ointment 0 5 inch (0 5 inches Topical Given 9/16/19 0648)   aspirin chewable tablet 162 mg (162 mg Oral Given 9/16/19 0749)   ondansetron (ZOFRAN) injection 4 mg (4 mg Intravenous Given 9/16/19 0749)   ticagrelor (BRILINTA) tablet 180 mg (180 mg Oral Given 9/16/19 0758)   heparin (porcine) injection 4,000 Units (4,000 Units Intravenous Given 9/16/19 0804)   morphine injection 1 mg (1 mg Intravenous Given 9/16/19 0804)       Diagnostic Studies  Results Reviewed     Procedure Component Value Units Date/Time    Troponin I [[id number]]  (Normal) Collected:  09/16/19 0652    Lab Status:  Final result Specimen:  Blood from Arm, Left Updated:  09/16/19 0719     Troponin I <0 02 ng/mL     Comprehensive metabolic panel [[id number]]  (Abnormal) Collected:  09/16/19 0652    Lab Status:  Final result Specimen:  Blood from Arm, Left Updated:  09/16/19 0715     Sodium 143 mmol/L      Potassium 3 5 mmol/L      Chloride 104 mmol/L      CO2 26 mmol/L      ANION GAP 13 mmol/L      BUN 28 mg/dL      Creatinine 1 01 mg/dL      Glucose 108 mg/dL      Calcium 9 1 mg/dL      AST 16 U/L      ALT 21 U/L      Alkaline Phosphatase 70 U/L      Total Protein 7 1 g/dL      Albumin 3 5 g/dL      Total Bilirubin 0 40 mg/dL      eGFR 49 ml/min/1 73sq m     Narrative:       [place] guidelines for Chronic Kidney Disease (CKD):     Stage 1 with normal or high GFR (GFR > 90 mL/min/1 73 square meters)    Stage 2 Mild CKD (GFR = 60-89 mL/min/1 73 square meters)    Stage 3A Moderate CKD (GFR = 45-59 mL/min/1 73 square meters)    Stage 3B Moderate CKD (GFR = 30-44 mL/min/1 73 square meters)    Stage 4 Severe CKD (GFR = 15-29 mL/min/1 73 square meters)    Stage 5 End Stage CKD (GFR <15 mL/min/1 73 square meters)  Note: GFR calculation is accurate only with a steady state creatinine    Lipase [[id number]]  (Normal) Collected:  09/16/19 0652    Lab Status:  Final result Specimen:  Blood from Arm, Left Updated:  09/16/19 0715     Lipase 185 u/L     CK Total with Reflex CKMB [[id number]]  (Normal) Collected:  09/16/19 0652    Lab Status:  Final result Specimen:  Blood from Arm, Left Updated:  09/16/19 0715     Total CK 58 U/L     Protime-INR [[id number]]  (Normal) Collected:  09/16/19 0652    Lab Status:  Final result Specimen:  Blood from Arm, Left Updated:  09/16/19 0710     Protime 12 5 seconds      INR 0 99    APTT [[id number]]  (Normal) Collected:  09/16/19 0652    Lab Status:  Final result Specimen:  Blood from Arm, Left Updated:  09/16/19 0710     PTT 24 seconds     CBC and differential [[id number]]  (Abnormal) Collected:  09/16/19 0652    Lab Status:  Final result Specimen:  Blood from Arm, Left Updated:  09/16/19 0659     WBC 9 40 Thousand/uL      RBC 3 44 Million/uL      Hemoglobin 11 1 g/dL      Hematocrit 34 3 %       fL      MCH 32 3 pg      MCHC 32 4 g/dL      RDW 13 7 %      MPV 8 2 fL      Platelets 426 Thousands/uL      nRBC 0 /100 WBCs      Neutrophils Relative 61 %      Immat GRANS % 1 %      Lymphocytes Relative 27 %      Monocytes Relative 9 %      Eosinophils Relative 2 %      Basophils Relative 0 %      Neutrophils Absolute 5 77 Thousands/µL      Immature Grans Absolute 0 06 Thousand/uL      Lymphocytes Absolute 2 54 Thousands/µL      Monocytes Absolute 0 82 Thousand/µL      Eosinophils Absolute 0 18 Thousand/µL      Basophils Absolute 0 03 Thousands/µL                  CT head without contrast   ED Interpretation by Peter Washington MD (09/16 0801)   FINDINGS:      PARENCHYMA: Decreased attenuation is noted in periventricular and subcortical white matter demonstrating an appearance that is statistically most likely to represent mild microangiopathic change   Focal areas of encephalomalacia noted in the left frontal    lobe and left temporal occipital regions  No CT signs of acute infarction   No intracranial mass, mass effect or midline shift   No acute parenchymal hemorrhage  VENTRICLES AND EXTRA-AXIAL SPACES:  Normal for the patient's age  VISUALIZED ORBITS AND PARANASAL SINUSES:  Unremarkable  CALVARIUM AND EXTRACRANIAL SOFT TISSUES:  Normal    Impression:        No acute intracranial abnormality   Microangiopathic changes   More focal areas of chronic encephalomalacia noted in the left frontal lobe and left temporal occipital region  Workstation performed: [id number]         Final Result by Mina Lozada MD (09/16 2105)      No acute intracranial abnormality  Microangiopathic changes  More focal areas of chronic encephalomalacia noted in the left frontal lobe and left temporal occipital region  Workstation performed: [id number]         XR chest 1 view portable   ED Interpretation by Peter Washington MD (09/16 3045)   Cardiomegaly, rotated read by me and unchanged from 6/28/19 CXR  Final Result by Sandra Matthews MD (09/16 6763)      No acute cardiopulmonary disease              Workstation performed: [id number]                    Procedures  ECG 12 Lead Documentation Only  Date/Time: 9/16/2019 6:48 AM  Performed by: Rene Croft MD  Authorized by: Rene Croft MD     Indications / Diagnosis:  Chest pain  ECG reviewed by me, the ED Provider: yes    Patient location:  ED  Previous ECG:     Previous ECG:  Compared to current    Comparison ECG info:  6/29/19    Similarity:  Changes noted (not s  saul now and no PVC now)  Rate:     ECG rate:  67    ECG rate assessment: normal    Rhythm:     Rhythm: sinus rhythm      Rhythm comment:  S  arrhythmia  Ectopy:     Ectopy: none    QRS:     QRS axis:  Normal  Conduction:     Conduction: abnormal      Abnormal conduction: complete RBBB    ST segments:     ST segments:  Normal  T waves:     T waves: normal    ECG 12 Lead Documentation Only  Date/Time: 9/16/2019 7:39 AM  Performed by: Rene Croft MD  Authorized by: Rene Croft MD     Indications / Diagnosis:  Repeat EKG for chest pain  ECG reviewed by me, the ED Provider: yes    Patient location:  ED  Previous ECG:     Previous ECG:  Compared to current    Comparison ECG info:  Earlier this AM    Similarity:  Changes noted (ST elevation and ST depression  )  Quality:     Tracing quality:  Limited by artifact  Rate:     ECG rate:  74    ECG rate assessment: normal    Rhythm:     Rhythm: sinus rhythm    Ectopy:     Ectopy: PAC    QRS:     QRS axis:  Normal  Conduction:     Conduction: abnormal      Abnormal conduction: complete RBBB    ST segments:     ST segments:  Abnormal    Elevation:  III and aVF    Depression:  I  T waves:     T waves: non-specific    CriticalCare Time  Performed by: Rene Croft MD  Authorized by: Rene Croft MD     Critical care provider statement:     Critical care time (minutes):  35    Critical care time was exclusive of:  Separately billable procedures and treating other patients    Critical care was necessary to treat or prevent imminent or life-threatening deterioration of the following conditions: ACS      Critical care was time spent personally by me on the following activities:  Obtaining history from patient or surrogate, development of treatment plan with patient or surrogate, discussions with consultants, evaluation of patient's response to treatment, examination of patient, ordering and performing treatments and interventions, ordering and review of laboratory studies, ordering and review of radiographic studies, re-evaluation of patient's condition and review of old charts    I assumed direction of critical care for this patient from another provider in my specialty: no             ED Course  ED Course as of Sep 16 0840   Mon Sep 16, 2019   0706 EKG, CXR, CBC d/w patient and daughter with patient's permission  1132 Rest of labs d/w patient and daughter  Patient still with chest pain with radiation to right arm now so repeat EKG ordered  7710 CT head and repeat EKG d/w patient and daughter  D/w  to call cardiology on call        4063 MI alert called by me  Heparin and brilinta ordered  6573 Patient denies GI bleeding  Patient states it feels like someone is sitting on her chest        0802 D/w Dr Amrita Maravilla and EKGs tiger texted to him        5634 D/w Dr Lashanda Sánchez who came to ED and reviewed EKGs and will see patient and states he does not believe patient needs to go to the cath lab        0827 D/w Dr Lashanda Sánchez who saw patient in ED and patient will go to cath lab later today and he wants patient admitted to [address] [place] [address] to [facility] and patient not an MI alert at this time               HEART Risk Score      Most Recent Value   History  2 Filed at: 09/16/2019 0734   ECG  0 Filed at: 09/16/2019 0734   Age  2 Filed at: 09/16/2019 0734   Risk Factors  2 Filed at: 09/16/2019 0734   Troponin  0 Filed at: 09/16/2019 0734   Heart Score Risk Calculator   History  2 Filed at: 09/16/2019 0734   ECG  0 Filed at: 09/16/2019 0734   Age  2 Filed at: 09/16/2019 0734   Risk Factors  2 Filed at: 09/16/2019 0734   Troponin  0 Filed at: 09/16/2019 0734   HEART Score  6 Filed at: 09/16/2019 0734   HEART Score  6 Filed at: 09/16/2019 0734                    FREDA Risk Score      Most Recent Value   Age >= 72  1 Filed at: 09/16/2019 7609   Known CAD (stenosis >= 50%)  0 Filed at: 09/16/2019 0641   Recent (<=24 hrs) Service Angina  0 Filed at: 09/16/2019 0734   ST Deviation >= 0 5 mm  0 Filed at: 09/16/2019 0734   3+ CAD Risk Factors (FHx, HTN, HLP, DM, Smoker)  1 Filed at: 09/16/2019 0734   Aspirin Use Past 7 Days  1 Filed at: 09/16/2019 0734   Elevated Cardiac Markers  0 Filed at: 09/16/2019 0734   FREDA Risk Score (Calculated)  3 Filed at: 09/16/2019 0274              MDM  Number of Diagnoses or Management Options  Diagnosis management comments: DDX including but not limited to: ACS, MI, doubt PE, PTX, pneumonia, doubt dissection, pleurisy, pericarditis, myocarditis, rhabdomyolysis, GI etiology  DDx including but not limited to: tension headache, cluster headache, migraine, HTN; doubt ICH, SAH, tumor, meningitis, temporal arteritis, carbon monoxide poisoning, zoster, sinusitis          Amount and/or Complexity of Data Reviewed  Clinical lab tests: ordered and reviewed  Tests in the radiology section of CPT®: ordered and reviewed  Decide to obtain previous medical records or to obtain history from someone other than the patient: yes  Obtain history from someone other than the patient: yes  Review and summarize past medical records: yes  Discuss the patient with other providers: yes  Independent visualization of images, tracings, or specimens: yes        Disposition  Final diagnoses:   Acute MI ([address] )     Time reflects when diagnosis was documented in both MDM as applicable and the Disposition within this note     Time User Action Codes Description Comment    9/16/2019  8:12 AM Anil Aponte Add [I21 9] Acute MI [facility])       ED Disposition     ED Disposition Condition Date/Time Comment    Admit Fair Mon Sep 16, 2019  8:39 AM Case was discussed with Dr Jeferson Sauceda and the patient's admission status was agreed to be Admission Status: inpatient status to the service of Dr Africa Diaz   Follow-up Information    None         Patient's Medications   Discharge Prescriptions    No medications on file     No discharge procedures on file      ED Provider  Electronically Signed by           Jennifer Salazar MD  09/16/19 0014 Scribe Attestation (For Scribes USE Only)... Attending Attestation (For Attendings USE Only).../Scribe Attestation (For Scribes USE Only)...

## 2019-09-16 NOTE — H&P
H&P- No Coles 4/6/1930, 80 y o  female MRN: [id number]    Unit/Bed#: -01 Encounter: [id number]    Primary Care Provider: Rashmi Jama MD   Date and time admitted to hospital: 9/16/2019  6:32 AM    * Coronary artery disease with unstable angina  Assessment & Plan  · Patient with CP early this AM-- some EKG changes concerning for inferior wall MI and was MI alert in the ED, but was canceled after EKG was reviewed and it did not show signficant changes compared to prior EKG  Had OP stress test recently with primary cardiologist that demonstrated inferior ischemia which was attempted to be managed medically  · S/P JESUSITA to RCA today; sites of prior stenting were patent   · Continue home medications: ASA, Imdur, Ranexa, BB; cardiology may adjust regimen prior to discharge  · Maintain telemetry  · Post-cath recovery    Peripheral arterial disease (HCC)  Assessment & Plan  · Continue ASA    Benign essential hypertension  Assessment & Plan  · Continue home BP medications-- adequately controlled on this regimen at this time     Paroxysmal atrial fibrillation (HCC)  Assessment & Plan  · Rate controlled on Coreg  · Not on AC per primary cardiologist 2/2 fall risk       VTE Prophylaxis: Heparin  / sequential compression device   Code Status: Level 3 DNAR/DNI  POLST: POLST form is not discussed and not completed at this time  Discussion with family: family not immediately available     Anticipated Length of Stay:  Patient will be admitted on an Inpatient basis with an anticipated length of stay of  > 2 midnights  Justification for Hospital Stay: post cath management     Total Time for Visit, including Counseling / Coordination of Care: 30 minutes  Greater than 50% of this total time spent on direct patient counseling and coordination of care      Chief Complaint:   Chest pain    History of Present Illness:    No Coles is a 80 y o  female with PMH significant for CAD, PAF, NSVT, HLD, presents to the ED for eval of CP that began at 1AM this morning  Reports pain that woke her from her sleep  Pain located in center of chest and was described as chest pressure with nausea  Pain radiated to the neck  She felt this was similar in presentation to her prior MI  She reports that she had a stress test as an OP that was concerning for inferior ischemia, but she and her cardiologist were trying to manage this medically before pursuing cardiac cath  This patient was initially an MI alert in the ED, but this was then cancelled after review of her EKG did not demonstrate significant changes compared to her prior  Since admission she has undergone cardiac cath with JESUSITA to the RCA  She is currently tired, but chest pain free  Review of Systems:    Review of Systems   Constitutional: Negative  HENT: Negative  Eyes: Negative  Respiratory: Negative  Cardiovascular: Positive for chest pain  Gastrointestinal: Positive for nausea  Genitourinary: Negative  Musculoskeletal: Negative  Skin: Negative  Neurological: Positive for headaches  Hematological: Negative  Psychiatric/Behavioral: Negative  Past Medical and Surgical History:     Past Medical History:   Diagnosis Date    Atrial fibrillation [facility] [address] )     Benign essential hypertension     CAD (coronary artery disease)     Carotid artery obstruction     Mesenteric ischemia, chronic (HCC)     TIA (transient ischemic attack)        Past Surgical History:   Procedure Laterality Date    APPENDECTOMY      CATARACT EXTRACTION      CHOLECYSTECTOMY      CORONARY ANGIOPLASTY      stent x4 07/11/1996x1 10/09/2009 x3    HYSTERECTOMY      REPLACEMENT TOTAL KNEE Right        Meds/Allergies:    Prior to Admission medications    Medication Sig Start Date End Date Taking?  Authorizing Provider   acetaminophen (TYLENOL) 325 mg tablet Take 650 mg by mouth every 8 (eight) hours as needed for mild pain     Yes Historical Provider, MD   amLODIPine (NORVASC) 5 mg tablet Take 1 tablet by mouth daily 12/14/17  Yes Historical Provider, MD   amoxicillin (AMOXIL) 500 mg capsule TAKE 4 CAPSULES BY MOUTH 1 HOUR BEFORE DENTAL APPOINTMENT 9/1/19  Yes Historical Provider, MD   Ascorbic Acid (VITAMIN C) 1000 MG tablet Take 1,000 mg by mouth daily   Yes Historical Provider, MD   aspirin 81 MG tablet Take 162 mg by mouth daily    Yes Historical Provider, MD   Calcium Citrate-Vitamin D (CALCIUM + D PO) Take 600 mg by mouth every other day     Yes Historical Provider, MD   carvedilol (COREG) 12 5 mg tablet Take 2 tablets by mouth 2 (two) times a day   Yes Historical Provider, MD   Cholecalciferol (VITAMIN D3) 1000 units CAPS Take by mouth daily     Yes Historical Provider, MD   cloNIDine (CATAPRES) 0 1 mg tablet Take 1 tablet by mouth 4 (four) times a day    Yes Historical Provider, MD   Cranberry 500 MG CAPS Take 1 capsule by mouth daily   Yes Historical Provider, MD   cyanocobalamin 1000 MCG tablet Take by mouth daily     Yes Historical Provider, MD   escitalopram (LEXAPRO) 10 mg tablet Take 1 tablet by mouth daily 12/14/17  Yes Historical Provider, MD   famotidine (PEPCID) 20 mg tablet Take 1 tablet (20 mg total) by mouth daily  Patient taking differently: Take 20 mg by mouth as needed  8/27/19  Yes RADHA Escobar   ferrous sulfate 325 (65 FE) MG EC tablet Take 325 mg by mouth daily   Yes Historical Provider, MD   furosemide (LASIX) 20 mg tablet Take 1 tablet (20 mg total) by mouth every other day 8/5/19  Yes Andrew Hull DO   isosorbide mononitrate (IMDUR) 30 mg 24 hr tablet Take 1 tablet (30 mg total) by mouth 2 (two) times a day 7/26/19  Yes Andrew Hull DO   pantoprazole (PROTONIX) 40 mg tablet Take 40 mg by mouth daily   Yes Historical Provider, MD   potassium chloride (K-DUR,KLOR-CON) 10 mEq tablet Take 10 mEq by mouth 3 (three) times a day    Yes Historical Provider, MD   predniSONE 5 mg tablet Take 7 5 mg by mouth daily   2/7/18  Yes Historical Provider, MD   ranolazine (RANEXA) 500 mg 12 hr tablet Take 1 tablet (500 mg total) by mouth 2 (two) times a day 8/27/19  Yes RADHA Jean Baptiste   repaglinide (PRANDIN) 0 5 mg tablet Take 0 5 mg by mouth 3 (three) times a day before meals     Yes Historical Provider, MD   rosuvastatin (CRESTOR) 5 mg tablet 1 TABLET THREE DAYS A WEEK (Tuesdays Thursdays and Saturday) 8/12/19  Yes Krystal Waite,      I have reviewed home medications with patient personally  Allergies: Allergies   Allergen Reactions    Adhesive [Medical Tape]      Paper tape okay    Amoxicillin     Erythromycin     Ezetimibe     Fenofibrate     Flecainide     Lovastatin     Sulfa Antibiotics     Thioridazine        Social History:     Marital Status:     Occupation: retired  Patient Pre-hospital Living Situation: independent   Patient Pre-hospital Level of Mobility: independent   Patient Pre-hospital Diet Restrictions: cardiac prudent   Substance Use History:   Social History     Substance and Sexual Activity   Alcohol Use No     Social History     Tobacco Use   Smoking Status Never Smoker   Smokeless Tobacco Never Used     Social History     Substance and Sexual Activity   Drug Use No       Family History:    Family History   Problem Relation Age of Onset    Leukemia Father     Hypertension Sister     Heart attack Brother 46    Hypertension Brother     Sudden death Brother 46        scd    Heart failure Maternal Grandmother     Hypertension Maternal Grandmother     Stroke Maternal Grandfather     Hypertension Daughter     Anuerysm Neg Hx     Clotting disorder Neg Hx     Arrhythmia Neg Hx     Hyperlipidemia Neg Hx        Physical Exam:     Vitals:   Blood Pressure: (!) 173/74 (09/16/19 1500)  Pulse: 68 (09/16/19 1500)  Temperature: 97 8 °F (36 6 °C) (09/16/19 0957)  Temp Source: Oral (09/16/19 0957)  Respirations: 16 (09/16/19 0957)  SpO2: 97 % (09/16/19 0957)    Physical Exam   Constitutional: She is oriented to person, place, and time  No distress  HENT:   Head: Normocephalic and atraumatic  Eyes: Pupils are equal, round, and reactive to light  EOM are normal    Neck: No JVD present  Cardiovascular: Normal rate and regular rhythm  Exam reveals no gallop and no friction rub  No murmur heard  Pulmonary/Chest: Effort normal and breath sounds normal  No stridor  No respiratory distress  She has no wheezes  Abdominal: Soft  Bowel sounds are normal  She exhibits no distension  There is no tenderness  There is no guarding  Musculoskeletal: She exhibits no edema or deformity  R wrist with hemoband in place   Neurological: She is alert and oriented to person, place, and time  She displays normal reflexes  No cranial nerve deficit  Coordination normal    Skin: Skin is warm and dry  She is not diaphoretic  No erythema  No pallor  Psychiatric: She has a normal mood and affect  Her behavior is normal        Additional Data:     Lab Results: I have personally reviewed pertinent reports  Results from last 7 days   Lab Units 09/16/19  0652   WBC Thousand/uL 9 40   HEMOGLOBIN g/dL 11 1*   HEMATOCRIT % 34 3*   PLATELETS Thousands/uL 241   NEUTROS PCT % 61   LYMPHS PCT % 27   MONOS PCT % 9   EOS PCT % 2     Results from last 7 days   Lab Units 09/16/19  0652   SODIUM mmol/L 143   POTASSIUM mmol/L 3 5   CHLORIDE mmol/L 104   CO2 mmol/L 26   BUN mg/dL 28*   CREATININE mg/dL 1 01   ANION GAP mmol/L 13   CALCIUM mg/dL 9 1   ALBUMIN g/dL 3 5   TOTAL BILIRUBIN mg/dL 0 40   ALK PHOS U/L 70   ALT U/L 21   AST U/L 16   GLUCOSE RANDOM mg/dL 108     Results from last 7 days   Lab Units 09/16/19  0652   INR  0 99         Results from last 7 days   Lab Units 09/12/19  0845   HEMOGLOBIN A1C % 6 2           Imaging: I have personally reviewed pertinent reports        CT head without contrast   ED Interpretation by Princess Felicia MD (34/23 1039)   FINDINGS:      PARENCHYMA: Decreased attenuation is noted in periventricular and subcortical white matter demonstrating an appearance that is statistically most likely to represent mild microangiopathic change   Focal areas of encephalomalacia noted in the left frontal    lobe and left temporal occipital regions  No CT signs of acute infarction   No intracranial mass, mass effect or midline shift   No acute parenchymal hemorrhage  VENTRICLES AND EXTRA-AXIAL SPACES:  Normal for the patient's age  VISUALIZED ORBITS AND PARANASAL SINUSES:  Unremarkable  CALVARIUM AND EXTRACRANIAL SOFT TISSUES:  Normal    Impression:        No acute intracranial abnormality   Microangiopathic changes   More focal areas of chronic encephalomalacia noted in the left frontal lobe and left temporal occipital region  Workstation performed: [id number]         Final Result by Abby Pineda MD (09/16 3140)      No acute intracranial abnormality  Microangiopathic changes  More focal areas of chronic encephalomalacia noted in the left frontal lobe and left temporal occipital region  Workstation performed: [id number]         XR chest 1 view portable   ED Interpretation by Beth Ocampo MD (09/16 5578)   Cardiomegaly, rotated read by me and unchanged from 6/28/19 CXR  Final Result by Amara Guzman MD (09/16 7102)      No acute cardiopulmonary disease  Workstation performed: [id number]             EKG, Pathology, and Other Studies Reviewed on Admission:   · EKG: ST elevation inferior leads     Allscripts / Epic Records Reviewed: Yes     ** Please Note: This note has been constructed using a voice recognition system   **

## 2019-09-17 VITALS
BODY MASS INDEX: 26.19 KG/M2 | TEMPERATURE: 97.7 F | RESPIRATION RATE: 18 BRPM | SYSTOLIC BLOOD PRESSURE: 138 MMHG | WEIGHT: 143.2 LBS | HEART RATE: 66 BPM | DIASTOLIC BLOOD PRESSURE: 65 MMHG | OXYGEN SATURATION: 98 %

## 2019-09-17 LAB
ANION GAP SERPL CALCULATED.3IONS-SCNC: 9 MMOL/L (ref 4–13)
ATRIAL RATE: 67 BPM
ATRIAL RATE: 78 BPM
BUN SERPL-MCNC: 18 MG/DL (ref 5–25)
CALCIUM SERPL-MCNC: 8.5 MG/DL (ref 8.3–10.1)
CHLORIDE SERPL-SCNC: 107 MMOL/L (ref 100–108)
CO2 SERPL-SCNC: 25 MMOL/L (ref 21–32)
CREAT SERPL-MCNC: 0.95 MG/DL (ref 0.6–1.3)
ERYTHROCYTE [DISTWIDTH] IN BLOOD BY AUTOMATED COUNT: 13.8 % (ref 11.6–15.1)
GFR SERPL CREATININE-BSD FRML MDRD: 53 ML/MIN/1.73SQ M
GLUCOSE SERPL-MCNC: 124 MG/DL (ref 65–140)
HCT VFR BLD AUTO: 31 % (ref 34.8–46.1)
HGB BLD-MCNC: 9.9 G/DL (ref 11.5–15.4)
MAGNESIUM SERPL-MCNC: 2 MG/DL (ref 1.6–2.6)
MCH RBC QN AUTO: 32.4 PG (ref 26.8–34.3)
MCHC RBC AUTO-ENTMCNC: 31.9 G/DL (ref 31.4–37.4)
MCV RBC AUTO: 101 FL (ref 82–98)
P AXIS: 66 DEGREES
P AXIS: 87 DEGREES
PLATELET # BLD AUTO: 222 THOUSANDS/UL (ref 149–390)
PMV BLD AUTO: 8.6 FL (ref 8.9–12.7)
POTASSIUM SERPL-SCNC: 4.7 MMOL/L (ref 3.5–5.3)
PR INTERVAL: 160 MS
PR INTERVAL: 168 MS
QRS AXIS: 55 DEGREES
QRS AXIS: 64 DEGREES
QRSD INTERVAL: 122 MS
QRSD INTERVAL: 124 MS
QT INTERVAL: 434 MS
QT INTERVAL: 476 MS
QTC INTERVAL: 481 MS
QTC INTERVAL: 502 MS
RBC # BLD AUTO: 3.06 MILLION/UL (ref 3.81–5.12)
SODIUM SERPL-SCNC: 141 MMOL/L (ref 136–145)
T WAVE AXIS: -38 DEGREES
T WAVE AXIS: 28 DEGREES
VENTRICULAR RATE: 67 BPM
VENTRICULAR RATE: 74 BPM
WBC # BLD AUTO: 9.16 THOUSAND/UL (ref 4.31–10.16)

## 2019-09-17 PROCEDURE — 85027 COMPLETE CBC AUTOMATED: CPT | Performed by: PHYSICIAN ASSISTANT

## 2019-09-17 PROCEDURE — 83735 ASSAY OF MAGNESIUM: CPT | Performed by: PHYSICIAN ASSISTANT

## 2019-09-17 PROCEDURE — 80048 BASIC METABOLIC PNL TOTAL CA: CPT | Performed by: PHYSICIAN ASSISTANT

## 2019-09-17 PROCEDURE — 99239 HOSP IP/OBS DSCHRG MGMT >30: CPT | Performed by: PHYSICIAN ASSISTANT

## 2019-09-17 PROCEDURE — 93010 ELECTROCARDIOGRAM REPORT: CPT | Performed by: INTERNAL MEDICINE

## 2019-09-17 PROCEDURE — 99232 SBSQ HOSP IP/OBS MODERATE 35: CPT | Performed by: INTERNAL MEDICINE

## 2019-09-17 RX ORDER — ATORVASTATIN CALCIUM 40 MG/1
40 TABLET, FILM COATED ORAL
Qty: 30 TABLET | Refills: 0 | Status: SHIPPED | OUTPATIENT
Start: 2019-09-17 | End: 2019-10-16 | Stop reason: SDUPTHER

## 2019-09-17 RX ORDER — FAMOTIDINE 20 MG/1
20 TABLET, FILM COATED ORAL AS NEEDED
Start: 2019-09-17 | End: 2019-09-26 | Stop reason: CLARIF

## 2019-09-17 RX ORDER — ISOSORBIDE MONONITRATE 30 MG/1
30 TABLET, EXTENDED RELEASE ORAL DAILY
Status: DISCONTINUED | OUTPATIENT
Start: 2019-09-18 | End: 2019-09-17 | Stop reason: HOSPADM

## 2019-09-17 RX ORDER — CLOPIDOGREL BISULFATE 75 MG/1
75 TABLET ORAL DAILY
Qty: 30 TABLET | Refills: 0 | Status: SHIPPED | OUTPATIENT
Start: 2019-09-18 | End: 2019-10-16 | Stop reason: SDUPTHER

## 2019-09-17 RX ADMIN — SENNOSIDES 8.6 MG: 8.6 TABLET, FILM COATED ORAL at 08:19

## 2019-09-17 RX ADMIN — HEPARIN SODIUM 5000 UNITS: 5000 INJECTION INTRAVENOUS; SUBCUTANEOUS at 05:31

## 2019-09-17 RX ADMIN — PANTOPRAZOLE SODIUM 40 MG: 40 TABLET, DELAYED RELEASE ORAL at 05:30

## 2019-09-17 RX ADMIN — REPAGLINIDE 0.5 MG: 0.5 TABLET ORAL at 08:21

## 2019-09-17 RX ADMIN — AMLODIPINE BESYLATE 5 MG: 5 TABLET ORAL at 08:19

## 2019-09-17 RX ADMIN — FERROUS SULFATE TAB 325 MG (65 MG ELEMENTAL FE) 325 MG: 325 (65 FE) TAB at 08:19

## 2019-09-17 RX ADMIN — ESCITALOPRAM OXALATE 10 MG: 10 TABLET ORAL at 08:19

## 2019-09-17 RX ADMIN — CALCIUM 1 TABLET: 500 TABLET ORAL at 08:19

## 2019-09-17 RX ADMIN — RANOLAZINE 500 MG: 500 TABLET, FILM COATED, EXTENDED RELEASE ORAL at 08:19

## 2019-09-17 RX ADMIN — FUROSEMIDE 20 MG: 20 TABLET ORAL at 08:19

## 2019-09-17 RX ADMIN — PREDNISONE 7.5 MG: 1 TABLET ORAL at 08:18

## 2019-09-17 RX ADMIN — CLONIDINE HYDROCHLORIDE 0.1 MG: 0.1 TABLET ORAL at 08:20

## 2019-09-17 RX ADMIN — MELATONIN 1000 UNITS: at 08:18

## 2019-09-17 RX ADMIN — OXYCODONE HYDROCHLORIDE AND ACETAMINOPHEN 1000 MG: 500 TABLET ORAL at 08:19

## 2019-09-17 RX ADMIN — CLOPIDOGREL BISULFATE 75 MG: 75 TABLET ORAL at 08:19

## 2019-09-17 RX ADMIN — CARVEDILOL 12.5 MG: 12.5 TABLET, FILM COATED ORAL at 08:19

## 2019-09-17 RX ADMIN — ASPIRIN 81 MG 81 MG: 81 TABLET ORAL at 08:19

## 2019-09-17 RX ADMIN — ISOSORBIDE MONONITRATE 30 MG: 30 TABLET, EXTENDED RELEASE ORAL at 08:19

## 2019-09-17 RX ADMIN — ACETAMINOPHEN 975 MG: 325 TABLET, FILM COATED ORAL at 05:30

## 2019-09-17 RX ADMIN — POTASSIUM CHLORIDE 10 MEQ: 10 TABLET, EXTENDED RELEASE ORAL at 08:19

## 2019-09-17 RX ADMIN — CLONIDINE HYDROCHLORIDE 0.1 MG: 0.1 TABLET ORAL at 13:54

## 2019-09-17 RX ADMIN — REPAGLINIDE 0.5 MG: 0.5 TABLET ORAL at 13:54

## 2019-09-17 NOTE — ASSESSMENT & PLAN NOTE
· Patient with CP early Monday AM-- patient with evidence of inferior wall MI on initial EKG, and MI alert was called  This was subsequently canceled as her EKG did not reveal acute changes compared to prior  She did however undergo urgent cardiac catheterization which showed in near 100% occlusion of right coronary artery and she required to drug-eluting stents  · At this time, patient is pain free and feeling well  · Continue home medications: ASA/Plavix x1 year, Imdur, BB; hold Ranexa until patient can be evaluated by Cardiology in the outpatient setting    Crestor will be transition to atorvastatin 40 mg p o  Q h s   · Ambulatory referral for cardiac rehab

## 2019-09-17 NOTE — PLAN OF CARE
Problem: Potential for Falls  Goal: Patient will remain free of falls  Description  INTERVENTIONS:  - Assess patient frequently for physical needs  -  Identify cognitive and physical deficits and behaviors that affect risk of falls    -  [place] fall precautions as indicated by assessment   - Educate patient/family on patient safety including physical limitations  - Instruct patient to call for assistance with activity based on assessment  - Modify environment to reduce risk of injury  - Consider OT/PT consult to assist with strengthening/mobility  Outcome: Completed     Problem: CARDIOVASCULAR - ADULT  Goal: Maintains optimal cardiac output and hemodynamic stability  Description  INTERVENTIONS:  - Monitor I/O, vital signs and rhythm  - Monitor for S/S and trends of decreased cardiac output  - Administer and titrate ordered vasoactive medications to optimize hemodynamic stability  - Assess quality of pulses, skin color and temperature  - Assess for signs of decreased coronary artery perfusion  - Instruct patient to report change in severity of symptoms  Outcome: Completed  Goal: Absence of cardiac dysrhythmias or at baseline rhythm  Description  INTERVENTIONS:  - Continuous cardiac monitoring, vital signs, obtain 12 lead EKG if ordered  - Administer antiarrhythmic and heart rate control medications as ordered  - Monitor electrolytes and administer replacement therapy as ordered  Outcome: Completed     Problem: PAIN - ADULT  Goal: Verbalizes/displays adequate comfort level or baseline comfort level  Description  Interventions:  - Encourage patient to monitor pain and request assistance  - Assess pain using appropriate pain scale  - Administer analgesics based on type and severity of pain and evaluate response  - Implement non-pharmacological measures as appropriate and evaluate response  - Consider cultural and social influences on pain and pain management  - Notify physician/advanced practitioner if interventions unsuccessful or patient reports new pain  Outcome: Completed     Problem: DISCHARGE PLANNING  Goal: Discharge to home or other facility with appropriate resources  Description  INTERVENTIONS:  - Identify barriers to discharge w/patient and caregiver  - Arrange for needed discharge resources and transportation as appropriate  - Identify discharge learning needs (meds, wound care, etc )  - Arrange for interpretive services to assist at discharge as needed  - Refer to Case Management Department for coordinating discharge planning if the patient needs post-hospital services based on physician/advanced practitioner order or complex needs related to functional status, cognitive ability, or social support system  Outcome: Completed     Problem: Knowledge Deficit  Goal: Patient/family/caregiver demonstrates understanding of disease process, treatment plan, medications, and discharge instructions  Description  Complete learning assessment and assess knowledge base    Interventions:  - Provide teaching at level of understanding  - Provide teaching via preferred learning methods  Outcome: Completed

## 2019-09-17 NOTE — UTILIZATION REVIEW
Initial Clinical Review    Admission: Date/Time/Statement: Inpatient Admission Orders (From admission, onward)     Ordered        09/16/19 0638  Inpatient Admission  Once                   Orders Placed This Encounter   Procedures    Inpatient Admission     Telemetry     Standing Status:   Standing     Number of Occurrences:   1     Order Specific Question:   Admitting Physician     Answer:   Maria Eugenia Romeo     Order Specific Question:   Level of Care     Answer:   Med Surg [16]     Order Specific Question:   Bed request comments     Answer:   telemetry     Order Specific Question:   Estimated length of stay     Answer:   More than 2 Midnights     Order Specific Question:   Certification     Answer:   I certify that inpatient services are medically necessary for this patient for a duration of greater than two midnights  See H&P and MD Progress Notes for additional information about the patient's course of treatment  ED Arrival Information     Expected Arrival Acuity Means of Arrival Escorted By Service Admission Type    - 9/16/2019 06:29 Emergent Wheelchair Family Member Hospitalist Emergency    Arrival Complaint    chest pain; headache        Chief Complaint   Patient presents with    Chest Pain     PT  complains of upper chest pain radiating in to jaw and headache, started at 1 am  States was nauseous during initial pain  Assessment/Plan:   80year old female with constant worsening upper chest pain with radiation to neck since 0100  No vomiting but has nausea  No fever  (+) cough  No sob  No travel  Denies smoking  Was last seen in this ED on 6/28/19 for NSTEMI  [facility] website checked on this patient and patient not found  BP at home was 160s/60s  (+) headache  Patient needed my urgent attention  1  Unstable Angina: Attending had long discussion with patient and her daughter at bedside   It appears her anginal symptoms are not being controlled with medical/conservative therapy and they would now like to proceed with cardiac catheterization despite risks involved  Will be done today  She is currently pain free; initial troponin negative  Has been given ASA 325mg, Brilinta 180mg and 4000 units heparin  Continue nitropaste; can use nitroglycerin infusion if pain returns/persists  - NPO, start gentle IVF in preparation of caridac cath/constrast; Cr 1 01 today  Further plan pending results of cath; may need to intensify anti-anginal regimen  Signed             Right radial catheterization for unstable angina     Findings:  Left main coronary artery, lad and circumflex are free of significant disease  There calcified  Patent stents      Right coronary artery has calcific complex mid 90-95% stenosis, distal vessel faintly collateralized  This was reduced to 0-10% residual with a 3 5 x 12 and 3 5 x 16 mm drug-eluting stent with use of microcatheter to unable stent delivery      Plan:  Aspirin 81 indefinitely, Plavix x1 year    If patient is on anticoagulation can just used anticoagulation with Plavix and no aspirin for a year                ED Triage Vitals [09/16/19 0638]   Temperature Pulse Respirations Blood Pressure SpO2   97 7 °F (36 5 °C) 69 17 (!) 210/82 96 %      Temp Source Heart Rate Source Patient Position - Orthostatic VS BP Location FiO2 (%)   Oral Monitor Lying Left arm --      Pain Score       4        Wt Readings from Last 1 Encounters:   09/17/19 65 kg (143 lb 3 2 oz)     Additional Vital Signs:   09/16/19 0957  97 8 °F (36 6 °C)  65  16  141/65    97 %  None (Room air)  Lying   09/16/19 0900    66    170/72    97 %       09/16/19 0845    64  14  174/75Abnormal     97 %       09/16/19 0814    67  14  177/77Abnormal     97 %    Lying   09/16/19 0805    73  14  192/81Abnormal     97 %  Nasal cannula  Lying   09/16/19 0739    66  16  183/77Abnormal     94 %           Pertinent Labs/Diagnostic Test Results:   Results from last 7 days   Lab Units 09/17/19  6208 09/16/19  0652   WBC Thousand/uL 9 16 9 40   HEMOGLOBIN g/dL 9 9* 11 1*   HEMATOCRIT % 31 0* 34 3*   PLATELETS Thousands/uL 222 241   NEUTROS ABS Thousands/µL  --  5 77         Results from last 7 days   Lab Units 09/17/19  0433 09/16/19  0652 09/12/19  0845   SODIUM mmol/L 141 143 137   POTASSIUM mmol/L 4 7 3 5 4 0   CHLORIDE mmol/L 107 104 104   CO2 mmol/L 25 26 26   ANION GAP mmol/L 9 13 7   BUN mg/dL 18 28* 28*   CREATININE mg/dL 0 95 1 01 1 23   EGFR ml/min/1 73sq m 53 49 39   CALCIUM mg/dL 8 5 9 1 8 9   MAGNESIUM mg/dL 2 0  --   --      Results from last 7 days   Lab Units 09/16/19  0652   AST U/L 16   ALT U/L 21   ALK PHOS U/L 70   TOTAL PROTEIN g/dL 7 1   ALBUMIN g/dL 3 5   TOTAL BILIRUBIN mg/dL 0 40         Results from last 7 days   Lab Units 09/17/19  0433 09/16/19  0652 09/12/19  0845   GLUCOSE RANDOM mg/dL 124 108 153*         Results from last 7 days   Lab Units 09/12/19  0845   HEMOGLOBIN A1C % 6 2   EAG mg/dl 131     Results from last 7 days   Lab Units 09/16/19  0652   CK TOTAL U/L 58     Results from last 7 days   Lab Units 09/16/19  0958 09/16/19  0652   TROPONIN I ng/mL <0 02 <0 02     Results from last 7 days   Lab Units 09/16/19  0652   PROTIME seconds 12 5   INR  0 99   PTT seconds 24     Results from last 7 days   Lab Units 09/16/19  0652   LIPASE u/L 185       09-16-19  EKG  Sinus rhythm with occasional Premature atrial complexes  Right bundle branch block  Abnormal ECG  When compared with ECG of 29-JUN-2019 03:03,  Premature ventricular complexes are no longer Present        09-16-19  EKG  Sinus rhythm with Premature supraventricular complexes  Right bundle branch block  T wave abnormality, consider inferior ischemia  Abnormal ECG  When compared with ECG of 16-SEP-2019 06:45, (unconfirmed        CXR 09-16-19  NAD    ED Treatment:   Medication Administration from 09/16/2019 0629 to 09/16/2019 8125       Date/Time Order Dose Route Action Action by Comments     09/16/2019 0647 acetaminophen (TYLENOL) tablet 975 mg 975 mg Oral Given Jerzy Smoker, RN      09/16/2019 6839 nitroglycerin (NITRO-BID) 2 % TD ointment 0 5 inch 0 5 inch Topical Given Jerzy Smoker, RN      09/16/2019 7848 aspirin chewable tablet 162 mg 162 mg Oral Given Jerzy Smoker, RN      09/16/2019 0749 ondansetron (ZOFRAN) injection 4 mg 4 mg Intravenous Given Jerzy Smoker, RN      09/16/2019 0728 ticagrelor (BRILINTA) tablet 180 mg 180 mg Oral Given Jerzy Smoker, RN      09/16/2019 0804 heparin (porcine) injection 4,000 Units 4,000 Units Intravenous Given Jerzy Smoker, RN      09/16/2019 0804 morphine injection 1 mg 1 mg Intravenous Given Jerzy Smoker, RN         Past Medical History:   Diagnosis Date    [facility]     Benign essential hypertension     CAD (coronary artery disease)     Carotid artery obstruction     Mesenteric ischemia, chronic (HCC)     TIA (transient ischemic attack)      Present on Admission:   [facility]ox[facility]mal [facility] ([facility])   Benign essential hypertension   Coronary artery disease with unstable angina   Peripheral arterial disease ([facility])      Admitting Diagnosis: Chest pain [R07 9]  Acute MI ([facility] [address] ) [I21 9]  Age/Sex: 80 y o  female  Admission Orders:    Current Facility-Administered Medications:  acetaminophen 975 mg Oral Q8H [address]   amLODIPine 5 mg Oral Daily   vitamin C 1,000 mg Oral Daily   aspirin 81 mg Oral Daily   atorvastatin 40 mg Oral Daily With Dinner   calcium carbonate 1 tablet Oral Daily With Breakfast   calcium carbonate 1,000 mg Oral Daily PRN   carvedilol 12 5 mg Oral BID With Meals   cholecalciferol 1,000 Units Oral Daily   cloNIDine 0 1 mg Oral 4x Daily   clopidogrel 75 mg Oral Daily   escitalopram 10 mg Oral Daily   ferrous sulfate 325 mg Oral Daily With Breakfast   furosemide 20 mg Oral Every Other Day   heparin (porcine) 5,000 Units Subcutaneous Q8H [address]   [START ON 9/18/2019] isosorbide mononitrate 30 mg Oral Daily   melatonin 6 mg Oral HS   ondansetron 4 mg Intravenous Q6H PRN   pantoprazole 40 mg Oral Early Morning   predniSONE 7 5 mg Oral Daily   repaglinide 0 5 mg Oral TID AC   senna 1 tablet Oral Daily       IP CONSULT TO CARDIOLOGY   TELEMETRY  SCD  O2  2 L NC    Network Utilization Review Department  Phone: [phone]; Fax [phone]  [email or web address]  ATTENTION: Please call with any questions or concerns to [phone]  and carefully listen to the prompts so that you are directed to the right person  Send all requests for admission clinical reviews, approved or denied determinations and any other requests to fax [phone]   All voicemails are confidential

## 2019-09-17 NOTE — ASSESSMENT & PLAN NOTE
· Rate controlled on Coreg  · Not on [facility] per primary cardiologist 2/2 fall risk -- will be on dual antiplatelet therapy x1 year

## 2019-09-17 NOTE — PLAN OF CARE
Problem: Potential for Falls  Goal: Patient will remain free of falls  Description  INTERVENTIONS:  - Assess patient frequently for physical needs  -  Identify cognitive and physical deficits and behaviors that affect risk of falls    -  [place] fall precautions as indicated by assessment   - Educate patient/family on patient safety including physical limitations  - Instruct patient to call for assistance with activity based on assessment  - Modify environment to reduce risk of injury  - Consider OT/PT consult to assist with strengthening/mobility  Outcome: Progressing     Problem: CARDIOVASCULAR - ADULT  Goal: Maintains optimal cardiac output and hemodynamic stability  Description  INTERVENTIONS:  - Monitor I/O, vital signs and rhythm  - Monitor for S/S and trends of decreased cardiac output  - Administer and titrate ordered vasoactive medications to optimize hemodynamic stability  - Assess quality of pulses, skin color and temperature  - Assess for signs of decreased coronary artery perfusion  - Instruct patient to report change in severity of symptoms  Outcome: Progressing  Goal: Absence of cardiac dysrhythmias or at baseline rhythm  Description  INTERVENTIONS:  - Continuous cardiac monitoring, vital signs, obtain 12 lead EKG if ordered  - Administer antiarrhythmic and heart rate control medications as ordered  - Monitor electrolytes and administer replacement therapy as ordered  Outcome: Progressing     Problem: PAIN - ADULT  Goal: Verbalizes/displays adequate comfort level or baseline comfort level  Description  Interventions:  - Encourage patient to monitor pain and request assistance  - Assess pain using appropriate pain scale  - Administer analgesics based on type and severity of pain and evaluate response  - Implement non-pharmacological measures as appropriate and evaluate response  - Consider cultural and social influences on pain and pain management  - Notify physician/advanced practitioner if interventions unsuccessful or patient reports new pain  Outcome: Progressing     Problem: DISCHARGE PLANNING  Goal: Discharge to home or other facility with appropriate resources  Description  INTERVENTIONS:  - Identify barriers to discharge w/patient and caregiver  - Arrange for needed discharge resources and transportation as appropriate  - Identify discharge learning needs (meds, wound care, etc )  - Arrange for interpretive services to assist at discharge as needed  - Refer to Case Management Department for coordinating discharge planning if the patient needs post-hospital services based on physician/advanced practitioner order or complex needs related to functional status, cognitive ability, or social support system  Outcome: Progressing     Problem: Knowledge Deficit  Goal: Patient/family/caregiver demonstrates understanding of disease process, treatment plan, medications, and discharge instructions  Description  Complete learning assessment and assess knowledge base    Interventions:  - Provide teaching at level of understanding  - Provide teaching via preferred learning methods  Outcome: Progressing

## 2019-09-17 NOTE — PHYSICIAN ADVISOR
Current patient class: Inpatient  The patient is currently on Hospital Day: 2 at [facility]      The patient was admitted to the hospital at [address] [facility] on 9/16/19 for the following diagnosis:  Chest pain [R07 9]  Acute MI ([address] ) [I21 9]       There is documentation in the medical record of an expected length of stay of at least 2 midnights  The patient is therefore expected to satisfy the 2 midnight benchmark and given the 2 midnight presumption is appropriate for INPATIENT ADMISSION  Given this expectation of a satisfying stay, CMS instructs us that the patient is most often appropriate for inpatient admission under part A provided medical necessity is documented in the chart  After review of the relevant documentation, labs, vital signs and test results, the patient is appropriate for INPATIENT ADMISSION  Admission to the hospital as an inpatient is a complex decision making process which requires the practitioner to consider the patients presenting complaint, history and physical examination and all relevant testing  With this in mind, in this case, the patient was deemed appropriate for INPATIENT ADMISSION  After review of the documentation and testing available at the time of the admission I concur with this clinical determination of medical necessity  Rationale is as follows: The patient is an 55-year-old female who was admitted as an inpatient on September 16, 2019  There was documentation of an anticipated length of stay greater than two midnights  At the time of this review, the patient is expected to be discharged today therefore she will not meet this to midnight benchmark  The patient presented with concern for unstable angina  She went for cardiac catheterization early in the course of the stay and had drug eluting stent placement x 2  Initially an MI alert was called because of concern for inferior STEMI      This patient with advanced age has known coronary artery disease, paroxysmal atrial fibrillation, chronic diastolic heart failure, presenting with unstable angina as discussed  As noted in the discharge summary, the patient was discharged earlier than anticipated because of rapid recovery  The patient classes appropriate to remain inpatient      The patients vitals on arrival were ED Triage Vitals [09/16/19 0638]   Temperature Pulse Respirations Blood Pressure SpO2   97 7 °F (36 5 °C) 69 17 (!) 210/82 96 %      Temp Source Heart Rate Source Patient Position - Orthostatic VS BP Location FiO2 (%)   Oral Monitor Lying Left arm --      Pain Score       4           Past Medical History:   Diagnosis Date    Atrial fibrillation (HCC)     Benign essential hypertension     CAD (coronary artery disease)     Carotid artery obstruction     Mesenteric ischemia, chronic (HCC)     TIA (transient ischemic attack)      Past Surgical History:   Procedure Laterality Date    APPENDECTOMY      CATARACT EXTRACTION      CHOLECYSTECTOMY      CORONARY ANGIOPLASTY      stent x4 07/11/1996x1 10/09/2009 x3    HYSTERECTOMY      REPLACEMENT TOTAL KNEE Right            Consults have been placed to:   IP CONSULT TO CARDIOLOGY    Vitals:    09/16/19 2300 09/17/19 0300 09/17/19 0700 09/17/19 0911   BP: 141/64 147/65 138/65    BP Location: Left arm Left arm Left arm    Pulse: 69 56 66    Resp:  18 18    Temp:  98 4 °F (36 9 °C) 97 7 °F (36 5 °C)    TempSrc:  Oral Oral    SpO2:  96% 98%    Weight:    65 kg (143 lb 3 2 oz)       Most recent labs:    Recent Labs     09/16/19  0652 09/16/19  0958 09/17/19  0433   WBC 9 40  --  9 16   HGB 11 1*  --  9 9*   HCT 34 3*  --  31 0*     --  222   K 3 5  --  4 7   CALCIUM 9 1  --  8 5   BUN 28*  --  18   CREATININE 1 01  --  0 95   LIPASE 185  --   --    INR 0 99  --   --    TROPONINI <0 02 <0 02  --    CKTOTAL 58  --   --    AST 16  --   --    ALT 21  --   --    ALKPHOS 70  --   --        Scheduled Meds:  Current Facility-Administered Medications:  acetaminophen 975 mg Oral Q8H [facility] Chey NIELS Constanza, NADIA   amLODIPine 5 mg Oral Daily , NADIA   vitamin C 1,000 mg Oral Daily , NADIA   aspirin 81 mg Oral Daily , NADIA   atorvastatin 40 mg Oral Daily With [employer] [facility], NADIA   calcium carbonate 1 tablet Oral Daily With Breakfast , NADIA   calcium carbonate 1,000 mg Oral Daily PRN , NADIA   carvedilol 12 5 mg Oral BID With Meals , NADIA   cholecalciferol 1,000 Units Oral Daily , NADIA   cloNIDine 0 1 mg Oral 4x Daily , NADIA   clopidogrel 75 mg Oral Daily , NADIA   escitalopram 10 mg Oral Daily , NADIA   ferrous sulfate 325 mg Oral Daily With Breakfast , NADIA   furosemide 20 mg Oral Every Other Day , NADIA   heparin (porcine) 5,000 Units Subcutaneous Q8H [facility] Chey NIELS NADIA [place]   [START ON 9/18/2019] isosorbide mononitrate 30 mg Oral Daily Anna Pires MD   melatonin 6 mg Oral HS , NADIA   ondansetron 4 mg Intravenous Q6H PRN [facility]   pantoprazole 40 mg Oral Early Morning , NADIA   predniSONE 7 5 mg Oral Daily , NADIA   repaglinide 0 5 mg Oral TID AC NADIA   senna 1 tablet Oral Daily [facility]     Continuous Infusions:   PRN Meds: calcium carbonate    ondansetron    Surgical procedures (if appropriate):

## 2019-09-17 NOTE — DISCHARGE INSTRUCTIONS
1  Please see the post cardiac catheterization/ angioseal closure device instructions and stent booklet  No heavy lifting, greater than 10 lbs  or strenuous  activity for 48 hrs  See the post cardiac catheterization/ angioseal closure device instructions  2 Remove band aid tomorrow  Shower and wash area- wrist gently with soap and water- beginning tomorrow  Rinse and pat dry  Apply new water seal band aid  Repeat this process for 5 days  No powders, creams lotions or antibiotic ointments  for 5 days  No tub baths, hot tubs or swimming for 5 days  3 No driving for 3 days   4  Do not stop aspirin or plavix (clopidigrel) for any reason without a cardiologists consent, or the stent could block up and cause a heart attack  After Heart Catheterization   AMBULATORY CARE:   Call 911 for any of the following:   · You have any of the following signs of a heart attack:      ¨ Squeezing, pressure, or pain in your chest that lasts longer than 5 minutes or returns    ¨ Discomfort or pain in your back, neck, jaw, stomach, or arm     ¨ Trouble breathing    ¨ Nausea or vomiting    ¨ Lightheadedness or a sudden cold sweat, especially with chest pain or trouble breathing    · You have any of the following signs of a stroke:      ¨ Numbness or drooping on one side of your face     ¨ Weakness in an arm or leg    ¨ Confusion or difficulty speaking    ¨ Dizziness, a severe headache, or vision loss    · You feel lightheaded, short of breath, and have chest pain  · You cough up blood  · You have trouble breathing  · You cannot stop the bleeding from your wound even after you hold firm pressure for 10 minutes  Seek care immediately if:   · Blood soaks through your bandage  · Your stitches come apart  · Your arm or leg feels numb, cool, or looks pale  · Your wound gets swollen quickly  Contact your healthcare provider if:   · You have a fever or chills      · Your wound is red, swollen, or draining pus  · Your wound looks more bruised or you have new bruising on the side of your leg or arm  · You have nausea or are vomiting  · Your skin is itchy, swollen, or you have a rash  · You have questions or concerns about your condition or care  Medicines: You may need any of the following:  · Blood thinners    help prevent blood clots  Examples of blood thinners include heparin and warfarin  Clots can cause strokes, heart attacks, and death  The following are general safety guidelines to follow while you are taking a blood thinner:    ¨ Watch for bleeding and bruising while you take blood thinners  Watch for bleeding from your gums or nose  Watch for blood in your urine and bowel movements  Use a soft washcloth on your skin, and a soft toothbrush to brush your teeth  This can keep your skin and gums from bleeding  If you shave, use an electric shaver  Do not play contact sports  ¨ Tell your dentist and other healthcare providers that you take anticoagulants  Wear a bracelet or necklace that says you take this medicine  ¨ Do not start or stop any medicines unless your healthcare provider tells you to  Many medicines cannot be used with blood thinners  ¨ Tell your healthcare provider right away if you forget to take the medicine, or if you take too much  ¨ Warfarin  is a blood thinner that you may need to take  The following are things you should be aware of if you take warfarin  § Foods and medicines can affect the amount of warfarin in your blood  Do not make major changes to your diet while you take warfarin  Warfarin works best when you eat about the same amount of vitamin K every day  Vitamin K is found in green leafy vegetables and certain other foods  Ask for more information about what to eat when you are taking warfarin  § You will need to see your healthcare provider for follow-up visits when you are on warfarin  You will need regular blood tests   These tests are used to decide how much medicine you need  · Acetaminophen  helps decrease pain and fever  This medicine is available without a doctor's order  Ask how much medicine is safe to take, and how often to take it  Acetaminophen can cause liver damage if not taken correctly  · Take your medicine as directed  Contact your healthcare provider if you think your medicine is not helping or if you have side effects  Tell him or her if you are allergic to any medicine  Keep a list of the medicines, vitamins, and herbs you take  Include the amounts, and when and why you take them  Bring the list or the pill bottles to follow-up visits  Carry your medicine list with you in case of an emergency  Bathing: You may be able to shower the day after your procedure  Remove your pressure bandage before you shower  Do not take baths or go in hot tubs or pools  Carefully wash the wound with soap and water  Pat the area dry  Care for your wound as directed:  Change your bandage when it gets wet or dirty  A small bandage can be placed on your wound after you remove the pressure bandage  Do not put powders, lotions, or creams on your wound  They may cause your wound to get infected  Monitor your wound every day for signs of infection, such as redness, swelling, or pus  Mild bruising is normal and expected  If bleeding from your wound occurs:  Apply firm, steady pressure to stop the bleeding  Apply pressure with a clean gauze or towel for 5 to 10 minutes  Call 911 if bleeding becomes heavy or does not stop  Activity:  Do not lift anything heavier than 5 pounds until directed by your healthcare provider  Heavy lifting can put stress on your wound and cause bleeding  Do not push or pull with the arm that was used for the procedure  Do not do vigorous activity for at least 48 hours  Vigorous activity may cause bleeding from your wound  Rest and do quiet activities  Short walks to the bathroom and around the house are okay   Limit your stair climbing to prevent bleeding  Ask your healthcare provider when you can return to your normal activities  Do not strain when you have a bowel movement:  Your wound may bleed if you strain to have a bowel movement  Keep your legs flat on the floor and your hips at a 90° angle  Talk to your healthcare provider if you are constipated  You may need medicine to make it easier for you to have a bowel movement and to prevent straining  Drink liquids as directed:  Liquids will help flush the contrast liquid from your body  Ask how much liquid to drink each day and which liquids are best for you  Driving:  Ask your healthcare provider when it is okay for you to drive  He may tell you to wait 48 hours before you drive to decrease your risk for bleeding  Returning to work: You may not be able to return to work for at least 2 days after your procedure if your job involves heavy lifting  Ask your healthcare provider when it is okay for you to return to work  © 2017 [facility] Information is for End User's use only and may not be sold, redistributed or otherwise used for commercial purposes  All illustrations and images included in CareNotes® are the copyrighted property of [employer] Inc  or Carlos Blackburn  The above information is an  only  It is not intended as medical advice for individual conditions or treatments  Talk to your doctor, nurse or pharmacist before following any medical regimen to see if it is safe and effective for you  Angio-Seal   WHAT YOU NEED TO KNOW:   Angio-Seal is a closure device  It stops the bleeding after angiography procedures, such as cardiac catheterization  Your body will absorb Angio-Seal within 90 days  DISCHARGE INSTRUCTIONS:   Contact your healthcare provider if:   · You have a fever  · Your groin is red, painful, and warm to the touch  · You have bleeding or increased swelling at the site      · You have severe pain or cramps in your leg that Angio-Seal was placed  · You see a change in the color of your leg  · You have questions or concerns about your condition or care  Follow up with your healthcare provider as directed: Your healthcare provider may need to do a Doppler ultrasound to check if Angio-Seal has been absorbed  Write down your questions so you remember to ask them during your visits  © 2017 [address] [employer] Information is for End User's use only and may not be sold, redistributed or otherwise used for commercial purposes  All illustrations and images included in CareNotes® are the copyrighted property of [employer]  or Carlos Blackburn  The above information is an  only  It is not intended as medical advice for individual conditions or treatments  Talk to your doctor, nurse or pharmacist before following any medical regimen to see if it is safe and effective for you

## 2019-09-17 NOTE — SOCIAL WORK
LOS 1, patient is not a bundle, patient is not a 30 day readmission  CM met with patient and daughter Ronold Peabody present at bedside to discuss discharge planning  CM name and role introduced  Patient/daughter report that patient lives with her other daughter Real Ho in a 2 story home with a chairlift in the house to go upstairs  DME includes 2 walkers and a tub bench as well  Patient reports being independent with her ADLs but that her daughter Clement Regalado is home with her to assist if and when needed; daughters or son-in-law assist with transportation, fills prescriptions at [facility]  Patient identifies her daughters as having POA, reports her income is a pension and social security, reports hx of rehab at [address] [place] [address], hx of VNA but patient cannot remember the name of the agency  CM discussed discharge planning including VNA; both daughter and patient deny discharge needs  Daughter to transport home  CM will continue to follow patient's hospital course  CM reviewed discharge planning process including the following: identifying caregivers at home, preference for d/c planning needs, [facility] Meds to Bed program, availability of treatment team to discuss questions or concerns patient and/or family may have regarding diagnosis, plan of care, old or new medications and discharge planning   CM will continue to follow for care coordination and update assessment as necessary

## 2019-09-17 NOTE — DISCHARGE SUMMARY
Discharge- Stephane Abdul 4/6/1930, 80 y o  female MRN: [id number]    Unit/Bed#: -01 Encounter: [id number]    Primary Care Provider: Darius Sanchez MD   Date and time admitted to hospital: 9/16/2019  6:32 AM    * Coronary artery disease with unstable angina  Assessment & Plan  · Patient with CP early Monday AM-- patient with evidence of inferior wall MI on initial EKG, and MI alert was called  This was subsequently canceled as her EKG did not reveal acute changes compared to prior  She did however undergo urgent cardiac catheterization which showed in near 100% occlusion of right coronary artery and she required to drug-eluting stents  · At this time, patient is pain free and feeling well  · Continue home medications: ASA/Plavix x1 year, Imdur, BB; hold Ranexa until patient can be evaluated by Cardiology in the outpatient setting    Crestor will be transition to atorvastatin 40 mg p o  Q h s   · Ambulatory referral for cardiac rehab    Peripheral arterial disease (HCC)  Assessment & Plan  · Continue ASA    Benign essential hypertension  Assessment & Plan  · Continue home BP medications-- adequately controlled on this regimen at this time     Paroxysmal atrial fibrillation ([address] )  Assessment & Plan  · Rate controlled on Coreg  · Not on [facility] per primary cardiologist 2/2 fall risk -- will be on dual antiplatelet therapy x1 year      Discharging Physician / Practitioner: Sherma Severe, PA-C  PCP: Darius Sanchez MD  Admission Date:   Admission Orders (From admission, onward)     Ordered        09/16/19 0840  Inpatient Admission  Once                   Discharge Date: 09/17/19    Resolved Problems  Date Reviewed: 9/17/2019    None          Consultations During Hospital Stay:  · Cardiology-- Dr Max Hagan    Procedures Performed:   · Chest x-ray  · Cardiac catheterization    Significant Findings / Test Results:   · Chest x-ray  · No acute cardiopulmonary disease  · Cardiac catheterization  · 90-95% occlusion of mid RCA with faintly collateralized distal vessel  Incidental Findings:   · See reported cardiac catheterization     Test Results Pending at Discharge (will require follow up): · None     Outpatient Tests Requested:  · CBC and BMP    Complications:  None    Reason for Admission:  Chest pain    Hospital Course:     Rosanna Parry is a 80 y o  female patient who originally presented to the hospital on 9/16/2019 due to chest pain  She reports pain woke her up suddenly at 1 o'clock in the morning on Monday  She came to the ER approximately 5 hours later  She was initially noted to have inferior ST segment elevation concerning for acute MI  MI alert was called, but was subsequently canceled after cardiology reviewed her EKG which did not reveal significant changes from her prior  She reports she had an outpatient stress test not too long ago which did reveal inferior ischemia which was attempted to be medically managed  She underwent urgent cardiac catheterization was found to have approximately 90% occlusion of the right coronary artery  She underwent drug-eluting stent x2  After procedure, patient was chest pain-free  She recovered overnight in the hospital   She remained chest pain free  Cardiology evaluated patient and recommended the following medications:  Dual antiplatelet therapy x1 year, indoor, beta blocker  Hold Ranexa until can be evaluated by Cardiology in the outpatient  Patient reports that despite taking Crestor approximately 3 times a week, she still has significant muscle pains  She was transition to atorvastatin per formulary last night, and tolerated this medication well  Will transition patient from Crestor to atorvastatin 40 mg p o  Daily upon discharge  She was also noted to have a slight bump in her creatinine and drop in her hemoglobin  She will have an outpatient CBC and BMP before her next Cardiology appointment      The patient, initially admitted to the hospital as inpatient, was discharged earlier than expected given the following: Expedited medical intervention and rapid recovery  Please see above list of diagnoses and related plan for additional information  Condition at Discharge: fair     Discharge Day Visit / Exam:     Subjective:  Patient feels well today  No further chest pain or shortness of breath  She reports having some leg swelling yesterday, but this seems to have improved  Vitals: Blood Pressure: 138/65 (09/17/19 0700)  Pulse: 66 (09/17/19 0700)  Temperature: 97 7 °F (36 5 °C) (09/17/19 0700)  Temp Source: Oral (09/17/19 0700)  Respirations: 18 (09/17/19 0700)  Weight - Scale: 65 kg (143 lb 3 2 oz) (09/17/19 0911)  SpO2: 98 % (09/17/19 0700)  Exam:   Physical Exam   Constitutional: She is oriented to person, place, and time  No distress  HENT:   Head: Normocephalic and atraumatic  Eyes: Pupils are equal, round, and reactive to light  EOM are normal    Neck: No JVD present  Cardiovascular: Normal rate and regular rhythm  Exam reveals no gallop and no friction rub  No murmur heard  Pulmonary/Chest: Effort normal and breath sounds normal  No stridor  No respiratory distress  She has no wheezes  Abdominal: Soft  Bowel sounds are normal  She exhibits no distension  There is no tenderness  There is no guarding  Musculoskeletal: Normal range of motion  She exhibits no edema  Neurological: She is alert and oriented to person, place, and time  She displays normal reflexes  No cranial nerve deficit  Coordination normal    Skin: Skin is warm and dry  She is not diaphoretic  Psychiatric: She has a normal mood and affect  Her behavior is normal      Discussion with Family:  Discussed with daughter via phone    Discharge instructions/Information to patient and family:   See after visit summary for information provided to patient and family        Provisions for Follow-Up Care:  See after visit summary for information related to follow-up care and any pertinent home health orders  Disposition:     Home    For Discharges to [facility] SNF:   · Not Applicable to this Patient - Not Applicable to this Patient    Planned Readmission:  None     Discharge Statement:  I spent 45 minutes discharging the patient  This time was spent on the day of discharge  I had direct contact with the patient on the day of discharge  Greater than 50% of the total time was spent examining patient, answering all patient questions, arranging and discussing plan of care with patient as well as directly providing post-discharge instructions  Additional time then spent on discharge activities  Discharge Medications:  See after visit summary for reconciled discharge medications provided to patient and family        ** Please Note: This note has been constructed using a voice recognition system **

## 2019-09-17 NOTE — PROGRESS NOTES
General Cardiology   Progress Note -  [facility]   Tai Landrum 80 y o  female MRN: [id number]    Unit/Bed#: -01 Encounter: [id number]    Assessment:    1  Unstable Angina/CAD: no MI; troponin remain negative  2  HTN  3  Chronic diastolic CHF: takes Lasix 20mg every other day as OP  4  PAF: maintaining SR  5  HLD: continue high intensity statin  6  Preserved LVEF 60% on echo 6/2019    Plan:    S/P cardiac cath yesterday with stenting x2 to 95% lesion of mid RCA; no other significant disease  Troponin remained negative  No recurrent pain/anginal symptoms post cath  Has been able to ambulate pain free this AM    - Continue DAPT for at least one year (she does have hx of PAF but is not anticoagulated); she is on ASA and plavix  - Continue BB and high intensity statin  Stop Ranexa; continue Imdur    - Echo with preserved EF 6/2019  - Cr stable this AM 0 95  - slight drop in hgb 11 to 9 9 (which is close to her baseline of 10; no s/s bleeding)   - repeat CBC and BMP as OP   - Euvolemic on exam today; resume home lasix 20mg every other day with dose this AM      Stable from cardiac standpoint for discharge home today  Subjective:  Patient seen for follow-up  She reports feeling well today  Has not had any recurrent chest pain post catheterization  Has been able to ambulate with a walker to the bathroom without any dyspnea or chest discomfort  Good appetite this morning  Lower extremity swelling is gone  Review of Systems   Constitution: Negative for decreased appetite and fever  Cardiovascular: Negative for chest pain, dyspnea on exertion, leg swelling, orthopnea and palpitations  Respiratory: Negative for cough, shortness of breath and sleep disturbances due to breathing  Gastrointestinal: Negative for nausea and vomiting  Genitourinary: Negative for dysuria  Neurological: Negative for dizziness and light-headedness  Psychiatric/Behavioral: Negative for altered mental status  Objective:   Vitals: Blood pressure 138/65, pulse 66, temperature 97 7 °F (36 5 °C), temperature source Oral, resp  rate 18, SpO2 98 %  ,     There is no height or weight on file to calculate BMI ,     Systolic (73TZQ), HHD:136 , Min:122 , INU:138     Diastolic (10ORW), HEM:36, Min:57, Max:83      Intake/Output Summary (Last 24 hours) at 9/17/2019 3732  Last data filed at 9/16/2019 1924  Gross per 24 hour   Intake 120 ml   Output 150 ml   Net -30 ml     Weight (last 2 days)     None        Telemetry Review:   Sinus rhythm, no significant events    Physical Exam   Constitutional: She is oriented to person, place, and time  No distress  Pt sitting up in chair in NAD; alert and cooperative   HENT:   Head: Normocephalic and atraumatic  Neck: No JVD present  Cardiovascular: Normal rate, regular rhythm, S1 normal and S2 normal    No murmur heard  No LE edema  rigth radial cath site + ecchymosis; no swelling  Right radial pulse +2; sensation and ROM intact  RUE warm and well perfused   Pulmonary/Chest: Effort normal and breath sounds normal  She has no wheezes  She has no rales  Abdominal: Soft  Musculoskeletal: She exhibits no edema  Neurological: She is alert and oriented to person, place, and time  Skin: Skin is warm and dry  She is not diaphoretic  Psychiatric: She has a normal mood and affect  Her behavior is normal    Nursing note and vitals reviewed      LABORATORY RESULTS  Results from last 7 days   Lab Units 09/16/19  0958 09/16/19  0652   CK TOTAL U/L  --  58   TROPONIN I ng/mL <0 02 <0 02     CBC with diff:   Results from last 7 days   Lab Units 09/17/19  0433 09/16/19  0652   WBC Thousand/uL 9 16 9 40   HEMOGLOBIN g/dL 9 9* 11 1*   HEMATOCRIT % 31 0* 34 3*   MCV fL 101* 100*   PLATELETS Thousands/uL 222 241   MCH pg 32 4 32 3   MCHC g/dL 31 9 32 4   RDW % 13 8 13 7   MPV fL 8 6* 8 2*   NRBC AUTO /100 WBCs  --  0     CMP:  Results from last 7 days   Lab Units 09/17/19  0433 09/16/19  2930 19  0845   POTASSIUM mmol/L 4 7 3 5 4 0   CHLORIDE mmol/L 107 104 104   CO2 mmol/L 25 26 26   BUN mg/dL 18 28* 28*   CREATININE mg/dL 0 95 1 01 1 23   CALCIUM mg/dL 8 5 9 1 8 9   AST U/L  --  16  --    ALT U/L  --  21  --    ALK PHOS U/L  --  70  --    EGFR ml/min/1 73sq m 53 49 39     BMP:  Results from last 7 days   Lab Units 19  0433 19  0652 19  0845   POTASSIUM mmol/L 4 7 3 5 4 0   CHLORIDE mmol/L 107 104 104   CO2 mmol/L 25 26 26   BUN mg/dL 18 28* 28*   CREATININE mg/dL 0 95 1 01 1 23   CALCIUM mg/dL 8 5 9 1 8 9     Lab Results   Component Value Date    NTBNP 3,346 (H) 2019     Results from last 7 days   Lab Units 19  0433   MAGNESIUM mg/dL 2 0     Results from last 7 days   Lab Units 19  0845   HEMOGLOBIN A1C % 6 2     Results from last 7 days   Lab Units 19  0652   INR  0 99     Lipid Profile:   No results found for: CHOL  Lab Results   Component Value Date    HDL 70 (H) 2019    HDL 64 (H) 2019    HDL 59 2018     Lab Results   Component Value Date    LDLCALC 123 (H) 2019    LDLCALC 126 (H) 2019     Lab Results   Component Value Date    TRIG 237 (H) 2019    TRIG 136 2019    TRIG 297 (H) 2018     Cardiac testing:   Results for orders placed during the hospital encounter of 19   Echo complete with contrast if indicated    Narrative [facility] [address] [facility]  [phone]    Transthoracic Echocardiogram  2D, M-mode, Doppler, and Color Doppler    Study date:  2019    Patient: Kiana Bradley  MR number: [id number]  Account number: [de-identified]  : 1930  Age: 80 years  Gender: Female  Status: Inpatient  Location: Bedside  Height: 62 in  Weight: 142 lb  BP:    Indications: Chest pain, NSTEMI      Diagnoses: R07 9 - Chest pain, unspecified    Sonographer:  ZULEIMA Jean  Primary Physician:  Paula Dominguez MD  Referring Physician:  Sherman Cordova MD  Group: [id number]  Interpreting Physician:  Vashti Harada, MD    SUMMARY    LEFT VENTRICLE:  Systolic function was normal  Ejection fraction was estimated to be 60 %  There were no regional wall motion abnormalities  Doppler parameters were consistent with abnormal left ventricular relaxation (grade 1 diastolic dysfunction)  RIGHT VENTRICLE:  The size was normal   Systolic function was normal     MITRAL VALVE:  There was trace regurgitation  TRICUSPID VALVE:  There was trace regurgitation  HISTORY: PRIOR HISTORY: A-fib, HTN, CAD, carotid disease, TIA  PROCEDURE: The procedure was performed at the bedside  This was a routine study  The transthoracic approach was used  The study included complete 2D imaging, M-mode, complete spectral Doppler, and color Doppler  Images were obtained from  the parasternal, apical, subcostal, and suprasternal notch acoustic windows  Echocardiographic views were limited due to poor acoustic window availability  This was a technically difficult study  LEFT VENTRICLE: Size was normal  Systolic function was normal  Ejection fraction was estimated to be 60 %  There were no regional wall motion abnormalities  Wall thickness was normal  DOPPLER: Doppler parameters were consistent with  abnormal left ventricular relaxation (grade 1 diastolic dysfunction)  RIGHT VENTRICLE: The size was normal  Systolic function was normal  Wall thickness was normal     LEFT ATRIUM: Size was normal     RIGHT ATRIUM: Size was normal     MITRAL VALVE: Valve structure was normal  There was normal leaflet separation  DOPPLER: The transmitral velocity was within the normal range  There was no evidence for stenosis  There was trace regurgitation  AORTIC VALVE: The valve was trileaflet  Leaflets exhibited normal thickness and normal cuspal separation  DOPPLER: Transaortic velocity was within the normal range  There was no evidence for stenosis   There was no significant  regurgitation  TRICUSPID VALVE: The valve structure was normal  There was normal leaflet separation  DOPPLER: The transtricuspid velocity was within the normal range  There was no evidence for stenosis  There was trace regurgitation  PULMONIC VALVE: Leaflets exhibited normal thickness, no calcification, and normal cuspal separation  DOPPLER: The transpulmonic velocity was within the normal range  There was no significant regurgitation  PERICARDIUM: There was no pericardial effusion  The pericardium was normal in appearance  AORTA: The root exhibited normal size  SYSTEMIC VEINS: IVC: The inferior vena cava was normal in size  SYSTEM MEASUREMENT TABLES    CW  AV Env  Ti: 327 57 ms  AV MaxP 36 mmHg  AV VTI: 25 43 cm  AV Vmax: 1 26 m/s  AV Vmean: 0 78 m/s  AV meanP 78 mmHg    MM  TAPSE: 2 54 cm    PW  E': 0 06 m/s  E/E': 16 44  LVOT Env  Ti: 364 48 ms  LVOT VTI: 24 87 cm  LVOT Vmax: 1 1 m/s  LVOT Vmean: 0 68 m/s  LVOT maxP 88 mmHg  LVOT meanP 25 mmHg  MV A Alexandru: 1 23 m/s  MV Dec Dimmit: 3 86 m/s2  MV DecT: 277 ms  MV E Alexandru: 1 07 m/s  MV E/A Ratio: 0 87  MV PHT: 80 33 ms  MVA By PHT: 2 74 cm2    [employer] Accredited Echocardiography Laboratory    Prepared and electronically signed by    Bear Smith MD  Signed 2019 15:55:46       Meds/Allergies   current meds:   Current Facility-Administered Medications   Medication Dose Route Frequency    acetaminophen (TYLENOL) tablet 975 mg  975 mg Oral Q8H [address]    amLODIPine (NORVASC) tablet 5 mg  5 mg Oral Daily    ascorbic acid (VITAMIN C) tablet 1,000 mg  1,000 mg Oral Daily    aspirin chewable tablet 81 mg  81 mg Oral Daily    atorvastatin (LIPITOR) tablet 40 mg  40 mg Oral Daily With Dinner    calcium carbonate (OYSTER SHELL,OSCAL) 500 mg tablet 1 tablet  1 tablet Oral Daily With Breakfast    calcium carbonate (TUMS) chewable tablet 1,000 mg  1,000 mg Oral Daily PRN    carvedilol (COREG) tablet 12 5 mg 12 5 mg Oral BID With Meals    cholecalciferol (VITAMIN D3) tablet 1,000 Units  1,000 Units Oral Daily    cloNIDine (CATAPRES) tablet 0 1 mg  0 1 mg Oral 4x Daily    clopidogrel (PLAVIX) tablet 75 mg  75 mg Oral Daily    escitalopram (LEXAPRO) tablet 10 mg  10 mg Oral Daily    ferrous sulfate tablet 325 mg  325 mg Oral Daily With Breakfast    furosemide (LASIX) tablet 20 mg  20 mg Oral Every Other Day    heparin (porcine) subcutaneous injection 5,000 Units  5,000 Units Subcutaneous Q8H [facility]    isosorbide mononitrate (IMDUR) 24 hr tablet 30 mg  30 mg Oral BID    melatonin tablet 6 mg  6 mg Oral HS    ondansetron (ZOFRAN) injection 4 mg  4 mg Intravenous Q6H PRN    pantoprazole (PROTONIX) EC tablet 40 mg  40 mg Oral Early Morning    predniSONE tablet 7 5 mg  7 5 mg Oral Daily    repaglinide (PRANDIN) tablet 0 5 mg  0 5 mg Oral TID AC    senna (SENOKOT) tablet 8 6 mg  1 tablet Oral Daily     Medications Prior to Admission   Medication    acetaminophen (TYLENOL) 325 mg tablet    amLODIPine (NORVASC) 5 mg tablet    amoxicillin (AMOXIL) 500 mg capsule    Ascorbic Acid (VITAMIN C) 1000 MG tablet    aspirin 81 MG tablet    Calcium Citrate-Vitamin D (CALCIUM + D PO)    carvedilol (COREG) 12 5 mg tablet    Cholecalciferol (VITAMIN D3) 1000 units CAPS    cloNIDine (CATAPRES) 0 1 mg tablet    Cranberry 500 MG CAPS    cyanocobalamin 1000 MCG tablet    escitalopram (LEXAPRO) 10 mg tablet    famotidine (PEPCID) 20 mg tablet    ferrous sulfate 325 (65 FE) MG EC tablet    furosemide (LASIX) 20 mg tablet    isosorbide mononitrate (IMDUR) 30 mg 24 hr tablet    pantoprazole (PROTONIX) 40 mg tablet    potassium chloride (K-DUR,KLOR-CON) 10 mEq tablet    predniSONE 5 mg tablet    ranolazine (RANEXA) 500 mg 12 hr tablet    repaglinide (PRANDIN) 0 5 mg tablet    rosuvastatin (CRESTOR) 5 mg tablet     Assessment:  Principal Problem:    Coronary artery disease with unstable angina  Active Problems: Paroxysmal atrial fibrillation (HCC)    Benign essential hypertension    Peripheral arterial disease ([facility] [address] )    Counseling / Coordination of Care  Total floor / unit time spent today 20 minutes  Greater than 50% of total time was spent with the patient and / or family counseling and / or coordination of care  ** Please Note: Dragon 360 Dictation voice to text software may have been used in the creation of this document   **

## 2019-09-23 ENCOUNTER — APPOINTMENT (OUTPATIENT)
Dept: LAB | Facility: AMBULARY SURGERY CENTER | Age: 84
End: 2019-09-23
Payer: MEDICARE

## 2019-09-23 ENCOUNTER — TRANSCRIBE ORDERS (OUTPATIENT)
Dept: LAB | Facility: AMBULARY SURGERY CENTER | Age: 84
End: 2019-09-23

## 2019-09-23 DIAGNOSIS — I50.9 CONGESTIVE HEART FAILURE, UNSPECIFIED HF CHRONICITY, UNSPECIFIED HEART FAILURE TYPE (HCC): ICD-10-CM

## 2019-09-23 DIAGNOSIS — I25.119 CORONARY ARTERY DISEASE WITH ANGINA PECTORIS, UNSPECIFIED VESSEL OR LESION TYPE, UNSPECIFIED WHETHER NATIVE OR TRANSPLANTED HEART (HCC): Primary | ICD-10-CM

## 2019-09-23 DIAGNOSIS — I25.119 CORONARY ARTERY DISEASE WITH ANGINA PECTORIS, UNSPECIFIED VESSEL OR LESION TYPE, UNSPECIFIED WHETHER NATIVE OR TRANSPLANTED HEART (HCC): ICD-10-CM

## 2019-09-23 LAB
ANION GAP SERPL CALCULATED.3IONS-SCNC: 7 MMOL/L (ref 4–13)
BUN SERPL-MCNC: 23 MG/DL (ref 5–25)
CALCIUM SERPL-MCNC: 8.6 MG/DL (ref 8.3–10.1)
CHLORIDE SERPL-SCNC: 108 MMOL/L (ref 100–108)
CO2 SERPL-SCNC: 24 MMOL/L (ref 21–32)
CREAT SERPL-MCNC: 0.92 MG/DL (ref 0.6–1.3)
ERYTHROCYTE [DISTWIDTH] IN BLOOD BY AUTOMATED COUNT: 14 % (ref 11.6–15.1)
GFR SERPL CREATININE-BSD FRML MDRD: 55 ML/MIN/1.73SQ M
GLUCOSE P FAST SERPL-MCNC: 86 MG/DL (ref 65–99)
HCT VFR BLD AUTO: 29.2 % (ref 34.8–46.1)
HGB BLD-MCNC: 9.1 G/DL (ref 11.5–15.4)
MCH RBC QN AUTO: 32 PG (ref 26.8–34.3)
MCHC RBC AUTO-ENTMCNC: 31.2 G/DL (ref 31.4–37.4)
MCV RBC AUTO: 103 FL (ref 82–98)
PLATELET # BLD AUTO: 268 THOUSANDS/UL (ref 149–390)
PMV BLD AUTO: 9 FL (ref 8.9–12.7)
POTASSIUM SERPL-SCNC: 3.8 MMOL/L (ref 3.5–5.3)
RBC # BLD AUTO: 2.84 MILLION/UL (ref 3.81–5.12)
SODIUM SERPL-SCNC: 139 MMOL/L (ref 136–145)
WBC # BLD AUTO: 7.84 THOUSAND/UL (ref 4.31–10.16)

## 2019-09-23 PROCEDURE — 85027 COMPLETE CBC AUTOMATED: CPT

## 2019-09-23 PROCEDURE — 36415 COLL VENOUS BLD VENIPUNCTURE: CPT

## 2019-09-23 PROCEDURE — 80048 BASIC METABOLIC PNL TOTAL CA: CPT

## 2019-09-26 ENCOUNTER — OFFICE VISIT (OUTPATIENT)
Dept: CARDIOLOGY CLINIC | Facility: CLINIC | Age: 84
End: 2019-09-26
Payer: MEDICARE

## 2019-09-26 VITALS
HEART RATE: 74 BPM | SYSTOLIC BLOOD PRESSURE: 120 MMHG | DIASTOLIC BLOOD PRESSURE: 50 MMHG | HEIGHT: 62 IN | BODY MASS INDEX: 26.35 KG/M2 | OXYGEN SATURATION: 97 % | WEIGHT: 143.2 LBS

## 2019-09-26 DIAGNOSIS — I10 HYPERTENSION, UNSPECIFIED TYPE: ICD-10-CM

## 2019-09-26 DIAGNOSIS — E11.8 TYPE 2 DIABETES MELLITUS WITH COMPLICATION, WITHOUT LONG-TERM CURRENT USE OF INSULIN (HCC): ICD-10-CM

## 2019-09-26 DIAGNOSIS — E78.5 DYSLIPIDEMIA: ICD-10-CM

## 2019-09-26 DIAGNOSIS — I48.0 PAROXYSMAL ATRIAL FIBRILLATION (HCC): ICD-10-CM

## 2019-09-26 DIAGNOSIS — Z95.5 S/P DRUG ELUTING CORONARY STENT PLACEMENT: Primary | ICD-10-CM

## 2019-09-26 DIAGNOSIS — K21.9 GASTROESOPHAGEAL REFLUX DISEASE WITHOUT ESOPHAGITIS: ICD-10-CM

## 2019-09-26 PROCEDURE — 99214 OFFICE O/P EST MOD 30 MIN: CPT | Performed by: NURSE PRACTITIONER

## 2019-09-26 NOTE — PATIENT INSTRUCTIONS
2gm sodium low fat low cholesterol eating fresh is best eating fresh fruits fresh vegetables, lean protein    DO not stop ASA or Plavix for any reason  No dental procedures for one month

## 2019-09-26 NOTE — PROGRESS NOTES
Cardiology Follow Up    Edwina Mejia  4/6/1930  [id number]  [facility] [address] [address] [phone]    Acute visit for Chest pain and Lower leg edema     Interval History:     Ms Checo Whatley was seen by Dr Basilio Correa on 7/26/19  Imdur increased to 30mg BID  Lasix 20mg 1-2 times a week for lower leg edema  On 8/27/19 Ms Checo Whatley was seen in our office due to complaints of almost daily chest pain  Chest pain lasts occurred the evening prior to this office visit waking her from sleep, 10/10, radiating up neck, across chest into left arm  Once she burps the pain will subside  Inocencio Hodge was accompanied by her daughter  She admitted to lower extremity edema  Her weight at home was 139-140 lb her weight in the office is 143 lb  She denied dietary indiscretion  She is taking all medications as prescribed  Ranexa 500mg BID started for CP  She was instructed to take an extra dose of Lasix on 8/27/ and a dose on 8/28/19  On 9/10/19 Ms Checo Whatley was seen in our office for a follow-up visit  She was accompanied by her daughter  Inocencio Hodge was chest pain free for 1 week after starting Ranexa  On 9/09/19 she was walking into cold air she began to experience bilateral neck pain  She denied mid sternal CP  She became severely fatigued  She continues with fatigue today  I had a long discussion with Inocencio Hodge and her daughter in regards to treatment options  She does not want to be on any new medications or change medications at this point  Treatment options medical and possible cardiac catheterization discussed  Inocencio Hodge would think about her options  She was instructed to follow up with me and or Dr Basilio Correa in near future  On 9/16/19 - 9/17/19 Inocencio Hodge was admitted To [facility] with unstable angina  She presented emergency room chest pain  Woke her up suddenly at 1:00 a m   In the morning  She came to the ER  EKG was read as ST elevations in inferior leads  On review EKG was unchanged from prior  She underwent urgent cardiac catheterization which showed  Mid LAD 20% stenosis at the site of the prior stent, 1st obtuse marginal no stenosis at the site of prior stent, 95% stenosis in the mid RCA  Synergy MR JESUSITA placed x 2 to mid RCA  Left main coronary artery, lad and circumflex are free of significant disease  After the procedure she was chest pain-free  She was monitored overnight  She was discharged on Plavix 75mg daily, ASA 162mg daily  Ms Andreas Dominguez our office for recent hospitalization follow-up visit  He is accompanied by her daughter  She denies chest pain palpitations lightheadedness or dizziness she admits to fatigue is improved from previous office visit  She admits to multiple areas of ecchymosis, right arm, left flank, right lower abdomen  9/23/19 Sodium 139, potassium 3 8, BUN 23, creat 0 92  GFR 55          PMHX:  CAD prior stenting LAD, L Cx, OM2, lastly stented in 2009 6/30/19 Nuclear stress test, mild amount of inferior ischemia it was decided on conservative medical management  6/30/19 TTE LVEF 60%, grade 1 DD, RV normal size and function, trace MR  Single episode of Atrial Fibrillation maintained on ASA  HTN  PAD   Patient Active Problem List   Diagnosis    Paroxysmal atrial fibrillation (HCC)    Benign essential hypertension    Coronary artery disease with unstable angina    Familial hypercholesteremia    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HCC)    Carotid artery obstruction    Peripheral arterial disease (HCC)    NSTEMI (non-ST elevated myocardial infarction) with known CAD ([facility] [address] [facility]    Anemia    NSVT (nonsustained ventricular tachycardia) ([facility] [address] [facility]    MEAGN (acute kidney injury) ([facility] [address] )     Past Medical History:   Diagnosis Date    Atrial fibrillation (HCC)     Benign essential hypertension     CAD (coronary artery disease)     Carotid artery obstruction     Mesenteric ischemia, chronic (HCC)     TIA (transient ischemic attack)      Social History     Socioeconomic History    Marital status:       Spouse name: Not on file    Number of children: Not on file    Years of education: Not on file    Highest education level: Not on file   Occupational History    Not on file   Social Needs    Financial resource strain: Not on file    Food insecurity:     Worry: Not on file     Inability: Not on file    Transportation needs:     Medical: Not on file     Non-medical: Not on file   Tobacco Use    Smoking status: Never Smoker    Smokeless tobacco: Never Used   Substance and Sexual Activity    Alcohol use: No    Drug use: No    Sexual activity: Not on file   Lifestyle    Physical activity:     Days per week: Not on file     Minutes per session: Not on file    Stress: Not on file   Relationships    Social connections:     Talks on phone: Not on file     Gets together: Not on file     Attends [employer] service: Not on file     Active member of club or organization: Not on file     Attends meetings of clubs or organizations: Not on file     Relationship status: Not on file    Intimate partner violence:     Fear of current or ex partner: Not on file     Emotionally abused: Not on file     Physically abused: Not on file     Forced sexual activity: Not on file   Other Topics Concern    Not on file   Social History Narrative    Not on file      Family History   Problem Relation Age of Onset    Leukemia Father     Hypertension Sister     Heart attack Brother 46    Hypertension Brother     Sudden death Brother 46        scd    Heart failure Maternal Grandmother     Hypertension Maternal Grandmother     Stroke Maternal Grandfather     Hypertension Daughter     Anuerysm Neg Hx     Clotting disorder Neg Hx     Arrhythmia Neg Hx     Hyperlipidemia Neg Hx      Past Surgical History:   Procedure Laterality Date    APPENDECTOMY  CARDIAC CATHETERIZATION      CATARACT EXTRACTION      CHOLECYSTECTOMY      CORONARY ANGIOPLASTY      stent x4 07/11/1996x1 10/09/2009 x3    CORONARY STENT PLACEMENT      HYSTERECTOMY      REPLACEMENT TOTAL KNEE Right        Current Outpatient Medications:     acetaminophen (TYLENOL) 325 mg tablet, Take 650 mg by mouth every 8 (eight) hours as needed for mild pain  , Disp: , Rfl:     amLODIPine (NORVASC) 5 mg tablet, Take 1 tablet by mouth daily, Disp: , Rfl:     amoxicillin (AMOXIL) 500 mg capsule, TAKE 4 CAPSULES BY MOUTH 1 HOUR BEFORE DENTAL APPOINTMENT, Disp: , Rfl: 3    Ascorbic Acid (VITAMIN C) 1000 MG tablet, Take 1,000 mg by mouth daily, Disp: , Rfl:     aspirin 81 MG tablet, Take 162 mg by mouth daily , Disp: , Rfl:     atorvastatin (LIPITOR) 40 mg tablet, Take 1 tablet (40 mg total) by mouth daily with dinner, Disp: 30 tablet, Rfl: 0    Calcium Citrate-Vitamin D (CALCIUM + D PO), Take 600 mg by mouth every other day  , Disp: , Rfl:     carvedilol (COREG) 12 5 mg tablet, Take 2 tablets by mouth 2 (two) times a day, Disp: , Rfl:     Cholecalciferol (VITAMIN D3) 1000 units CAPS, Take by mouth daily  , Disp: , Rfl:     cloNIDine (CATAPRES) 0 1 mg tablet, Take 1 tablet by mouth 4 (four) times a day , Disp: , Rfl:     clopidogrel (PLAVIX) 75 mg tablet, Take 1 tablet (75 mg total) by mouth daily, Disp: 30 tablet, Rfl: 0    Cranberry 500 MG CAPS, Take 1 capsule by mouth daily, Disp: , Rfl:     cyanocobalamin 1000 MCG tablet, Take by mouth daily  , Disp: , Rfl:     escitalopram (LEXAPRO) 10 mg tablet, Take 1 tablet by mouth daily, Disp: , Rfl:     famotidine (PEPCID) 20 mg tablet, Take 1 tablet (20 mg total) by mouth as needed for indigestion or heartburn, Disp: , Rfl:     ferrous sulfate 325 (65 FE) MG EC tablet, Take 325 mg by mouth daily, Disp: , Rfl:     furosemide (LASIX) 20 mg tablet, Take 1 tablet (20 mg total) by mouth every other day, Disp: 45 tablet, Rfl: 3    isosorbide mononitrate (IMDUR) 30 mg 24 hr tablet, Take 1 tablet (30 mg total) by mouth 2 (two) times a day, Disp: 60 tablet, Rfl: 6    pantoprazole (PROTONIX) 40 mg tablet, Take 40 mg by mouth daily, Disp: , Rfl:     potassium chloride (K-DUR,KLOR-CON) 10 mEq tablet, Take 10 mEq by mouth 3 (three) times a day , Disp: , Rfl:     predniSONE 5 mg tablet, Take 7 5 mg by mouth daily  , Disp: , Rfl: 5    repaglinide (PRANDIN) 0 5 mg tablet, Take 0 5 mg by mouth 3 (three) times a day before meals  , Disp: , Rfl:   Allergies   Allergen Reactions    Adhesive [Medical Tape]      Paper tape okay    Amoxicillin     Erythromycin     Ezetimibe     Fenofibrate     Flecainide     Lovastatin     Sulfa Antibiotics     Thioridazine        Labs:  Appointment on 09/23/2019   Component Date Value    Sodium 09/23/2019 139     Potassium 09/23/2019 3 8     Chloride 09/23/2019 108     CO2 09/23/2019 24     ANION GAP 09/23/2019 7     BUN 09/23/2019 23     Creatinine 09/23/2019 0 92     Glucose, Fasting 09/23/2019 86     Calcium 09/23/2019 8 6     eGFR 09/23/2019 55     WBC 09/23/2019 7 84     RBC 09/23/2019 2 84*    Hemoglobin 09/23/2019 9 1*    Hematocrit 09/23/2019 29 2*    MCV 09/23/2019 103*    MCH 09/23/2019 32 0     MCHC 09/23/2019 31 2*    RDW 09/23/2019 14 0     Platelets 84/92/5220 268     MPV 09/23/2019 9 0    Admission on 09/16/2019, Discharged on 09/17/2019   Component Date Value    WBC 09/16/2019 9 40     RBC 09/16/2019 3 44*    Hemoglobin 09/16/2019 11 1*    Hematocrit 09/16/2019 34 3*    MCV 09/16/2019 100*    MCH 09/16/2019 32 3     MCHC 09/16/2019 32 4     RDW 09/16/2019 13 7     MPV 09/16/2019 8 2*    Platelets 67/99/6614 241     nRBC 09/16/2019 0     Neutrophils Relative 09/16/2019 61     Immat GRANS % 09/16/2019 1     Lymphocytes Relative 09/16/2019 27     Monocytes Relative 09/16/2019 9     Eosinophils Relative 09/16/2019 2     Basophils Relative 09/16/2019 0     Neutrophils Absolute 09/16/2019 5 77     Immature Grans Absolute 09/16/2019 0 06     Lymphocytes Absolute 09/16/2019 2 54     Monocytes Absolute 09/16/2019 0 82     Eosinophils Absolute 09/16/2019 0 18     Basophils Absolute 09/16/2019 0 03     Protime 09/16/2019 12 5     INR 09/16/2019 0 99     PTT 09/16/2019 24     Sodium 09/16/2019 143     Potassium 09/16/2019 3 5     Chloride 09/16/2019 104     CO2 09/16/2019 26     ANION GAP 09/16/2019 13     BUN 09/16/2019 28*    Creatinine 09/16/2019 1 01     Glucose 09/16/2019 108     Calcium 09/16/2019 9 1     AST 09/16/2019 16     ALT 09/16/2019 21     Alkaline Phosphatase 09/16/2019 70     Total Protein 09/16/2019 7 1     Albumin 09/16/2019 3 5     Total Bilirubin 09/16/2019 0 40     eGFR 09/16/2019 49     Lipase 09/16/2019 185     Total CK 09/16/2019 58     Troponin I 09/16/2019 <0 02     Troponin I 09/16/2019 <0 02     Activated Clotting Time,* 09/16/2019 316*    Specimen Type 09/16/2019 VENOUS     Sodium 09/17/2019 141     Potassium 09/17/2019 4 7     Chloride 09/17/2019 107     CO2 09/17/2019 25     ANION GAP 09/17/2019 9     BUN 09/17/2019 18     Creatinine 09/17/2019 0 95     Glucose 09/17/2019 124     Calcium 09/17/2019 8 5     eGFR 09/17/2019 53     Magnesium 09/17/2019 2 0     WBC 09/17/2019 9 16     RBC 09/17/2019 3 06*    Hemoglobin 09/17/2019 9 9*    Hematocrit 09/17/2019 31 0*    MCV 09/17/2019 101*    MCH 09/17/2019 32 4     MCHC 09/17/2019 31 9     RDW 09/17/2019 13 8     Platelets 57/99/7499 222     MPV 09/17/2019 8 6*    Ventricular Rate 09/16/2019 67     Atrial Rate 09/16/2019 67     IA Interval 09/16/2019 168     QRSD Interval 09/16/2019 124     QT Interval 09/16/2019 476     QTC Interval 09/16/2019 502     P Axis 09/16/2019 87     QRS Axis 09/16/2019 55     T Wave Axis 09/16/2019 28     Ventricular Rate 09/16/2019 74     Atrial Rate 09/16/2019 78     IA Interval 09/16/2019 160     QRSD Interval 09/16/2019 122     QT Interval 09/16/2019 434     QTC Interval 09/16/2019 481     P Axis 09/16/2019 66     QRS Axis 09/16/2019 64     T Wave Axis 09/16/2019 -38    Appointment on 09/12/2019   Component Date Value    Hemoglobin A1C 09/12/2019 6 2     EAG 09/12/2019 131     Sodium 09/12/2019 137     Potassium 09/12/2019 4 0     Chloride 09/12/2019 104     CO2 09/12/2019 26     ANION GAP 09/12/2019 7     BUN 09/12/2019 28*    Creatinine 09/12/2019 1 23     Glucose 09/12/2019 153*    Calcium 09/12/2019 8 9     eGFR 09/12/2019 39    Appointment on 08/07/2019   Component Date Value    Cholesterol 08/07/2019 240*    Triglycerides 08/07/2019 237*    HDL, Direct 08/07/2019 70*    LDL Calculated 08/07/2019 123*    Sodium 08/07/2019 137     Potassium 08/07/2019 4 2     Chloride 08/07/2019 104     CO2 08/07/2019 27     ANION GAP 08/07/2019 6     BUN 08/07/2019 25     Creatinine 08/07/2019 1 12     Glucose 08/07/2019 85     Calcium 08/07/2019 9 1     eGFR 08/07/2019 44      Imaging: No results found  Review of Systems:  Review of Systems   Constitutional: Positive for fatigue  Musculoskeletal: Positive for arthralgias and gait problem  Skin:        Multiple areas of ecchymosis       Physical Exam:  Physical Exam   Constitutional: She is oriented to person, place, and time  She appears well-developed  HENT:   Head: Normocephalic  Eyes: Pupils are equal, round, and reactive to light  Neck: Normal range of motion  Cardiovascular: Normal rate and normal heart sounds  Pulmonary/Chest: Effort normal  She has rales  Bibasilar rales     Abdominal: Soft  Bowel sounds are normal    Musculoskeletal: Normal range of motion  She exhibits no edema  Neurological: She is alert and oriented to person, place, and time  Skin: Skin is warm and dry  Capillary refill takes less than 2 seconds  jayson LE Compression stockings intact   Psychiatric: She has a normal mood and affect     Vitals reviewed  Discussion/Summary:  1  Sp Synergy MR JESUSITA placed x 2 to mid RCA- continue on Plavix 75mg daily decrease ASA from 162mg daily to 81mg daily, DO Not stop for any reason  Shae Moreno is aware she will be on ASA and Plavix for one year  Continue on  Norvasc 5 mg daily, Lipitor 40 mg daily, Coreg 12 5 mg b i d , clonidine 0 1 mg 4 times a day, Lasix 20mg EOD, K dur 10meq TID, stop Imdur   Shae Moreno will not be able to tolerate  Cardiac rehabilitation due to bilateral shoulder and hip pain she is using a chair walker to ambulate  2  HTN-   /60, continue on Norvasc 5 mg daily, Coreg 12 5 mg b i d , clonidine 0 1 mg 4 times daily  3  Dyslipidemia- 8/07/19 cholesterol 240, triglycerides 237, HDL 70, , continue on Lipitor 40mg daily   4  Paroxysmal atrial fibrillation- RRR, continue on Coreg 12 5mg BID, not on AC due to fall risk   5  DM2 Hgb A1C 6 5 on 6/29/19- continue on Prandin 0 5mg TID follow up with PCP   6   GERD- continue on Protonix 40mg daily

## 2019-10-16 DIAGNOSIS — E78.01 FAMILIAL HYPERCHOLESTEREMIA: ICD-10-CM

## 2019-10-16 DIAGNOSIS — I21.9 ACUTE MI (HCC): ICD-10-CM

## 2019-10-16 RX ORDER — CLOPIDOGREL BISULFATE 75 MG/1
75 TABLET ORAL DAILY
Qty: 30 TABLET | Refills: 5 | Status: SHIPPED | OUTPATIENT
Start: 2019-10-16 | End: 2020-04-13 | Stop reason: SDUPTHER

## 2019-10-16 RX ORDER — ATORVASTATIN CALCIUM 40 MG/1
40 TABLET, FILM COATED ORAL
Qty: 30 TABLET | Refills: 5 | Status: SHIPPED | OUTPATIENT
Start: 2019-10-16 | End: 2020-04-14 | Stop reason: SDUPTHER

## 2019-11-12 ENCOUNTER — OFFICE VISIT (OUTPATIENT)
Dept: CARDIOLOGY CLINIC | Facility: CLINIC | Age: 84
End: 2019-11-12
Payer: MEDICARE

## 2019-11-12 VITALS
HEART RATE: 71 BPM | SYSTOLIC BLOOD PRESSURE: 132 MMHG | WEIGHT: 146 LBS | BODY MASS INDEX: 26.87 KG/M2 | HEIGHT: 62 IN | DIASTOLIC BLOOD PRESSURE: 56 MMHG

## 2019-11-12 DIAGNOSIS — I47.2 NSVT (NONSUSTAINED VENTRICULAR TACHYCARDIA) (HCC): ICD-10-CM

## 2019-11-12 DIAGNOSIS — I65.23 OBSTRUCTION OF CAROTID ARTERY ON BOTH SIDES: ICD-10-CM

## 2019-11-12 DIAGNOSIS — I25.110 CORONARY ARTERY DISEASE INVOLVING NATIVE CORONARY ARTERY OF NATIVE HEART WITH UNSTABLE ANGINA PECTORIS (HCC): Primary | ICD-10-CM

## 2019-11-12 DIAGNOSIS — I48.0 PAROXYSMAL ATRIAL FIBRILLATION (HCC): ICD-10-CM

## 2019-11-12 DIAGNOSIS — I10 BENIGN ESSENTIAL HYPERTENSION: ICD-10-CM

## 2019-11-12 PROCEDURE — 99214 OFFICE O/P EST MOD 30 MIN: CPT | Performed by: INTERNAL MEDICINE

## 2019-11-12 NOTE — PROGRESS NOTES
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     3  Benign essential hypertension     4  Paroxysmal atrial fibrillation (HCC)     5  NSVT (nonsustained ventricular tachycardia) ([facility])         Discussion/Summary:   She has had complete resolution of the anginal symptoms following PCI  Blood pressure has been controlled lipids have been stable  Continue aggressive medical management moving forward  No further cardiac testing I will see her back in 6 months  Reinforced the need to remain on dual anti-platelet agent  Interval History:   40-year-old female with a history of coronary artery disease, hypertension, paroxysmal atrial fibrillation, peripheral arterial disease presents for routine scheduled follow-up visit  Denies any chest pain, shortness of breath, palpitations, lightheadedness, dizziness, or syncope  She remains fairly physically active around the house  There has been no lightheadedness dizziness or syncope  She uses a walker for ambulation  Following a last visit she had an episode of burping and belching this was felt to be anginal equivalent  Admitted to the hospital and decision was made to be conservative with medical management only no intervention  Overall she has been doing well  Denies any chest pain, shortness of breath, palpitations, lightheadedness, dizziness, or syncope  Anginal symptoms have completely resolved following implantation of recent drug-eluting stent  She is doing well on dual anti-platelet therapy  There has been some mild bruising overall feels well  Activity mainly limited by severe lower extremity arthritis        Problem List     Atrial fibrillation ([facility] [address] )    Benign essential hypertension    Coronary artery disease    Familial hypercholesteremia    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HCC)    Carotid artery obstruction        Past Medical History:   Diagnosis Date    Atrial fibrillation (HCC)     Benign essential hypertension     CAD (coronary artery disease)     Carotid artery obstruction     Disease of thyroid gland     TIA    GERD (gastroesophageal reflux disease)     HL (hearing loss)     Wears hearing aids    Mesenteric ischemia, chronic (HCC)     TIA (transient ischemic attack)     Tinnitus      Social History     Socioeconomic History    Marital status:       Spouse name: Not on file    Number of children: Not on file    Years of education: Not on file    Highest education level: Not on file   Occupational History    Not on file   Social Needs    Financial resource strain: Not on file    Food insecurity:     Worry: Not on file     Inability: Not on file    Transportation needs:     Medical: Not on file     Non-medical: Not on file   Tobacco Use    Smoking status: Never Smoker    Smokeless tobacco: Never Used   Substance and Sexual Activity    Alcohol use: No    Drug use: No    Sexual activity: Not on file   Lifestyle    Physical activity:     Days per week: Not on file     Minutes per session: Not on file    Stress: Not on file   Relationships    Social connections:     Talks on phone: Not on file     Gets together: Not on file     Attends Christianity service: Not on file     Active member of club or organization: Not on file     Attends meetings of clubs or organizations: Not on file     Relationship status: Not on file    Intimate partner violence:     Fear of current or ex partner: Not on file     Emotionally abused: Not on file     Physically abused: Not on file     Forced sexual activity: Not on file   Other Topics Concern    Not on file   Social History Narrative    Not on file      Family History Problem Relation Age of Onset    Leukemia Father     Hypertension Sister     Heart attack Brother 46    Hypertension Brother     Sudden death Brother 46        scd    Heart failure Maternal Grandmother     Hypertension Maternal Grandmother     Stroke Maternal Grandfather     Hypertension Daughter     Anuerysm Neg Hx     Clotting disorder Neg Hx     Arrhythmia Neg Hx     Hyperlipidemia Neg Hx      Past Surgical History:   Procedure Laterality Date    APPENDECTOMY      CARDIAC CATHETERIZATION      CATARACT EXTRACTION      CHOLECYSTECTOMY      CORONARY ANGIOPLASTY      stent x4 07/11/1996x1 10/09/2009 x3    CORONARY STENT PLACEMENT      HYSTERECTOMY      REPLACEMENT TOTAL KNEE Right        Current Outpatient Medications:     acetaminophen (TYLENOL) 325 mg tablet, Take 650 mg by mouth every 8 (eight) hours as needed for mild pain  , Disp: , Rfl:     amLODIPine (NORVASC) 5 mg tablet, Take 1 tablet by mouth daily, Disp: , Rfl:     amoxicillin (AMOXIL) 500 mg capsule, TAKE 4 CAPSULES BY MOUTH 1 HOUR BEFORE DENTAL APPOINTMENT, Disp: , Rfl: 3    Ascorbic Acid (VITAMIN C) 1000 MG tablet, Take 1,000 mg by mouth daily, Disp: , Rfl:     aspirin 81 MG tablet, Take 1 tablet (81 mg total) by mouth daily, Disp: 30 tablet, Rfl: 3    atorvastatin (LIPITOR) 40 mg tablet, Take 1 tablet (40 mg total) by mouth daily with dinner, Disp: 30 tablet, Rfl: 5    Calcium Citrate-Vitamin D (CALCIUM + D PO), Take 600 mg by mouth every other day  , Disp: , Rfl:     carvedilol (COREG) 12 5 mg tablet, Take 2 tablets by mouth 2 (two) times a day, Disp: , Rfl:     Cholecalciferol (VITAMIN D3) 1000 units CAPS, Take by mouth daily  , Disp: , Rfl:     cloNIDine (CATAPRES) 0 1 mg tablet, Take 1 tablet by mouth 4 (four) times a day , Disp: , Rfl:     clopidogrel (PLAVIX) 75 mg tablet, Take 1 tablet (75 mg total) by mouth daily, Disp: 30 tablet, Rfl: 5    Cranberry 500 MG CAPS, Take 1 capsule by mouth daily, Disp: , Rfl:    cyanocobalamin 1000 MCG tablet, Take by mouth daily  , Disp: , Rfl:     escitalopram (LEXAPRO) 10 mg tablet, Take 1 tablet by mouth daily, Disp: , Rfl:     ferrous sulfate 325 (65 FE) MG EC tablet, Take 325 mg by mouth daily, Disp: , Rfl:     furosemide (LASIX) 20 mg tablet, Take 1 tablet (20 mg total) by mouth every other day, Disp: 45 tablet, Rfl: 3    pantoprazole (PROTONIX) 40 mg tablet, Take 40 mg by mouth daily, Disp: , Rfl:     potassium chloride (K-DUR,KLOR-CON) 10 mEq tablet, Take 10 mEq by mouth 3 (three) times a day , Disp: , Rfl:     predniSONE 5 mg tablet, Take 7 5 mg by mouth daily  , Disp: , Rfl: 5    repaglinide (PRANDIN) 0 5 mg tablet, Take 0 5 mg by mouth 3 (three) times a day before meals  , Disp: , Rfl:   Allergies   Allergen Reactions    Adhesive [Medical Tape]      Paper tape okay    Amoxicillin     Erythromycin     Ezetimibe     Fenofibrate     Flecainide     Lovastatin     Sulfa Antibiotics     Thioridazine        Labs:     Chemistry        Component Value Date/Time     01/09/2018 0842    K 3 8 09/23/2019 0922    K 4 5 02/27/2018 0933     09/23/2019 0922     02/27/2018 0933    CO2 24 09/23/2019 0922    CO2 27 02/27/2018 0933    BUN 23 09/23/2019 0922    BUN 22 02/27/2018 0933    CREATININE 0 92 09/23/2019 0922    CREATININE 1 05 (H) 01/09/2018 0842        Component Value Date/Time    CALCIUM 8 6 09/23/2019 0922    CALCIUM 9 4 02/27/2018 0933    ALKPHOS 70 09/16/2019 0652    ALKPHOS 57 02/27/2018 0933    AST 16 09/16/2019 0652    AST 12 02/27/2018 0933    ALT 21 09/16/2019 0652    ALT 11 02/27/2018 0933    BILITOT 0 4 01/09/2018 0842            No results found for: CHOL  Lab Results   Component Value Date    HDL 70 (H) 08/07/2019    HDL 64 (H) 06/29/2019    HDL 59 09/19/2018     Lab Results   Component Value Date    LDLCALC 123 (H) 08/07/2019    LDLCALC 126 (H) 06/29/2019     Lab Results   Component Value Date    TRIG 237 (H) 08/07/2019    TRIG 136 06/29/2019    TRIG 297 (H) 09/19/2018     No results found for: CHOLHDL    Imaging: No results found  ECG:  Normal sinus rhythm incomplete right bundle-branch block      Review of Systems   Constitution: Negative  HENT: Negative  Eyes: Negative  Cardiovascular: Negative  Respiratory: Negative  Endocrine: Negative  Hematologic/Lymphatic: Negative  Skin: Negative  Musculoskeletal: Negative  Gastrointestinal: Negative  Genitourinary: Negative  Neurological: Negative  Psychiatric/Behavioral: Negative  Vitals:    11/12/19 1303   BP: 132/56   Pulse: 71     Vitals:    11/12/19 1303   Weight: 66 2 kg (146 lb)     Height: 5' 2" (157 5 cm)   Body mass index is 26 7 kg/m²      Physical Exam:  General:  Alert and cooperative, appears stated age  HEENT:  PERRLA, EOMI, no scleral icterus, no conjunctival pallor  Neck:  No lymphadenopathy, no thyromegaly, no carotid bruits, no elevated JVP  Heart:  Regular rate and rhythm, normal S1/S2, no S3/S4, no murmur  Lungs:  Clear to auscultation bilaterally   Abdomen:  Soft, non-tender, positive bowel sounds, no rebound or guarding,   no organomegaly   Extremities:  No clubbing, cyanosis or edema   Vascular:  2+ pedal pulses  Skin:  No rashes or lesions on exposed skin  Neurologic:  Cranial nerves II-XII grossly intact without focal deficits

## 2019-12-10 ENCOUNTER — TRANSCRIBE ORDERS (OUTPATIENT)
Dept: ADMINISTRATIVE | Facility: HOSPITAL | Age: 84
End: 2019-12-10

## 2019-12-10 DIAGNOSIS — N39.0 URINARY TRACT INFECTION WITHOUT HEMATURIA, SITE UNSPECIFIED: Primary | ICD-10-CM

## 2019-12-17 ENCOUNTER — HOSPITAL ENCOUNTER (OUTPATIENT)
Dept: ULTRASOUND IMAGING | Facility: HOSPITAL | Age: 84
Discharge: HOME | End: 2019-12-17
Payer: MEDICARE

## 2019-12-17 DIAGNOSIS — N39.0 URINARY TRACT INFECTION WITHOUT HEMATURIA, SITE UNSPECIFIED: ICD-10-CM

## 2019-12-17 PROCEDURE — 76770 US EXAM ABDO BACK WALL COMP: CPT

## 2020-01-01 ENCOUNTER — HOSPITAL ENCOUNTER (INPATIENT)
Facility: HOSPITAL | Age: 85
LOS: 3 days | Discharge: HOME HEALTH | DRG: 682 | End: 2020-01-04
Attending: EMERGENCY MEDICINE | Admitting: INTERNAL MEDICINE
Payer: MEDICARE

## 2020-01-01 ENCOUNTER — APPOINTMENT (EMERGENCY)
Dept: RADIOLOGY | Facility: HOSPITAL | Age: 85
DRG: 682 | End: 2020-01-01
Payer: MEDICARE

## 2020-01-01 DIAGNOSIS — J96.01 ACUTE RESPIRATORY FAILURE WITH HYPOXIA (HCC): ICD-10-CM

## 2020-01-01 DIAGNOSIS — N39.0 RECURRENT UTI: ICD-10-CM

## 2020-01-01 DIAGNOSIS — R09.02 HYPOXIA: ICD-10-CM

## 2020-01-01 DIAGNOSIS — R53.1 WEAKNESS: Primary | ICD-10-CM

## 2020-01-01 DIAGNOSIS — J40 BRONCHITIS: ICD-10-CM

## 2020-01-01 DIAGNOSIS — N17.9 AKI (ACUTE KIDNEY INJURY) (HCC): ICD-10-CM

## 2020-01-01 PROBLEM — E87.2 LACTIC ACIDOSIS: Status: ACTIVE | Noted: 2020-01-01

## 2020-01-01 PROBLEM — R26.2 AMBULATORY DYSFUNCTION: Status: ACTIVE | Noted: 2020-01-01

## 2020-01-01 LAB
ALBUMIN SERPL BCP-MCNC: 2.9 G/DL (ref 3.5–5)
ALP SERPL-CCNC: 98 U/L (ref 46–116)
ALT SERPL W P-5'-P-CCNC: 21 U/L (ref 12–78)
ANION GAP SERPL CALCULATED.3IONS-SCNC: 15 MMOL/L (ref 4–13)
APTT PPP: 26 SECONDS (ref 23–37)
AST SERPL W P-5'-P-CCNC: 13 U/L (ref 5–45)
BACTERIA UR QL AUTO: ABNORMAL /HPF
BASOPHILS # BLD AUTO: 0.05 THOUSANDS/ΜL (ref 0–0.1)
BASOPHILS NFR BLD AUTO: 0 % (ref 0–1)
BILIRUB SERPL-MCNC: 0.46 MG/DL (ref 0.2–1)
BILIRUB UR QL STRIP: NEGATIVE
BUN SERPL-MCNC: 26 MG/DL (ref 5–25)
CALCIUM SERPL-MCNC: 9.2 MG/DL (ref 8.3–10.1)
CHLORIDE SERPL-SCNC: 103 MMOL/L (ref 100–108)
CLARITY UR: CLEAR
CO2 SERPL-SCNC: 24 MMOL/L (ref 21–32)
COLOR UR: YELLOW
CREAT SERPL-MCNC: 1.42 MG/DL (ref 0.6–1.3)
EOSINOPHIL # BLD AUTO: 0.43 THOUSAND/ΜL (ref 0–0.61)
EOSINOPHIL NFR BLD AUTO: 4 % (ref 0–6)
ERYTHROCYTE [DISTWIDTH] IN BLOOD BY AUTOMATED COUNT: 13.6 % (ref 11.6–15.1)
FLUAV RNA NPH QL NAA+PROBE: NORMAL
FLUBV RNA NPH QL NAA+PROBE: NORMAL
GFR SERPL CREATININE-BSD FRML MDRD: 33 ML/MIN/1.73SQ M
GLUCOSE SERPL-MCNC: 141 MG/DL (ref 65–140)
GLUCOSE UR STRIP-MCNC: NEGATIVE MG/DL
HCT VFR BLD AUTO: 34.2 % (ref 34.8–46.1)
HGB BLD-MCNC: 10.6 G/DL (ref 11.5–15.4)
HGB UR QL STRIP.AUTO: NEGATIVE
IMM GRANULOCYTES # BLD AUTO: 0.08 THOUSAND/UL (ref 0–0.2)
IMM GRANULOCYTES NFR BLD AUTO: 1 % (ref 0–2)
INR PPP: 1.01 (ref 0.84–1.19)
KETONES UR STRIP-MCNC: NEGATIVE MG/DL
LACTATE SERPL-SCNC: 1.6 MMOL/L (ref 0.5–2)
LACTATE SERPL-SCNC: 3.4 MMOL/L (ref 0.5–2)
LEUKOCYTE ESTERASE UR QL STRIP: ABNORMAL
LYMPHOCYTES # BLD AUTO: 2.58 THOUSANDS/ΜL (ref 0.6–4.47)
LYMPHOCYTES NFR BLD AUTO: 22 % (ref 14–44)
MCH RBC QN AUTO: 32 PG (ref 26.8–34.3)
MCHC RBC AUTO-ENTMCNC: 31 G/DL (ref 31.4–37.4)
MCV RBC AUTO: 103 FL (ref 82–98)
MONOCYTES # BLD AUTO: 1.24 THOUSAND/ΜL (ref 0.17–1.22)
MONOCYTES NFR BLD AUTO: 11 % (ref 4–12)
NEUTROPHILS # BLD AUTO: 7.17 THOUSANDS/ΜL (ref 1.85–7.62)
NEUTS SEG NFR BLD AUTO: 62 % (ref 43–75)
NITRITE UR QL STRIP: NEGATIVE
NON-SQ EPI CELLS URNS QL MICRO: ABNORMAL /HPF
NRBC BLD AUTO-RTO: 0 /100 WBCS
PH UR STRIP.AUTO: 5 [PH]
PLATELET # BLD AUTO: 264 THOUSANDS/UL (ref 149–390)
PMV BLD AUTO: 8.6 FL (ref 8.9–12.7)
POTASSIUM SERPL-SCNC: 3.9 MMOL/L (ref 3.5–5.3)
PROCALCITONIN SERPL-MCNC: 0.12 NG/ML
PROT SERPL-MCNC: 6.4 G/DL (ref 6.4–8.2)
PROT UR STRIP-MCNC: NEGATIVE MG/DL
PROTHROMBIN TIME: 12.7 SECONDS (ref 11.6–14.5)
RBC # BLD AUTO: 3.31 MILLION/UL (ref 3.81–5.12)
RBC #/AREA URNS AUTO: ABNORMAL /HPF
RSV RNA NPH QL NAA+PROBE: NORMAL
SODIUM SERPL-SCNC: 142 MMOL/L (ref 136–145)
SP GR UR STRIP.AUTO: 1.01 (ref 1–1.03)
TROPONIN I SERPL-MCNC: <0.02 NG/ML
UROBILINOGEN UR QL STRIP.AUTO: 0.2 E.U./DL
WBC # BLD AUTO: 11.55 THOUSAND/UL (ref 4.31–10.16)
WBC #/AREA URNS AUTO: ABNORMAL /HPF

## 2020-01-01 PROCEDURE — 83605 ASSAY OF LACTIC ACID: CPT | Performed by: EMERGENCY MEDICINE

## 2020-01-01 PROCEDURE — 99223 1ST HOSP IP/OBS HIGH 75: CPT | Performed by: PHYSICIAN ASSISTANT

## 2020-01-01 PROCEDURE — 99285 EMERGENCY DEPT VISIT HI MDM: CPT | Performed by: EMERGENCY MEDICINE

## 2020-01-01 PROCEDURE — 36415 COLL VENOUS BLD VENIPUNCTURE: CPT | Performed by: EMERGENCY MEDICINE

## 2020-01-01 PROCEDURE — 87040 BLOOD CULTURE FOR BACTERIA: CPT | Performed by: EMERGENCY MEDICINE

## 2020-01-01 PROCEDURE — 80053 COMPREHEN METABOLIC PANEL: CPT | Performed by: EMERGENCY MEDICINE

## 2020-01-01 PROCEDURE — 96365 THER/PROPH/DIAG IV INF INIT: CPT

## 2020-01-01 PROCEDURE — 96360 HYDRATION IV INFUSION INIT: CPT

## 2020-01-01 PROCEDURE — 99285 EMERGENCY DEPT VISIT HI MDM: CPT

## 2020-01-01 PROCEDURE — 1123F ACP DISCUSS/DSCN MKR DOCD: CPT | Performed by: PHYSICIAN ASSISTANT

## 2020-01-01 PROCEDURE — 84145 PROCALCITONIN (PCT): CPT | Performed by: EMERGENCY MEDICINE

## 2020-01-01 PROCEDURE — 71045 X-RAY EXAM CHEST 1 VIEW: CPT

## 2020-01-01 PROCEDURE — 85730 THROMBOPLASTIN TIME PARTIAL: CPT | Performed by: EMERGENCY MEDICINE

## 2020-01-01 PROCEDURE — 84484 ASSAY OF TROPONIN QUANT: CPT | Performed by: EMERGENCY MEDICINE

## 2020-01-01 PROCEDURE — 85025 COMPLETE CBC W/AUTO DIFF WBC: CPT | Performed by: EMERGENCY MEDICINE

## 2020-01-01 PROCEDURE — 93005 ELECTROCARDIOGRAM TRACING: CPT

## 2020-01-01 PROCEDURE — 81001 URINALYSIS AUTO W/SCOPE: CPT | Performed by: EMERGENCY MEDICINE

## 2020-01-01 PROCEDURE — 85610 PROTHROMBIN TIME: CPT | Performed by: EMERGENCY MEDICINE

## 2020-01-01 PROCEDURE — 87086 URINE CULTURE/COLONY COUNT: CPT | Performed by: EMERGENCY MEDICINE

## 2020-01-01 PROCEDURE — 87631 RESP VIRUS 3-5 TARGETS: CPT | Performed by: EMERGENCY MEDICINE

## 2020-01-01 RX ORDER — SODIUM CHLORIDE 9 MG/ML
100 INJECTION, SOLUTION INTRAVENOUS CONTINUOUS
Status: CANCELLED | OUTPATIENT
Start: 2020-01-01 | End: 2020-01-02

## 2020-01-01 RX ORDER — GUAIFENESIN 600 MG
1200 TABLET, EXTENDED RELEASE 12 HR ORAL EVERY 12 HOURS SCHEDULED
Status: DISCONTINUED | OUTPATIENT
Start: 2020-01-01 | End: 2020-01-04 | Stop reason: HOSPADM

## 2020-01-01 RX ORDER — PREDNISONE 2.5 MG
2.5 TABLET ORAL DAILY
Status: ON HOLD | COMMUNITY
End: 2020-01-04 | Stop reason: SDUPTHER

## 2020-01-01 RX ORDER — MELATONIN
1000 DAILY
Status: DISCONTINUED | OUTPATIENT
Start: 2020-01-02 | End: 2020-01-04 | Stop reason: HOSPADM

## 2020-01-01 RX ORDER — DOXYCYCLINE HYCLATE 100 MG/1
100 CAPSULE ORAL EVERY 12 HOURS SCHEDULED
Status: DISCONTINUED | OUTPATIENT
Start: 2020-01-01 | End: 2020-01-04

## 2020-01-01 RX ORDER — FERROUS SULFATE 325(65) MG
325 TABLET ORAL
Status: DISCONTINUED | OUTPATIENT
Start: 2020-01-02 | End: 2020-01-04 | Stop reason: HOSPADM

## 2020-01-01 RX ORDER — ACETAMINOPHEN 325 MG/1
650 TABLET ORAL EVERY 6 HOURS PRN
Status: DISCONTINUED | OUTPATIENT
Start: 2020-01-01 | End: 2020-01-04 | Stop reason: HOSPADM

## 2020-01-01 RX ORDER — CLOPIDOGREL BISULFATE 75 MG/1
75 TABLET ORAL DAILY
Status: DISCONTINUED | OUTPATIENT
Start: 2020-01-02 | End: 2020-01-04 | Stop reason: HOSPADM

## 2020-01-01 RX ORDER — PANTOPRAZOLE SODIUM 40 MG/1
40 TABLET, DELAYED RELEASE ORAL
Status: DISCONTINUED | OUTPATIENT
Start: 2020-01-02 | End: 2020-01-04 | Stop reason: HOSPADM

## 2020-01-01 RX ORDER — POTASSIUM CHLORIDE 750 MG/1
10 TABLET, EXTENDED RELEASE ORAL 3 TIMES DAILY
Status: DISCONTINUED | OUTPATIENT
Start: 2020-01-01 | End: 2020-01-04 | Stop reason: HOSPADM

## 2020-01-01 RX ORDER — CLONIDINE HYDROCHLORIDE 0.1 MG/1
0.1 TABLET ORAL 4 TIMES DAILY
Status: DISCONTINUED | OUTPATIENT
Start: 2020-01-01 | End: 2020-01-04 | Stop reason: HOSPADM

## 2020-01-01 RX ORDER — ASPIRIN 81 MG/1
81 TABLET, CHEWABLE ORAL DAILY
Status: DISCONTINUED | OUTPATIENT
Start: 2020-01-02 | End: 2020-01-04 | Stop reason: HOSPADM

## 2020-01-01 RX ORDER — ATORVASTATIN CALCIUM 40 MG/1
40 TABLET, FILM COATED ORAL
Status: DISCONTINUED | OUTPATIENT
Start: 2020-01-02 | End: 2020-01-04 | Stop reason: HOSPADM

## 2020-01-01 RX ORDER — BENZONATATE 100 MG/1
100 CAPSULE ORAL 3 TIMES DAILY PRN
Status: DISCONTINUED | OUTPATIENT
Start: 2020-01-01 | End: 2020-01-04 | Stop reason: HOSPADM

## 2020-01-01 RX ORDER — ESCITALOPRAM OXALATE 10 MG/1
10 TABLET ORAL DAILY
Status: DISCONTINUED | OUTPATIENT
Start: 2020-01-02 | End: 2020-01-04 | Stop reason: HOSPADM

## 2020-01-01 RX ORDER — SODIUM CHLORIDE 9 MG/ML
3 INJECTION INTRAVENOUS AS NEEDED
Status: DISCONTINUED | OUTPATIENT
Start: 2020-01-01 | End: 2020-01-04 | Stop reason: HOSPADM

## 2020-01-01 RX ORDER — CARVEDILOL 12.5 MG/1
25 TABLET ORAL 2 TIMES DAILY WITH MEALS
Status: DISCONTINUED | OUTPATIENT
Start: 2020-01-01 | End: 2020-01-04 | Stop reason: HOSPADM

## 2020-01-01 RX ORDER — ONDANSETRON 2 MG/ML
4 INJECTION INTRAMUSCULAR; INTRAVENOUS EVERY 6 HOURS PRN
Status: DISCONTINUED | OUTPATIENT
Start: 2020-01-01 | End: 2020-01-04 | Stop reason: HOSPADM

## 2020-01-01 RX ORDER — AMLODIPINE BESYLATE 5 MG/1
5 TABLET ORAL DAILY
Status: DISCONTINUED | OUTPATIENT
Start: 2020-01-02 | End: 2020-01-04 | Stop reason: HOSPADM

## 2020-01-01 RX ADMIN — CEFTRIAXONE SODIUM 1000 MG: 10 INJECTION, POWDER, FOR SOLUTION INTRAVENOUS at 18:57

## 2020-01-01 RX ADMIN — GUAIFENESIN 1200 MG: 600 TABLET, EXTENDED RELEASE ORAL at 22:42

## 2020-01-01 RX ADMIN — DOXYCYCLINE 100 MG: 100 CAPSULE ORAL at 22:42

## 2020-01-01 RX ADMIN — POTASSIUM CHLORIDE 10 MEQ: 750 TABLET, EXTENDED RELEASE ORAL at 22:42

## 2020-01-01 RX ADMIN — CLONIDINE HYDROCHLORIDE 0.1 MG: 0.1 TABLET ORAL at 22:42

## 2020-01-01 RX ADMIN — CARVEDILOL 25 MG: 12.5 TABLET, FILM COATED ORAL at 22:43

## 2020-01-01 RX ADMIN — SODIUM CHLORIDE 1000 ML: 0.9 INJECTION, SOLUTION INTRAVENOUS at 17:51

## 2020-01-02 LAB
ANION GAP SERPL CALCULATED.3IONS-SCNC: 10 MMOL/L (ref 4–13)
BACTERIA UR CULT: NORMAL
BASOPHILS # BLD AUTO: 0.04 THOUSANDS/ΜL (ref 0–0.1)
BASOPHILS NFR BLD AUTO: 0 % (ref 0–1)
BUN SERPL-MCNC: 18 MG/DL (ref 5–25)
CALCIUM SERPL-MCNC: 8.7 MG/DL (ref 8.3–10.1)
CHLORIDE SERPL-SCNC: 104 MMOL/L (ref 100–108)
CO2 SERPL-SCNC: 26 MMOL/L (ref 21–32)
CREAT SERPL-MCNC: 0.87 MG/DL (ref 0.6–1.3)
EOSINOPHIL # BLD AUTO: 0.39 THOUSAND/ΜL (ref 0–0.61)
EOSINOPHIL NFR BLD AUTO: 3 % (ref 0–6)
ERYTHROCYTE [DISTWIDTH] IN BLOOD BY AUTOMATED COUNT: 13.6 % (ref 11.6–15.1)
GFR SERPL CREATININE-BSD FRML MDRD: 59 ML/MIN/1.73SQ M
GLUCOSE SERPL-MCNC: 107 MG/DL (ref 65–140)
HCT VFR BLD AUTO: 33.3 % (ref 34.8–46.1)
HGB BLD-MCNC: 10.6 G/DL (ref 11.5–15.4)
IMM GRANULOCYTES # BLD AUTO: 0.06 THOUSAND/UL (ref 0–0.2)
IMM GRANULOCYTES NFR BLD AUTO: 1 % (ref 0–2)
LYMPHOCYTES # BLD AUTO: 2.07 THOUSANDS/ΜL (ref 0.6–4.47)
LYMPHOCYTES NFR BLD AUTO: 17 % (ref 14–44)
MCH RBC QN AUTO: 32 PG (ref 26.8–34.3)
MCHC RBC AUTO-ENTMCNC: 31.8 G/DL (ref 31.4–37.4)
MCV RBC AUTO: 101 FL (ref 82–98)
MONOCYTES # BLD AUTO: 1.07 THOUSAND/ΜL (ref 0.17–1.22)
MONOCYTES NFR BLD AUTO: 9 % (ref 4–12)
NEUTROPHILS # BLD AUTO: 8.42 THOUSANDS/ΜL (ref 1.85–7.62)
NEUTS SEG NFR BLD AUTO: 70 % (ref 43–75)
NRBC BLD AUTO-RTO: 0 /100 WBCS
PLATELET # BLD AUTO: 233 THOUSANDS/UL (ref 149–390)
PMV BLD AUTO: 9 FL (ref 8.9–12.7)
POTASSIUM SERPL-SCNC: 3.3 MMOL/L (ref 3.5–5.3)
RBC # BLD AUTO: 3.31 MILLION/UL (ref 3.81–5.12)
SODIUM SERPL-SCNC: 140 MMOL/L (ref 136–145)
WBC # BLD AUTO: 12.05 THOUSAND/UL (ref 4.31–10.16)

## 2020-01-02 PROCEDURE — 97163 PT EVAL HIGH COMPLEX 45 MIN: CPT

## 2020-01-02 PROCEDURE — 99222 1ST HOSP IP/OBS MODERATE 55: CPT | Performed by: PHYSICIAN ASSISTANT

## 2020-01-02 PROCEDURE — 94664 DEMO&/EVAL PT USE INHALER: CPT

## 2020-01-02 PROCEDURE — 85025 COMPLETE CBC W/AUTO DIFF WBC: CPT | Performed by: PHYSICIAN ASSISTANT

## 2020-01-02 PROCEDURE — 80048 BASIC METABOLIC PNL TOTAL CA: CPT | Performed by: PHYSICIAN ASSISTANT

## 2020-01-02 PROCEDURE — 94760 N-INVAS EAR/PLS OXIMETRY 1: CPT

## 2020-01-02 PROCEDURE — 99232 SBSQ HOSP IP/OBS MODERATE 35: CPT | Performed by: HOSPITALIST

## 2020-01-02 RX ORDER — FUROSEMIDE 20 MG/1
20 TABLET ORAL EVERY OTHER DAY
Status: DISCONTINUED | OUTPATIENT
Start: 2020-01-02 | End: 2020-01-04 | Stop reason: HOSPADM

## 2020-01-02 RX ORDER — GABAPENTIN 100 MG/1
200 CAPSULE ORAL 3 TIMES DAILY
Status: DISCONTINUED | OUTPATIENT
Start: 2020-01-02 | End: 2020-01-03

## 2020-01-02 RX ORDER — GABAPENTIN 100 MG/1
200 CAPSULE ORAL 3 TIMES DAILY
Refills: 4 | COMMUNITY
Start: 2019-12-05 | End: 2020-05-04 | Stop reason: SDUPTHER

## 2020-01-02 RX ORDER — POTASSIUM CHLORIDE 20 MEQ/1
20 TABLET, EXTENDED RELEASE ORAL ONCE
Status: COMPLETED | OUTPATIENT
Start: 2020-01-02 | End: 2020-01-02

## 2020-01-02 RX ORDER — DOCUSATE SODIUM 100 MG/1
100 CAPSULE, LIQUID FILLED ORAL 2 TIMES DAILY
Status: DISCONTINUED | OUTPATIENT
Start: 2020-01-02 | End: 2020-01-03

## 2020-01-02 RX ORDER — METHYLPREDNISOLONE SODIUM SUCCINATE 40 MG/ML
40 INJECTION, POWDER, LYOPHILIZED, FOR SOLUTION INTRAMUSCULAR; INTRAVENOUS EVERY 12 HOURS SCHEDULED
Status: DISCONTINUED | OUTPATIENT
Start: 2020-01-02 | End: 2020-01-03

## 2020-01-02 RX ADMIN — AMLODIPINE BESYLATE 5 MG: 5 TABLET ORAL at 08:31

## 2020-01-02 RX ADMIN — GUAIFENESIN 1200 MG: 600 TABLET, EXTENDED RELEASE ORAL at 20:22

## 2020-01-02 RX ADMIN — ACETAMINOPHEN 650 MG: 325 TABLET, FILM COATED ORAL at 05:23

## 2020-01-02 RX ADMIN — POTASSIUM CHLORIDE 10 MEQ: 750 TABLET, EXTENDED RELEASE ORAL at 15:53

## 2020-01-02 RX ADMIN — CLONIDINE HYDROCHLORIDE 0.1 MG: 0.1 TABLET ORAL at 11:09

## 2020-01-02 RX ADMIN — CLONIDINE HYDROCHLORIDE 0.1 MG: 0.1 TABLET ORAL at 08:30

## 2020-01-02 RX ADMIN — POTASSIUM CHLORIDE 10 MEQ: 750 TABLET, EXTENDED RELEASE ORAL at 08:31

## 2020-01-02 RX ADMIN — BENZONATATE 100 MG: 100 CAPSULE ORAL at 11:13

## 2020-01-02 RX ADMIN — ESCITALOPRAM OXALATE 10 MG: 10 TABLET ORAL at 08:30

## 2020-01-02 RX ADMIN — CLONIDINE HYDROCHLORIDE 0.1 MG: 0.1 TABLET ORAL at 19:17

## 2020-01-02 RX ADMIN — POTASSIUM CHLORIDE 20 MEQ: 1500 TABLET, EXTENDED RELEASE ORAL at 08:31

## 2020-01-02 RX ADMIN — PREDNISONE 7.5 MG: 1 TABLET ORAL at 08:30

## 2020-01-02 RX ADMIN — METHYLPREDNISOLONE SODIUM SUCCINATE 40 MG: 40 INJECTION, POWDER, FOR SOLUTION INTRAMUSCULAR; INTRAVENOUS at 11:10

## 2020-01-02 RX ADMIN — CLONIDINE HYDROCHLORIDE 0.1 MG: 0.1 TABLET ORAL at 22:21

## 2020-01-02 RX ADMIN — MELATONIN 1000 UNITS: at 08:30

## 2020-01-02 RX ADMIN — DOXYCYCLINE 100 MG: 100 CAPSULE ORAL at 20:22

## 2020-01-02 RX ADMIN — ASPIRIN 81 MG 81 MG: 81 TABLET ORAL at 08:30

## 2020-01-02 RX ADMIN — CEFTRIAXONE SODIUM 1000 MG: 10 INJECTION, POWDER, FOR SOLUTION INTRAVENOUS at 19:13

## 2020-01-02 RX ADMIN — METHYLPREDNISOLONE SODIUM SUCCINATE 40 MG: 40 INJECTION, POWDER, FOR SOLUTION INTRAMUSCULAR; INTRAVENOUS at 20:22

## 2020-01-02 RX ADMIN — CARVEDILOL 25 MG: 12.5 TABLET, FILM COATED ORAL at 08:30

## 2020-01-02 RX ADMIN — CYANOCOBALAMIN TAB 500 MCG 1000 MCG: 500 TAB at 08:30

## 2020-01-02 RX ADMIN — FUROSEMIDE 20 MG: 20 TABLET ORAL at 11:09

## 2020-01-02 RX ADMIN — GUAIFENESIN 1200 MG: 600 TABLET, EXTENDED RELEASE ORAL at 08:31

## 2020-01-02 RX ADMIN — GABAPENTIN 200 MG: 100 CAPSULE ORAL at 15:52

## 2020-01-02 RX ADMIN — FERROUS SULFATE TAB 325 MG (65 MG ELEMENTAL FE) 325 MG: 325 (65 FE) TAB at 08:30

## 2020-01-02 RX ADMIN — CLOPIDOGREL BISULFATE 75 MG: 75 TABLET ORAL at 08:31

## 2020-01-02 RX ADMIN — GABAPENTIN 200 MG: 100 CAPSULE ORAL at 20:22

## 2020-01-02 RX ADMIN — ENOXAPARIN SODIUM 30 MG: 30 INJECTION SUBCUTANEOUS at 08:29

## 2020-01-02 RX ADMIN — ATORVASTATIN CALCIUM 40 MG: 40 TABLET, FILM COATED ORAL at 15:53

## 2020-01-02 RX ADMIN — DOCUSATE SODIUM 100 MG: 100 CAPSULE, LIQUID FILLED ORAL at 11:09

## 2020-01-02 RX ADMIN — CARVEDILOL 25 MG: 12.5 TABLET, FILM COATED ORAL at 15:53

## 2020-01-02 RX ADMIN — PANTOPRAZOLE SODIUM 40 MG: 40 TABLET, DELAYED RELEASE ORAL at 05:03

## 2020-01-02 RX ADMIN — POTASSIUM CHLORIDE 10 MEQ: 750 TABLET, EXTENDED RELEASE ORAL at 20:22

## 2020-01-02 RX ADMIN — DOXYCYCLINE 100 MG: 100 CAPSULE ORAL at 08:30

## 2020-01-02 NOTE — PLAN OF CARE
Problem: Prexisting or High Potential for Compromised Skin Integrity  Goal: Skin integrity is maintained or improved  Description  INTERVENTIONS:  - Identify patients at risk for skin breakdown  - Assess and monitor skin integrity  - Assess and monitor nutrition and hydration status  - Monitor labs   - Assess for incontinence   - Turn and reposition patient  - Assist with mobility/ambulation  - Relieve pressure over bony prominences  - Avoid friction and shearing  - Provide appropriate hygiene as needed including keeping skin clean and dry  - Evaluate need for skin moisturizer/barrier cream  - Collaborate with interdisciplinary team   - Patient/family teaching  - Consider wound care consult   Outcome: Progressing     Problem: Potential for Falls  Goal: Patient will remain free of falls  Description  INTERVENTIONS:  - Assess patient frequently for physical needs  -  Identify cognitive and physical deficits and behaviors that affect risk of falls    -  Marissa fall precautions as indicated by assessment   - Educate patient/family on patient safety including physical limitations  - Instruct patient to call for assistance with activity based on assessment  - Modify environment to reduce risk of injury  - Consider OT/PT consult to assist with strengthening/mobility  Outcome: Progressing

## 2020-01-02 NOTE — ASSESSMENT & PLAN NOTE
 Creatinine upon admission:  1 42  o Baseline:  0 9  o Current creatinine is 0 87   Secondary to prerenal, dehydration   Treatment:   resume Lasix 20 mg every other day   Monitor BMP

## 2020-01-02 NOTE — ASSESSMENT & PLAN NOTE
She is followed as an outpatient by urologist, Dr Verdel Osler for recurrent UTIs  Has been on Keflex prophylactically daily, has been compliant with her medications  Patient has had generalized weakness, urinalysis highly suspicious for UTI with 30-50 white blood cells and moderate leukocytes  · Continue IV ceftriaxone Q 24 hours  · Follow up on urine culture  · Urology consulted

## 2020-01-02 NOTE — PLAN OF CARE
Problem: PHYSICAL THERAPY ADULT  Goal: Performs mobility at highest level of function for planned discharge setting  See evaluation for individualized goals  Description  Treatment/Interventions: Functional transfer training, LE strengthening/ROM, Therapeutic exercise, Endurance training, Patient/family training, Equipment eval/education, Bed mobility, Gait training, Cognitive reorientation  Equipment Recommended: Walker(preferably SHORt walker)       See flowsheet documentation for full assessment, interventions and recommendations  Note:   Prognosis: Fair  Problem List: Decreased strength, Decreased range of motion, Decreased endurance, Impaired balance, Decreased mobility, Decreased coordination, Impaired hearing, Pain(gait deviations)  Assessment: Assessment: Pt is a 80 y o  female seen for PT evaluation s/p admit to [facility] on 1/1/2020 w/ Acute respiratory failure with hypoxia ([address] )  Order placed for PT  Upon evaluation: Pt requires substantial assistance for bed mobility and min assistance for transfers and ambulation with rolling walker for short distances  Pt's clinical presentation is currently unstable/unpredictable given the functional mobility deficits above, especially weakness, decreased ROM, decreased endurance, gait deviations, pain, decreased activity tolerance, decreased functional mobility tolerance and SOB upon exertion, coupled with fall risks including impaired balance, impaired coordination and near miss falls, and combined with medical complications of hypertension , hypotension, abnormal H&H, abnormal WBCs and abnormal potassium values  Pt to benefit from continued skilled PT tx while in hospital and upon DC to address deficits as defined above and maximize level of functional mobility   Recommend trial with walker next 1-2 sessions, ther ex next 1-2 sessions, OT consult and case management consult     Barriers to Discharge Comments: Daughter reports she can provide 24 hr A and physical care PRN for pt if needed  Comorbidities affecting pt's physical performance at time of assessment include: HTN, A fib, CAD and R TKR, TIA, cardiac cath/angioplasty w/stents  Personal factors affecting pt at time of IE include: steps to enter environment, advanced age, inability to perform IADLs, inability to perform ADLs and inability to navigate community distances  Recommendation: Home with family support, Home PT          See flowsheet documentation for full assessment

## 2020-01-02 NOTE — NURSING NOTE
There was an order for a post void residual however the patient is incontinent  Via purewick I timed it as best as I could and when I say that her urine increased in the canister I bladder scanned her for 110

## 2020-01-02 NOTE — H&P
H&P- Bala Faulkner 4/6/1930, 80 y o  female MRN: [id number]  Unit/Bed#: S -01 Encounter: [id number]  Primary Care Provider: Rupinder Nathan MD   Date and time admitted to hospital: 1/1/2020  5:26 PM    * Acute respiratory failure with hypoxia ([facility] )  Assessment & Plan  · Patient presents requiring 4 L of nasal cannula oxygen, no oxygen requirements at baseline  She has had upper respiratory symptoms for the past few days  No chest pain or palpitations  · Chest x-ray with no clear evidence of pneumonia, however suspect bronchitis versus developing pneumonia  · Continue nasal cannula oxygen, wean as tolerated  · IV ceftriaxone and PO doxycycline  · Patient has intolerance to azithromycin  · If no improvement consider pulmonology consultation  MEGAN (acute kidney injury) ([facility] )  Assessment & Plan   Creatinine upon admission:  1 42  o Baseline:  0 9   Secondary to prerenal, dehydration   Treatment:  IV fluids, hold Lasix   Monitor BMP  Ambulatory dysfunction  Assessment & Plan  · Patient has felt generally weak, felt her legs collapse  · In the setting of infections  · PT/OT evaluation  ·  consultation, may need placement  Lactic acidosis  Assessment & Plan  · Initial lactic acidosis present, now resolved after IV fluids  · 3 4-1 6    Recurrent UTI  Assessment & Plan  · She is followed as an outpatient by urologist, Dr David Chirinos for recurrent UTIs  · Has been on Keflex prophylactically daily, has been compliant with her medications  · Patient has had generalized weakness, urinalysis highly suspicious for UTI with 30-50 white blood cells and moderate leukocytes  · IV ceftriaxone Q 24 hours  · Follow up on urine culture  · Urology consulted  Coronary artery disease with unstable angina  Assessment & Plan  · Follows with Cardiology as an outpatient  No chest pain at this time  · Continue dual anti-platelet therapy      Benign essential hypertension  Assessment & Plan   Blood pressure is reviewed and acceptable  She was hypotensive upon arrival, responded to IV fluids  o Last recorded pressure:  165/72   Continue clonidine, carvedilol, amlodipine  Hold Lasix   Monitor blood pressure  Paroxysmal atrial fibrillation [facility])  Assessment & Plan   Controlled at this time   Rate/rhythm control:  Coreg   Anticoagulation:  Plavix, aspirin   Monitor heart rate  VTE Prophylaxis: Enoxaparin (Lovenox)  / sequential compression device   Code Status: DNR DNI  POLST: POLST form is not discussed and not completed at this time  Discussion with family: patient, daughters    Anticipated Length of Stay:  Patient will be admitted on an Inpatient basis with an anticipated length of stay of  > 2 midnights  Justification for Hospital Stay: MEGAN, UTI, acute respiratory failure    Total Time for Visit, including Counseling / Coordination of Care: 1 hour  Greater than 50% of this total time spent on direct patient counseling and coordination of care  Chief Complaint:   Cough, weakness, ambulatory dysfunction    History of Present Illness:    Lola Diop is a 80 y o  female who presents with upper respiratory symptoms over the past few days, as well as weakness starting today  Patient ambulates with a walker at baseline  Reports today that she has felt generally weak, had not ate much today  She felt her legs were totally weak, almost giving out on her  She denies any fevers but has had some chills  She reports a cough, which is not productive  Her daughter is sick with similar symptoms, is being treated for bronchitis  She denies any chest pain, palpitations, nausea, vomiting, abdominal pain  She has a history of recurrent UTIs, follows with urologist, Dr Leopoldo Booty  She has been taking Keflex 250 mg daily and has been compliant with her medication regimen  She does not really report any urinary symptoms besides a general fatigue and weakness   Denies any swelling in her legs, changes in her skin  Review of Systems:    Review of Systems   Constitutional: Positive for chills  Negative for activity change, appetite change, fatigue and fever  HENT: Negative for congestion, rhinorrhea, sinus pressure and sore throat  Eyes: Negative for photophobia, pain and visual disturbance  Respiratory: Positive for cough and shortness of breath  Negative for chest tightness and wheezing  Cardiovascular: Negative for chest pain, palpitations and leg swelling  Gastrointestinal: Negative for abdominal distention, abdominal pain, constipation, diarrhea, nausea and vomiting  Endocrine: Negative for cold intolerance, heat intolerance, polydipsia and polyuria  Genitourinary: Negative for difficulty urinating, dysuria, flank pain, frequency and hematuria  Musculoskeletal: Negative for arthralgias, back pain and joint swelling  Skin: Negative for color change, pallor and rash  Allergic/Immunologic: Negative  Neurological: Positive for weakness  Negative for dizziness, syncope, light-headedness and headaches  Hematological: Negative  Psychiatric/Behavioral: Negative          Past Medical and Surgical History:     Past Medical History:   Diagnosis Date    Atrial fibrillation ([facility] )     Benign essential hypertension     CAD (coronary artery disease)     Carotid artery obstruction     Disease of thyroid gland     TIA    GERD (gastroesophageal reflux disease)     HL (hearing loss)     Wears hearing aids    Mesenteric ischemia, chronic (HCC)     TIA (transient ischemic attack)     Tinnitus        Past Surgical History:   Procedure Laterality Date    APPENDECTOMY      CARDIAC CATHETERIZATION      CATARACT EXTRACTION      CHOLECYSTECTOMY      CORONARY ANGIOPLASTY      stent x4 07/11/1996x1 10/09/2009 x3    CORONARY STENT PLACEMENT      HYSTERECTOMY      REPLACEMENT TOTAL KNEE Right        Meds/Allergies:    Prior to Admission medications    Medication Sig Start Date End Date Taking?  Authorizing Provider   acetaminophen (TYLENOL) 325 mg tablet Take 650 mg by mouth every 8 (eight) hours as needed for mild pain     Yes Historical Provider, MD   amLODIPine (NORVASC) 5 mg tablet Take 1 tablet by mouth daily 12/14/17  Yes Historical Provider, MD   amoxicillin (AMOXIL) 500 mg capsule TAKE 4 CAPSULES BY MOUTH 1 HOUR BEFORE DENTAL APPOINTMENT 9/1/19  Yes Historical Provider, MD   Ascorbic Acid (VITAMIN C) 1000 MG tablet Take 1,000 mg by mouth daily   Yes Historical Provider, MD   aspirin 81 MG tablet Take 1 tablet (81 mg total) by mouth daily 9/26/19  Yes RADHA Kay   atorvastatin (LIPITOR) 40 mg tablet Take 1 tablet (40 mg total) by mouth daily with dinner 10/16/19  Yes RADHA Kay   Calcium Citrate-Vitamin D (CALCIUM + D PO) Take 600 mg by mouth every other day     Yes Historical Provider, MD   carvedilol (COREG) 12 5 mg tablet Take 2 tablets by mouth 2 (two) times a day   Yes Historical Provider, MD   Cholecalciferol (VITAMIN D3) 1000 units CAPS Take by mouth daily     Yes Historical Provider, MD   cloNIDine (CATAPRES) 0 1 mg tablet Take 1 tablet by mouth 4 (four) times a day    Yes Historical Provider, MD   clopidogrel (PLAVIX) 75 mg tablet Take 1 tablet (75 mg total) by mouth daily 10/16/19  Yes RADHA Kay   Cranberry 500 MG CAPS Take 1 capsule by mouth daily   Yes Historical Provider, MD   cyanocobalamin 1000 MCG tablet Take by mouth daily     Yes Historical Provider, MD   escitalopram (LEXAPRO) 10 mg tablet Take 1 tablet by mouth daily 12/14/17  Yes Historical Provider, MD   ferrous sulfate 325 (65 FE) MG EC tablet Take 325 mg by mouth daily   Yes Historical Provider, MD   furosemide (LASIX) 20 mg tablet Take 1 tablet (20 mg total) by mouth every other day 8/5/19  Yes Andrew Hull DO   pantoprazole (PROTONIX) 40 mg tablet Take 40 mg by mouth daily   Yes Historical Provider, MD   potassium chloride (K-DUR,KLOR-CON) 10 mEq tablet Take 10 mEq by mouth 3 (three) times a day    Yes Historical Provider, MD   predniSONE 5 mg tablet Take 7 5 mg by mouth daily   2/7/18  Yes Historical Provider, MD   repaglinide (PRANDIN) 0 5 mg tablet Take 0 5 mg by mouth 3 (three) times a day before meals     Yes Historical Provider, MD     I have reviewed home medications with patient personally  Allergies: Allergies   Allergen Reactions    Adhesive [Medical Tape]      Paper tape okay    Amoxicillin     Erythromycin     Ezetimibe     Fenofibrate     Flecainide     Lovastatin     Sulfa Antibiotics     Thioridazine        Social History:     Marital Status:     Occupation: non-contrib  Patient Pre-hospital Living Situation: home with daughter   Patient Pre-hospital Level of Mobility: walker at baseline  Patient Pre-hospital Diet Restrictions: cardiac  Substance Use History:   Social History     Substance and Sexual Activity   Alcohol Use No     Social History     Tobacco Use   Smoking Status Never Smoker   Smokeless Tobacco Never Used     Social History     Substance and Sexual Activity   Drug Use No       Family History:    Family History   Problem Relation Age of Onset    Leukemia Father     Hypertension Sister     Heart attack Brother 46    Hypertension Brother     Sudden death Brother 46        scd    Heart failure Maternal Grandmother     Hypertension Maternal Grandmother     Stroke Maternal Grandfather     Hypertension Daughter     Anuerysm Neg Hx     Clotting disorder Neg Hx     Arrhythmia Neg Hx     Hyperlipidemia Neg Hx        Physical Exam:     Vitals:   Blood Pressure: (!) 180/68 (01/01/20 2123)  Pulse: 64 (01/01/20 2123)  Temperature: 98 1 °F (36 7 °C) (01/01/20 2123)  Temp Source: Oral (01/01/20 2123)  Respirations: 19 (01/01/20 2123)  Height: 5' 2" (157 5 cm) (01/01/20 2030)  Weight - Scale: 67 6 kg (149 lb 0 5 oz) (01/01/20 2030)  SpO2: 95 % (01/01/20 2123)    Physical Exam   Constitutional: She is oriented to person, place, and time  She appears well-developed and well-nourished  No distress  Nasal cannula in place  HENT:   Head: Normocephalic and atraumatic  Mouth/Throat: Oropharynx is clear and moist    Eyes: Pupils are equal, round, and reactive to light  EOM are normal  No scleral icterus  Neck: Neck supple  Cardiovascular: Normal rate, regular rhythm and normal heart sounds  No murmur heard  Pulmonary/Chest: Effort normal  No respiratory distress  She has no wheezes  She has rhonchi  She has no rales  Abdominal: Soft  Bowel sounds are normal  She exhibits no distension  There is no tenderness  There is no rebound and no guarding  Musculoskeletal: She exhibits no deformity  Neurological: She is alert and oriented to person, place, and time  Skin: Skin is warm and dry  Capillary refill takes less than 2 seconds  No rash noted  She is not diaphoretic  No erythema  No pallor  Psychiatric: She has a normal mood and affect  Nursing note and vitals reviewed  Additional Data:     Lab Results: I have personally reviewed pertinent reports        Results from last 7 days   Lab Units 01/01/20  1741   WBC Thousand/uL 11 55*   HEMOGLOBIN g/dL 10 6*   HEMATOCRIT % 34 2*   PLATELETS Thousands/uL 264   NEUTROS PCT % 62   LYMPHS PCT % 22   MONOS PCT % 11   EOS PCT % 4     Results from last 7 days   Lab Units 01/01/20  1741   SODIUM mmol/L 142   POTASSIUM mmol/L 3 9   CHLORIDE mmol/L 103   CO2 mmol/L 24   BUN mg/dL 26*   CREATININE mg/dL 1 42*   ANION GAP mmol/L 15*   CALCIUM mg/dL 9 2   ALBUMIN g/dL 2 9*   TOTAL BILIRUBIN mg/dL 0 46   ALK PHOS U/L 98   ALT U/L 21   AST U/L 13   GLUCOSE RANDOM mg/dL 141*     Results from last 7 days   Lab Units 01/01/20  1741   INR  1 01             Results from last 7 days   Lab Units 01/01/20  2015 01/01/20  1741   LACTIC ACID mmol/L 1 6 3 4*       Imaging: I have personally reviewed pertinent films in PACS    XR chest 1 view portable    (Results Pending)       EKG, Pathology, and Other Studies Reviewed on Admission:   · Prior pertinent studies and records reviewed in [facility] [address] [facility] [employer] Records Reviewed: Yes     ** Please Note: This note has been constructed using a voice recognition system   **

## 2020-01-02 NOTE — ASSESSMENT & PLAN NOTE
 Blood pressure is reviewed and acceptable  She was hypotensive upon arrival, responded to IV fluids  o Last recorded pressure:141/63   Continue clonidine, carvedilol, amlodipine  Resume Lasix   Monitor blood pressure

## 2020-01-02 NOTE — CONSULTS
Consult - Urology   Samanthazaid Montoya 4/6/1930, 80 y o  female MRN: [id number]    Unit/Bed#: S -01 Encounter: [id number]    Recurrent UTI  Assessment & Plan  With reported frequent UTIs on Keflex suppression, known to Dr Octavio Jimenez  No previous cultures in our system  UA with 0-1 RBC 30-50 WBC and occasional bacteria  Current coverage with Ceftriaxone, culture pending  PVR 110mL     Plan: Tailor antibiotics to culture   Continue Keflex suppression following antibiotic course, unless cultures suggest otherwise       Subjective/Objective     Subjective:   CC: "I am having incontinence"  HPI:  Misa Perez is a 66-year-old female known to Dr Octavio Jimenez  She has a history of recurrent UTIs  She is maintained on p  O  Keflex daily, when she stops this, she reports that she has UTIs  ROS:  Review of Systems   Constitutional: Negative for activity change and appetite change  HENT: Negative for congestion and ear pain  Eyes: Negative for pain  Respiratory: Negative for cough and shortness of breath  Cardiovascular: Negative for chest pain and palpitations  Gastrointestinal: Negative for abdominal distention, abdominal pain, blood in stool, constipation, diarrhea and nausea  Genitourinary: Negative for difficulty urinating, dysuria, frequency, hematuria and urgency  New incontinence x approximately 1 week  Musculoskeletal: Negative for arthralgias and myalgias  Skin: Negative for rash  Allergic/Immunologic: Negative for immunocompromised state  Neurological: Negative for dizziness and headaches  Hematological: Negative for adenopathy  Does not bruise/bleed easily  Psychiatric/Behavioral: Negative for agitation  The patient is not nervous/anxious  Objective:  Vitals: Blood pressure 141/63, pulse 78, temperature 98 °F (36 7 °C), temperature source Oral, resp  rate 18, height 5' 2" (1 575 m), weight 67 6 kg (149 lb 0 5 oz), SpO2 94 %  ,Body mass index is 27 26 kg/m²        Intake/Output Summary (Last 24 hours) at 1/2/2020 1409  Last data filed at 1/2/2020 1403  Gross per 24 hour   Intake 1140 ml   Output 975 ml   Net 165 ml       Invasive Devices     Peripheral Intravenous Line            Peripheral IV 01/02/20 Left Wrist less than 1 day          Line            Arterial Sheath 6 Fr  Right Radial 108 days                Physical Exam   Constitutional: She is oriented to person, place, and time  She appears well-developed and well-nourished  She is cooperative  She does not appear ill  No distress  80year old female, OOB to chair with nasal cannula oxygen in place  No acute distress  HENT:   Head: Normocephalic and atraumatic  Moist mucous membranes  Eyes: Conjunctivae and EOM are normal    Neck: Normal range of motion  Neck supple  No tracheal deviation present  Cardiovascular: Normal rate, regular rhythm and normal heart sounds  No murmur heard  Pulmonary/Chest: Effort normal and breath sounds normal  No respiratory distress  She has no wheezes  Good airflow bilaterally on deep inspiration  Abdominal: Soft  Bowel sounds are normal  She exhibits no distension and no mass  There is no tenderness  Obese, soft nontender  Musculoskeletal: Normal range of motion  She exhibits no edema  Bilateral CVA without tenderness  Neurological: She is alert and oriented to person, place, and time  Skin: Skin is warm and dry  No rash noted  She is not diaphoretic  No erythema  No pallor  Psychiatric: She has a normal mood and affect  Her behavior is normal  Judgment and thought content normal    Nursing note and vitals reviewed        History:    Past Medical History:   Diagnosis Date    Atrial fibrillation (HCC)     Benign essential hypertension     CAD (coronary artery disease)     Carotid artery obstruction     Disease of thyroid gland     TIA    GERD (gastroesophageal reflux disease)     HL (hearing loss)     Wears hearing aids    Mesenteric ischemia, chronic ([address] )     TIA (transient ischemic attack)     Tinnitus      Past Surgical History:   Procedure Laterality Date    APPENDECTOMY      CARDIAC CATHETERIZATION      CATARACT EXTRACTION      CHOLECYSTECTOMY      CORONARY ANGIOPLASTY      stent x4 07/11/1996x1 10/09/2009 x3    CORONARY STENT PLACEMENT      HYSTERECTOMY      REPLACEMENT TOTAL KNEE Right      Family History   Problem Relation Age of Onset    Leukemia Father     Hypertension Sister     Heart attack Brother 46    Hypertension Brother     Sudden death Brother 46        scd    Heart failure Maternal Grandmother     Hypertension Maternal Grandmother     Stroke Maternal Grandfather     Hypertension Daughter     Anuerysm Neg Hx     Clotting disorder Neg Hx     Arrhythmia Neg Hx     Hyperlipidemia Neg Hx      Social History     Socioeconomic History    Marital status:       Spouse name: None    Number of children: None    Years of education: None    Highest education level: None   Occupational History    None   Social Needs    Financial resource strain: None    Food insecurity:     Worry: None     Inability: None    Transportation needs:     Medical: None     Non-medical: None   Tobacco Use    Smoking status: Never Smoker    Smokeless tobacco: Never Used   Substance and Sexual Activity    Alcohol use: No    Drug use: No    Sexual activity: None   Lifestyle    Physical activity:     Days per week: None     Minutes per session: None    Stress: None   Relationships    Social connections:     Talks on phone: None     Gets together: None     Attends Congregation service: None     Active member of club or organization: None     Attends meetings of clubs or organizations: None     Relationship status: None    Intimate partner violence:     Fear of current or ex partner: None     Emotionally abused: None     Physically abused: None     Forced sexual activity: None   Other Topics Concern    None   Social History Narrative    None Imaging:  U/S kidneys and bladder 12/24/2019 - small bilateral renal cysts  Imaging reviewed - both report and images personally reviewed  Lab Results:  I have personally reviewed pertinent labs  Results from last 7 days   Lab Units 01/02/20  0437 01/01/20  1741   WBC Thousand/uL 12 05* 11 55*   HEMOGLOBIN g/dL 10 6* 10 6*   PLATELETS Thousands/uL 233 264     Results from last 7 days   Lab Units 01/02/20  0437 01/01/20  1741   SODIUM mmol/L 140 142   POTASSIUM mmol/L 3 3* 3 9   CHLORIDE mmol/L 104 103   CO2 mmol/L 26 24   BUN mg/dL 18 26*   CREATININE mg/dL 0 87 1 42*   EGFR ml/min/1 73sq m 59 33   CALCIUM mg/dL 8 7 9 2   AST U/L  --  13   ALT U/L  --  21   ALK PHOS U/L  --  98     Results from last 7 days   Lab Units 01/01/20  1801 01/01/20  1750   BLOOD CULTURE  Received in Microbiology Lab  Culture in Progress  Received in Microbiology Lab  Culture in Progress  Results from last 7 days   Lab Units 01/01/20  1741   PROCALCITONIN ng/ml 0 12     UA with 0-1 RBC 30-50 WBC and occasional bacteria       Ketan Begum PA-C  Date: 1/2/2020 Time: 2:09 PM

## 2020-01-02 NOTE — ASSESSMENT & PLAN NOTE
Patient presents requiring 4 L of nasal cannula oxygen, no oxygen requirements at baseline  She has had upper respiratory symptoms for the past few days  No chest pain or palpitations  Chest x-ray with no clear evidence of pneumonia, however suspect bronchitis versus developing pneumonia  Assessment:  Bronchitis versus developing pneumonia, patient has bilateral wheezing and right lower lobe crackles on assessment, patient was also complaining of productive cough described as green sputum this morning  · Continue nasal cannula oxygen, wean as tolerated keep oxygen above 88%  · Continue IV ceftriaxone and PO doxycycline  · Patient has intolerance to azithromycin  · Will place patient on Solu-Medrol 40 mg IV every 12 hours  · Will discontinue prednisone 7 5 mg daily while patient on Solu-Medrol  · BMP  · CBC in a m  · Repeat pro call in a m    · Respiratory protocol

## 2020-01-02 NOTE — ASSESSMENT & PLAN NOTE
· Follows with Cardiology as an outpatient  No chest pain at this time  · Continue dual anti-platelet therapy

## 2020-01-02 NOTE — ASSESSMENT & PLAN NOTE
 Creatinine upon admission:  1 42  o Baseline:  0 9   Secondary to prerenal, dehydration   Treatment:  IV fluids, hold Lasix   Monitor BMP

## 2020-01-02 NOTE — PLAN OF CARE
Problem: Prexisting or High Potential for Compromised Skin Integrity  Goal: Skin integrity is maintained or improved  Description  INTERVENTIONS:  - Identify patients at risk for skin breakdown  - Assess and monitor skin integrity  - Assess and monitor nutrition and hydration status  - Monitor labs   - Turn and reposition patient  - Assist with mobility/ambulation  - Relieve pressure over bony prominences  - Avoid friction and shearing  - Provide appropriate hygiene as needed including keeping skin clean and dry  - Evaluate need for skin moisturizer/barrier cream  - Collaborate with interdisciplinary team   - Patient/family teaching  Outcome: Progressing     Problem: Potential for Falls  Goal: Patient will remain free of falls  Description  INTERVENTIONS:  - Assess patient frequently for physical needs  -  Identify cognitive and physical deficits and behaviors that affect risk of falls    -  [place] fall precautions as indicated by assessment   - Educate patient/family on patient safety including physical limitations  - Instruct patient to call for assistance with activity based on assessment  - Modify environment to reduce risk of injury  - Encourage patient to ambulate with assistance of Nursing Staff and/or PT    - (Ambulates with walker at baseline)  Outcome: Progressing

## 2020-01-02 NOTE — ASSESSMENT & PLAN NOTE
· Patient presents requiring 4 L of nasal cannula oxygen, no oxygen requirements at baseline  She has had upper respiratory symptoms for the past few days  No chest pain or palpitations  · Chest x-ray with no clear evidence of pneumonia, however suspect bronchitis versus developing pneumonia  · Continue nasal cannula oxygen, wean as tolerated  · IV ceftriaxone and PO doxycycline  · Patient has intolerance to azithromycin  · If no improvement consider pulmonology consultation

## 2020-01-02 NOTE — PROGRESS NOTES
Progress Note - Samantha Montoya 4/6/1930, 80 y o  female MRN: [id number]    Unit/Bed#: S -01 Encounter: [id number]    Primary Care Provider: Nano Rodriguez MD   Date and time admitted to hospital: 1/1/2020  5:26 PM        * Acute respiratory failure with hypoxia [facility])  Assessment & Plan  Patient presents requiring 4 L of nasal cannula oxygen, no oxygen requirements at baseline  She has had upper respiratory symptoms for the past few days  No chest pain or palpitations  Chest x-ray with no clear evidence of pneumonia, however suspect bronchitis versus developing pneumonia  Assessment:  Bronchitis versus developing pneumonia, patient has bilateral wheezing and right lower lobe crackles on assessment, patient was also complaining of productive cough described as green sputum this morning  · Continue nasal cannula oxygen, wean as tolerated keep oxygen above 88%  · Continue IV ceftriaxone and PO doxycycline  · Patient has intolerance to azithromycin  · Will place patient on Solu-Medrol 40 mg IV every 12 hours  · Will discontinue prednisone 7 5 mg daily while patient on Solu-Medrol  · BMP  · CBC in a m  · Repeat pro call in a m  · Respiratory protocol    [facility]  Assessment & Plan   Creatinine upon admission:  1 42  o Baseline:  0 9  o Current creatinine is 0 87   Secondary to prerenal, dehydration   Treatment:   resume Lasix 20 mg every other day   Monitor BMP  Recurrent UTI  Assessment & Plan  She is followed as an outpatient by urologist, Dr Octavio Jimenez for recurrent UTIs  Has been on Keflex prophylactically daily, has been compliant with her medications  Patient has had generalized weakness, urinalysis highly suspicious for UTI with 30-50 white blood cells and moderate leukocytes  · Continue IV ceftriaxone Q 24 hours  · Follow up on urine culture  · Urology consulted  Ambulatory dysfunction  Assessment & Plan  Patient has felt generally weak, felt her legs collapse    · In the setting of infections  · Pending PT/OT evaluation  ·  consultation, may need placement  Lactic acidosis  Assessment & Plan  · Initial lactic acidosis present, now resolved after IV fluids  · 3 4-1 6  · D/C IV fluids    Coronary artery disease with unstable angina  Assessment & Plan  · Follows with Cardiology as an outpatient  No chest pain at this time  · Continue dual anti-platelet therapy  Benign essential hypertension  Assessment & Plan   Blood pressure is reviewed and acceptable  She was hypotensive upon arrival, responded to IV fluids  o Last recorded pressure:141/63   Continue clonidine, carvedilol, amlodipine  Resume Lasix   Monitor blood pressure  Paroxysmal atrial fibrillation [facility])  Assessment & Plan   Controlled at this time   Rate/rhythm control:  Coreg   Anticoagulation:  Plavix, aspirin   Monitor heart rate  VTE Pharmacologic Prophylaxis:   Pharmacologic: Enoxaparin (Lovenox)  Mechanical VTE Prophylaxis in Place: Yes    Discussions with Specialists or Other Care Team Provider:  Discussed with my attending current treatment plan    Education and Discussions with Family / Patient:  Discussed with the patient current treatment plan and talked to her if I should call her daughter, she said that her daughter usually comes in this morning  Told her to call me if her daughter comes in and I will talk to her personally  Current Length of Stay: 1 day(s)    Current Patient Status: Inpatient     Discharge Plan / Estimated Discharge Date:  1-2 days    Code Status: Level 3 - DNAR and DNI      Subjective:   1  Patient reported being generally weak today but some improvement with breathing would like to sit on the chair  We called the nurse to help her go to the chair this morning      Objective:     Vitals:   Temp (24hrs), Av °F (36 7 °C), Min:97 9 °F (36 6 °C), Max:98 1 °F (36 7 °C)    Temp:  [97 9 °F (36 6 °C)-98 1 °F (36 7 °C)] 98 °F (36 7 °C)  HR:  [62-78] 78  Resp:  [18-19] 18  BP: ()/(49-72) 141/63  SpO2:  [91 %-99 %] 94 %  Body mass index is 27 26 kg/m²  Input and Output Summary (last 24 hours): Intake/Output Summary (Last 24 hours) at 1/2/2020 1009  Last data filed at 1/2/2020 0850  Gross per 24 hour   Intake 680 ml   Output 500 ml   Net 180 ml       Physical Exam:     Physical Exam   Constitutional: She is oriented to person, place, and time  She appears well-developed  HENT:   Head: Normocephalic  Eyes: EOM are normal    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 according to the patient ever since he had a cataract surgery  Neck: Normal range of motion  Cardiovascular: Normal rate, regular rhythm, normal heart sounds and intact distal pulses  Pulmonary/Chest: Effort normal  She has wheezes (Bilateral wheezing)  She has rales (Rales in the right lower lobe)  Patient is wearing oxygen at 3- 4 L nasal cannula   Abdominal: Soft  Bowel sounds are normal    Musculoskeletal: She exhibits edema (Trace edema on the legs)  Upper motor strength 4/5  Lower motor strength 3/5   Neurological: She is alert and oriented to person, place, and time  Skin: Skin is warm  Psychiatric: She has a normal mood and affect  Additional Data:     Labs:    Results from last 7 days   Lab Units 01/02/20  0437   WBC Thousand/uL 12 05*   HEMOGLOBIN g/dL 10 6*   HEMATOCRIT % 33 3*   PLATELETS Thousands/uL 233   NEUTROS PCT % 70   LYMPHS PCT % 17   MONOS PCT % 9   EOS PCT % 3     Results from last 7 days   Lab Units 01/02/20  0437 01/01/20  1741   POTASSIUM mmol/L 3 3* 3 9   CHLORIDE mmol/L 104 103   CO2 mmol/L 26 24   BUN mg/dL 18 26*   CREATININE mg/dL 0 87 1 42*   CALCIUM mg/dL 8 7 9 2   ALK PHOS U/L  --  98   ALT U/L  --  21   AST U/L  --  13     Results from last 7 days   Lab Units 01/01/20  1741   INR  1 01       * I Have Reviewed All Lab Data Listed Above  * Additional Pertinent Lab Tests Reviewed:  All Labs Within Last 24 Hours Reviewed    Imaging:    Imaging Reports Reviewed Today Include:  Chest x-ray reviewed by me    Recent Cultures (last 7 days):     Results from last 7 days   Lab Units 01/01/20  1801 01/01/20  1750   BLOOD CULTURE  Received in Microbiology Lab  Culture in Progress  Received in Microbiology Lab  Culture in Progress         Last 24 Hours Medication List:     Current Facility-Administered Medications:  acetaminophen 650 mg Oral Q6H PRN Dicie Jonas, PA-C   amLODIPine 5 mg Oral Daily Dicie Jonas, PA-C   aspirin 81 mg Oral Daily Dicie Jonas, PA-C   atorvastatin 40 mg Oral Daily With [employer], PA-C   benzonatate 100 mg Oral TID PRN Dicie Jonas, PA-C   carvedilol 25 mg Oral BID With Meals Dicie Jonas, PA-C   cefTRIAXone 1,000 mg Intravenous Q24H Dicie Jonas, PA-C   cholecalciferol 1,000 Units Oral Daily Dicie Jonas, PA-C   cloNIDine 0 1 mg Oral 4x Daily Dicie Jonas, PA-C   clopidogrel 75 mg Oral Daily Dicie Jonas, PA-C   cyanocobalamin 1,000 mcg Oral Daily Dicie Jonas, PA-C   docusate sodium 100 mg Oral BID Cortney Ramey MD   doxycycline hyclate 100 mg Oral Q12H [address] Dicie Jonas, PA-C   enoxaparin 30 mg Subcutaneous Daily Dicie Jonas, PA-C   escitalopram 10 mg Oral Daily Dicie Jonas, PA-C   ferrous sulfate 325 mg Oral Daily With Breakfast Dicie Jonas, PA-C   furosemide 20 mg Oral Every Other Day Cortney Ramey MD   guaiFENesin 1,200 mg Oral Q12H [address] Dicie Jonas, PA-C   methylPREDNISolone sodium succinate 40 mg Intravenous Q12H [address] Cortney Ramey MD   ondansetron 4 mg Intravenous Q6H PRN Dicie Jonas, PA-C   pantoprazole 40 mg Oral Early Morning Dicie Jonas, PA-C   potassium chloride 10 mEq Oral TID Dicie Jonas, PA-C   sodium chloride (PF) 3 mL Intravenous PRN Dicie Jonas, PA-C        Today, Patient Was Seen By: Cortney Ramey MD    ** Please Note: This note has been constructed using a voice recognition system   **

## 2020-01-02 NOTE — ED PROVIDER NOTES
History  Chief Complaint   Patient presents with    Fever - 75 years or older     from home via EMS w/cold symptoms x 3 days, weakness, fever, HX 6 stents, 650 Tylenol given by EMS     43-year-old female comes in for evaluation of weakness  Family reports that she has had an upper respiratory infection for approximately 3 days then tonight she began to felt weak like she could not walk  Family became concerned and called EMS  EMS felt that she had a fever so gave her Tylenol just prior to arrival   Patient complains of cough and congestion some shortness of breath no real chest pain  Patient does have a history of 6 cardiac stents in the past       History provided by:  EMS personnel, medical records and relative   used: No    Fever - 75 years or older   Temp source:  Subjective  Severity:  Moderate  Onset quality:  Sudden  Duration:  1 hour  Timing:  Constant  Progression:  Unchanged  Chronicity:  New  Ineffective treatments:  Acetaminophen  Associated symptoms: chills, confusion, congestion, cough and rhinorrhea    Associated symptoms: no chest pain, no diarrhea, no ear pain, no headaches and no rash    Risk factors: no contaminated food, no occupational exposure and no recent travel        Prior to Admission Medications   Prescriptions Last Dose Informant Patient Reported? Taking?    Ascorbic Acid (VITAMIN C) 1000 MG tablet  Family Member Yes Yes   Sig: Take 1,000 mg by mouth daily   Calcium Citrate-Vitamin D (CALCIUM + D PO)  Family Member Yes Yes   Sig: Take 600 mg by mouth every other day     Cholecalciferol (VITAMIN D3) 1000 units CAPS  Family Member Yes Yes   Sig: Take by mouth daily     Cranberry 500 MG CAPS  Family Member Yes Yes   Sig: Take 1 capsule by mouth daily   acetaminophen (TYLENOL) 325 mg tablet  Family Member Yes Yes   Sig: Take 650 mg by mouth every 8 (eight) hours as needed for mild pain     amLODIPine (NORVASC) 5 mg tablet  Family Member Yes Yes   Sig: Take 1 tablet by mouth daily   amoxicillin (AMOXIL) 500 mg capsule  Family Member Yes Yes   Sig: TAKE 4 CAPSULES BY MOUTH 1 HOUR BEFORE DENTAL APPOINTMENT   aspirin 81 MG tablet  Family Member No Yes   Sig: Take 1 tablet (81 mg total) by mouth daily   atorvastatin (LIPITOR) 40 mg tablet  Family Member No Yes   Sig: Take 1 tablet (40 mg total) by mouth daily with dinner   carvedilol (COREG) 12 5 mg tablet  Family Member Yes Yes   Sig: Take 2 tablets by mouth 2 (two) times a day   cloNIDine (CATAPRES) 0 1 mg tablet  Family Member Yes Yes   Sig: Take 1 tablet by mouth 4 (four) times a day    clopidogrel (PLAVIX) 75 mg tablet  Family Member No Yes   Sig: Take 1 tablet (75 mg total) by mouth daily   cyanocobalamin 1000 MCG tablet  Family Member Yes Yes   Sig: Take by mouth daily     escitalopram (LEXAPRO) 10 mg tablet  Family Member Yes Yes   Sig: Take 1 tablet by mouth daily   ferrous sulfate 325 (65 FE) MG EC tablet  Family Member Yes Yes   Sig: Take 325 mg by mouth daily   furosemide (LASIX) 20 mg tablet  Family Member No Yes   Sig: Take 1 tablet (20 mg total) by mouth every other day   pantoprazole (PROTONIX) 40 mg tablet  Family Member Yes Yes   Sig: Take 40 mg by mouth daily   potassium chloride (K-DUR,KLOR-CON) 10 mEq tablet  Family Member Yes Yes   Sig: Take 10 mEq by mouth 3 (three) times a day    predniSONE 5 mg tablet  Family Member Yes Yes   Sig: Take 7 5 mg by mouth daily     repaglinide (PRANDIN) 0 5 mg tablet  Family Member Yes Yes   Sig: Take 0 5 mg by mouth 3 (three) times a day before meals        Facility-Administered Medications: None       Past Medical History:   Diagnosis Date    Atrial fibrillation (HCC)     Benign essential hypertension     CAD (coronary artery disease)     Carotid artery obstruction     Disease of thyroid gland     TIA    GERD (gastroesophageal reflux disease)     HL (hearing loss)     Wears hearing aids    Mesenteric ischemia, chronic (HCC)     TIA (transient ischemic attack)     Tinnitus        Past Surgical History:   Procedure Laterality Date    APPENDECTOMY      CARDIAC CATHETERIZATION      CATARACT EXTRACTION      CHOLECYSTECTOMY      CORONARY ANGIOPLASTY      stent x4 07/11/1996x1 10/09/2009 x3    CORONARY STENT PLACEMENT      HYSTERECTOMY      REPLACEMENT TOTAL KNEE Right        Family History   Problem Relation Age of Onset    Leukemia Father     Hypertension Sister     Heart attack Brother 46    Hypertension Brother     Sudden death Brother 46        scd    Heart failure Maternal Grandmother     Hypertension Maternal Grandmother     Stroke Maternal Grandfather     Hypertension Daughter     Anuerysm Neg Hx     Clotting disorder Neg Hx     Arrhythmia Neg Hx     Hyperlipidemia Neg Hx      I have reviewed and agree with the history as documented  Social History     Tobacco Use    Smoking status: Never Smoker    Smokeless tobacco: Never Used   Substance Use Topics    Alcohol use: No    Drug use: No        Review of Systems   Constitutional: Positive for chills  Negative for fatigue and fever  HENT: Positive for congestion, postnasal drip and rhinorrhea  Negative for ear pain  Eyes: Negative for discharge and redness  Respiratory: Positive for cough  Negative for apnea, shortness of breath and wheezing  Cardiovascular: Negative for chest pain  Gastrointestinal: Negative for abdominal pain and diarrhea  Endocrine: Negative for cold intolerance and polydipsia  Genitourinary: Negative for difficulty urinating and hematuria  Musculoskeletal: Negative for arthralgias and back pain  Skin: Negative for color change and rash  Allergic/Immunologic: Negative for environmental allergies and immunocompromised state  Neurological: Negative for numbness and headaches  Hematological: Negative for adenopathy  Does not bruise/bleed easily  Psychiatric/Behavioral: Positive for confusion  Negative for agitation and behavioral problems         Physical Exam  Physical Exam   Constitutional: Vital signs are normal  She appears well-developed and well-nourished  Non-toxic appearance  She appears distressed  HENT:   Head: Normocephalic and atraumatic  Right Ear: Tympanic membrane and external ear normal    Left Ear: Tympanic membrane and external ear normal    Nose: Nose normal  No rhinorrhea, sinus tenderness or nasal deformity  Mouth/Throat: Uvula is midline and oropharynx is clear and moist  Normal dentition  Eyes: Pupils are equal, round, and reactive to light  Conjunctivae, EOM and lids are normal  Right eye exhibits no discharge  Left eye exhibits no discharge  Neck: Trachea normal and normal range of motion  Neck supple  No JVD present  Carotid bruit is not present  Cardiovascular: Normal rate, regular rhythm, intact distal pulses and normal pulses  No extrasystoles are present  PMI is not displaced  Pulmonary/Chest: Effort normal  No accessory muscle usage  No respiratory distress  She has decreased breath sounds  She has no wheezes  She has no rhonchi  She has no rales  Abdominal: Soft  Normal appearance and bowel sounds are normal  She exhibits no mass  There is no tenderness  There is no rigidity, no rebound and no guarding  Musculoskeletal:        Right shoulder: She exhibits normal range of motion, no bony tenderness, no swelling and no deformity  Cervical back: Normal  She exhibits normal range of motion, no tenderness, no bony tenderness and no deformity  Lymphadenopathy:     She has no cervical adenopathy  She has no axillary adenopathy  Neurological: She is alert  She has normal strength and normal reflexes  She is disoriented  No cranial nerve deficit or sensory deficit  GCS eye subscore is 4  GCS verbal subscore is 4  GCS motor subscore is 6  Skin: Skin is warm and dry  No rash noted  Psychiatric: She has a normal mood and affect   Her speech is normal and behavior is normal    Nursing note and vitals reviewed        Vital Signs  ED Triage Vitals   Temperature Pulse Respirations Blood Pressure SpO2   01/01/20 1729 01/01/20 1729 01/01/20 1729 01/01/20 1729 01/01/20 1729   98 1 °F (36 7 °C) 66 18 (!) 94/49 93 %      Temp Source Heart Rate Source Patient Position - Orthostatic VS BP Location FiO2 (%)   01/01/20 1729 01/01/20 1729 01/01/20 1729 01/01/20 1729 --   Oral Monitor Lying Right arm       Pain Score       01/01/20 1845       No Pain           Vitals:    01/01/20 2015 01/01/20 2030 01/01/20 2100 01/01/20 2123   BP: 132/61 140/62 165/72 (!) 180/68   Pulse: 64 62 66 64   Patient Position - Orthostatic VS: Sitting Sitting Sitting Lying         Visual Acuity      ED Medications  Medications   sodium chloride (PF) 0 9 % injection 3 mL (has no administration in time range)   amLODIPine (NORVASC) tablet 5 mg (has no administration in time range)   aspirin tablet 81 mg (has no administration in time range)   atorvastatin (LIPITOR) tablet 40 mg (has no administration in time range)   carvedilol (COREG) tablet 25 mg (has no administration in time range)   cholecalciferol (VITAMIN D3) tablet 1,000 Units (has no administration in time range)   cloNIDine (CATAPRES) tablet 0 1 mg (has no administration in time range)   clopidogrel (PLAVIX) tablet 75 mg (has no administration in time range)   cyanocobalamin (VITAMIN B-12) tablet 1,000 mcg (has no administration in time range)   escitalopram (LEXAPRO) tablet 10 mg (has no administration in time range)   ferrous sulfate tablet 325 mg (has no administration in time range)   pantoprazole (PROTONIX) EC tablet 40 mg (has no administration in time range)   predniSONE tablet 7 5 mg (has no administration in time range)   potassium chloride (K-DUR,KLOR-CON) CR tablet 10 mEq (has no administration in time range)   ondansetron (ZOFRAN) injection 4 mg (has no administration in time range)   enoxaparin (LOVENOX) subcutaneous injection 30 mg (has no administration in time range) acetaminophen (TYLENOL) tablet 650 mg (has no administration in time range)   doxycycline hyclate (VIBRAMYCIN) capsule 100 mg (has no administration in time range)   cefTRIAXone (ROCEPHIN) 1,000 mg in dextrose 5 % 50 mL IVPB (has no administration in time range)   guaiFENesin (MUCINEX) 12 hr tablet 1,200 mg (has no administration in time range)   benzonatate (TESSALON PERLES) capsule 100 mg (has no administration in time range)   sodium chloride 0 9 % bolus 1,000 mL (0 mL Intravenous Stopped 1/1/20 1856)   ceftriaxone (ROCEPHIN) 1 g/50 mL in dextrose IVPB (0 mg Intravenous Stopped 1/1/20 1950)       Diagnostic Studies  Results Reviewed     Procedure Component Value Units Date/Time    Lactic Acid x2 [[id number]]  (Normal) Collected:  01/01/20 2015    Lab Status:  Final result Specimen:  Blood from Arm, Left Updated:  01/01/20 2049     LACTIC ACID 1 6 mmol/L     Narrative:       Result may be elevated if tourniquet was used during collection  Urine Microscopic [[id number]]  (Abnormal) Collected:  01/01/20 1959    Lab Status:  Final result Specimen:  Urine, Clean Catch Updated:  01/01/20 2016     RBC, UA 0-1 /hpf      WBC, UA 30-50 /hpf      Epithelial Cells Occasional /hpf      Bacteria, UA Occasional /hpf     Urine culture [[id number]] Collected:  01/01/20 1959    Lab Status:   In process Specimen:  Urine, Clean Catch Updated:  01/01/20 2016    UA w Reflex to Microscopic w Reflex to Culture [[id number]]  (Abnormal) Collected:  01/01/20 1959    Lab Status:  Final result Specimen:  Urine, Clean Catch Updated:  01/01/20 2007     Color, UA Yellow     Clarity, UA Clear     Specific Gravity, UA 1 010     pH, UA 5 0     Leukocytes, UA Moderate     Nitrite, UA Negative     Protein, UA Negative mg/dl      Glucose, UA Negative mg/dl      Ketones, UA Negative mg/dl      Urobilinogen, UA 0 2 E U /dl      Bilirubin, UA Negative     Blood, UA Negative    Influenza A/B and RSV PCR [[id number]]  (Normal) Collected:  01/01/20 4845 Lab Status:  Final result Specimen:  Nasopharyngeal Swab Updated:  01/01/20 1840     INFLUENZA A PCR None Detected     INFLUENZA B PCR None Detected     RSV PCR None Detected    Troponin I [[id number]]  (Normal) Collected:  01/01/20 1741    Lab Status:  Final result Specimen:  Blood from Arm, Right Updated:  01/01/20 1821     Troponin I <0 02 ng/mL     Lactic Acid x2 [[id number]]  (Abnormal) Collected:  01/01/20 1741    Lab Status:  Final result Specimen:  Blood from Arm, Right Updated:  01/01/20 1815     LACTIC ACID 3 4 mmol/L     Narrative:       Result may be elevated if tourniquet was used during collection  Comprehensive metabolic panel [[id number]]  (Abnormal) Collected:  01/01/20 1741    Lab Status:  Final result Specimen:  Blood from Arm, Right Updated:  01/01/20 1809     Sodium 142 mmol/L      Potassium 3 9 mmol/L      Chloride 103 mmol/L      CO2 24 mmol/L      ANION GAP 15 mmol/L      BUN 26 mg/dL      Creatinine 1 42 mg/dL      Glucose 141 mg/dL      Calcium 9 2 mg/dL      AST 13 U/L      ALT 21 U/L      Alkaline Phosphatase 98 U/L      Total Protein 6 4 g/dL      Albumin 2 9 g/dL      Total Bilirubin 0 46 mg/dL      eGFR 33 ml/min/1 73sq m     Narrative:       [place] guidelines for Chronic Kidney Disease (CKD):     Stage 1 with normal or high GFR (GFR > 90 mL/min/1 73 square meters)    Stage 2 Mild CKD (GFR = 60-89 mL/min/1 73 square meters)    Stage 3A Moderate CKD (GFR = 45-59 mL/min/1 73 square meters)    Stage 3B Moderate CKD (GFR = 30-44 mL/min/1 73 square meters)    Stage 4 Severe CKD (GFR = 15-29 mL/min/1 73 square meters)    Stage 5 End Stage CKD (GFR <15 mL/min/1 73 square meters)  Note: GFR calculation is accurate only with a steady state creatinine    Blood culture #2 [[id number]] Collected:  01/01/20 1801    Lab Status:   In process Specimen:  Blood from Arm, Right Updated:  01/01/20 1804    Protime-INR [[id number]]  (Normal) Collected:  01/01/20 1741    Lab Status:  Final result Specimen:  Blood from Arm, Right Updated:  01/01/20 1758     Protime 12 7 seconds      INR 1 01    APTT [[id number]]  (Normal) Collected:  01/01/20 1741    Lab Status:  Final result Specimen:  Blood from Arm, Right Updated:  01/01/20 1758     PTT 26 seconds     Blood culture #1 [[id number]] Collected:  01/01/20 1750    Lab Status: In process Specimen:  Blood from Arm, Left Updated:  01/01/20 1757    CBC and differential [[id number]]  (Abnormal) Collected:  01/01/20 1741    Lab Status:  Final result Specimen:  Blood from Arm, Right Updated:  01/01/20 1749     WBC 11 55 Thousand/uL      RBC 3 31 Million/uL      Hemoglobin 10 6 g/dL      Hematocrit 34 2 %       fL      MCH 32 0 pg      MCHC 31 0 g/dL      RDW 13 6 %      MPV 8 6 fL      Platelets 755 Thousands/uL      nRBC 0 /100 WBCs      Neutrophils Relative 62 %      Immat GRANS % 1 %      Lymphocytes Relative 22 %      Monocytes Relative 11 %      Eosinophils Relative 4 %      Basophils Relative 0 %      Neutrophils Absolute 7 17 Thousands/µL      Immature Grans Absolute 0 08 Thousand/uL      Lymphocytes Absolute 2 58 Thousands/µL      Monocytes Absolute 1 24 Thousand/µL      Eosinophils Absolute 0 43 Thousand/µL      Basophils Absolute 0 05 Thousands/µL     Procalcitonin [[id number]] Collected:  01/01/20 1741    Lab Status:   In process Specimen:  Blood from Arm, Right Updated:  01/01/20 1746                 XR chest 1 view portable    (Results Pending)              Procedures  ECG 12 Lead Documentation Only  Date/Time: 1/1/2020 5:34 PM  Performed by: Akbar Moran DO  Authorized by: Akbar Moran DO     Patient location:  ED  Rate:     ECG rate:  66  Rhythm:     Rhythm: sinus rhythm    Ectopy:     Ectopy: none    QRS:     QRS axis:  Normal  Conduction:     Conduction: normal               ED Course                   Initial Sepsis Screening     Row Name 01/01/20 2030                Is the patient's history suggestive of a new or worsening infection? (!) Yes (Proceed)  -MT        Suspected source of infection  suspect infection, source unknown  -MT        Are two or more of the following signs & symptoms of infection both present and new to the patient? (!) Yes (Proceed)  -MT        Indicate SIRS criteria          If the answer is yes to both questions, suspicion of sepsis is present          If severe sepsis is present AND tissue hypoperfusion perists in the hour after fluid resuscitation or lactate > 4, the patient meets criteria for SEPTIC SHOCK          Are any of the following organ dysfunction criteria present within 6 hours of suspected infection and SIRS criteria that are NOT considered to be chronic conditions? No  -MT        Organ dysfunction  Lactate > 2 0 mmol/L Most likely due to acute kidney injury and dehydration  -MT        Date of presentation of severe sepsis          Time of presentation of severe sepsis          Tissue hypoperfusion persists in the hour after crystalloid fluid administration, evidenced, by either:          Was hypotension present within one hour of the conclusion of crystalloid fluid administration?           Date of presentation of septic shock          Time of presentation of septic shock            User Key  (r) = Recorded By, (t) = Taken By, (c) = Cosigned By    Initials Name Provider Type    [address] [place] [address], DO Physician                  MDM  Number of Diagnoses or Management Options  MEGAN (acute kidney injury) [facility]): new and requires workup  Hypoxia: new and requires workup  Weakness: new and requires workup     Amount and/or Complexity of Data Reviewed  Clinical lab tests: ordered and reviewed  Tests in the radiology section of CPT®: ordered and reviewed  Tests in the medicine section of CPT®: ordered and reviewed  Review and summarize past medical records: yes  Discuss the patient with other providers: yes  Independent visualization of images, tracings, or specimens: yes          Disposition  Final diagnoses:   Weakness   MEGAN (acute kidney injury) ([place] [address] )   Hypoxia     Time reflects when diagnosis was documented in both MDM as applicable and the Disposition within this note     Time User Action Codes Description Comment    1/1/2020  8:32 PM Joseph Plata K Add [R53 1] Weakness     1/1/2020  8:32 PM Joseph Plata K Add [N17 0] Acute kidney injury (MEGAN) with acute tubular necrosis (ATN) ([facility] )     1/1/2020  8:32 PM TurBetzaida burnettesue Maxim K Remove [N17 0] Acute kidney injury (MEGAN) with acute tubular necrosis (ATN) ([facility] )     1/1/2020  8:32 PM Turock, Abdiaziz Maxim K Add [N17 9] MEGAN (acute kidney injury) ([place] [address] )     1/1/2020  8:32 PM Tuan Alvarenga Add [R09 02] Hypoxia     1/1/2020  9:03 PM Kathiivana Jose Add [N39 0] Recurrent UTI       ED Disposition     ED Disposition Condition Date/Time Comment    Admit Stable Wed Jan 1, 2020  8:32 PM Case was discussed with gordo and the patient's admission status was agreed to be Admission Status: inpatient status to the service of Dr Leslye Lugo   Follow-up Information    None         Current Discharge Medication List      CONTINUE these medications which have NOT CHANGED    Details   acetaminophen (TYLENOL) 325 mg tablet Take 650 mg by mouth every 8 (eight) hours as needed for mild pain        amLODIPine (NORVASC) 5 mg tablet Take 1 tablet by mouth daily      amoxicillin (AMOXIL) 500 mg capsule TAKE 4 CAPSULES BY MOUTH 1 HOUR BEFORE DENTAL APPOINTMENT  Refills: 3      Ascorbic Acid (VITAMIN C) 1000 MG tablet Take 1,000 mg by mouth daily      aspirin 81 MG tablet Take 1 tablet (81 mg total) by mouth daily  Qty: 30 tablet, Refills: 3    Associated Diagnoses: S/P drug eluting coronary stent placement      atorvastatin (LIPITOR) 40 mg tablet Take 1 tablet (40 mg total) by mouth daily with dinner  Qty: 30 tablet, Refills: 5    Associated Diagnoses: Acute MI ([place] [address] );  Familial hypercholesteremia      Calcium Citrate-Vitamin D (CALCIUM + D PO) Take 600 mg by mouth every other day        carvedilol (COREG) 12 5 mg tablet Take 2 tablets by mouth 2 (two) times a day      Cholecalciferol (VITAMIN D3) 1000 units CAPS Take by mouth daily        cloNIDine (CATAPRES) 0 1 mg tablet Take 1 tablet by mouth 4 (four) times a day       clopidogrel (PLAVIX) 75 mg tablet Take 1 tablet (75 mg total) by mouth daily  Qty: 30 tablet, Refills: 5    Associated Diagnoses: Acute MI (HCC)      Cranberry 500 MG CAPS Take 1 capsule by mouth daily      cyanocobalamin 1000 MCG tablet Take by mouth daily        escitalopram (LEXAPRO) 10 mg tablet Take 1 tablet by mouth daily      ferrous sulfate 325 (65 FE) MG EC tablet Take 325 mg by mouth daily      furosemide (LASIX) 20 mg tablet Take 1 tablet (20 mg total) by mouth every other day  Qty: 45 tablet, Refills: 3    Associated Diagnoses: Benign essential hypertension      pantoprazole (PROTONIX) 40 mg tablet Take 40 mg by mouth daily      potassium chloride (K-DUR,KLOR-CON) 10 mEq tablet Take 10 mEq by mouth 3 (three) times a day       predniSONE 5 mg tablet Take 7 5 mg by mouth daily    Refills: 5      repaglinide (PRANDIN) 0 5 mg tablet Take 0 5 mg by mouth 3 (three) times a day before meals             No discharge procedures on file      ED Provider  Electronically Signed by           Pippa Wade DO  01/01/20 4014

## 2020-01-02 NOTE — ASSESSMENT & PLAN NOTE
With reported frequent UTIs on Keflex suppression, known to Dr Kelsey Epley  No previous cultures in our system  UA with 0-1 RBC 30-50 WBC and occasional bacteria, no growth on urine culture  Current coverage with Ceftriaxone  PVR 110mL     Plan:  Discharged at the discretion of Internal Medicine  Resume Keflex suppression upon discharge

## 2020-01-02 NOTE — RESPIRATORY THERAPY NOTE
RT Protocol Note  [place] 80 y o  female MRN: [id number]  Unit/Bed#: S -01 Encounter: [id number]    Assessment    Principal Problem:    Acute respiratory failure with hypoxia ([facility] )  Active Problems:    Paroxysmal atrial fibrillation (HCC)    Benign essential hypertension    Coronary artery disease with unstable angina    MEGAN (acute kidney injury) ([facility] )    Recurrent UTI    Lactic acidosis    Ambulatory dysfunction      Home Pulmonary Medications:  None       Past Medical History:   Diagnosis Date    Atrial fibrillation (HCC)     Benign essential hypertension     CAD (coronary artery disease)     Carotid artery obstruction     Disease of thyroid gland     TIA    GERD (gastroesophageal reflux disease)     HL (hearing loss)     Wears hearing aids    Mesenteric ischemia, chronic (HCC)     TIA (transient ischemic attack)     Tinnitus      Social History     Socioeconomic History    Marital status:       Spouse name: None    Number of children: None    Years of education: None    Highest education level: None   Occupational History    None   Social Needs    Financial resource strain: None    Food insecurity:     Worry: None     Inability: None    Transportation needs:     Medical: None     Non-medical: None   Tobacco Use    Smoking status: Never Smoker    Smokeless tobacco: Never Used   Substance and Sexual Activity    Alcohol use: No    Drug use: No    Sexual activity: None   Lifestyle    Physical activity:     Days per week: None     Minutes per session: None    Stress: None   Relationships    Social connections:     Talks on phone: None     Gets together: None     Attends Caoda[employer] service: None     Active member of club or organization: None     Attends meetings of clubs or organizations: None     Relationship status: None    Intimate partner violence:     Fear of current or ex partner: None     Emotionally abused: None     Physically abused: None     Forced sexual activity: None   Other Topics Concern    None   Social History Narrative    None       Subjective         Objective    Physical Exam:   Assessment Type: Assess only  General Appearance: Awake, Alert  Respiratory Pattern: Normal  Chest Assessment: Chest expansion symmetrical  Bilateral Breath Sounds: Diminished  Cough: None  O2 Device: None    Vitals:  Blood pressure 127/59, pulse 70, temperature 98 1 °F (36 7 °C), temperature source Oral, resp  rate 16, height 5' 2" (1 575 m), weight 67 6 kg (149 lb 0 5 oz), SpO2 94 %  Imaging and other studies: I have personally reviewed pertinent reports        O2 Device: None     Plan    Respiratory Plan: No distress/Pulmonary history, Discontinue Protocol

## 2020-01-02 NOTE — ASSESSMENT & PLAN NOTE
· Patient has felt generally weak, felt her legs collapse  · In the setting of infections  · PT/OT evaluation  ·  consultation, may need placement

## 2020-01-02 NOTE — ASSESSMENT & PLAN NOTE
 Controlled at this time   Rate/rhythm control:  Coreg   Anticoagulation:  Plavix, aspirin   Monitor heart rate

## 2020-01-02 NOTE — PHYSICAL THERAPY NOTE
PHYSICAL THERAPY EVALUATION  NAME:  Francisco Swan  DATE: 01/02/20    AGE:   80 y o   Mrn:   [id number]  ADMIT DX:  Weakness [R53 1]  Recurrent UTI [N39 0]  Hypoxia [R09 02]  MEGAN (acute kidney injury) ([facility] [address] ) [N17 9]  Chest pain, unspecified [R07 9]    Past Medical History:   Diagnosis Date    Atrial fibrillation ([facility] )     Benign essential hypertension     CAD (coronary artery disease)     Carotid artery obstruction     Disease of thyroid gland     TIA    GERD (gastroesophageal reflux disease)     HL (hearing loss)     Wears hearing aids    Mesenteric ischemia, chronic (HCC)     TIA (transient ischemic attack)     Tinnitus      Length Of Stay: 1  Performed at least 2 patient identifiers during session: Name and Birthday  PHYSICAL THERAPY EVALUATION :    01/02/20 1221   Note Type   Note type Eval only   Pain Assessment   Pain Assessment FLACC   Pain Type Chronic pain   Pain Location Hand   Pain Orientation Bilateral   Effect of Pain on Daily Activities limits AROM @ hand/   Patient's Stated Pain Goal No pain   Hospital Pain Intervention(s) Repositioned; Ambulation/increased activity; Emotional support  (asking RN re: pt's gabapentin meds from home)   Pain Rating: FLACC (Rest) - Face 0   Pain Rating: FLACC (Rest) - Legs 1   Pain Rating: FLACC (Rest) - Activity 1   Pain Rating: FLACC (Rest) - Cry 1   Pain Rating: FLACC (Rest) - Consolability 0   Score: FLACC (Rest) 3   Pain Rating: FLACC (Activity) - Face 1   Pain Rating: FLACC (Activity) - Legs 1   Pain Rating: FLACC (Activity) - Activity 1   Pain Rating: FLACC (Activity) - Cry 1   Pain Rating: FLACC (Activity) - Consolability 0   Score: FLACC (Activity) 4   Home Living   Type of Home House   Home Layout Two level  (stair glide to second floor; 1 short LONNIE)   Home Equipment Walker   Additional Comments Pt did not have home O2 prior to admit   Pt reports having lift chair and adjutsable bed, but daughter reports bed high for pt   Prior Function   Level of [place] Independent with ADLs and functional mobility   Lives With Daughter  Kareen Edmonds)   Falls in the last 6 months 0  (near miss)   Restrictions/Precautions   Weight Bearing Precautions Per Order No   Other Precautions Telemetry;O2;Fall Risk;Pain   General   Family/Caregiver Present   (daughters present at end of session)   Cognition   Overall Cognitive Status [facility]   Arousal/Participation Alert   Following Commands Follows one step commands with increased time or repetition   RUE Strength   RUE Overall Strength Deficits  (attributes to premorbid RTC tears)   LUE Strength   LUE Overall Strength   (attributes to premorbid RTC tears)   RLE Overall PROM   R Ankle Dorsiflexion limited   R Ankle Plantar Flexion limited   Strength RLE   R Hip Flexion 4-/5   R Knee Extension 4-/5   LLE Overall PROM   L Ankle Dorsiflexion limited   L Ankle Plantar Flexion limited   Strength LLE   L Hip Flexion 4-/5   L Knee Extension 4-/5   Coordination   Movements are Fluid and Coordinated 0   Coordination and Movement Description segmental   Sensation X  (limited hearing)   Light Touch   RLE Light Touch Grossly intact   LLE Light Touch Grossly intact   Bed Mobility   Supine to Sit 3  Moderate assistance   Additional items Assist x 1;HOB elevated; Bedrails; Increased time required   Transfers   Sit to Stand 4  Minimal assistance   Additional items Assist x 1; Increased time required;Verbal cues;Armrests   Stand to Sit 4  Minimal assistance   Additional items Assist x 1; Increased time required;Verbal cues;Armrests  (abandons walker upon approach to sit)   Ambulation/Elevation   Gait pattern Excessively slow; Forward Flexion; Short stride;Poor UE support   Gait Assistance 4  Minimal assist   Additional items Assist x 1;Verbal cues  (while providing chair/O2 follow)   Assistive Device Rolling walker  (own walker appears standard height, not short)   Distance 12'   Stair Management Assistance Not tested   Balance   Static Sitting Good   Static Standing Fair -   Ambulatory Poor +   Endurance Deficit   Endurance Deficit Yes   Endurance Deficit Description limited amb distance and self-reported SOB  Spo2 on RA @ rest 96%, after amb on 2l/m Spo2 97%  Activity Tolerance   Activity Tolerance Patient limited by pain; Patient limited by fatigue   Nurse Made Aware spoke to [facility] and Evon PCA   Assessment   Prognosis Fair   Problem List Decreased strength;Decreased range of motion;Decreased endurance; Impaired balance;Decreased mobility; Decreased coordination; Impaired hearing;Pain  (gait deviations)   Barriers to Discharge Comments Daughter reports she can provide 24 hr A and physical care PRN for pt if needed  Goals   Patient Goals to get back home asap without O2, and be able to read   STG Expiration Date 01/12/20   PT Treatment Day 0   Plan   Treatment/Interventions Functional transfer training;LE strengthening/ROM; Therapeutic exercise; Endurance training;Patient/family training;Equipment eval/education; Bed mobility;Gait training;Cognitive reorientation   PT Frequency Other (Comment)  (3-5x/wk)   Recommendation   Recommendation Home with family support;Home PT   Equipment Recommended Walker  (preferably SHORt walker)   Barthel Index   Feeding 5   Bathing 0   Grooming Score 0   Dressing Score 5   Bladder Score 5   Bowels Score 0   Toilet Use Score 5   Transfers (Bed/Chair) Score 10   Mobility (Level Surface) Score 0   Stairs Score 0   Barthel Index Score 30   (Please find full objective findings from PT assessment regarding body systems outlined above)  Assessment: Pt is a 80 y o  female seen for PT evaluation s/p admit to [facility] on 1/1/2020 w/ Acute respiratory failure with hypoxia ([address] )  Order placed for PT  Upon evaluation: Pt requires substantial assistance for bed mobility and min assistance for transfers and ambulation with rolling walker for short distances    Pt's clinical presentation is currently unstable/unpredictable given the functional mobility deficits above, especially weakness, decreased ROM, decreased endurance, gait deviations, pain, decreased activity tolerance, decreased functional mobility tolerance and SOB upon exertion, coupled with fall risks including impaired balance, impaired coordination and near miss falls, and combined with medical complications of hypertension , hypotension, abnormal H&H, abnormal WBCs and abnormal potassium values  Pt to benefit from continued skilled PT tx while in hospital and upon DC to address deficits as defined above and maximize level of functional mobility  Recommend trial with walker next 1-2 sessions, ther ex next 1-2 sessions, OT consult and case management consult  Goals: Pt will: Perform bed mobility tasks with min A to prepare for transfers and reposition in bed  Perform transfers with Supervision to increase Indep in home environment and decrease risk for falls  Perform ambulation with rolling walker for up to 48' with Supervision to increase Indep in home environment and decrease risk for falls  Perform 1 small stair w/UE support and w/A Of 1 to return to home with LONNIE [place] Comorbidities affecting pt's physical performance at time of assessment include: HTN, A fib, CAD and R TKR, TIA, cardiac cath/angioplasty w/stents  Personal factors affecting pt at time of IE include: steps to enter environment, advanced age, inability to perform IADLs, inability to perform ADLs and inability to navigate community distances     Heri Ramirez, PT, DPT

## 2020-01-02 NOTE — ASSESSMENT & PLAN NOTE
Patient has felt generally weak, felt her legs collapse  · In the setting of infections  · Pending PT/OT evaluation  ·  consultation, may need placement

## 2020-01-02 NOTE — ASSESSMENT & PLAN NOTE
· She is followed as an outpatient by urologist, Dr Ron Watson for recurrent UTIs  · Has been on Keflex prophylactically daily, has been compliant with her medications  · Patient has had generalized weakness, urinalysis highly suspicious for UTI with 30-50 white blood cells and moderate leukocytes  · IV ceftriaxone Q 24 hours  · Follow up on urine culture  · Urology consulted

## 2020-01-03 PROBLEM — E87.2 LACTIC ACIDOSIS: Status: RESOLVED | Noted: 2020-01-01 | Resolved: 2020-01-03

## 2020-01-03 PROBLEM — R73.9 HYPERGLYCEMIA: Status: ACTIVE | Noted: 2020-01-03

## 2020-01-03 LAB
ANION GAP SERPL CALCULATED.3IONS-SCNC: 12 MMOL/L (ref 4–13)
ATRIAL RATE: 66 BPM
BASOPHILS # BLD AUTO: 0.01 THOUSANDS/ΜL (ref 0–0.1)
BASOPHILS NFR BLD AUTO: 0 % (ref 0–1)
BUN SERPL-MCNC: 32 MG/DL (ref 5–25)
CALCIUM SERPL-MCNC: 8.9 MG/DL (ref 8.3–10.1)
CHLORIDE SERPL-SCNC: 102 MMOL/L (ref 100–108)
CO2 SERPL-SCNC: 24 MMOL/L (ref 21–32)
CREAT SERPL-MCNC: 1.24 MG/DL (ref 0.6–1.3)
EOSINOPHIL # BLD AUTO: 0 THOUSAND/ΜL (ref 0–0.61)
EOSINOPHIL NFR BLD AUTO: 0 % (ref 0–6)
ERYTHROCYTE [DISTWIDTH] IN BLOOD BY AUTOMATED COUNT: 13.2 % (ref 11.6–15.1)
GFR SERPL CREATININE-BSD FRML MDRD: 39 ML/MIN/1.73SQ M
GLUCOSE SERPL-MCNC: 285 MG/DL (ref 65–140)
GLUCOSE SERPL-MCNC: 325 MG/DL (ref 65–140)
GLUCOSE SERPL-MCNC: 393 MG/DL (ref 65–140)
HCT VFR BLD AUTO: 31.1 % (ref 34.8–46.1)
HGB BLD-MCNC: 9.9 G/DL (ref 11.5–15.4)
IMM GRANULOCYTES # BLD AUTO: 0.09 THOUSAND/UL (ref 0–0.2)
IMM GRANULOCYTES NFR BLD AUTO: 1 % (ref 0–2)
LYMPHOCYTES # BLD AUTO: 0.85 THOUSANDS/ΜL (ref 0.6–4.47)
LYMPHOCYTES NFR BLD AUTO: 9 % (ref 14–44)
MCH RBC QN AUTO: 31.8 PG (ref 26.8–34.3)
MCHC RBC AUTO-ENTMCNC: 31.8 G/DL (ref 31.4–37.4)
MCV RBC AUTO: 100 FL (ref 82–98)
MONOCYTES # BLD AUTO: 0.12 THOUSAND/ΜL (ref 0.17–1.22)
MONOCYTES NFR BLD AUTO: 1 % (ref 4–12)
NEUTROPHILS # BLD AUTO: 8.53 THOUSANDS/ΜL (ref 1.85–7.62)
NEUTS SEG NFR BLD AUTO: 89 % (ref 43–75)
NRBC BLD AUTO-RTO: 0 /100 WBCS
P AXIS: 91 DEGREES
PLATELET # BLD AUTO: 233 THOUSANDS/UL (ref 149–390)
PMV BLD AUTO: 9.2 FL (ref 8.9–12.7)
POTASSIUM SERPL-SCNC: 3.7 MMOL/L (ref 3.5–5.3)
PR INTERVAL: 156 MS
PROCALCITONIN SERPL-MCNC: 0.07 NG/ML
QRS AXIS: 19 DEGREES
QRSD INTERVAL: 120 MS
QT INTERVAL: 430 MS
QTC INTERVAL: 450 MS
RBC # BLD AUTO: 3.11 MILLION/UL (ref 3.81–5.12)
SODIUM SERPL-SCNC: 138 MMOL/L (ref 136–145)
T WAVE AXIS: 44 DEGREES
VENTRICULAR RATE: 66 BPM
WBC # BLD AUTO: 9.6 THOUSAND/UL (ref 4.31–10.16)

## 2020-01-03 PROCEDURE — 93010 ELECTROCARDIOGRAM REPORT: CPT | Performed by: INTERNAL MEDICINE

## 2020-01-03 PROCEDURE — 85025 COMPLETE CBC W/AUTO DIFF WBC: CPT | Performed by: INTERNAL MEDICINE

## 2020-01-03 PROCEDURE — 99232 SBSQ HOSP IP/OBS MODERATE 35: CPT | Performed by: HOSPITALIST

## 2020-01-03 PROCEDURE — 82948 REAGENT STRIP/BLOOD GLUCOSE: CPT

## 2020-01-03 PROCEDURE — 80048 BASIC METABOLIC PNL TOTAL CA: CPT | Performed by: INTERNAL MEDICINE

## 2020-01-03 PROCEDURE — 99232 SBSQ HOSP IP/OBS MODERATE 35: CPT | Performed by: PHYSICIAN ASSISTANT

## 2020-01-03 PROCEDURE — 84145 PROCALCITONIN (PCT): CPT | Performed by: INTERNAL MEDICINE

## 2020-01-03 RX ORDER — LANOLIN ALCOHOL/MO/W.PET/CERES
3 CREAM (GRAM) TOPICAL
Status: DISCONTINUED | OUTPATIENT
Start: 2020-01-03 | End: 2020-01-03

## 2020-01-03 RX ORDER — GABAPENTIN 100 MG/1
200 CAPSULE ORAL 3 TIMES DAILY
Status: DISCONTINUED | OUTPATIENT
Start: 2020-01-04 | End: 2020-01-04 | Stop reason: HOSPADM

## 2020-01-03 RX ORDER — METHYLPREDNISOLONE SODIUM SUCCINATE 40 MG/ML
40 INJECTION, POWDER, LYOPHILIZED, FOR SOLUTION INTRAMUSCULAR; INTRAVENOUS DAILY
Status: DISCONTINUED | OUTPATIENT
Start: 2020-01-04 | End: 2020-01-04

## 2020-01-03 RX ORDER — LANOLIN ALCOHOL/MO/W.PET/CERES
3 CREAM (GRAM) TOPICAL
Status: DISCONTINUED | OUTPATIENT
Start: 2020-01-03 | End: 2020-01-04 | Stop reason: HOSPADM

## 2020-01-03 RX ADMIN — MELATONIN 1000 UNITS: at 09:20

## 2020-01-03 RX ADMIN — CLONIDINE HYDROCHLORIDE 0.1 MG: 0.1 TABLET ORAL at 17:11

## 2020-01-03 RX ADMIN — ENOXAPARIN SODIUM 30 MG: 30 INJECTION SUBCUTANEOUS at 09:18

## 2020-01-03 RX ADMIN — ASPIRIN 81 MG 81 MG: 81 TABLET ORAL at 09:20

## 2020-01-03 RX ADMIN — DOXYCYCLINE 100 MG: 100 CAPSULE ORAL at 21:20

## 2020-01-03 RX ADMIN — AMLODIPINE BESYLATE 5 MG: 5 TABLET ORAL at 09:19

## 2020-01-03 RX ADMIN — FERROUS SULFATE TAB 325 MG (65 MG ELEMENTAL FE) 325 MG: 325 (65 FE) TAB at 09:19

## 2020-01-03 RX ADMIN — CEFTRIAXONE SODIUM 1000 MG: 10 INJECTION, POWDER, FOR SOLUTION INTRAVENOUS at 18:41

## 2020-01-03 RX ADMIN — MELATONIN 3 MG: at 21:21

## 2020-01-03 RX ADMIN — CLONIDINE HYDROCHLORIDE 0.1 MG: 0.1 TABLET ORAL at 09:20

## 2020-01-03 RX ADMIN — POTASSIUM CHLORIDE 10 MEQ: 750 TABLET, EXTENDED RELEASE ORAL at 09:18

## 2020-01-03 RX ADMIN — METHYLPREDNISOLONE SODIUM SUCCINATE 40 MG: 40 INJECTION, POWDER, FOR SOLUTION INTRAMUSCULAR; INTRAVENOUS at 09:20

## 2020-01-03 RX ADMIN — CARVEDILOL 25 MG: 12.5 TABLET, FILM COATED ORAL at 09:20

## 2020-01-03 RX ADMIN — GABAPENTIN 200 MG: 100 CAPSULE ORAL at 17:12

## 2020-01-03 RX ADMIN — GABAPENTIN 200 MG: 100 CAPSULE ORAL at 09:19

## 2020-01-03 RX ADMIN — POTASSIUM CHLORIDE 10 MEQ: 750 TABLET, EXTENDED RELEASE ORAL at 17:12

## 2020-01-03 RX ADMIN — MELATONIN 3 MG: at 00:33

## 2020-01-03 RX ADMIN — CLONIDINE HYDROCHLORIDE 0.1 MG: 0.1 TABLET ORAL at 12:50

## 2020-01-03 RX ADMIN — CARVEDILOL 25 MG: 12.5 TABLET, FILM COATED ORAL at 17:11

## 2020-01-03 RX ADMIN — INSULIN LISPRO 5 UNITS: 100 INJECTION, SOLUTION INTRAVENOUS; SUBCUTANEOUS at 17:16

## 2020-01-03 RX ADMIN — CLONIDINE HYDROCHLORIDE 0.1 MG: 0.1 TABLET ORAL at 21:20

## 2020-01-03 RX ADMIN — GUAIFENESIN 1200 MG: 600 TABLET, EXTENDED RELEASE ORAL at 21:20

## 2020-01-03 RX ADMIN — PANTOPRAZOLE SODIUM 40 MG: 40 TABLET, DELAYED RELEASE ORAL at 05:14

## 2020-01-03 RX ADMIN — GABAPENTIN 200 MG: 100 CAPSULE ORAL at 12:50

## 2020-01-03 RX ADMIN — GUAIFENESIN 1200 MG: 600 TABLET, EXTENDED RELEASE ORAL at 09:19

## 2020-01-03 RX ADMIN — INSULIN LISPRO 3 UNITS: 100 INJECTION, SOLUTION INTRAVENOUS; SUBCUTANEOUS at 21:24

## 2020-01-03 RX ADMIN — ATORVASTATIN CALCIUM 40 MG: 40 TABLET, FILM COATED ORAL at 17:12

## 2020-01-03 RX ADMIN — DOXYCYCLINE 100 MG: 100 CAPSULE ORAL at 09:19

## 2020-01-03 RX ADMIN — POTASSIUM CHLORIDE 10 MEQ: 750 TABLET, EXTENDED RELEASE ORAL at 21:20

## 2020-01-03 RX ADMIN — CYANOCOBALAMIN TAB 500 MCG 1000 MCG: 500 TAB at 09:19

## 2020-01-03 RX ADMIN — CLOPIDOGREL BISULFATE 75 MG: 75 TABLET ORAL at 09:23

## 2020-01-03 RX ADMIN — ESCITALOPRAM OXALATE 10 MG: 10 TABLET ORAL at 09:19

## 2020-01-03 NOTE — ASSESSMENT & PLAN NOTE
Patient has felt generally weak, felt her legs collapse  · In the setting of infections    · PT evaluation recommended home PT

## 2020-01-03 NOTE — ASSESSMENT & PLAN NOTE
She is followed as an outpatient by urologist, Dr Mike Acuña for recurrent UTIs  Has been on Keflex prophylactically daily, has been compliant with her medications  Patient has had generalized weakness, urinalysis highly suspicious for UTI with 30-50 white blood cells and moderate leukocytes  ·  urine culture no growth  · Blood culture no growth  · Urology consulted: Discharged at the discretion of Internal Medicine   Resume Keflex suppression upon discharge

## 2020-01-03 NOTE — ASSESSMENT & PLAN NOTE
 Blood pressure is reviewed and acceptable  She was hypotensive upon arrival, responded to IV fluids      Continue clonidine, carvedilol, amlodipine  Lasix     Monitor blood pressure outpatient

## 2020-01-03 NOTE — DISCHARGE SUMMARY
Discharge- Ernie Fresh 4/6/1930, 80 y o  female MRN: [id number]    Unit/Bed#: S -01 Encounter: [id number]    Primary Care Provider: Sushila Amaral MD   Date and time admitted to hospital: 1/1/2020  5:26 PM        * Acute respiratory failure with hypoxia [facility])  Assessment & Plan  Patient on admission,  presents requiring 4 L of nasal cannula oxygen, no oxygen requirements at baseline  She has had upper respiratory symptoms for the past few days  No chest pain or palpitations  Chest x-ray with no clear evidence of pneumonia, however suspect bronchitis versus developing pneumonia  Assessment:  Bronchitis versus developing pneumonia    · Patient was weaned off oxygen since yesterday and oxygen saturation remained above 88%  · Discontinue IV ceftriaxone and doxycycline and will start on Omnicef 300 mg p o  Every 12 hours for total of 5 days  · After completing Omnicef patient can resume her home Keflex prophylactic dose the next day  · Discontinue Solu-Medrol 40 mg IV today and then will switch to p o  Prednisone on slow taper then will resume prednisone home dose the next day  · Will resume prednisone 7 5 mg daily once off prednisone slow taper  · procalcitonin 0 07 within normal limits      Hyperglycemia  Assessment & Plan  Blood glucose in morning level was 285, likely from his IV steroids that was started yesterday    With discontinue IV steroids and will discontinue subcutaneous insulin  Will follow up with primary care physician regarding blood glucose  Will resume home diabetic medication    Recurrent UTI  Assessment & Plan  She is followed as an outpatient by urologist, Dr Kelsey Epley for recurrent UTIs  Has been on Keflex prophylactically daily, has been compliant with her medications  Patient has had generalized weakness, urinalysis highly suspicious for UTI with 30-50 white blood cells and moderate leukocytes    ·  urine culture no growth  · Blood culture no growth  · Urology consulted: Discharged at the discretion of Internal Medicine  Resume Keflex suppression upon discharge    MEGAN (acute kidney injury) ([facility] [address] )  Assessment & Plan   Creatinine upon admission:  1 42  o Baseline:  0 9-1 24  o Current creatinine is 0 87>1 24>1 07   Secondary to prerenal, dehydration   Treatment:   resumed Lasix 20 mg every other day,    Ambulatory dysfunction  Assessment & Plan  Patient has felt generally weak, felt her legs collapse  · In the setting of infections  · PT evaluation recommended home PT      Coronary artery disease with unstable angina  Assessment & Plan  · Follows with Cardiology as an outpatient  No chest pain at this time  · Continue dual anti-platelet therapy  Benign essential hypertension  Assessment & Plan   Blood pressure is reviewed and acceptable  She was hypotensive upon arrival, responded to IV fluids      Continue clonidine, carvedilol, amlodipine  Lasix   Monitor blood pressure outpatient    Paroxysmal atrial fibrillation [facility])  Assessment & Plan   Controlled at this time   Rate/rhythm control:  Coreg   Anticoagulation:  Plavix, aspirin   Monitor heart rate  Discharging Resident Physician: Donis Marinelli MD  Attending: Cliff Brantley MD  PCP: Clemente Quintanilla MD  Admission Date: 1/1/2020  Discharge Date: 01/04/20    Disposition:     Home with VNA Services (Reminder: Complete face to face encounter)    Reason for Admission:  Acute respiratory failure with hypoxia secondary to possible pneumonia/bronchitis and UTI    Consultations During Hospital Stay:  Urology  Procedures Performed:     · None    Significant Findings / Test Results:     XR chest 1 view portable : No evidence of acute abnormality in the chest  Urine culture:  No growth  Blood culture:  No growth  Flu and RSV:  None detected  Urinalysis:  WBC 30-50, moderate leukocytes, occasional bacteria    Incidental Findings:   None  Test Results Pending at Discharge (will require follow up):    · None  Outpatient Tests Requested:  · None    Complications:  None    Hospital Course:     Maria E Armstrong is a 80 y o  female patient who originally presented to the hospital on 1/1/2020 due to upper respiratory symptoms over the past few days, as well as weakness  Patient ambulates with a walker at baseline  Reports that she has felt generally weak, had not ate much today  She felt her legs were totally weak, almost giving out on her  She denies any fevers but has had some chills  She reports a cough, which is not productive  Her daughter is sick with similar symptoms, is being treated for bronchitis  She denies any chest pain, palpitations, nausea, vomiting, abdominal pain  She has a history of recurrent UTIs, follows with urologist, Dr Jorge Hyman  She has been taking Keflex 250 mg daily and has been compliant with her medication regimen  She does not really report any urinary symptoms besides a general fatigue and weakness  Denies any swelling in her legs, changes in her skin  On admission, patient had productive cough described as yellow phlegm, which I have seen personally  There were concerns of UTI due to urinalysis and patient history of recurrent UTI  Urology was consulted  Chest x-ray was done but was unremarkable, but based on patient's symptoms such as shortness of breath, chills, productive sputum crackles in the base and wheezing we are treating her for possible pneumonia/bronchitis  She was given IV steroids which quickly improved the breathing was originally wearing 4 L nasal cannula and was weaned off to room air as soon as we started her on IV antibiotics plus IV steroids  Patient reported improvement with breathing unfortunately while patient on steroids her blood glucose went up so we had to place her on sliding scale insulin temporary  Patient reported improvement and would like to go home today    Urology has seen the patient, urine culture did not show anything, so they recommend to put back the patient on prophylactic Keflex per patient home med  Will switch IV antibiotics to p o  Cefdinir 300 mg b i d  For total 5 days and will discontinue doxycycline  Once the patient completed cefdinir she will be put back on her prophylactic Keflex per home regimen and we are going to discontinue her IV steroids and switch her to p o  Prednisone on a tapering dose once she completed the tapering dose she will be placed back on her original prednisone 7 5 mg daily  Patient reported feeling better and is ready to go home  I discussed with the patient to follow up with her primary care physician within 1 week          Condition at Discharge: good     Discharge Day Visit / Exam:     Subjective:  Patient has no subjective complain  Vitals: Blood Pressure: 125/79 (01/04/20 0700)  Pulse: 67 (01/04/20 0700)  Temperature: 98 4 °F (36 9 °C) (01/04/20 0700)  Temp Source: Oral (01/04/20 0700)  Respirations: 18 (01/04/20 0700)  Height: 5' 2" (157 5 cm) (01/01/20 2030)  Weight - Scale: 67 6 kg (149 lb 0 5 oz) (01/01/20 2030)  SpO2: 93 % (01/04/20 0700)  Exam:   Physical Exam   Constitutional: She is oriented to person, place, and time  She appears well-developed  HENT:   Head: Normocephalic  Eyes: EOM are normal    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 according to the patient ever since he had a cataract surgery  Neck: Normal range of motion  Cardiovascular: Normal rate, regular rhythm, normal heart sounds and intact distal pulses  Pulmonary/Chest: Effort normal  She has wheezes (mild Bilateral wheezing on the base)  She has rales (Mild rales in lower lobes)  Patient now is in room air, clear upper lung fields   Abdominal: Soft  Bowel sounds are normal    Musculoskeletal: She exhibits edema (Trace edema on the legs)  Upper motor strength 4/5  Lower motor strength 3/5   Neurological: She is alert and oriented to person, place, and time  Skin: Skin is warm  Psychiatric: She has a normal mood and affect  Discussion with family:  I talked to one of her daughters over the phone regarding discharge plan, and I also talked to her again at bedside regarding treatment plan when patient is discharged today  Discharge instructions/Information to patient and family:   See after visit summary for information provided to patient and family  Provisions for Follow-Up Care:  See after visit summary for information related to follow-up care and any pertinent home health orders  Discharge Medications:  See after visit summary for reconciled discharge medications provided to patient and family        ** Please Note: This note has been constructed using a voice recognition system **

## 2020-01-03 NOTE — ASSESSMENT & PLAN NOTE
 Creatinine upon admission:  1 42  o Baseline:  0 9-1 24  o Current creatinine is 0 87>1 24>1 07   Secondary to prerenal, dehydration   Treatment:   resumed Lasix 20 mg every other day,

## 2020-01-03 NOTE — ASSESSMENT & PLAN NOTE
Blood glucose in morning level was 285, likely from his IV steroids that was started yesterday    With discontinue IV steroids and will discontinue subcutaneous insulin  Will follow up with primary care physician regarding blood glucose  Will resume home diabetic medication

## 2020-01-03 NOTE — ASSESSMENT & PLAN NOTE
Blood glucose in morning level was 285, likely from his IV steroids that was started yesterday    Will place patient on sliding scale insulin while on IV steroids and will taper slowly

## 2020-01-03 NOTE — PROGRESS NOTES
Progress Note - Urology  Rahat Kamara 4/6/1930, 80 y o  female MRN: [id number]    Unit/Bed#: S -01 Encounter: [id number]    Recurrent UTI  Assessment & Plan  With reported frequent UTIs on Keflex suppression, known to Dr Gilmer Owens  No previous cultures in our system  UA with 0-1 RBC 30-50 WBC and occasional bacteria, no growth on urine culture  Current coverage with Ceftriaxone  PVR 110mL     Plan:  Discharged at the discretion of Internal Medicine  Resume Keflex suppression upon discharge  Urology will sign off but remain available for any further inpatient needs  Please feel free to call with questions or concerns  Outpatient follow up with Dr Gilmer Owens, as scheduled    Subjective/Objective     Subjective:   CC: "my breakfast was late, and i'm still coughing"  HPI:  Family reports she is feeling well today  She continues to cough  She continues to have incontinence, which she now reports is leaking with coughing  No burning, no hematuria  Urine culture resulted with no growth  ROS:  Review of Systems   Constitutional: Negative for activity change and appetite change  HENT: Negative for congestion and ear pain  Eyes: Negative for pain  Respiratory: Negative for cough and shortness of breath  Cardiovascular: Negative for chest pain and palpitations  Gastrointestinal: Negative for abdominal distention, abdominal pain, blood in stool, constipation, diarrhea and nausea  Genitourinary: Negative for difficulty urinating, dysuria and hematuria  Musculoskeletal: Negative for arthralgias and myalgias  Skin: Negative for rash  Allergic/Immunologic: Negative for immunocompromised state  Neurological: Negative for dizziness and headaches  Hematological: Negative for adenopathy  Does not bruise/bleed easily  Psychiatric/Behavioral: Negative for agitation  The patient is not nervous/anxious          Objective:  Vitals: Blood pressure 162/75, pulse 74, temperature 98 8 °F (37 1 °C), temperature source Oral, resp  rate 16, height 5' 2" (1 575 m), weight 67 6 kg (149 lb 0 5 oz), SpO2 94 %  ,Body mass index is 27 26 kg/m²  Intake/Output Summary (Last 24 hours) at 1/3/2020 0952  Last data filed at 1/3/2020 0700  Gross per 24 hour   Intake 700 ml   Output 1525 ml   Net -825 ml     Invasive Devices     Peripheral Intravenous Line            Peripheral IV 01/02/20 Left Wrist 1 day          Line            Arterial Sheath 6 Fr  Right Radial 108 days                Physical Exam   Constitutional: She is oriented to person, place, and time  She appears well-developed and well-nourished  She is cooperative  She does not appear ill  No distress  80year-old frail female, out of bed to the chair, finishing breakfast   No acute distress  HENT:   Head: Normocephalic and atraumatic  Moist mucous membranes  Eyes: Conjunctivae and EOM are normal    Neck: Normal range of motion  Neck supple  No tracheal deviation present  Cardiovascular: Normal rate, regular rhythm and normal heart sounds  No murmur heard  Pulmonary/Chest: Effort normal and breath sounds normal  No respiratory distress  She has no wheezes  Good airflow bilaterally on deep inspiration  Abdominal: Soft  Bowel sounds are normal  She exhibits no distension and no mass  There is no tenderness  Abdomen obese, soft and benign  Musculoskeletal: Normal range of motion  She exhibits no edema  Neurological: She is alert and oriented to person, place, and time  Skin: Skin is warm and dry  No rash noted  She is not diaphoretic  No erythema  No pallor  Psychiatric: She has a normal mood and affect  Her behavior is normal  Judgment and thought content normal    Nursing note and vitals reviewed        History:    Past Medical History:   Diagnosis Date    Atrial fibrillation (HCC)     Benign essential hypertension     CAD (coronary artery disease)     Carotid artery obstruction     Disease of thyroid gland     TIA    GERD (gastroesophageal reflux disease)     HL (hearing loss)     Wears hearing aids    Mesenteric ischemia, chronic (HCC)     TIA (transient ischemic attack)     Tinnitus      Past Surgical History:   Procedure Laterality Date    APPENDECTOMY      CARDIAC CATHETERIZATION      CATARACT EXTRACTION      CHOLECYSTECTOMY      CORONARY ANGIOPLASTY      stent x4 07/11/1996x1 10/09/2009 x3    CORONARY STENT PLACEMENT      HYSTERECTOMY      REPLACEMENT TOTAL KNEE Right      Family History   Problem Relation Age of Onset    Leukemia Father     Hypertension Sister     Heart attack Brother 46    Hypertension Brother     Sudden death Brother 46        scd    Heart failure Maternal Grandmother     Hypertension Maternal Grandmother     Stroke Maternal Grandfather     Hypertension Daughter     Anuerysm Neg Hx     Clotting disorder Neg Hx     Arrhythmia Neg Hx     Hyperlipidemia Neg Hx      Social History     Socioeconomic History    Marital status:       Spouse name: None    Number of children: None    Years of education: None    Highest education level: None   Occupational History    None   Social Needs    Financial resource strain: None    Food insecurity:     Worry: None     Inability: None    Transportation needs:     Medical: None     Non-medical: None   Tobacco Use    Smoking status: Never Smoker    Smokeless tobacco: Never Used   Substance and Sexual Activity    Alcohol use: No    Drug use: No    Sexual activity: None   Lifestyle    Physical activity:     Days per week: None     Minutes per session: None    Stress: None   Relationships    Social connections:     Talks on phone: None     Gets together: None     Attends Bahai service: None     Active member of club or organization: None     Attends meetings of clubs or organizations: None     Relationship status: None    Intimate partner violence:     Fear of current or ex partner: None     Emotionally abused: None     Physically abused: None     Forced sexual activity: None   Other Topics Concern    None   Social History Narrative    None       Labs:  Results from last 7 days   Lab Units 01/03/20  0426 01/02/20  0437 01/01/20  1741   WBC Thousand/uL 9 60 12 05* 11 55*   HEMOGLOBIN g/dL 9 9* 10 6* 10 6*   PLATELETS Thousands/uL 233 233 264     Results from last 7 days   Lab Units 01/03/20  0426 01/02/20  0437 01/01/20  1741   SODIUM mmol/L 138 140 142   POTASSIUM mmol/L 3 7 3 3* 3 9   CHLORIDE mmol/L 102 104 103   CO2 mmol/L 24 26 24   BUN mg/dL 32* 18 26*   CREATININE mg/dL 1 24 0 87 1 42*   EGFR ml/min/1 73sq m 39 59 33   CALCIUM mg/dL 8 9 8 7 9 2   AST U/L  --   --  13   ALT U/L  --   --  21   ALK PHOS U/L  --   --  98     Results from last 7 days   Lab Units 01/01/20  1959 01/01/20  1801 01/01/20  1750   BLOOD CULTURE   --  No Growth at 24 hrs  No Growth at 24 hrs     URINE CULTURE  No Growth <1000 cfu/mL  --   --      Results from last 7 days   Lab Units 01/01/20  1741   PROCALCITONIN ng/ml 0 12       Sebas Garrison PA-C  Date: 1/3/2020 Time: 9:52 AM

## 2020-01-03 NOTE — PLAN OF CARE
Problem: Prexisting or High Potential for Compromised Skin Integrity  Goal: Skin integrity is maintained or improved  Description  INTERVENTIONS:  - Identify patients at risk for skin breakdown  - Assess and monitor skin integrity  - Assess and monitor nutrition and hydration status  - Monitor labs   - Assess for incontinence   - Turn and reposition patient  - Assist with mobility/ambulation  - Relieve pressure over bony prominences  - Avoid friction and shearing  - Provide appropriate hygiene as needed including keeping skin clean and dry  - Evaluate need for skin moisturizer/barrier cream  - Collaborate with interdisciplinary team   - Patient/family teaching  - Consider wound care consult   Outcome: Progressing     Problem: Potential for Falls  Goal: Patient will remain free of falls  Description  INTERVENTIONS:  - Assess patient frequently for physical needs  -  Identify cognitive and physical deficits and behaviors that affect risk of falls    -  [place] fall precautions as indicated by assessment   - Educate patient/family on patient safety including physical limitations  - Instruct patient to call for assistance with activity based on assessment  - Modify environment to reduce risk of injury  - Consider OT/PT consult to assist with strengthening/mobility  Outcome: Progressing     Problem: PAIN - ADULT  Goal: Verbalizes/displays adequate comfort level or baseline comfort level  Description  Interventions:  - Encourage patient to monitor pain and request assistance  - Assess pain using appropriate pain scale  - Administer analgesics based on type and severity of pain and evaluate response  - Implement non-pharmacological measures as appropriate and evaluate response  - Consider cultural and social influences on pain and pain management  - Notify physician/advanced practitioner if interventions unsuccessful or patient reports new pain  Outcome: Progressing     Problem: INFECTION - ADULT  Goal: Absence or prevention of progression during hospitalization  Description  INTERVENTIONS:  - Assess and monitor for signs and symptoms of infection  - Monitor lab/diagnostic results  - Monitor all insertion sites, i e  indwelling lines, tubes, and drains  - Monitor endotracheal if appropriate and nasal secretions for changes in amount and color  - [place] appropriate cooling/warming therapies per order  - Administer medications as ordered  - Instruct and encourage patient and family to use good hand hygiene technique  - Identify and instruct in appropriate isolation precautions for identified infection/condition  Outcome: Progressing     Problem: SAFETY ADULT  Goal: Patient will remain free of falls  Description  INTERVENTIONS:  - Assess patient frequently for physical needs  -  Identify cognitive and physical deficits and behaviors that affect risk of falls    -  [place] fall precautions as indicated by assessment   - Educate patient/family on patient safety including physical limitations  - Instruct patient to call for assistance with activity based on assessment  - Modify environment to reduce risk of injury  - Consider OT/PT consult to assist with strengthening/mobility  Outcome: Progressing  Goal: Maintain or return to baseline ADL function  Description  INTERVENTIONS:  -  Assess patient's ability to carry out ADLs; assess patient's baseline for ADL function and identify physical deficits which impact ability to perform ADLs (bathing, care of mouth/teeth, toileting, grooming, dressing, etc )  - Assess/evaluate cause of self-care deficits   - Assess range of motion  - Assess patient's mobility; develop plan if impaired  - Assess patient's need for assistive devices and provide as appropriate  - Encourage maximum independence but intervene and supervise when necessary  - Involve family in performance of ADLs  - Assess for home care needs following discharge   - Consider OT consult to assist with ADL evaluation and planning for discharge  - Provide patient education as appropriate  Outcome: Progressing  Goal: Maintain or return mobility status to optimal level  Description  INTERVENTIONS:  - Assess patient's baseline mobility status (ambulation, transfers, stairs, etc )    - Identify cognitive and physical deficits and behaviors that affect mobility  - Identify mobility aids required to assist with transfers and/or ambulation (gait belt, sit-to-stand, lift, walker, cane, etc )  - [place] fall precautions as indicated by assessment  - Record patient progress and toleration of activity level on Mobility SBAR; progress patient to next Phase/Stage  - Instruct patient to call for assistance with activity based on assessment  - Consider rehabilitation consult to assist with strengthening/weightbearing, etc   Outcome: Progressing     Problem: DISCHARGE PLANNING  Goal: Discharge to home or other facility with appropriate resources  Description  INTERVENTIONS:  - Identify barriers to discharge w/patient and caregiver  - Arrange for needed discharge resources and transportation as appropriate  - Identify discharge learning needs (meds, wound care, etc )  - Arrange for interpretive services to assist at discharge as needed  - Refer to Case Management Department for coordinating discharge planning if the patient needs post-hospital services based on physician/advanced practitioner order or complex needs related to functional status, cognitive ability, or social support system  Outcome: Progressing     Problem: Knowledge Deficit  Goal: Patient/family/caregiver demonstrates understanding of disease process, treatment plan, medications, and discharge instructions  Description  Complete learning assessment and assess knowledge base    Interventions:  - Provide teaching at level of understanding  - Provide teaching via preferred learning methods  Outcome: Progressing     Problem: RESPIRATORY - ADULT  Goal: Achieves optimal ventilation and oxygenation  Description  INTERVENTIONS:  - Assess for changes in respiratory status  - Assess for changes in mentation and behavior  - Position to facilitate oxygenation and minimize respiratory effort  - Oxygen administered by appropriate delivery if ordered  - Initiate smoking cessation education as indicated  - Encourage broncho-pulmonary hygiene including cough, deep breathe, Incentive Spirometry  - Assess the need for suctioning and aspirate as needed  - Assess and instruct to report SOB or any respiratory difficulty  - Respiratory Therapy support as indicated  Outcome: Progressing     Problem: MUSCULOSKELETAL - ADULT  Goal: Maintain or return mobility to safest level of function  Description  INTERVENTIONS:  - Assess patient's ability to carry out ADLs; assess patient's baseline for ADL function and identify physical deficits which impact ability to perform ADLs (bathing, care of mouth/teeth, toileting, grooming, dressing, etc )  - Assess/evaluate cause of self-care deficits   - Assess range of motion  - Assess patient's mobility  - Assess patient's need for assistive devices and provide as appropriate  - Encourage maximum independence but intervene and supervise when necessary  - Involve family in performance of ADLs  - Assess for home care needs following discharge   - Consider OT consult to assist with ADL evaluation and planning for discharge  - Provide patient education as appropriate  Outcome: Progressing  Goal: Maintain proper alignment of affected body part  Description  INTERVENTIONS:  - Support, maintain and protect limb and body alignment  - Provide patient/ family with appropriate education  Outcome: Progressing

## 2020-01-03 NOTE — ASSESSMENT & PLAN NOTE
 Blood pressure is reviewed and acceptable  She was hypotensive upon arrival, responded to IV fluids  o Last recorded pressure:141/63   Continue clonidine, carvedilol, amlodipine  Lasix   Monitor blood pressure

## 2020-01-03 NOTE — ASSESSMENT & PLAN NOTE
Patient on admission,  presents requiring 4 L of nasal cannula oxygen, no oxygen requirements at baseline  She has had upper respiratory symptoms for the past few days  No chest pain or palpitations  Chest x-ray with no clear evidence of pneumonia, however suspect bronchitis versus developing pneumonia  Assessment:  Bronchitis versus developing pneumonia    · Patient was weaned off oxygen since yesterday and oxygen saturation remained above 88%  · Discontinue IV ceftriaxone and doxycycline and will start on Omnicef 300 mg p o  Every 12 hours for total of 5 days  · After completing Omnicef patient can resume her home Keflex prophylactic dose the next day  · Discontinue Solu-Medrol 40 mg IV today and then will switch to p o   Prednisone on slow taper then will resume prednisone home dose the next day  · Will resume prednisone 7 5 mg daily once off prednisone slow taper  · procalcitonin 0 07 within normal limits

## 2020-01-03 NOTE — PROGRESS NOTES
Progress Note - Paula Michaud 4/6/1930, 80 y o  female MRN: [id number]    Unit/Bed#: S -01 Encounter: [id number]    Primary Care Provider: Carlos Fiore MD   Date and time admitted to hospital: 1/1/2020  5:26 PM        * Acute respiratory failure with hypoxia [facility])  Assessment & Plan  Patient presents requiring 4 L of nasal cannula oxygen, no oxygen requirements at baseline  She has had upper respiratory symptoms for the past few days  No chest pain or palpitations  Chest x-ray with no clear evidence of pneumonia, however suspect bronchitis versus developing pneumonia  Assessment:  Bronchitis versus developing pneumonia, patient has very mild bilateral wheezing and bilateral lower lobe crackles on assessment, patient was also complaining of non productive cough today, but had a productive cough yesterday that was color green  · Patient was weaned off oxygen  · Continue IV ceftriaxone and PO doxycycline for 1 more day and will be switched to p o  Antibiotics day 2 antibiotic  · Patient has intolerance to azithromycin  · Will continue Solu-Medrol 40 mg IV today and then will switch to p o  · Will resume prednisone 7 5 mg daily once off Solu-Medrol  · BMP  · CBC in a m  · pro call still in progress  · Respiratory protocol    Hyperglycemia  Assessment & Plan  Blood glucose in morning level was 285, likely from his IV steroids that was started yesterday    Will place patient on sliding scale insulin while on IV steroids and will taper slowly    Recurrent UTI  Assessment & Plan  She is followed as an outpatient by urologist, Dr Angel Alvarez for recurrent UTIs  Has been on Keflex prophylactically daily, has been compliant with her medications  Patient has had generalized weakness, urinalysis highly suspicious for UTI with 30-50 white blood cells and moderate leukocytes  ·  urine culture no growth  · Blood culture no growth  · Urology consulted: Discharged at the discretion of Internal Medicine   Resume Keflex suppression upon discharge    MEGAN (acute kidney injury) ([facility] [address] )  Assessment & Plan   Creatinine upon admission:  1 42  o Baseline:  0 9  o Current creatinine is 0 87>1 24   Secondary to prerenal, dehydration   Treatment:   resumed Lasix 20 mg every other day, patient will be off Lasix today   Monitor BMP  Ambulatory dysfunction  Assessment & Plan  Patient has felt generally weak, felt her legs collapse  · In the setting of infections  · PT evaluation recommended home PT      Coronary artery disease with unstable angina  Assessment & Plan  · Follows with Cardiology as an outpatient  No chest pain at this time  · Continue dual anti-platelet therapy  Benign essential hypertension  Assessment & Plan   Blood pressure is reviewed and acceptable  She was hypotensive upon arrival, responded to IV fluids  o Last recorded pressure:141/63   Continue clonidine, carvedilol, amlodipine  Lasix   Monitor blood pressure  Paroxysmal atrial fibrillation [facility])  Assessment & Plan   Controlled at this time   Rate/rhythm control:  Coreg   Anticoagulation:  Plavix, aspirin   Monitor heart rate  VTE Pharmacologic Prophylaxis:   Pharmacologic: Enoxaparin (Lovenox)  Mechanical VTE Prophylaxis in Place: Yes    Discussions with Specialists or Other Care Team Provider:  Discussed with my attending current treatment plan    Education and Discussions with Family / Patient:  Discussed with the patient current treatment plan, talked with family at bedside regarding treatment plan  Current Length of Stay: 2 day(s)    Current Patient Status: Inpatient     Discharge Plan / Estimated Discharge Date:  1-2 days    Code Status: Level 3 - DNAR and DNI      Subjective:   1  Patient reported being generally weak today but some improvement with breathing that she no longer required oxygen  She had some problems sleeping last night      Objective:     Vitals:   Temp (24hrs), Av 5 °F (36 9 °C), Min:98 1 °F (36 7 °C), Max:98 8 °F (37 1 °C)    Temp:  [98 1 °F (36 7 °C)-98 8 °F (37 1 °C)] 98 8 °F (37 1 °C)  HR:  [70-87] 74  Resp:  [16-18] 16  BP: (127-177)/(59-80) 162/75  SpO2:  [91 %-95 %] 94 %  Body mass index is 27 26 kg/m²  Input and Output Summary (last 24 hours): Intake/Output Summary (Last 24 hours) at 1/3/2020 1035  Last data filed at 1/3/2020 0700  Gross per 24 hour   Intake 700 ml   Output 1525 ml   Net -825 ml       Physical Exam:     Physical Exam   Constitutional: She is oriented to person, place, and time  She appears well-developed  HENT:   Head: Normocephalic  Eyes: EOM are normal    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 according to the patient ever since he had a cataract surgery  Neck: Normal range of motion  Cardiovascular: Normal rate, regular rhythm, normal heart sounds and intact distal pulses  Pulmonary/Chest: Effort normal  She has wheezes (Very mild Bilateral wheezing)  She has rales (Bilateral rales in lower lobe)  Patient now is in room air   Abdominal: Soft  Bowel sounds are normal    Musculoskeletal: She exhibits edema (Trace edema on the legs)  Upper motor strength 4/5  Lower motor strength 3/5   Neurological: She is alert and oriented to person, place, and time  Skin: Skin is warm  Psychiatric: She has a normal mood and affect  Additional Data:     Labs:    Results from last 7 days   Lab Units 01/03/20  0426   WBC Thousand/uL 9 60   HEMOGLOBIN g/dL 9 9*   HEMATOCRIT % 31 1*   PLATELETS Thousands/uL 233   NEUTROS PCT % 89*   LYMPHS PCT % 9*   MONOS PCT % 1*   EOS PCT % 0     Results from last 7 days   Lab Units 01/03/20  0426  01/01/20  1741   POTASSIUM mmol/L 3 7   < > 3 9   CHLORIDE mmol/L 102   < > 103   CO2 mmol/L 24   < > 24   BUN mg/dL 32*   < > 26*   CREATININE mg/dL 1 24   < > 1 42*   CALCIUM mg/dL 8 9   < > 9 2   ALK PHOS U/L  --   --  98   ALT U/L  --   --  21   AST U/L  --   --  13    < > = values in this interval not displayed       Results from last 7 days Lab Units 01/01/20  1741   INR  1 01       * I Have Reviewed All Lab Data Listed Above  * Additional Pertinent Lab Tests Reviewed: All Labs Within Last 24 Hours Reviewed    Imaging:    Imaging Reports Reviewed Today Include:  Chest x-ray reviewed by me    Recent Cultures (last 7 days):     Results from last 7 days   Lab Units 01/01/20 1959 01/01/20  1801 01/01/20  1750   BLOOD CULTURE   --  No Growth at 24 hrs  No Growth at 24 hrs     URINE CULTURE  No Growth <1000 cfu/mL  --   --        Last 24 Hours Medication List:     Current Facility-Administered Medications:  acetaminophen 650 mg Oral Q6H PRN Brodie Lion, PA-C    amLODIPine 5 mg Oral Daily Brodie Lion, PA-C    aspirin 81 mg Oral Daily Brodie Lion, PA-C    atorvastatin 40 mg Oral Daily With [employer], PA-C    benzonatate 100 mg Oral TID PRN Brodie Lion, PA-C    carvedilol 25 mg Oral BID With Meals Brodie Lion, PA-C    cefTRIAXone 1,000 mg Intravenous Q24H Brodie Lion, PA-C Last Rate: 1,000 mg (01/02/20 1913)   cholecalciferol 1,000 Units Oral Daily Brodie Lion, PA-C    cloNIDine 0 1 mg Oral 4x Daily Brodie Lion, PA-C    clopidogrel 75 mg Oral Daily Brodie Lion, PA-C    cyanocobalamin 1,000 mcg Oral Daily Brodie Lion, PA-C    doxycycline hyclate 100 mg Oral Q12H [facility] Brodie Lion, PA-C    enoxaparin 30 mg Subcutaneous Daily Brodie Lion, PA-C    escitalopram 10 mg Oral Daily Brodie Lion, PA-C    ferrous sulfate 325 mg Oral Daily With Breakfast Brodie Lion, PA-C    furosemide 20 mg Oral Every Other Day Tamy Mauricio MD    gabapentin 200 mg Oral TID Tamy Mauricio MD    guaiFENesin 1,200 mg Oral Q12H [facility] Brodie Lion, PA-C    insulin lispro 1-5 Units Subcutaneous TID [facility] Tamy Mauricio MD    insulin lispro 1-5 Units Subcutaneous HS Tamy Mauricio MD    melatonin 3 mg Oral HS Uday Josef, CRNP    [START ON 1/4/2020] methylPREDNISolone sodium succinate 40 mg Intravenous Daily Spencer White MD    ondansetron 4 mg Intravenous Q6H PRN Alyse OdeNADIA    pantoprazole 40 mg Oral Early Morning Alyse Ode, PA-C    potassium chloride 10 mEq Oral TID Alyse Ode, PA-OLGA LIDIA    sodium chloride (PF) 3 mL Intravenous PRN Alyse Johnson PA-C         Today, Patient Was Seen By: Spencer White MD    ** Please Note: This note has been constructed using a voice recognition system   **

## 2020-01-03 NOTE — ASSESSMENT & PLAN NOTE
 Creatinine upon admission:  1 42  o Baseline:  0 9  o Current creatinine is 0 87>1 24   Secondary to prerenal, dehydration   Treatment:   resumed Lasix 20 mg every other day, patient will be off Lasix today   Monitor BMP

## 2020-01-03 NOTE — ASSESSMENT & PLAN NOTE
She is followed as an outpatient by urologist, Dr Yan Rolle for recurrent UTIs  Has been on Keflex prophylactically daily, has been compliant with her medications  Patient has had generalized weakness, urinalysis highly suspicious for UTI with 30-50 white blood cells and moderate leukocytes  ·  urine culture no growth  · Blood culture no growth  · Urology consulted: Discharged at the discretion of Internal Medicine   Resume Keflex suppression upon discharge

## 2020-01-03 NOTE — ASSESSMENT & PLAN NOTE
Patient presents requiring 4 L of nasal cannula oxygen, no oxygen requirements at baseline  She has had upper respiratory symptoms for the past few days  No chest pain or palpitations  Chest x-ray with no clear evidence of pneumonia, however suspect bronchitis versus developing pneumonia  Assessment:  Bronchitis versus developing pneumonia, patient has very mild bilateral wheezing and bilateral lower lobe crackles on assessment, patient was also complaining of non productive cough today, but had a productive cough yesterday that was color green  · Patient was weaned off oxygen  · Continue IV ceftriaxone and PO doxycycline for 1 more day and will be switched to p o  Antibiotics day 2 antibiotic  · Patient has intolerance to azithromycin  · Will continue Solu-Medrol 40 mg IV today and then will switch to p o  · Will resume prednisone 7 5 mg daily once off Solu-Medrol  · BMP  · CBC in a m    · pro call still in progress  · Respiratory protocol

## 2020-01-04 VITALS
TEMPERATURE: 98.4 F | RESPIRATION RATE: 18 BRPM | SYSTOLIC BLOOD PRESSURE: 125 MMHG | WEIGHT: 149.03 LBS | BODY MASS INDEX: 27.43 KG/M2 | OXYGEN SATURATION: 93 % | HEART RATE: 67 BPM | DIASTOLIC BLOOD PRESSURE: 79 MMHG | HEIGHT: 62 IN

## 2020-01-04 LAB
ANION GAP SERPL CALCULATED.3IONS-SCNC: 9 MMOL/L (ref 4–13)
BUN SERPL-MCNC: 42 MG/DL (ref 5–25)
CALCIUM SERPL-MCNC: 9 MG/DL (ref 8.3–10.1)
CHLORIDE SERPL-SCNC: 102 MMOL/L (ref 100–108)
CO2 SERPL-SCNC: 25 MMOL/L (ref 21–32)
CREAT SERPL-MCNC: 1.07 MG/DL (ref 0.6–1.3)
ERYTHROCYTE [DISTWIDTH] IN BLOOD BY AUTOMATED COUNT: 13.3 % (ref 11.6–15.1)
GFR SERPL CREATININE-BSD FRML MDRD: 46 ML/MIN/1.73SQ M
GLUCOSE SERPL-MCNC: 221 MG/DL (ref 65–140)
GLUCOSE SERPL-MCNC: 247 MG/DL (ref 65–140)
GLUCOSE SERPL-MCNC: 247 MG/DL (ref 65–140)
HCT VFR BLD AUTO: 29.7 % (ref 34.8–46.1)
HGB BLD-MCNC: 9.3 G/DL (ref 11.5–15.4)
MCH RBC QN AUTO: 31.6 PG (ref 26.8–34.3)
MCHC RBC AUTO-ENTMCNC: 31.3 G/DL (ref 31.4–37.4)
MCV RBC AUTO: 101 FL (ref 82–98)
PLATELET # BLD AUTO: 245 THOUSANDS/UL (ref 149–390)
PMV BLD AUTO: 8.8 FL (ref 8.9–12.7)
POTASSIUM SERPL-SCNC: 4.2 MMOL/L (ref 3.5–5.3)
RBC # BLD AUTO: 2.94 MILLION/UL (ref 3.81–5.12)
SODIUM SERPL-SCNC: 136 MMOL/L (ref 136–145)
WBC # BLD AUTO: 13.97 THOUSAND/UL (ref 4.31–10.16)

## 2020-01-04 PROCEDURE — 99239 HOSP IP/OBS DSCHRG MGMT >30: CPT | Performed by: HOSPITALIST

## 2020-01-04 PROCEDURE — 80048 BASIC METABOLIC PNL TOTAL CA: CPT | Performed by: INTERNAL MEDICINE

## 2020-01-04 PROCEDURE — 85027 COMPLETE CBC AUTOMATED: CPT | Performed by: INTERNAL MEDICINE

## 2020-01-04 PROCEDURE — 82948 REAGENT STRIP/BLOOD GLUCOSE: CPT

## 2020-01-04 RX ORDER — PREDNISONE 20 MG/1
TABLET ORAL
Qty: 15 TABLET | Refills: 0 | Status: SHIPPED | OUTPATIENT
Start: 2020-01-05 | End: 2020-01-17

## 2020-01-04 RX ORDER — PREDNISONE 20 MG/1
TABLET ORAL
Qty: 15 TABLET | Refills: 0 | Status: SHIPPED | OUTPATIENT
Start: 2020-01-05 | End: 2020-01-04

## 2020-01-04 RX ORDER — CEFDINIR 300 MG/1
300 CAPSULE ORAL EVERY 12 HOURS SCHEDULED
Qty: 6 CAPSULE | Refills: 0 | Status: SHIPPED | OUTPATIENT
Start: 2020-01-04 | End: 2020-01-07

## 2020-01-04 RX ORDER — CEFDINIR 300 MG/1
300 CAPSULE ORAL EVERY 12 HOURS SCHEDULED
Status: DISCONTINUED | OUTPATIENT
Start: 2020-01-04 | End: 2020-01-04 | Stop reason: HOSPADM

## 2020-01-04 RX ORDER — GUAIFENESIN 1200 MG/1
1200 TABLET, EXTENDED RELEASE ORAL EVERY 12 HOURS SCHEDULED
Qty: 20 TABLET | Refills: 0 | Status: SHIPPED | OUTPATIENT
Start: 2020-01-04 | End: 2020-12-23

## 2020-01-04 RX ORDER — PREDNISONE 20 MG/1
40 TABLET ORAL DAILY
Status: DISCONTINUED | OUTPATIENT
Start: 2020-01-05 | End: 2020-01-04 | Stop reason: HOSPADM

## 2020-01-04 RX ORDER — PREDNISONE 1 MG/1
5 TABLET ORAL DAILY
Refills: 0
Start: 2020-01-17 | End: 2020-07-30 | Stop reason: SDUPTHER

## 2020-01-04 RX ORDER — BENZONATATE 100 MG/1
100 CAPSULE ORAL 3 TIMES DAILY PRN
Qty: 20 CAPSULE | Refills: 0 | Status: SHIPPED | OUTPATIENT
Start: 2020-01-04 | End: 2020-12-29 | Stop reason: HOSPADM

## 2020-01-04 RX ORDER — PREDNISONE 2.5 MG
2.5 TABLET ORAL DAILY
Refills: 0
Start: 2020-01-17 | End: 2020-05-04 | Stop reason: SDUPTHER

## 2020-01-04 RX ADMIN — CLONIDINE HYDROCHLORIDE 0.1 MG: 0.1 TABLET ORAL at 12:02

## 2020-01-04 RX ADMIN — MELATONIN 1000 UNITS: at 08:12

## 2020-01-04 RX ADMIN — FERROUS SULFATE TAB 325 MG (65 MG ELEMENTAL FE) 325 MG: 325 (65 FE) TAB at 08:12

## 2020-01-04 RX ADMIN — GABAPENTIN 200 MG: 100 CAPSULE ORAL at 08:13

## 2020-01-04 RX ADMIN — PANTOPRAZOLE SODIUM 40 MG: 40 TABLET, DELAYED RELEASE ORAL at 05:39

## 2020-01-04 RX ADMIN — CLONIDINE HYDROCHLORIDE 0.1 MG: 0.1 TABLET ORAL at 08:13

## 2020-01-04 RX ADMIN — INSULIN LISPRO 2 UNITS: 100 INJECTION, SOLUTION INTRAVENOUS; SUBCUTANEOUS at 08:16

## 2020-01-04 RX ADMIN — ENOXAPARIN SODIUM 30 MG: 30 INJECTION SUBCUTANEOUS at 08:12

## 2020-01-04 RX ADMIN — CARVEDILOL 25 MG: 12.5 TABLET, FILM COATED ORAL at 08:12

## 2020-01-04 RX ADMIN — CEFDINIR 300 MG: 300 CAPSULE ORAL at 12:02

## 2020-01-04 RX ADMIN — CLOPIDOGREL BISULFATE 75 MG: 75 TABLET ORAL at 08:13

## 2020-01-04 RX ADMIN — POTASSIUM CHLORIDE 10 MEQ: 750 TABLET, EXTENDED RELEASE ORAL at 08:12

## 2020-01-04 RX ADMIN — FUROSEMIDE 20 MG: 20 TABLET ORAL at 08:12

## 2020-01-04 RX ADMIN — GUAIFENESIN 1200 MG: 600 TABLET, EXTENDED RELEASE ORAL at 08:12

## 2020-01-04 RX ADMIN — DOXYCYCLINE 100 MG: 100 CAPSULE ORAL at 08:13

## 2020-01-04 RX ADMIN — ASPIRIN 81 MG 81 MG: 81 TABLET ORAL at 08:13

## 2020-01-04 RX ADMIN — CYANOCOBALAMIN TAB 500 MCG 1000 MCG: 500 TAB at 08:13

## 2020-01-04 RX ADMIN — INSULIN LISPRO 2 UNITS: 100 INJECTION, SOLUTION INTRAVENOUS; SUBCUTANEOUS at 12:00

## 2020-01-04 RX ADMIN — AMLODIPINE BESYLATE 5 MG: 5 TABLET ORAL at 08:13

## 2020-01-04 RX ADMIN — GABAPENTIN 200 MG: 100 CAPSULE ORAL at 12:02

## 2020-01-04 RX ADMIN — METHYLPREDNISOLONE SODIUM SUCCINATE 40 MG: 40 INJECTION, POWDER, FOR SOLUTION INTRAMUSCULAR; INTRAVENOUS at 08:12

## 2020-01-04 RX ADMIN — ESCITALOPRAM OXALATE 10 MG: 10 TABLET ORAL at 08:12

## 2020-01-04 NOTE — DISCHARGE INSTRUCTIONS
Patient will continue to take cefdinir 300 mg  twice a day for 3 days then  she can resume taking her (cephalexin) Keflex antibiotic prophylaxis prescribed by her urologist at home the next day  Patient will follow tapering dose of prednisone than on the last dose of the tapering dose, she can resume her home prednisone dose the next day

## 2020-01-04 NOTE — SOCIAL WORK
LOS 2 days, Bundle: No  30 Day Readmission: No    CM met with pt at bedside  Pt was awake, alert sitting in recliner chair  Pt lives in a 2 story home with her daughter, Cheryle Florez  There is one step to enter the home and a chair lift is present for pt to get upstairs  Pt has 2 walkers, 2 shower chairs and a raised toilet seat  Pt stated her daughter assists her with her ADL's like bathing, cooking and shopping  Her daughter provides transportation to appointments  Pt stated she had VNA services in past with [facility] and pt also stated she was in [place] for STR in past after knee surgery  Pt stated she uses [facility] but she could not recall location  Pt denied MH history, drug or alcohol use/hospitalization  Pt's daughter Cheryle Florez is her health care rep  Pt is retired and collects SS benefits  Pt would like home PT and possibly VNA services at [place]  Pt requested [facility]  CM will make referral in [facility]

## 2020-01-06 LAB
BACTERIA BLD CULT: NORMAL
BACTERIA BLD CULT: NORMAL

## 2020-03-02 ENCOUNTER — APPOINTMENT (OUTPATIENT)
Dept: LAB | Facility: AMBULARY SURGERY CENTER | Age: 85
End: 2020-03-02
Payer: MEDICARE

## 2020-03-02 ENCOUNTER — TRANSCRIBE ORDERS (OUTPATIENT)
Dept: LAB | Facility: AMBULARY SURGERY CENTER | Age: 85
End: 2020-03-02

## 2020-03-02 DIAGNOSIS — N18.6 TYPE 2 DIABETES MELLITUS WITH ESRD (END-STAGE RENAL DISEASE) (HCC): Primary | ICD-10-CM

## 2020-03-02 DIAGNOSIS — E11.22 TYPE 2 DIABETES MELLITUS WITH ESRD (END-STAGE RENAL DISEASE) (HCC): ICD-10-CM

## 2020-03-02 DIAGNOSIS — E11.22 TYPE 2 DIABETES MELLITUS WITH ESRD (END-STAGE RENAL DISEASE) (HCC): Primary | ICD-10-CM

## 2020-03-02 DIAGNOSIS — N18.6 TYPE 2 DIABETES MELLITUS WITH ESRD (END-STAGE RENAL DISEASE) (HCC): ICD-10-CM

## 2020-03-02 LAB
ANION GAP SERPL CALCULATED.3IONS-SCNC: 6 MMOL/L (ref 4–13)
BASOPHILS # BLD AUTO: 0.05 THOUSANDS/ΜL (ref 0–0.1)
BASOPHILS NFR BLD AUTO: 1 % (ref 0–1)
BUN SERPL-MCNC: 25 MG/DL (ref 5–25)
CALCIUM SERPL-MCNC: 8.8 MG/DL (ref 8.3–10.1)
CHLORIDE SERPL-SCNC: 110 MMOL/L (ref 100–108)
CO2 SERPL-SCNC: 25 MMOL/L (ref 21–32)
CREAT SERPL-MCNC: 0.84 MG/DL (ref 0.6–1.3)
EOSINOPHIL # BLD AUTO: 0.24 THOUSAND/ΜL (ref 0–0.61)
EOSINOPHIL NFR BLD AUTO: 3 % (ref 0–6)
ERYTHROCYTE [DISTWIDTH] IN BLOOD BY AUTOMATED COUNT: 13.7 % (ref 11.6–15.1)
EST. AVERAGE GLUCOSE BLD GHB EST-MCNC: 160 MG/DL
GFR SERPL CREATININE-BSD FRML MDRD: 62 ML/MIN/1.73SQ M
GLUCOSE P FAST SERPL-MCNC: 95 MG/DL (ref 65–99)
HBA1C MFR BLD: 7.2 %
HCT VFR BLD AUTO: 35.4 % (ref 34.8–46.1)
HGB BLD-MCNC: 11.1 G/DL (ref 11.5–15.4)
IMM GRANULOCYTES # BLD AUTO: 0.07 THOUSAND/UL (ref 0–0.2)
IMM GRANULOCYTES NFR BLD AUTO: 1 % (ref 0–2)
LYMPHOCYTES # BLD AUTO: 2.91 THOUSANDS/ΜL (ref 0.6–4.47)
LYMPHOCYTES NFR BLD AUTO: 32 % (ref 14–44)
MCH RBC QN AUTO: 32.5 PG (ref 26.8–34.3)
MCHC RBC AUTO-ENTMCNC: 31.4 G/DL (ref 31.4–37.4)
MCV RBC AUTO: 104 FL (ref 82–98)
MONOCYTES # BLD AUTO: 0.78 THOUSAND/ΜL (ref 0.17–1.22)
MONOCYTES NFR BLD AUTO: 8 % (ref 4–12)
NEUTROPHILS # BLD AUTO: 5.19 THOUSANDS/ΜL (ref 1.85–7.62)
NEUTS SEG NFR BLD AUTO: 55 % (ref 43–75)
NRBC BLD AUTO-RTO: 0 /100 WBCS
PLATELET # BLD AUTO: 221 THOUSANDS/UL (ref 149–390)
PMV BLD AUTO: 8.6 FL (ref 8.9–12.7)
POTASSIUM SERPL-SCNC: 4 MMOL/L (ref 3.5–5.3)
RBC # BLD AUTO: 3.42 MILLION/UL (ref 3.81–5.12)
SODIUM SERPL-SCNC: 141 MMOL/L (ref 136–145)
WBC # BLD AUTO: 9.24 THOUSAND/UL (ref 4.31–10.16)

## 2020-03-02 PROCEDURE — 85025 COMPLETE CBC W/AUTO DIFF WBC: CPT

## 2020-03-02 PROCEDURE — 83036 HEMOGLOBIN GLYCOSYLATED A1C: CPT

## 2020-03-02 PROCEDURE — 80048 BASIC METABOLIC PNL TOTAL CA: CPT

## 2020-03-02 PROCEDURE — 36415 COLL VENOUS BLD VENIPUNCTURE: CPT

## 2020-04-10 DIAGNOSIS — E78.01 FAMILIAL HYPERCHOLESTEREMIA: ICD-10-CM

## 2020-04-10 DIAGNOSIS — I21.9 ACUTE MI (HCC): ICD-10-CM

## 2020-04-13 ENCOUNTER — TELEPHONE (OUTPATIENT)
Dept: CARDIOLOGY CLINIC | Facility: CLINIC | Age: 85
End: 2020-04-13

## 2020-04-13 DIAGNOSIS — I21.9 ACUTE MI (HCC): ICD-10-CM

## 2020-04-13 RX ORDER — CLOPIDOGREL BISULFATE 75 MG/1
75 TABLET ORAL DAILY
Qty: 90 TABLET | Refills: 3 | Status: SHIPPED | OUTPATIENT
Start: 2020-04-13 | End: 2021-01-01 | Stop reason: SDUPTHER

## 2020-04-14 DIAGNOSIS — E78.01 FAMILIAL HYPERCHOLESTEREMIA: ICD-10-CM

## 2020-04-14 DIAGNOSIS — I21.9 ACUTE MI (HCC): ICD-10-CM

## 2020-04-14 RX ORDER — ATORVASTATIN CALCIUM 40 MG/1
40 TABLET, FILM COATED ORAL EVERY OTHER DAY
Qty: 45 TABLET | Refills: 3 | Status: SHIPPED | OUTPATIENT
Start: 2020-04-14 | End: 2020-05-20 | Stop reason: SDUPTHER

## 2020-04-15 RX ORDER — CLOPIDOGREL BISULFATE 75 MG/1
TABLET ORAL
Qty: 90 TABLET | Refills: 1 | Status: SHIPPED | OUTPATIENT
Start: 2020-04-15 | End: 2020-05-20 | Stop reason: SDUPTHER

## 2020-04-15 RX ORDER — ATORVASTATIN CALCIUM 40 MG/1
TABLET, FILM COATED ORAL
Qty: 30 TABLET | Refills: 1 | Status: SHIPPED | OUTPATIENT
Start: 2020-04-15 | End: 2020-10-05

## 2020-05-04 DIAGNOSIS — R20.0 NUMBNESS OF FINGERS OF BOTH HANDS: Primary | ICD-10-CM

## 2020-05-04 DIAGNOSIS — J96.01 ACUTE RESPIRATORY FAILURE WITH HYPOXIA (HCC): ICD-10-CM

## 2020-05-04 DIAGNOSIS — J40 BRONCHITIS: ICD-10-CM

## 2020-05-04 RX ORDER — GABAPENTIN 100 MG/1
200 CAPSULE ORAL 3 TIMES DAILY
Qty: 180 CAPSULE | Refills: 4 | Status: SHIPPED | OUTPATIENT
Start: 2020-05-04 | End: 2020-05-08

## 2020-05-04 RX ORDER — PREDNISONE 2.5 MG
2.5 TABLET ORAL DAILY
Qty: 90 TABLET | Refills: 0
Start: 2020-05-04 | End: 2020-05-06 | Stop reason: SDUPTHER

## 2020-05-06 DIAGNOSIS — J96.01 ACUTE RESPIRATORY FAILURE WITH HYPOXIA (HCC): ICD-10-CM

## 2020-05-06 DIAGNOSIS — J40 BRONCHITIS: ICD-10-CM

## 2020-05-06 RX ORDER — PREDNISONE 2.5 MG
2.5 TABLET ORAL DAILY
Qty: 90 TABLET | Refills: 0 | Status: SHIPPED | OUTPATIENT
Start: 2020-05-06 | End: 2020-07-27

## 2020-05-07 DIAGNOSIS — R20.0 NUMBNESS OF FINGERS OF BOTH HANDS: ICD-10-CM

## 2020-05-08 RX ORDER — GABAPENTIN 100 MG/1
200 CAPSULE ORAL 3 TIMES DAILY
Qty: 540 CAPSULE | Refills: 2 | Status: SHIPPED | OUTPATIENT
Start: 2020-05-08 | End: 2021-01-01 | Stop reason: SDUPTHER

## 2020-05-20 ENCOUNTER — OFFICE VISIT (OUTPATIENT)
Dept: CARDIOLOGY CLINIC | Facility: CLINIC | Age: 85
End: 2020-05-20
Payer: MEDICARE

## 2020-05-20 VITALS
BODY MASS INDEX: 29.63 KG/M2 | DIASTOLIC BLOOD PRESSURE: 58 MMHG | HEART RATE: 61 BPM | HEIGHT: 62 IN | TEMPERATURE: 97.5 F | SYSTOLIC BLOOD PRESSURE: 126 MMHG | WEIGHT: 161 LBS

## 2020-05-20 DIAGNOSIS — I65.23 OBSTRUCTION OF CAROTID ARTERY ON BOTH SIDES: ICD-10-CM

## 2020-05-20 DIAGNOSIS — I25.10 CORONARY ARTERY DISEASE INVOLVING NATIVE CORONARY ARTERY OF NATIVE HEART WITHOUT ANGINA PECTORIS: ICD-10-CM

## 2020-05-20 DIAGNOSIS — I48.0 PAROXYSMAL ATRIAL FIBRILLATION (HCC): ICD-10-CM

## 2020-05-20 DIAGNOSIS — I47.2 NSVT (NONSUSTAINED VENTRICULAR TACHYCARDIA) (HCC): ICD-10-CM

## 2020-05-20 DIAGNOSIS — I10 BENIGN ESSENTIAL HYPERTENSION: Primary | ICD-10-CM

## 2020-05-20 DIAGNOSIS — E78.01 FAMILIAL HYPERCHOLESTEREMIA: ICD-10-CM

## 2020-05-20 PROCEDURE — 3074F SYST BP LT 130 MM HG: CPT | Performed by: INTERNAL MEDICINE

## 2020-05-20 PROCEDURE — 3066F NEPHROPATHY DOC TX: CPT | Performed by: INTERNAL MEDICINE

## 2020-05-20 PROCEDURE — 3051F HG A1C>EQUAL 7.0%<8.0%: CPT | Performed by: INTERNAL MEDICINE

## 2020-05-20 PROCEDURE — 3008F BODY MASS INDEX DOCD: CPT | Performed by: INTERNAL MEDICINE

## 2020-05-20 PROCEDURE — 99214 OFFICE O/P EST MOD 30 MIN: CPT | Performed by: INTERNAL MEDICINE

## 2020-05-20 PROCEDURE — 93000 ELECTROCARDIOGRAM COMPLETE: CPT | Performed by: INTERNAL MEDICINE

## 2020-05-20 PROCEDURE — 1036F TOBACCO NON-USER: CPT | Performed by: INTERNAL MEDICINE

## 2020-05-20 PROCEDURE — 1160F RVW MEDS BY RX/DR IN RCRD: CPT | Performed by: INTERNAL MEDICINE

## 2020-05-20 PROCEDURE — 3078F DIAST BP <80 MM HG: CPT | Performed by: INTERNAL MEDICINE

## 2020-05-20 RX ORDER — GLIMEPIRIDE 1 MG/1
TABLET ORAL
COMMUNITY
Start: 2020-03-16 | End: 2020-09-09 | Stop reason: SDUPTHER

## 2020-05-20 RX ORDER — FUROSEMIDE 20 MG/1
20 TABLET ORAL DAILY
Qty: 90 TABLET | Refills: 3 | Status: SHIPPED | OUTPATIENT
Start: 2020-05-20 | End: 2020-07-30 | Stop reason: SDUPTHER

## 2020-05-20 RX ORDER — CEPHALEXIN 250 MG/1
250 CAPSULE ORAL
COMMUNITY
Start: 2020-05-08 | End: 2020-12-23

## 2020-05-26 ENCOUNTER — TELEPHONE (OUTPATIENT)
Dept: CARDIOLOGY CLINIC | Facility: CLINIC | Age: 85
End: 2020-05-26

## 2020-05-28 ENCOUNTER — APPOINTMENT (OUTPATIENT)
Dept: LAB | Facility: AMBULARY SURGERY CENTER | Age: 85
End: 2020-05-28
Payer: MEDICARE

## 2020-05-28 LAB
ANION GAP SERPL CALCULATED.3IONS-SCNC: 7 MMOL/L (ref 4–13)
BUN SERPL-MCNC: 19 MG/DL (ref 5–25)
CALCIUM SERPL-MCNC: 9.6 MG/DL (ref 8.3–10.1)
CHLORIDE SERPL-SCNC: 105 MMOL/L (ref 100–108)
CO2 SERPL-SCNC: 27 MMOL/L (ref 21–32)
CREAT SERPL-MCNC: 0.99 MG/DL (ref 0.6–1.3)
GFR SERPL CREATININE-BSD FRML MDRD: 50 ML/MIN/1.73SQ M
GLUCOSE P FAST SERPL-MCNC: 119 MG/DL (ref 65–99)
POTASSIUM SERPL-SCNC: 3.4 MMOL/L (ref 3.5–5.3)
SODIUM SERPL-SCNC: 139 MMOL/L (ref 136–145)

## 2020-05-28 PROCEDURE — 80048 BASIC METABOLIC PNL TOTAL CA: CPT | Performed by: INTERNAL MEDICINE

## 2020-05-28 PROCEDURE — 36415 COLL VENOUS BLD VENIPUNCTURE: CPT | Performed by: INTERNAL MEDICINE

## 2020-05-29 DIAGNOSIS — I10 ESSENTIAL HYPERTENSION: ICD-10-CM

## 2020-05-29 DIAGNOSIS — I25.10 CORONARY ARTERIOSCLEROSIS: Primary | ICD-10-CM

## 2020-06-02 RX ORDER — AMLODIPINE BESYLATE 5 MG/1
5 TABLET ORAL
Qty: 90 TABLET | Refills: 3 | OUTPATIENT
Start: 2020-06-02

## 2020-06-02 RX ORDER — CARVEDILOL 12.5 MG/1
25 TABLET ORAL 2 TIMES DAILY
Qty: 180 TABLET | Refills: 3 | OUTPATIENT
Start: 2020-06-02

## 2020-06-03 DIAGNOSIS — I10 ESSENTIAL HYPERTENSION: Primary | ICD-10-CM

## 2020-06-03 RX ORDER — CARVEDILOL 12.5 MG/1
25 TABLET ORAL 2 TIMES DAILY
Qty: 60 TABLET | Refills: 5 | Status: SHIPPED | OUTPATIENT
Start: 2020-06-03 | End: 2020-08-31

## 2020-06-03 RX ORDER — AMLODIPINE BESYLATE 5 MG/1
5 TABLET ORAL
Qty: 90 TABLET | Refills: 1 | Status: SHIPPED | OUTPATIENT
Start: 2020-06-03 | End: 2020-11-17

## 2020-06-03 NOTE — TELEPHONE ENCOUNTER
Patient needs refill on amlodipine 5 mg one daily    Carvedilol 12 5 mg   2 tablets twice a day         Bree Door phone  [phone]

## 2020-06-04 ENCOUNTER — OFFICE VISIT (OUTPATIENT)
Dept: INTERNAL MEDICINE CLINIC | Facility: CLINIC | Age: 85
End: 2020-06-04
Payer: MEDICARE

## 2020-06-04 VITALS
OXYGEN SATURATION: 93 % | WEIGHT: 157.9 LBS | HEIGHT: 62 IN | HEART RATE: 64 BPM | DIASTOLIC BLOOD PRESSURE: 78 MMHG | BODY MASS INDEX: 29.06 KG/M2 | TEMPERATURE: 98.3 F | SYSTOLIC BLOOD PRESSURE: 120 MMHG

## 2020-06-04 DIAGNOSIS — N18.30 CKD (CHRONIC KIDNEY DISEASE) STAGE 3, GFR 30-59 ML/MIN (HCC): ICD-10-CM

## 2020-06-04 DIAGNOSIS — I10 BENIGN ESSENTIAL HYPERTENSION: Primary | ICD-10-CM

## 2020-06-04 DIAGNOSIS — E78.01 FAMILIAL HYPERCHOLESTEREMIA: ICD-10-CM

## 2020-06-04 DIAGNOSIS — E11.69 DIABETES MELLITUS TYPE 2 IN OBESE (HCC): ICD-10-CM

## 2020-06-04 DIAGNOSIS — E66.9 DIABETES MELLITUS TYPE 2 IN OBESE (HCC): ICD-10-CM

## 2020-06-04 DIAGNOSIS — R26.2 AMBULATORY DYSFUNCTION: ICD-10-CM

## 2020-06-04 PROCEDURE — 3074F SYST BP LT 130 MM HG: CPT | Performed by: INTERNAL MEDICINE

## 2020-06-04 PROCEDURE — 3051F HG A1C>EQUAL 7.0%<8.0%: CPT | Performed by: INTERNAL MEDICINE

## 2020-06-04 PROCEDURE — 99214 OFFICE O/P EST MOD 30 MIN: CPT | Performed by: INTERNAL MEDICINE

## 2020-06-04 PROCEDURE — 3078F DIAST BP <80 MM HG: CPT | Performed by: INTERNAL MEDICINE

## 2020-06-04 PROCEDURE — 3066F NEPHROPATHY DOC TX: CPT | Performed by: INTERNAL MEDICINE

## 2020-06-04 PROCEDURE — 3008F BODY MASS INDEX DOCD: CPT | Performed by: INTERNAL MEDICINE

## 2020-06-04 PROCEDURE — 1160F RVW MEDS BY RX/DR IN RCRD: CPT | Performed by: INTERNAL MEDICINE

## 2020-06-04 PROCEDURE — 1036F TOBACCO NON-USER: CPT | Performed by: INTERNAL MEDICINE

## 2020-06-08 ENCOUNTER — TELEPHONE (OUTPATIENT)
Dept: CARDIOLOGY CLINIC | Facility: CLINIC | Age: 85
End: 2020-06-08

## 2020-06-12 ENCOUNTER — APPOINTMENT (OUTPATIENT)
Dept: LAB | Facility: AMBULARY SURGERY CENTER | Age: 85
End: 2020-06-12
Payer: MEDICARE

## 2020-06-12 DIAGNOSIS — N18.30 CKD (CHRONIC KIDNEY DISEASE) STAGE 3, GFR 30-59 ML/MIN (HCC): ICD-10-CM

## 2020-06-12 DIAGNOSIS — E66.9 DIABETES MELLITUS TYPE 2 IN OBESE (HCC): ICD-10-CM

## 2020-06-12 DIAGNOSIS — E11.69 DIABETES MELLITUS TYPE 2 IN OBESE (HCC): ICD-10-CM

## 2020-06-12 LAB
ANION GAP SERPL CALCULATED.3IONS-SCNC: 12 MMOL/L (ref 4–13)
BUN SERPL-MCNC: 23 MG/DL (ref 5–25)
CALCIUM SERPL-MCNC: 8.7 MG/DL (ref 8.3–10.1)
CHLORIDE SERPL-SCNC: 105 MMOL/L (ref 100–108)
CO2 SERPL-SCNC: 23 MMOL/L (ref 21–32)
CREAT SERPL-MCNC: 1.03 MG/DL (ref 0.6–1.3)
EST. AVERAGE GLUCOSE BLD GHB EST-MCNC: 157 MG/DL
GFR SERPL CREATININE-BSD FRML MDRD: 48 ML/MIN/1.73SQ M
GLUCOSE P FAST SERPL-MCNC: 123 MG/DL (ref 65–99)
HBA1C MFR BLD: 7.1 %
POTASSIUM SERPL-SCNC: 3.5 MMOL/L (ref 3.5–5.3)
SODIUM SERPL-SCNC: 140 MMOL/L (ref 136–145)

## 2020-06-12 PROCEDURE — 83036 HEMOGLOBIN GLYCOSYLATED A1C: CPT

## 2020-06-12 PROCEDURE — 36415 COLL VENOUS BLD VENIPUNCTURE: CPT

## 2020-06-12 PROCEDURE — 80048 BASIC METABOLIC PNL TOTAL CA: CPT

## 2020-07-25 DIAGNOSIS — J96.01 ACUTE RESPIRATORY FAILURE WITH HYPOXIA (HCC): ICD-10-CM

## 2020-07-25 DIAGNOSIS — J40 BRONCHITIS: ICD-10-CM

## 2020-07-27 RX ORDER — PREDNISONE 2.5 MG
TABLET ORAL
Qty: 90 TABLET | Refills: 0 | Status: SHIPPED | OUTPATIENT
Start: 2020-07-27 | End: 2020-09-09 | Stop reason: CLARIF

## 2020-07-28 DIAGNOSIS — I10 BENIGN ESSENTIAL HYPERTENSION: ICD-10-CM

## 2020-07-28 RX ORDER — FUROSEMIDE 20 MG/1
20 TABLET ORAL DAILY
Qty: 90 TABLET | Refills: 3 | Status: CANCELLED | OUTPATIENT
Start: 2020-07-28

## 2020-07-30 DIAGNOSIS — J96.01 ACUTE RESPIRATORY FAILURE WITH HYPOXIA (HCC): ICD-10-CM

## 2020-07-30 DIAGNOSIS — J40 BRONCHITIS: ICD-10-CM

## 2020-07-30 DIAGNOSIS — I10 BENIGN ESSENTIAL HYPERTENSION: ICD-10-CM

## 2020-07-30 RX ORDER — FUROSEMIDE 20 MG/1
40 TABLET ORAL DAILY
Qty: 60 TABLET | Refills: 3 | Status: SHIPPED | OUTPATIENT
Start: 2020-07-30 | End: 2020-10-22

## 2020-07-31 RX ORDER — PREDNISONE 1 MG/1
5 TABLET ORAL DAILY
Qty: 30 TABLET | Refills: 2 | Status: SHIPPED | OUTPATIENT
Start: 2020-07-31 | End: 2020-10-15

## 2020-08-18 DIAGNOSIS — F41.9 ANXIETY: Primary | ICD-10-CM

## 2020-08-18 RX ORDER — ESCITALOPRAM OXALATE 10 MG/1
10 TABLET ORAL DAILY
Qty: 90 TABLET | Refills: 3 | Status: SHIPPED | OUTPATIENT
Start: 2020-08-18

## 2020-08-18 RX ORDER — CLONIDINE HYDROCHLORIDE 0.1 MG/1
0.1 TABLET ORAL 4 TIMES DAILY
Qty: 360 TABLET | Refills: 1 | Status: SHIPPED | OUTPATIENT
Start: 2020-08-18 | End: 2021-01-01

## 2020-08-29 DIAGNOSIS — I10 ESSENTIAL HYPERTENSION: ICD-10-CM

## 2020-08-31 RX ORDER — CARVEDILOL 12.5 MG/1
25 TABLET ORAL 2 TIMES DAILY
Qty: 180 TABLET | Refills: 1 | Status: SHIPPED | OUTPATIENT
Start: 2020-08-31 | End: 2020-11-30

## 2020-09-04 ENCOUNTER — OFFICE VISIT (OUTPATIENT)
Dept: CARDIOLOGY CLINIC | Facility: CLINIC | Age: 85
End: 2020-09-04
Payer: MEDICARE

## 2020-09-04 VITALS
DIASTOLIC BLOOD PRESSURE: 58 MMHG | BODY MASS INDEX: 29.04 KG/M2 | SYSTOLIC BLOOD PRESSURE: 122 MMHG | TEMPERATURE: 97.1 F | HEIGHT: 62 IN | HEART RATE: 78 BPM | WEIGHT: 157.8 LBS

## 2020-09-04 DIAGNOSIS — I48.0 PAROXYSMAL ATRIAL FIBRILLATION (HCC): ICD-10-CM

## 2020-09-04 DIAGNOSIS — I10 BENIGN ESSENTIAL HYPERTENSION: ICD-10-CM

## 2020-09-04 DIAGNOSIS — I47.2 NSVT (NONSUSTAINED VENTRICULAR TACHYCARDIA) (HCC): ICD-10-CM

## 2020-09-04 DIAGNOSIS — N18.30 CKD (CHRONIC KIDNEY DISEASE) STAGE 3, GFR 30-59 ML/MIN (HCC): ICD-10-CM

## 2020-09-04 DIAGNOSIS — I65.23 OBSTRUCTION OF CAROTID ARTERY ON BOTH SIDES: ICD-10-CM

## 2020-09-04 DIAGNOSIS — E78.01 FAMILIAL HYPERCHOLESTEREMIA: ICD-10-CM

## 2020-09-04 DIAGNOSIS — I25.10 CORONARY ARTERY DISEASE INVOLVING NATIVE CORONARY ARTERY OF NATIVE HEART WITHOUT ANGINA PECTORIS: Primary | ICD-10-CM

## 2020-09-04 PROCEDURE — 99214 OFFICE O/P EST MOD 30 MIN: CPT | Performed by: INTERNAL MEDICINE

## 2020-09-04 NOTE — PROGRESS NOTES
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)     5  Paroxysmal atrial fibrillation ([address] )     6  CKD (chronic kidney disease) stage 3, GFR 30-59 ml/min ([facility])     7  Familial hypercholesteremia         Discussion/Summary:  Overall things are stable  She is doing much better on the higher dose of diuretic  Weights have been stable and edema is doing good  Her main limitations are due to lower extremity arthritis  Coronary disease stable no complaints of angina  Blood pressure is well controlled  No further cardiovascular testing I will see her back in 6 months  If she needs an injection in the hip I would recommend waiting until the fall she could stop Plavix at that point time but remain on aspirin 81 mg daily  Interval History:   19-year-old female with a history of coronary artery disease, hypertension, paroxysmal atrial fibrillation, peripheral arterial disease presents for routine scheduled follow-up visit  Denies any chest pain, shortness of breath, palpitations, lightheadedness, dizziness, or syncope  She remains fairly physically active around the house  There has been no lightheadedness dizziness or syncope  She uses a walker for ambulation  Following a last visit she had an episode of burping and belching this was felt to be anginal equivalent  Admitted to the hospital and decision was made to be conservative with medical management only no intervention  Overall she has been doing well  She lives in an upstairs level of the daughter's home    She is able to get around from the bedroom to the bathroom with that is about it  She does a lot of TV watching and reading books  She has significant lower extremity arthritis that limits her mobility  Denies any anginal sounding chest pain, shortness of breath, lightheadedness, dizziness, or syncope  She is eating well  Problem List     Atrial fibrillation ([place] [address] )    Benign essential hypertension    Coronary artery disease    Familial hypercholesteremia    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HCC)    Carotid artery obstruction        Past Medical History:   Diagnosis Date    Anemia     Atrial fibrillation (HCC)     Benign essential hypertension     CAD (coronary artery disease)     Cancer ([place] [address] )     Carotid artery obstruction     Coronary artery disease     s/p stent x4     Disease of thyroid gland     TIA    GERD (gastroesophageal reflux disease)     Heartburn     HL (hearing loss)     Wears hearing aids    Hyperlipidemia     Mesenteric ischemia, chronic (HCC)     Skin cancer     TIA (transient ischemic attack)     Tinnitus      Social History     Socioeconomic History    Marital status:       Spouse name: Not on file    Number of children: Not on file    Years of education: Not on file    Highest education level: Not on file   Occupational History    Not on file   Social Needs    Financial resource strain: Not on file    Food insecurity     Worry: Not on file     Inability: Not on file    Transportation needs     Medical: Not on file     Non-medical: Not on file   Tobacco Use    Smoking status: Never Smoker    Smokeless tobacco: Never Used   Substance and Sexual Activity    Alcohol use: No    Drug use: No    Sexual activity: Not on file   Lifestyle    Physical activity     Days per week: Not on file     Minutes per session: Not on file    Stress: Not on file   Relationships    Social connections     Talks on phone: Not on file     Gets together: Not on file     Attends Oriental orthodox service: Not on file     Active member of club or organization: Not on file     Attends meetings of clubs or organizations: Not on file     Relationship status: Not on file    Intimate partner violence     Fear of current or ex partner: Not on file     Emotionally abused: Not on file     Physically abused: Not on file     Forced sexual activity: Not on file   Other Topics Concern    Not on file   Social History Narrative    Most recent tobacco use screenin2019      Exercise level:   None      Caffeine intake:   None      Diet:   Regular       Family History   Problem Relation Age of Onset    Leukemia Father     Hypertension Sister     Heart attack Brother 46    Hypertension Brother     Sudden death Brother 46        scd    Heart failure Maternal Grandmother     Hypertension Maternal Grandmother     Stroke Maternal Grandfather     Hypertension Daughter     Anuerysm Neg Hx     Clotting disorder Neg Hx     Arrhythmia Neg Hx     Hyperlipidemia Neg Hx      Past Surgical History:   Procedure Laterality Date    ANGIOPLASTY  01/01/2009    x4, stent placed in [address] [place] [address] Right 2005    CARDIAC CATHETERIZATION      CATARACT EXTRACTION      CHOLECYSTECTOMY      COLONOSCOPY  2016    CORONARY ANGIOPLASTY      stent x4 07/11/1996x1 10/09/2009 x3    CORONARY STENT PLACEMENT      HYSTERECTOMY      REPLACEMENT TOTAL KNEE Right        Current Outpatient Medications:     acetaminophen (TYLENOL) 325 mg tablet, Take 650 mg by mouth every 8 (eight) hours as needed for mild pain  , Disp: , Rfl:     amLODIPine (NORVASC) 5 mg tablet, Take 1 tablet (5 mg total) by mouth daily at bedtime, Disp: 90 tablet, Rfl: 1    Ascorbic Acid (VITAMIN C) 1000 MG tablet, Take 1,000 mg by mouth daily, Disp: , Rfl:     aspirin 81 MG tablet, Take 1 tablet (81 mg total) by mouth daily, Disp: 30 tablet, Rfl: 3    atorvastatin (LIPITOR) 40 mg tablet, Every other day with dinner (Patient taking differently: Take 40 mg by mouth mon-wed-fri), Disp: 30 tablet, Rfl: 1    Calcium Citrate-Vitamin D (CALCIUM + D PO), Take 600 mg by mouth every other day  , Disp: , Rfl:     carvedilol (COREG) 12 5 mg tablet, TAKE 2 TABLETS (25 MG TOTAL) BY MOUTH 2 (TWO) TIMES A DAY, Disp: 180 tablet, Rfl: 1    cephalexin (KEFLEX) 250 mg capsule, Take 250 mg by mouth daily at bedtime mon-wed-fri, Disp: , Rfl:     Cholecalciferol (VITAMIN D3) 1000 units CAPS, Take by mouth daily  , Disp: , Rfl:     cloNIDine (CATAPRES) 0 1 mg tablet, Take 1 tablet (0 1 mg total) by mouth 4 (four) times a day, Disp: 360 tablet, Rfl: 1    clopidogrel (PLAVIX) 75 mg tablet, Take 1 tablet (75 mg total) by mouth daily, Disp: 90 tablet, Rfl: 3    Cranberry 400 MG CAPS, Take 1 capsule by mouth daily , Disp: , Rfl:     cyanocobalamin 1000 MCG tablet, Take by mouth daily  , Disp: , Rfl:     escitalopram (LEXAPRO) 10 mg tablet, Take 1 tablet (10 mg total) by mouth daily, Disp: 90 tablet, Rfl: 3    ferrous sulfate 325 (65 FE) MG EC tablet, Take 325 mg by mouth daily, Disp: , Rfl:     furosemide (LASIX) 20 mg tablet, Take 2 tablets (40 mg total) by mouth daily, Disp: 60 tablet, Rfl: 3    gabapentin (NEURONTIN) 100 mg capsule, TAKE 2 CAPSULES (200 MG TOTAL) BY MOUTH 3 (THREE) TIMES A DAY, Disp: 540 capsule, Rfl: 2    glimepiride (AMARYL) 1 mg tablet, TAKE 0 5 TABLET(S) EVERY DAY BY ORAL ROUTE , Disp: , Rfl:     pantoprazole (PROTONIX) 40 mg tablet, Take 40 mg by mouth daily, Disp: , Rfl:     potassium chloride (K-DUR,KLOR-CON) 10 mEq tablet, Take 10 mEq by mouth 3 (three) times a day , Disp: , Rfl:     predniSONE 2 5 mg tablet, TAKE 1 TABLET BY MOUTH EVERY DAY (Patient taking differently: Take 2 5 mg by mouth daily With a 5 mg tablet), Disp: 90 tablet, Rfl: 0    amoxicillin (AMOXIL) 500 mg capsule, TAKE 4 CAPSULES BY MOUTH 1 HOUR BEFORE DENTAL APPOINTMENT, Disp: , Rfl: 3    benzonatate (TESSALON PERLES) 100 mg capsule, Take 1 capsule (100 mg total) by mouth 3 (three) times a day as needed for cough (Patient not taking: Reported on 6/11/2020), Disp: 20 capsule, Rfl: 0    guaiFENesin 1200 MG TB12, Take 1 tablet (1,200 mg total) by mouth every 12 (twelve) hours (Patient not taking: Reported on 6/11/2020), Disp: 20 tablet, Rfl: 0  Allergies   Allergen Reactions    Adhesive [Medical Tape]      Paper tape okay    Erythromycin     Ezetimibe     Fenofibrate     Flecainide     Lovastatin     Sulfa Antibiotics     Thioridazine        Labs:     Chemistry        Component Value Date/Time     01/09/2018 0842    K 3 5 06/12/2020 0947    K 4 5 02/27/2018 0933     06/12/2020 0947     02/27/2018 0933    CO2 23 06/12/2020 0947    CO2 27 02/27/2018 0933    BUN 23 06/12/2020 0947    BUN 22 02/27/2018 0933    CREATININE 1 03 06/12/2020 0947    CREATININE 1 05 (H) 01/09/2018 0842        Component Value Date/Time    CALCIUM 8 7 06/12/2020 0947    CALCIUM 9 4 02/27/2018 0933    ALKPHOS 98 01/01/2020 1741    ALKPHOS 57 02/27/2018 0933    AST 13 01/01/2020 1741    AST 12 02/27/2018 0933    ALT 21 01/01/2020 1741    ALT 11 02/27/2018 0933    BILITOT 0 4 01/09/2018 0842            No results found for: CHOL  Lab Results   Component Value Date    HDL 70 (H) 08/07/2019    HDL 64 (H) 06/29/2019    HDL 59 09/19/2018     Lab Results   Component Value Date    LDLCALC 123 (H) 08/07/2019    LDLCALC 126 (H) 06/29/2019    LDLCALC 144 (H) 09/19/2018     Lab Results   Component Value Date    TRIG 237 (H) 08/07/2019    TRIG 136 06/29/2019    TRIG 297 (H) 09/19/2018     No results found for: CHOLHDL    Imaging: No results found  ECG:  Normal sinus rhythm incomplete right bundle-branch block      Review of Systems   Constitution: Negative  HENT: Negative  Eyes: Negative  Cardiovascular: Negative  Respiratory: Negative  Endocrine: Negative  Hematologic/Lymphatic: Negative  Skin: Negative  Musculoskeletal: Negative      Gastrointestinal: Negative  Genitourinary: Negative  Neurological: Negative  Psychiatric/Behavioral: Negative  Vitals:    09/04/20 1342   BP: 122/58   Pulse: 78   Temp: (!) 97 1 °F (36 2 °C)     Vitals:    09/04/20 1342   Weight: 71 6 kg (157 lb 12 8 oz)     Height: 5' 2" (157 5 cm)   Body mass index is 28 86 kg/m²  Physical Exam:  Vital signs reviewed  General:  Alert and cooperative, appears stated age, no acute distress  HEENT:  PERRLA, EOMI, no scleral icterus, no conjunctival pallor  Neck:  No lymphadenopathy, no thyromegaly, no carotid bruits, no elevated JVP  Heart:  Regular rate and rhythm, normal S1/S2, no S3/S4, no murmur, rubs or gallops  PMI nondisplaced  Lungs:  Clear to auscultation bilaterally, no wheezes rales or rhonchi  Abdomen:  Soft, non-tender, positive bowel sounds, no rebound or guarding,   no organomegaly   Extremities:  Normal range of motion    No clubbing, cyanosis or edema   Vascular:  2+ pedal pulses  Skin:  No rashes or lesions on exposed skin  Neurologic:  Cranial nerves II-XII grossly intact without focal deficits  Psych:  Normal mood and affect

## 2020-09-09 ENCOUNTER — OFFICE VISIT (OUTPATIENT)
Dept: FAMILY MEDICINE CLINIC | Facility: CLINIC | Age: 85
End: 2020-09-09
Payer: MEDICARE

## 2020-09-09 VITALS
OXYGEN SATURATION: 98 % | HEIGHT: 62 IN | BODY MASS INDEX: 28.71 KG/M2 | HEART RATE: 72 BPM | SYSTOLIC BLOOD PRESSURE: 120 MMHG | DIASTOLIC BLOOD PRESSURE: 68 MMHG | WEIGHT: 156 LBS

## 2020-09-09 DIAGNOSIS — R26.2 AMBULATORY DYSFUNCTION: Primary | ICD-10-CM

## 2020-09-09 DIAGNOSIS — N18.30 CKD (CHRONIC KIDNEY DISEASE) STAGE 3, GFR 30-59 ML/MIN (HCC): ICD-10-CM

## 2020-09-09 DIAGNOSIS — I21.4 NSTEMI (NON-ST ELEVATED MYOCARDIAL INFARCTION) (HCC): ICD-10-CM

## 2020-09-09 DIAGNOSIS — I10 BENIGN ESSENTIAL HYPERTENSION: ICD-10-CM

## 2020-09-09 DIAGNOSIS — R73.9 HYPERGLYCEMIA: ICD-10-CM

## 2020-09-09 DIAGNOSIS — E08.622 DIABETES MELLITUS DUE TO UNDERLYING CONDITION WITH OTHER SKIN ULCER (CODE) (HCC): ICD-10-CM

## 2020-09-09 DIAGNOSIS — M16.11 PRIMARY OSTEOARTHRITIS OF RIGHT HIP: ICD-10-CM

## 2020-09-09 PROCEDURE — 99214 OFFICE O/P EST MOD 30 MIN: CPT | Performed by: INTERNAL MEDICINE

## 2020-09-09 RX ORDER — TRAMADOL HYDROCHLORIDE 50 MG/1
50 TABLET ORAL 2 TIMES DAILY PRN
Qty: 60 TABLET | Refills: 2 | Status: SHIPPED | OUTPATIENT
Start: 2020-09-09 | End: 2020-12-09

## 2020-09-09 RX ORDER — GLIMEPIRIDE 1 MG/1
0.5 TABLET ORAL
Qty: 45 TABLET | Refills: 3 | Status: SHIPPED | OUTPATIENT
Start: 2020-09-09 | End: 2021-01-01 | Stop reason: ALTCHOICE

## 2020-09-09 NOTE — PROGRESS NOTES
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 (CODE) ([facility] [address] [address] )     Relevant Medications    glimepiride (AMARYL) 1 mg tablet    Other Relevant Orders    TSH, 3rd generation       Cardiovascular and Mediastinum    Benign essential hypertension     Controlled with current meds         NSTEMI (non-ST elevated myocardial infarction) with known CAD ([facility] [address] )     S/p stents- 12 months ago  On asa, plavix         Relevant Orders    Lipid panel       Musculoskeletal and Integument    Primary osteoarthritis of right hip     Severe  Tylenol helps some  Add tramadol 50 mg bid prn- s/e d/w pt  pdmp ok         Relevant Medications    traMADol (ULTRAM) 50 mg tablet       Genitourinary    CKD (chronic kidney disease) stage 3, GFR 30-59 ml/min ([facility])    Relevant Orders    CBC and differential    Comprehensive metabolic panel       Other    Ambulatory dysfunction - Primary     Getting home PT- some improvement  Has severe right hip djd         Hyperglycemia     On glimepiride 0 5 pill od  No hypoglycemia symptoms         Relevant Medications    glimepiride (AMARYL) 1 mg tablet    Other Relevant Orders    CBC and differential    Comprehensive metabolic panel    Lipid panel    HEMOGLOBIN A1C W/ EAG ESTIMATION    TSH, 3rd generation            Subjective:      Patient ID: Queta Monroy is a 80 y o  female  1  Dm-2- a1c 7 1, she is taking glimepiride half a pill daily  No hypoglycemia symptoms  No polyuria or polydipsia  No significant weight changes  No increased shortness of breath  No chest pain  No increased lower extremity edema  No increase numbness or tingling  No change in the vision  No increased fatigue  2  htn-blood pressure is under control with current multiple medications  Tolerating medicine well  No headache dizziness or lightheadedness  No palpitations  No orthopnea  No increased lower extremity edema  No chest pain or increased shortness of breath  No fatigue  3  ckd-3-GFR is around 48-50  No increased volume retention  No muscle cramps  Knee on no hiccups  No nausea  4  NSTEMI s/p stents-past 12 months  She is taking aspirin and Plavix  Tolerating medicine well  No chest pain or increased shortness of breath  No dizziness or lightheadedness  No palpitations  5  Severe right hip arthritis with bone on bone  She was advised to have a steroid shot done however she is on Plavix and unable to get off of it due to she had a stent placed in the heart coronary artery within 12 months  When she get off of Plavix they will reconsider it  I will order tramadol as needed for pain  The following portions of the patient's history were reviewed and updated as appropriate:   She has a past medical history of Anemia, Atrial fibrillation ([facility] ), CAD (coronary artery disease), Cancer ([facility] ), Carotid artery obstruction, Coronary artery disease, Disease of thyroid gland, GERD (gastroesophageal reflux disease), HL (hearing loss), Hyperlipidemia, Hypertension, Mesenteric ischemia, chronic ([facility] ), and TIA (transient ischemic attack)  ,  does not have any pertinent problems on file  ,   has a past surgical history that includes Appendectomy; Cataract extraction; Coronary angioplasty; Cholecystectomy; Hysterectomy; Replacement total knee (Right); Coronary stent placement; Cardiac catheterization; Colonoscopy (02/26/2016); Angioplasty (01/01/2009); and Breast surgery (Right, 01/01/2005)  ,  family history includes Heart attack (age of onset: 46) in her brother; Heart failure in her maternal grandmother; Hypertension in her brother, daughter, maternal grandmother, and sister; Leukemia in her father; Stroke in her maternal grandfather; Sudden death (age of onset: 46) in her brother  ,   reports that she has never smoked  She has never used smokeless tobacco  She reports that she does not drink alcohol or use drugs  ,  is allergic to adhesive [medical tape]; erythromycin; ezetimibe; fenofibrate; flecainide; lovastatin; sulfa antibiotics; and thioridazine     Current Outpatient Medications   Medication Sig Dispense Refill    acetaminophen (TYLENOL) 325 mg tablet Take 650 mg by mouth every 8 (eight) hours as needed for mild pain        amLODIPine (NORVASC) 5 mg tablet Take 1 tablet (5 mg total) by mouth daily at bedtime 90 tablet 1    amoxicillin (AMOXIL) 500 mg capsule TAKE 4 CAPSULES BY MOUTH 1 HOUR BEFORE DENTAL APPOINTMENT  3    Ascorbic Acid (VITAMIN C) 1000 MG tablet Take 1,000 mg by mouth daily      aspirin 81 MG tablet Take 1 tablet (81 mg total) by mouth daily 30 tablet 3    atorvastatin (LIPITOR) 40 mg tablet Every other day with dinner (Patient taking differently: Take 40 mg by mouth mon-wed-fri) 30 tablet 1    Calcium Citrate-Vitamin D (CALCIUM + D PO) Take 600 mg by mouth every other day        carvedilol (COREG) 12 5 mg tablet TAKE 2 TABLETS (25 MG TOTAL) BY MOUTH 2 (TWO) TIMES A  tablet 1    Cholecalciferol (VITAMIN D3) 1000 units CAPS Take by mouth daily        cloNIDine (CATAPRES) 0 1 mg tablet Take 1 tablet (0 1 mg total) by mouth 4 (four) times a day 360 tablet 1    clopidogrel (PLAVIX) 75 mg tablet Take 1 tablet (75 mg total) by mouth daily 90 tablet 3    Cranberry 400 MG CAPS Take 1 capsule by mouth daily       cyanocobalamin 1000 MCG tablet Take by mouth daily        escitalopram (LEXAPRO) 10 mg tablet Take 1 tablet (10 mg total) by mouth daily 90 tablet 3    ferrous sulfate 325 (65 FE) MG EC tablet Take 325 mg by mouth daily      furosemide (LASIX) 20 mg tablet Take 2 tablets (40 mg total) by mouth daily 60 tablet 3    gabapentin (NEURONTIN) 100 mg capsule TAKE 2 CAPSULES (200 MG TOTAL) BY MOUTH 3 (THREE) TIMES A  capsule 2    glimepiride (AMARYL) 1 mg tablet Take 0 5 tablets (0 5 mg total) by mouth daily with breakfast 45 tablet 3    pantoprazole (PROTONIX) 40 mg tablet Take 40 mg by mouth daily      potassium chloride (K-DUR,KLOR-CON) 10 mEq tablet Take 10 mEq by mouth 3 (three) times a day       benzonatate (TESSALON PERLES) 100 mg capsule Take 1 capsule (100 mg total) by mouth 3 (three) times a day as needed for cough (Patient not taking: Reported on 9/9/2020) 20 capsule 0    cephalexin (KEFLEX) 250 mg capsule Take 250 mg by mouth daily at bedtime mon-wed-fri      guaiFENesin 1200 MG TB12 Take 1 tablet (1,200 mg total) by mouth every 12 (twelve) hours (Patient not taking: Reported on 9/9/2020) 20 tablet 0    traMADol (ULTRAM) 50 mg tablet Take 1 tablet (50 mg total) by mouth 2 (two) times a day as needed for moderate pain or severe pain 60 tablet 2     No current facility-administered medications for this visit  Review of Systems   Constitutional: Positive for unexpected weight change  Negative for appetite change, chills, diaphoresis, fatigue and fever  HENT: Negative for congestion, drooling and sinus pain  Eyes: Negative for discharge and itching  Respiratory: Negative for cough, chest tightness and shortness of breath (BRASHER- seen by cardio)  Cardiovascular: Positive for leg swelling (same)  Negative for chest pain and palpitations  Gastrointestinal: Negative  Endocrine: Negative for polyphagia and polyuria  Genitourinary: Negative for difficulty urinating, dysuria, frequency and urgency  Musculoskeletal: Positive for arthralgias and gait problem  Skin: Negative for pallor and rash  Allergic/Immunologic: Negative for food allergies  Neurological: Positive for weakness (muscle)  Negative for dizziness, seizures, speech difficulty, light-headedness, numbness and headaches  Hematological: Negative for adenopathy  Does not bruise/bleed easily  Psychiatric/Behavioral: Negative for agitation, confusion, decreased concentration, dysphoric mood, self-injury, sleep disturbance and suicidal ideas  The patient is not nervous/anxious  Objective:  Vitals:    09/09/20 1337   BP: 120/68   BP Location: Left arm   Patient Position: Sitting   Cuff Size: Adult   Pulse: 72   SpO2: 98%   Weight: 70 8 kg (156 lb)   Height: 5' 2" (1 575 m)     Body mass index is 28 53 kg/m²  Physical Exam  Vitals signs and nursing note reviewed  Constitutional:       Appearance: She is well-developed  HENT:      Head: Atraumatic  Eyes:      General:         Right eye: No discharge  Left eye: No discharge  Neck:      Thyroid: No thyromegaly  Cardiovascular:      Rate and Rhythm: Normal rate and regular rhythm  Pulses: Normal pulses  Heart sounds: Murmur present  Pulmonary:      Effort: Pulmonary effort is normal       Breath sounds: Normal breath sounds  No wheezing  Chest:      Chest wall: No tenderness  Abdominal:      General: Bowel sounds are normal       Palpations: Abdomen is soft  There is no mass  Tenderness: There is no abdominal tenderness  Musculoskeletal:         General: No tenderness  Right lower leg: Edema (same) present  Left lower leg: Edema (same) present  Lymphadenopathy:      Cervical: No cervical adenopathy  Skin:     Capillary Refill: Capillary refill takes less than 2 seconds  Findings: No erythema or rash  Neurological:      Mental Status: She is alert and oriented to person, place, and time  Mental status is at baseline     Psychiatric:         Mood and Affect: Mood normal          Behavior: Behavior normal          Judgment: Judgment normal

## 2020-10-02 DIAGNOSIS — I21.9 ACUTE MI (HCC): ICD-10-CM

## 2020-10-02 DIAGNOSIS — E78.01 FAMILIAL HYPERCHOLESTEREMIA: ICD-10-CM

## 2020-10-05 RX ORDER — ATORVASTATIN CALCIUM 40 MG/1
TABLET, FILM COATED ORAL
Qty: 30 TABLET | Refills: 1 | Status: SHIPPED | OUTPATIENT
Start: 2020-10-05 | End: 2021-02-17 | Stop reason: SDUPTHER

## 2020-10-07 ENCOUNTER — IMMUNIZATIONS (OUTPATIENT)
Dept: FAMILY MEDICINE CLINIC | Facility: CLINIC | Age: 85
End: 2020-10-07
Payer: MEDICARE

## 2020-10-07 DIAGNOSIS — Z23 NEEDS FLU SHOT: Primary | ICD-10-CM

## 2020-10-07 PROCEDURE — G0008 ADMIN INFLUENZA VIRUS VAC: HCPCS

## 2020-10-07 PROCEDURE — 90662 IIV NO PRSV INCREASED AG IM: CPT

## 2020-10-15 DIAGNOSIS — J96.01 ACUTE RESPIRATORY FAILURE WITH HYPOXIA (HCC): ICD-10-CM

## 2020-10-15 DIAGNOSIS — J40 BRONCHITIS: ICD-10-CM

## 2020-10-15 RX ORDER — PREDNISONE 1 MG/1
TABLET ORAL
Qty: 30 TABLET | Refills: 2 | Status: SHIPPED | OUTPATIENT
Start: 2020-10-15 | End: 2020-10-30 | Stop reason: CLARIF

## 2020-10-22 DIAGNOSIS — I10 BENIGN ESSENTIAL HYPERTENSION: ICD-10-CM

## 2020-10-22 RX ORDER — FUROSEMIDE 20 MG/1
TABLET ORAL
Qty: 180 TABLET | Refills: 1 | Status: SHIPPED | OUTPATIENT
Start: 2020-10-22 | End: 2020-11-02 | Stop reason: SDUPTHER

## 2020-10-23 ENCOUNTER — TELEPHONE (OUTPATIENT)
Dept: CARDIOLOGY CLINIC | Facility: CLINIC | Age: 85
End: 2020-10-23

## 2020-10-30 DIAGNOSIS — J96.01 ACUTE RESPIRATORY FAILURE WITH HYPOXIA (HCC): Primary | ICD-10-CM

## 2020-10-30 RX ORDER — PREDNISONE 2.5 MG
2.5 TABLET ORAL DAILY
Qty: 90 TABLET | Refills: 0 | Status: SHIPPED | OUTPATIENT
Start: 2020-10-30 | End: 2020-11-30 | Stop reason: CLARIF

## 2020-11-02 DIAGNOSIS — I10 BENIGN ESSENTIAL HYPERTENSION: ICD-10-CM

## 2020-11-02 RX ORDER — FUROSEMIDE 20 MG/1
40 TABLET ORAL DAILY
Qty: 180 TABLET | Refills: 1 | Status: SHIPPED | OUTPATIENT
Start: 2020-11-02 | End: 2021-01-01

## 2020-11-03 ENCOUNTER — TELEPHONE (OUTPATIENT)
Dept: FAMILY MEDICINE CLINIC | Facility: CLINIC | Age: 85
End: 2020-11-03

## 2020-11-03 DIAGNOSIS — E08.622 DIABETES MELLITUS DUE TO UNDERLYING CONDITION WITH OTHER SKIN ULCER (CODE) (HCC): ICD-10-CM

## 2020-11-03 DIAGNOSIS — N18.30 STAGE 3 CHRONIC KIDNEY DISEASE, UNSPECIFIED WHETHER STAGE 3A OR 3B CKD (HCC): ICD-10-CM

## 2020-11-03 DIAGNOSIS — R26.2 AMBULATORY DYSFUNCTION: Primary | ICD-10-CM

## 2020-11-08 DIAGNOSIS — F41.9 ANXIETY: ICD-10-CM

## 2020-11-10 RX ORDER — CLONIDINE HYDROCHLORIDE 0.1 MG/1
0.1 TABLET ORAL 4 TIMES DAILY
Qty: 540 TABLET | Refills: 1 | OUTPATIENT
Start: 2020-11-10

## 2020-11-17 DIAGNOSIS — I10 ESSENTIAL HYPERTENSION: ICD-10-CM

## 2020-11-17 RX ORDER — AMLODIPINE BESYLATE 5 MG/1
TABLET ORAL
Qty: 90 TABLET | Refills: 1 | Status: SHIPPED | OUTPATIENT
Start: 2020-11-17 | End: 2021-01-01

## 2020-11-29 DIAGNOSIS — I10 ESSENTIAL HYPERTENSION: ICD-10-CM

## 2020-11-30 ENCOUNTER — TELEPHONE (OUTPATIENT)
Dept: FAMILY MEDICINE CLINIC | Facility: CLINIC | Age: 85
End: 2020-11-30

## 2020-11-30 DIAGNOSIS — I10 BENIGN ESSENTIAL HYPERTENSION: Primary | ICD-10-CM

## 2020-11-30 RX ORDER — POTASSIUM CHLORIDE 750 MG/1
10 TABLET, FILM COATED, EXTENDED RELEASE ORAL 3 TIMES DAILY
Qty: 90 TABLET | Refills: 7 | Status: SHIPPED | OUTPATIENT
Start: 2020-11-30 | End: 2021-01-01

## 2020-11-30 RX ORDER — CARVEDILOL 12.5 MG/1
25 TABLET ORAL 2 TIMES DAILY
Qty: 360 TABLET | Refills: 1 | Status: SHIPPED | OUTPATIENT
Start: 2020-11-30 | End: 2021-01-01 | Stop reason: SDUPTHER

## 2020-11-30 RX ORDER — PREDNISONE 1 MG/1
5 TABLET ORAL DAILY
Qty: 90 TABLET | Refills: 1 | Status: SHIPPED | OUTPATIENT
Start: 2020-11-30 | End: 2021-01-01 | Stop reason: SDUPTHER

## 2020-11-30 RX ORDER — PANTOPRAZOLE SODIUM 40 MG/1
40 TABLET, DELAYED RELEASE ORAL DAILY
Qty: 90 TABLET | Refills: 3 | Status: SHIPPED | OUTPATIENT
Start: 2020-11-30

## 2020-11-30 RX ORDER — CARVEDILOL 12.5 MG/1
12.5 TABLET ORAL 2 TIMES DAILY WITH MEALS
Qty: 60 TABLET | Refills: 5 | Status: SHIPPED | OUTPATIENT
Start: 2020-11-30 | End: 2020-12-02 | Stop reason: SDUPTHER

## 2020-12-02 DIAGNOSIS — I10 BENIGN ESSENTIAL HYPERTENSION: ICD-10-CM

## 2020-12-03 RX ORDER — CARVEDILOL 12.5 MG/1
12.5 TABLET ORAL 2 TIMES DAILY WITH MEALS
Qty: 120 TABLET | Refills: 5 | Status: SHIPPED | OUTPATIENT
Start: 2020-12-03 | End: 2020-12-15 | Stop reason: SDUPTHER

## 2020-12-08 ENCOUNTER — TELEPHONE (OUTPATIENT)
Dept: LAB | Facility: HOSPITAL | Age: 85
End: 2020-12-08

## 2020-12-09 DIAGNOSIS — M16.11 PRIMARY OSTEOARTHRITIS OF RIGHT HIP: ICD-10-CM

## 2020-12-09 RX ORDER — TRAMADOL HYDROCHLORIDE 50 MG/1
50 TABLET ORAL 2 TIMES DAILY PRN
Qty: 60 TABLET | Refills: 2 | Status: SHIPPED | OUTPATIENT
Start: 2020-12-09 | End: 2020-12-30

## 2020-12-15 ENCOUNTER — TELEMEDICINE (OUTPATIENT)
Dept: FAMILY MEDICINE CLINIC | Facility: CLINIC | Age: 85
End: 2020-12-15
Payer: MEDICARE

## 2020-12-15 VITALS — BODY MASS INDEX: 29.63 KG/M2 | WEIGHT: 161 LBS | HEIGHT: 62 IN

## 2020-12-15 DIAGNOSIS — Z00.00 MEDICARE ANNUAL WELLNESS VISIT, SUBSEQUENT: Primary | ICD-10-CM

## 2020-12-15 DIAGNOSIS — M15.9 OSTEOARTHRITIS OF MULTIPLE JOINTS, UNSPECIFIED OSTEOARTHRITIS TYPE: ICD-10-CM

## 2020-12-15 PROCEDURE — 99213 OFFICE O/P EST LOW 20 MIN: CPT | Performed by: INTERNAL MEDICINE

## 2020-12-15 RX ORDER — PREDNISONE 2.5 MG
2.5 TABLET ORAL DAILY
COMMUNITY
End: 2021-01-01 | Stop reason: SDUPTHER

## 2020-12-21 ENCOUNTER — APPOINTMENT (OUTPATIENT)
Dept: LAB | Facility: HOSPITAL | Age: 85
End: 2020-12-21
Payer: MEDICARE

## 2020-12-21 DIAGNOSIS — R73.9 HYPERGLYCEMIA: ICD-10-CM

## 2020-12-21 DIAGNOSIS — I21.4 NSTEMI (NON-ST ELEVATED MYOCARDIAL INFARCTION) (HCC): ICD-10-CM

## 2020-12-21 DIAGNOSIS — N18.30 CKD (CHRONIC KIDNEY DISEASE) STAGE 3, GFR 30-59 ML/MIN (HCC): ICD-10-CM

## 2020-12-21 DIAGNOSIS — E08.622 DIABETES MELLITUS DUE TO UNDERLYING CONDITION WITH OTHER SKIN ULCER (CODE) (HCC): ICD-10-CM

## 2020-12-21 LAB
ALBUMIN SERPL BCP-MCNC: 3.1 G/DL (ref 3.5–5)
ALP SERPL-CCNC: 71 U/L (ref 46–116)
ALT SERPL W P-5'-P-CCNC: 15 U/L (ref 12–78)
ANION GAP SERPL CALCULATED.3IONS-SCNC: 8 MMOL/L (ref 4–13)
AST SERPL W P-5'-P-CCNC: 11 U/L (ref 5–45)
BASOPHILS # BLD AUTO: 0.07 THOUSANDS/ΜL (ref 0–0.1)
BASOPHILS NFR BLD AUTO: 1 % (ref 0–1)
BILIRUB SERPL-MCNC: 0.3 MG/DL (ref 0.2–1)
BUN SERPL-MCNC: 20 MG/DL (ref 5–25)
CALCIUM ALBUM COR SERPL-MCNC: 10.2 MG/DL (ref 8.3–10.1)
CALCIUM SERPL-MCNC: 9.5 MG/DL (ref 8.3–10.1)
CHLORIDE SERPL-SCNC: 105 MMOL/L (ref 100–108)
CHOLEST SERPL-MCNC: 269 MG/DL (ref 50–200)
CO2 SERPL-SCNC: 29 MMOL/L (ref 21–32)
CREAT SERPL-MCNC: 0.77 MG/DL (ref 0.6–1.3)
EOSINOPHIL # BLD AUTO: 0.22 THOUSAND/ΜL (ref 0–0.61)
EOSINOPHIL NFR BLD AUTO: 2 % (ref 0–6)
ERYTHROCYTE [DISTWIDTH] IN BLOOD BY AUTOMATED COUNT: 13.7 % (ref 11.6–15.1)
EST. AVERAGE GLUCOSE BLD GHB EST-MCNC: 169 MG/DL
GFR SERPL CREATININE-BSD FRML MDRD: 68 ML/MIN/1.73SQ M
GLUCOSE SERPL-MCNC: 96 MG/DL (ref 65–140)
HBA1C MFR BLD: 7.5 %
HCT VFR BLD AUTO: 34.2 % (ref 34.8–46.1)
HDLC SERPL-MCNC: 59 MG/DL
HGB BLD-MCNC: 10.6 G/DL (ref 11.5–15.4)
IMM GRANULOCYTES # BLD AUTO: 0.1 THOUSAND/UL (ref 0–0.2)
IMM GRANULOCYTES NFR BLD AUTO: 1 % (ref 0–2)
LYMPHOCYTES # BLD AUTO: 2.58 THOUSANDS/ΜL (ref 0.6–4.47)
LYMPHOCYTES NFR BLD AUTO: 25 % (ref 14–44)
MCH RBC QN AUTO: 32 PG (ref 26.8–34.3)
MCHC RBC AUTO-ENTMCNC: 31 G/DL (ref 31.4–37.4)
MCV RBC AUTO: 103 FL (ref 82–98)
MONOCYTES # BLD AUTO: 1.02 THOUSAND/ΜL (ref 0.17–1.22)
MONOCYTES NFR BLD AUTO: 10 % (ref 4–12)
NEUTROPHILS # BLD AUTO: 6.51 THOUSANDS/ΜL (ref 1.85–7.62)
NEUTS SEG NFR BLD AUTO: 61 % (ref 43–75)
NONHDLC SERPL-MCNC: 210 MG/DL
NRBC BLD AUTO-RTO: 0 /100 WBCS
PLATELET # BLD AUTO: 243 THOUSANDS/UL (ref 149–390)
PMV BLD AUTO: 9.2 FL (ref 8.9–12.7)
POTASSIUM SERPL-SCNC: 4.2 MMOL/L (ref 3.5–5.3)
PROT SERPL-MCNC: 6.2 G/DL (ref 6.4–8.2)
RBC # BLD AUTO: 3.31 MILLION/UL (ref 3.81–5.12)
SODIUM SERPL-SCNC: 142 MMOL/L (ref 136–145)
TRIGL SERPL-MCNC: 567 MG/DL
TSH SERPL DL<=0.05 MIU/L-ACNC: 2.31 UIU/ML (ref 0.36–3.74)
WBC # BLD AUTO: 10.5 THOUSAND/UL (ref 4.31–10.16)

## 2020-12-21 PROCEDURE — 80053 COMPREHEN METABOLIC PANEL: CPT

## 2020-12-21 PROCEDURE — 83036 HEMOGLOBIN GLYCOSYLATED A1C: CPT

## 2020-12-21 PROCEDURE — 85025 COMPLETE CBC W/AUTO DIFF WBC: CPT

## 2020-12-21 PROCEDURE — 84443 ASSAY THYROID STIM HORMONE: CPT

## 2020-12-21 PROCEDURE — 36415 COLL VENOUS BLD VENIPUNCTURE: CPT

## 2020-12-21 PROCEDURE — 80061 LIPID PANEL: CPT

## 2020-12-23 ENCOUNTER — TELEPHONE (OUTPATIENT)
Dept: FAMILY MEDICINE CLINIC | Facility: CLINIC | Age: 85
End: 2020-12-23

## 2020-12-23 ENCOUNTER — HOSPITAL ENCOUNTER (INPATIENT)
Facility: HOSPITAL | Age: 85
LOS: 5 days | Discharge: NURSING, REHAB OR LONG-TERM CARE | DRG: 554 | End: 2020-12-29
Attending: EMERGENCY MEDICINE | Admitting: INTERNAL MEDICINE
Payer: MEDICARE

## 2020-12-23 ENCOUNTER — APPOINTMENT (EMERGENCY)
Dept: RADIOLOGY | Facility: HOSPITAL | Age: 85
DRG: 554 | End: 2020-12-23
Payer: MEDICARE

## 2020-12-23 DIAGNOSIS — M79.605 PAIN OF LEFT LOWER EXTREMITY: ICD-10-CM

## 2020-12-23 DIAGNOSIS — M79.604 RIGHT LEG PAIN: ICD-10-CM

## 2020-12-23 DIAGNOSIS — N17.9 AKI (ACUTE KIDNEY INJURY) (HCC): ICD-10-CM

## 2020-12-23 DIAGNOSIS — R53.1 GENERALIZED WEAKNESS: Primary | ICD-10-CM

## 2020-12-23 DIAGNOSIS — M15.9 OSTEOARTHRITIS OF MULTIPLE JOINTS: ICD-10-CM

## 2020-12-23 DIAGNOSIS — M79.661 PAIN IN RIGHT LOWER LEG: ICD-10-CM

## 2020-12-23 DIAGNOSIS — R26.2 AMBULATORY DYSFUNCTION: ICD-10-CM

## 2020-12-23 LAB
BASOPHILS # BLD AUTO: 0.04 THOUSANDS/ΜL (ref 0–0.1)
BASOPHILS NFR BLD AUTO: 0 % (ref 0–1)
EOSINOPHIL # BLD AUTO: 0.07 THOUSAND/ΜL (ref 0–0.61)
EOSINOPHIL NFR BLD AUTO: 1 % (ref 0–6)
ERYTHROCYTE [DISTWIDTH] IN BLOOD BY AUTOMATED COUNT: 13.8 % (ref 11.6–15.1)
HCT VFR BLD AUTO: 30.2 % (ref 34.8–46.1)
HGB BLD-MCNC: 9.6 G/DL (ref 11.5–15.4)
IMM GRANULOCYTES # BLD AUTO: 0.1 THOUSAND/UL (ref 0–0.2)
IMM GRANULOCYTES NFR BLD AUTO: 1 % (ref 0–2)
LYMPHOCYTES # BLD AUTO: 1.94 THOUSANDS/ΜL (ref 0.6–4.47)
LYMPHOCYTES NFR BLD AUTO: 18 % (ref 14–44)
MCH RBC QN AUTO: 32.5 PG (ref 26.8–34.3)
MCHC RBC AUTO-ENTMCNC: 31.8 G/DL (ref 31.4–37.4)
MCV RBC AUTO: 102 FL (ref 82–98)
MONOCYTES # BLD AUTO: 1 THOUSAND/ΜL (ref 0.17–1.22)
MONOCYTES NFR BLD AUTO: 9 % (ref 4–12)
NEUTROPHILS # BLD AUTO: 7.58 THOUSANDS/ΜL (ref 1.85–7.62)
NEUTS SEG NFR BLD AUTO: 71 % (ref 43–75)
NRBC BLD AUTO-RTO: 0 /100 WBCS
PLATELET # BLD AUTO: 224 THOUSANDS/UL (ref 149–390)
PMV BLD AUTO: 9 FL (ref 8.9–12.7)
RBC # BLD AUTO: 2.95 MILLION/UL (ref 3.81–5.12)
WBC # BLD AUTO: 10.73 THOUSAND/UL (ref 4.31–10.16)

## 2020-12-23 PROCEDURE — 73590 X-RAY EXAM OF LOWER LEG: CPT

## 2020-12-23 PROCEDURE — 85730 THROMBOPLASTIN TIME PARTIAL: CPT | Performed by: EMERGENCY MEDICINE

## 2020-12-23 PROCEDURE — 99285 EMERGENCY DEPT VISIT HI MDM: CPT | Performed by: EMERGENCY MEDICINE

## 2020-12-23 PROCEDURE — 84484 ASSAY OF TROPONIN QUANT: CPT | Performed by: EMERGENCY MEDICINE

## 2020-12-23 PROCEDURE — 85610 PROTHROMBIN TIME: CPT | Performed by: EMERGENCY MEDICINE

## 2020-12-23 PROCEDURE — 83735 ASSAY OF MAGNESIUM: CPT | Performed by: EMERGENCY MEDICINE

## 2020-12-23 PROCEDURE — 36415 COLL VENOUS BLD VENIPUNCTURE: CPT | Performed by: EMERGENCY MEDICINE

## 2020-12-23 PROCEDURE — 83880 ASSAY OF NATRIURETIC PEPTIDE: CPT | Performed by: EMERGENCY MEDICINE

## 2020-12-23 PROCEDURE — 82550 ASSAY OF CK (CPK): CPT | Performed by: EMERGENCY MEDICINE

## 2020-12-23 PROCEDURE — 73552 X-RAY EXAM OF FEMUR 2/>: CPT

## 2020-12-23 PROCEDURE — 99285 EMERGENCY DEPT VISIT HI MDM: CPT

## 2020-12-23 PROCEDURE — 80053 COMPREHEN METABOLIC PANEL: CPT | Performed by: EMERGENCY MEDICINE

## 2020-12-23 PROCEDURE — 71045 X-RAY EXAM CHEST 1 VIEW: CPT

## 2020-12-23 PROCEDURE — 85025 COMPLETE CBC W/AUTO DIFF WBC: CPT | Performed by: EMERGENCY MEDICINE

## 2020-12-23 PROCEDURE — 93005 ELECTROCARDIOGRAM TRACING: CPT

## 2020-12-23 PROCEDURE — 84443 ASSAY THYROID STIM HORMONE: CPT | Performed by: EMERGENCY MEDICINE

## 2020-12-24 ENCOUNTER — APPOINTMENT (INPATIENT)
Dept: VASCULAR ULTRASOUND | Facility: HOSPITAL | Age: 85
DRG: 554 | End: 2020-12-24
Payer: MEDICARE

## 2020-12-24 ENCOUNTER — APPOINTMENT (INPATIENT)
Dept: RADIOLOGY | Facility: HOSPITAL | Age: 85
DRG: 554 | End: 2020-12-24
Payer: MEDICARE

## 2020-12-24 PROBLEM — M79.605 PAIN OF LEFT LOWER EXTREMITY: Status: RESOLVED | Noted: 2020-12-24 | Resolved: 2020-12-24

## 2020-12-24 PROBLEM — M79.661 PAIN IN RIGHT LOWER LEG: Status: ACTIVE | Noted: 2020-12-24

## 2020-12-24 PROBLEM — R53.1 WEAKNESS: Status: ACTIVE | Noted: 2020-12-24

## 2020-12-24 PROBLEM — M79.605 PAIN OF LEFT LOWER EXTREMITY: Status: ACTIVE | Noted: 2020-12-24

## 2020-12-24 LAB
ALBUMIN SERPL BCP-MCNC: 3 G/DL (ref 3.5–5)
ALP SERPL-CCNC: 65 U/L (ref 46–116)
ALT SERPL W P-5'-P-CCNC: 17 U/L (ref 12–78)
ANION GAP SERPL CALCULATED.3IONS-SCNC: 8 MMOL/L (ref 4–13)
APTT PPP: 22 SECONDS (ref 23–37)
AST SERPL W P-5'-P-CCNC: 11 U/L (ref 5–45)
ATRIAL RATE: 76 BPM
BILIRUB SERPL-MCNC: 0.21 MG/DL (ref 0.2–1)
BILIRUB UR QL STRIP: NEGATIVE
BUN SERPL-MCNC: 27 MG/DL (ref 5–25)
CALCIUM ALBUM COR SERPL-MCNC: 10.4 MG/DL (ref 8.3–10.1)
CALCIUM SERPL-MCNC: 9.6 MG/DL (ref 8.3–10.1)
CHLORIDE SERPL-SCNC: 100 MMOL/L (ref 100–108)
CK SERPL-CCNC: 63 U/L (ref 26–192)
CLARITY UR: CLEAR
CO2 SERPL-SCNC: 29 MMOL/L (ref 21–32)
COLOR UR: YELLOW
CREAT SERPL-MCNC: 1.69 MG/DL (ref 0.6–1.3)
ERYTHROCYTE [DISTWIDTH] IN BLOOD BY AUTOMATED COUNT: 13.9 % (ref 11.6–15.1)
FOLATE SERPL-MCNC: 9.2 NG/ML (ref 3.1–17.5)
GFR SERPL CREATININE-BSD FRML MDRD: 26 ML/MIN/1.73SQ M
GLUCOSE SERPL-MCNC: 126 MG/DL (ref 65–140)
GLUCOSE SERPL-MCNC: 140 MG/DL (ref 65–140)
GLUCOSE SERPL-MCNC: 148 MG/DL (ref 65–140)
GLUCOSE SERPL-MCNC: 173 MG/DL (ref 65–140)
GLUCOSE SERPL-MCNC: 234 MG/DL (ref 65–140)
GLUCOSE UR STRIP-MCNC: NEGATIVE MG/DL
HCT VFR BLD AUTO: 31.1 % (ref 34.8–46.1)
HGB BLD-MCNC: 9.7 G/DL (ref 11.5–15.4)
HGB UR QL STRIP.AUTO: NEGATIVE
INR PPP: 1.04 (ref 0.84–1.19)
KETONES UR STRIP-MCNC: ABNORMAL MG/DL
LEUKOCYTE ESTERASE UR QL STRIP: NEGATIVE
MAGNESIUM SERPL-MCNC: 1.7 MG/DL (ref 1.6–2.6)
MCH RBC QN AUTO: 32.2 PG (ref 26.8–34.3)
MCHC RBC AUTO-ENTMCNC: 31.2 G/DL (ref 31.4–37.4)
MCV RBC AUTO: 103 FL (ref 82–98)
NITRITE UR QL STRIP: NEGATIVE
NT-PROBNP SERPL-MCNC: 760 PG/ML
P AXIS: 97 DEGREES
PH UR STRIP.AUTO: 5 [PH] (ref 4.5–8)
PLATELET # BLD AUTO: 240 THOUSANDS/UL (ref 149–390)
PMV BLD AUTO: 9 FL (ref 8.9–12.7)
POTASSIUM SERPL-SCNC: 4.1 MMOL/L (ref 3.5–5.3)
PROT SERPL-MCNC: 5.9 G/DL (ref 6.4–8.2)
PROT UR STRIP-MCNC: NEGATIVE MG/DL
PROTHROMBIN TIME: 13.7 SECONDS (ref 11.6–14.5)
QRS AXIS: 17 DEGREES
QRSD INTERVAL: 116 MS
QT INTERVAL: 412 MS
QTC INTERVAL: 475 MS
RBC # BLD AUTO: 3.01 MILLION/UL (ref 3.81–5.12)
SODIUM SERPL-SCNC: 137 MMOL/L (ref 136–145)
SP GR UR STRIP.AUTO: 1.01 (ref 1–1.03)
T WAVE AXIS: 22 DEGREES
TROPONIN I SERPL-MCNC: <0.02 NG/ML
TSH SERPL DL<=0.05 MIU/L-ACNC: 2.84 UIU/ML (ref 0.36–3.74)
UROBILINOGEN UR QL STRIP.AUTO: 0.2 E.U./DL
VENTRICULAR RATE: 80 BPM
VIT B12 SERPL-MCNC: 1135 PG/ML (ref 100–900)
WBC # BLD AUTO: 12.62 THOUSAND/UL (ref 4.31–10.16)

## 2020-12-24 PROCEDURE — 82948 REAGENT STRIP/BLOOD GLUCOSE: CPT

## 2020-12-24 PROCEDURE — 93970 EXTREMITY STUDY: CPT | Performed by: SURGERY

## 2020-12-24 PROCEDURE — 86592 SYPHILIS TEST NON-TREP QUAL: CPT | Performed by: STUDENT IN AN ORGANIZED HEALTH CARE EDUCATION/TRAINING PROGRAM

## 2020-12-24 PROCEDURE — 85027 COMPLETE CBC AUTOMATED: CPT | Performed by: PHYSICIAN ASSISTANT

## 2020-12-24 PROCEDURE — 99223 1ST HOSP IP/OBS HIGH 75: CPT | Performed by: PHYSICIAN ASSISTANT

## 2020-12-24 PROCEDURE — 93010 ELECTROCARDIOGRAM REPORT: CPT | Performed by: INTERNAL MEDICINE

## 2020-12-24 PROCEDURE — 82746 ASSAY OF FOLIC ACID SERUM: CPT | Performed by: STUDENT IN AN ORGANIZED HEALTH CARE EDUCATION/TRAINING PROGRAM

## 2020-12-24 PROCEDURE — 86038 ANTINUCLEAR ANTIBODIES: CPT | Performed by: STUDENT IN AN ORGANIZED HEALTH CARE EDUCATION/TRAINING PROGRAM

## 2020-12-24 PROCEDURE — 93970 EXTREMITY STUDY: CPT

## 2020-12-24 PROCEDURE — 72100 X-RAY EXAM L-S SPINE 2/3 VWS: CPT

## 2020-12-24 PROCEDURE — 99232 SBSQ HOSP IP/OBS MODERATE 35: CPT | Performed by: INTERNAL MEDICINE

## 2020-12-24 PROCEDURE — 82306 VITAMIN D 25 HYDROXY: CPT | Performed by: STUDENT IN AN ORGANIZED HEALTH CARE EDUCATION/TRAINING PROGRAM

## 2020-12-24 PROCEDURE — 81003 URINALYSIS AUTO W/O SCOPE: CPT

## 2020-12-24 PROCEDURE — 82607 VITAMIN B-12: CPT | Performed by: STUDENT IN AN ORGANIZED HEALTH CARE EDUCATION/TRAINING PROGRAM

## 2020-12-24 PROCEDURE — 36415 COLL VENOUS BLD VENIPUNCTURE: CPT | Performed by: PHYSICIAN ASSISTANT

## 2020-12-24 RX ORDER — PREDNISONE 1 MG/1
2.5 TABLET ORAL DAILY
Status: DISCONTINUED | OUTPATIENT
Start: 2020-12-24 | End: 2020-12-29 | Stop reason: HOSPADM

## 2020-12-24 RX ORDER — SODIUM CHLORIDE 9 MG/ML
75 INJECTION, SOLUTION INTRAVENOUS CONTINUOUS
Status: DISCONTINUED | OUTPATIENT
Start: 2020-12-24 | End: 2020-12-25

## 2020-12-24 RX ORDER — OXYCODONE HYDROCHLORIDE 5 MG/1
5 TABLET ORAL EVERY 4 HOURS PRN
Status: DISCONTINUED | OUTPATIENT
Start: 2020-12-24 | End: 2020-12-29 | Stop reason: HOSPADM

## 2020-12-24 RX ORDER — CLONIDINE HYDROCHLORIDE 0.1 MG/1
0.1 TABLET ORAL 4 TIMES DAILY
Status: DISCONTINUED | OUTPATIENT
Start: 2020-12-24 | End: 2020-12-29 | Stop reason: HOSPADM

## 2020-12-24 RX ORDER — CLOPIDOGREL BISULFATE 75 MG/1
75 TABLET ORAL DAILY
Status: DISCONTINUED | OUTPATIENT
Start: 2020-12-24 | End: 2020-12-29 | Stop reason: HOSPADM

## 2020-12-24 RX ORDER — POTASSIUM CHLORIDE 750 MG/1
10 TABLET, EXTENDED RELEASE ORAL 3 TIMES DAILY
Status: DISCONTINUED | OUTPATIENT
Start: 2020-12-24 | End: 2020-12-29 | Stop reason: HOSPADM

## 2020-12-24 RX ORDER — ESCITALOPRAM OXALATE 10 MG/1
10 TABLET ORAL DAILY
Status: DISCONTINUED | OUTPATIENT
Start: 2020-12-24 | End: 2020-12-29 | Stop reason: HOSPADM

## 2020-12-24 RX ORDER — LIDOCAINE 50 MG/G
1 PATCH TOPICAL DAILY
Status: COMPLETED | OUTPATIENT
Start: 2020-12-24 | End: 2020-12-24

## 2020-12-24 RX ORDER — KETOTIFEN FUMARATE 0.35 MG/ML
1 SOLUTION/ DROPS OPHTHALMIC 2 TIMES DAILY PRN
Status: DISCONTINUED | OUTPATIENT
Start: 2020-12-24 | End: 2020-12-29 | Stop reason: HOSPADM

## 2020-12-24 RX ORDER — HYDROMORPHONE HCL/PF 1 MG/ML
0.2 SYRINGE (ML) INJECTION EVERY 4 HOURS PRN
Status: DISCONTINUED | OUTPATIENT
Start: 2020-12-24 | End: 2020-12-29 | Stop reason: HOSPADM

## 2020-12-24 RX ORDER — ACETAMINOPHEN 325 MG/1
975 TABLET ORAL EVERY 8 HOURS SCHEDULED
Status: DISCONTINUED | OUTPATIENT
Start: 2020-12-24 | End: 2020-12-29 | Stop reason: HOSPADM

## 2020-12-24 RX ORDER — ACETAMINOPHEN 325 MG/1
650 TABLET ORAL EVERY 6 HOURS PRN
Status: DISCONTINUED | OUTPATIENT
Start: 2020-12-24 | End: 2020-12-24

## 2020-12-24 RX ORDER — CARVEDILOL 12.5 MG/1
25 TABLET ORAL 2 TIMES DAILY
Status: DISCONTINUED | OUTPATIENT
Start: 2020-12-24 | End: 2020-12-29 | Stop reason: HOSPADM

## 2020-12-24 RX ORDER — FERROUS SULFATE 325(65) MG
325 TABLET ORAL
Status: DISCONTINUED | OUTPATIENT
Start: 2020-12-24 | End: 2020-12-29 | Stop reason: HOSPADM

## 2020-12-24 RX ORDER — OXYCODONE HYDROCHLORIDE 5 MG/1
2.5 TABLET ORAL EVERY 4 HOURS PRN
Status: DISCONTINUED | OUTPATIENT
Start: 2020-12-24 | End: 2020-12-29 | Stop reason: HOSPADM

## 2020-12-24 RX ORDER — HEPARIN SODIUM 5000 [USP'U]/ML
5000 INJECTION, SOLUTION INTRAVENOUS; SUBCUTANEOUS EVERY 8 HOURS SCHEDULED
Status: DISCONTINUED | OUTPATIENT
Start: 2020-12-24 | End: 2020-12-29 | Stop reason: HOSPADM

## 2020-12-24 RX ORDER — PREDNISONE 1 MG/1
5 TABLET ORAL DAILY
Status: DISCONTINUED | OUTPATIENT
Start: 2020-12-24 | End: 2020-12-29 | Stop reason: HOSPADM

## 2020-12-24 RX ORDER — AMLODIPINE BESYLATE 5 MG/1
5 TABLET ORAL
Status: DISCONTINUED | OUTPATIENT
Start: 2020-12-24 | End: 2020-12-29 | Stop reason: HOSPADM

## 2020-12-24 RX ORDER — ATORVASTATIN CALCIUM 40 MG/1
40 TABLET, FILM COATED ORAL
Status: DISCONTINUED | OUTPATIENT
Start: 2020-12-25 | End: 2020-12-29 | Stop reason: HOSPADM

## 2020-12-24 RX ORDER — GABAPENTIN 100 MG/1
200 CAPSULE ORAL 3 TIMES DAILY
Status: DISCONTINUED | OUTPATIENT
Start: 2020-12-24 | End: 2020-12-29 | Stop reason: HOSPADM

## 2020-12-24 RX ORDER — ASPIRIN 81 MG/1
81 TABLET ORAL DAILY
Status: DISCONTINUED | OUTPATIENT
Start: 2020-12-24 | End: 2020-12-29 | Stop reason: HOSPADM

## 2020-12-24 RX ORDER — PANTOPRAZOLE SODIUM 40 MG/1
40 TABLET, DELAYED RELEASE ORAL DAILY
Status: DISCONTINUED | OUTPATIENT
Start: 2020-12-24 | End: 2020-12-29 | Stop reason: HOSPADM

## 2020-12-24 RX ORDER — POLYVINYL ALCOHOL 14 MG/ML
1 SOLUTION/ DROPS OPHTHALMIC ONCE
Status: DISCONTINUED | OUTPATIENT
Start: 2020-12-24 | End: 2020-12-24 | Stop reason: CLARIF

## 2020-12-24 RX ADMIN — POTASSIUM CHLORIDE 10 MEQ: 750 TABLET, EXTENDED RELEASE ORAL at 17:44

## 2020-12-24 RX ADMIN — LIDOCAINE 5% 1 PATCH: 700 PATCH TOPICAL at 09:07

## 2020-12-24 RX ADMIN — POTASSIUM CHLORIDE 10 MEQ: 750 TABLET, EXTENDED RELEASE ORAL at 20:14

## 2020-12-24 RX ADMIN — KETOTIFEN FUMARATE 1 DROP: 0.35 SOLUTION/ DROPS OPHTHALMIC at 02:07

## 2020-12-24 RX ADMIN — OXYCODONE HYDROCHLORIDE 2.5 MG: 5 TABLET ORAL at 11:29

## 2020-12-24 RX ADMIN — AMLODIPINE BESYLATE 5 MG: 5 TABLET ORAL at 22:18

## 2020-12-24 RX ADMIN — INSULIN LISPRO 2 UNITS: 100 INJECTION, SOLUTION INTRAVENOUS; SUBCUTANEOUS at 17:53

## 2020-12-24 RX ADMIN — CLOPIDOGREL BISULFATE 75 MG: 75 TABLET ORAL at 09:10

## 2020-12-24 RX ADMIN — GABAPENTIN 200 MG: 100 CAPSULE ORAL at 20:14

## 2020-12-24 RX ADMIN — PREDNISONE 2.5 MG: 1 TABLET ORAL at 09:14

## 2020-12-24 RX ADMIN — SODIUM CHLORIDE 75 ML/HR: 0.9 INJECTION, SOLUTION INTRAVENOUS at 22:37

## 2020-12-24 RX ADMIN — GLYCERIN, HYPROMELLOSE, POLYETHYLENE GLYCOL 1 DROP: .2; .2; 1 LIQUID OPHTHALMIC at 02:07

## 2020-12-24 RX ADMIN — CLONIDINE HYDROCHLORIDE 0.1 MG: 0.1 TABLET ORAL at 17:44

## 2020-12-24 RX ADMIN — CLONIDINE HYDROCHLORIDE 0.1 MG: 0.1 TABLET ORAL at 14:22

## 2020-12-24 RX ADMIN — SODIUM CHLORIDE 75 ML/HR: 0.9 INJECTION, SOLUTION INTRAVENOUS at 03:14

## 2020-12-24 RX ADMIN — ACETAMINOPHEN 975 MG: 325 TABLET, FILM COATED ORAL at 11:29

## 2020-12-24 RX ADMIN — CARVEDILOL 25 MG: 12.5 TABLET, FILM COATED ORAL at 09:12

## 2020-12-24 RX ADMIN — ASPIRIN 81 MG: 81 TABLET, COATED ORAL at 09:13

## 2020-12-24 RX ADMIN — ESCITALOPRAM 10 MG: 10 TABLET, FILM COATED ORAL at 09:12

## 2020-12-24 RX ADMIN — CLONIDINE HYDROCHLORIDE 0.1 MG: 0.1 TABLET ORAL at 09:10

## 2020-12-24 RX ADMIN — GABAPENTIN 200 MG: 100 CAPSULE ORAL at 17:44

## 2020-12-24 RX ADMIN — PANTOPRAZOLE SODIUM 40 MG: 40 TABLET, DELAYED RELEASE ORAL at 09:10

## 2020-12-24 RX ADMIN — CLONIDINE HYDROCHLORIDE 0.1 MG: 0.1 TABLET ORAL at 22:17

## 2020-12-24 RX ADMIN — HEPARIN SODIUM 5000 UNITS: 5000 INJECTION INTRAVENOUS; SUBCUTANEOUS at 05:23

## 2020-12-24 RX ADMIN — CARVEDILOL 25 MG: 12.5 TABLET, FILM COATED ORAL at 17:44

## 2020-12-24 RX ADMIN — GABAPENTIN 200 MG: 100 CAPSULE ORAL at 09:10

## 2020-12-24 RX ADMIN — POTASSIUM CHLORIDE 10 MEQ: 750 TABLET, EXTENDED RELEASE ORAL at 09:10

## 2020-12-24 RX ADMIN — PREDNISONE 5 MG: 1 TABLET ORAL at 09:12

## 2020-12-24 RX ADMIN — HEPARIN SODIUM 5000 UNITS: 5000 INJECTION INTRAVENOUS; SUBCUTANEOUS at 14:29

## 2020-12-24 RX ADMIN — OXYCODONE HYDROCHLORIDE 5 MG: 5 TABLET ORAL at 22:31

## 2020-12-24 RX ADMIN — FERROUS SULFATE TAB 325 MG (65 MG ELEMENTAL FE) 325 MG: 325 (65 FE) TAB at 09:13

## 2020-12-24 RX ADMIN — INSULIN LISPRO 1 UNITS: 100 INJECTION, SOLUTION INTRAVENOUS; SUBCUTANEOUS at 13:01

## 2020-12-24 RX ADMIN — HEPARIN SODIUM 5000 UNITS: 5000 INJECTION INTRAVENOUS; SUBCUTANEOUS at 22:18

## 2020-12-24 NOTE — ASSESSMENT & PLAN NOTE
· Patient with assisted fall at home earlier today  She states that her right leg gave out on her  Does ambulate with a walker at baseline  · History of ambulatory dysfunction the past   · PT OT evaluations    · Up with assistance

## 2020-12-24 NOTE — ASSESSMENT & PLAN NOTE
· Pain in bilateral extremities however more pronounced in the right lower extremity  Pain is predominantly of the right thigh, however complains of pain down the entire extremity  Has decreased sensation bilaterally  · History of peripheral artery disease  · Palpable pedal pulses bilaterally with good cap refill  · Does have history of osteoarthritis  · Duplex ultrasound shows no sign of DVT    Plan:  · Geriatric pain regimen   · Lumbar xray ordered to assess for signs of spinal stenosis/nerve impingement  · If pain persists, can check arterial studies

## 2020-12-24 NOTE — ASSESSMENT & PLAN NOTE
· Creatinine 1 69 (Baseline around 0 9-1 2)  · 2a to dehydration, will treat with Gentle IV hydration  · Avoid nephrotoxins and hypotension  · Hold Lasix

## 2020-12-24 NOTE — ASSESSMENT & PLAN NOTE
· Pain in left lower extremity    Very severe extending from lateral aspect of right knee down to approximately mid calf

## 2020-12-24 NOTE — ASSESSMENT & PLAN NOTE
· Patient with assisted fall at home earlier today  She states that her right leg gave out on her  Does ambulate with a walker at baseline and has history of ambulatory dysfunction the past    · LE xrays showed no fracture  · Pain is mainly of Right thigh although there is referred pain with movement of all lower extremities  · Decreased sensation of bilateral lower extremities noted  Plan:   · PT OT evaluations    · Lumbar xray looking for spinal impingement

## 2020-12-24 NOTE — ASSESSMENT & PLAN NOTE
· Blood pressure reviewed has been slightly elevated    · Continue home medications including amlodipine, Coreg, clonidine  · Monitor pressure

## 2020-12-24 NOTE — PROGRESS NOTES
Progress Note - Clarence Addison 4/6/1930, 80 y o  female MRN: [id number]    Unit/Bed#: ED 25 Encounter: [id number]    Primary Care Provider: Rickie Hill MD   Date and time admitted to hospital: 12/23/2020 11:19 PM      * Pain in right lower leg  Assessment & Plan  · Pain in bilateral extremities however more pronounced in the right lower extremity  Pain is predominantly of the right thigh, however complains of pain down the entire extremity  Has decreased sensation bilaterally  · History of peripheral artery disease  · Palpable pedal pulses bilaterally with good cap refill  · Does have history of osteoarthritis  · Duplex ultrasound shows no sign of DVT    Plan:  · Geriatric pain regimen   · Lumbar xray ordered to assess for signs of spinal stenosis/nerve impingement  · If pain persists, can check arterial studies  Ambulatory dysfunction  Assessment & Plan  · Patient with assisted fall at home earlier today  She states that her right leg gave out on her  Does ambulate with a walker at baseline and has history of ambulatory dysfunction the past    · LE xrays showed no fracture  · Pain is mainly of Right thigh although there is referred pain with movement of all lower extremities  · Decreased sensation of bilateral lower extremities noted  Plan:   · PT OT evaluations  · Lumbar xray looking for spinal impingement       MEGAN (acute kidney injury) ([facility] [address] )  Assessment & Plan  · Creatinine 1 69 (Baseline around 0 9-1 2)  · 2a to dehydration, will treat with Gentle IV hydration  · Avoid nephrotoxins and hypotension  · Hold Lasix    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 (CODE) [facility])   Assessment & Plan  Lab Results   Component Value Date    HGBA1C 7 5 (H) 12/21/2020       Recent Labs     12/24/20  0608 12/24/20  1248   POCGLU 126 173*       Blood Sugar Average: Last 72 hrs:    · Last A1c shows poor control    · Patient is currently taking 7 5 mg daily of prednisone secondary to osteoarthritis  · Will have to optimize sugar control  · Insulin sliding scale  · Accu-Cheks q i d   (P) 149 5    Peripheral arterial disease ([address] )  Assessment & Plan  · History of PAD followed with vascular surgery in 2018  /MONA at that time [id number]% stenosis  · DP palpable b/l and patient with good cap refill  · May play a role in current LE pain  Benign essential hypertension  Assessment & Plan  · Blood pressure reviewed has been slightly elevated  · Continue home medications including amlodipine, Coreg, clonidine  · Monitor pressure    Paroxysmal atrial fibrillation (HCC)  Assessment & Plan  Controlled at this time  Rate/rhythm control: coreg   Currently on ASA/Plavix  Monitor heart rate        VTE Pharmacologic Prophylaxis:   Pharmacologic: Heparin  Mechanical VTE Prophylaxis in Place: No    Discussions with Specialists or Other Care Team Provider:none    Education and Discussions with Family / Patient: spoke with daughter    Current Length of Stay: 0 day(s)    Current Patient Status: Inpatient     Discharge Plan / Estimated Discharge Date: 24hrs    Code Status: Level 3 - DNAR and DNI      Subjective:   Still has persistent pain  Objective:     Vitals:   Temp (24hrs), Av 6 °F (36 4 °C), Min:97 6 °F (36 4 °C), Max:97 6 °F (36 4 °C)    Temp:  [97 6 °F (36 4 °C)] 97 6 °F (36 4 °C)  HR:  [76-92] 82  Resp:  [16-18] 16  BP: (120-167)/(53-79) 125/62  SpO2:  [91 %-99 %] 97 %  Body mass index is 30 93 kg/m²  Input and Output Summary (last 24 hours):     No intake or output data in the 24 hours ending 20 1356    Physical Exam:     Physical Exam  Eyes:      Extraocular Movements: Extraocular movements intact  Cardiovascular:      Rate and Rhythm: Normal rate  Rhythm irregular  Pulmonary:      Effort: Pulmonary effort is normal       Breath sounds: Normal breath sounds  Abdominal:      General: Abdomen is flat  Palpations: Abdomen is soft  Tenderness:  There is no abdominal tenderness  There is no guarding  Musculoskeletal:         General: Swelling and tenderness present  Right lower leg: Edema present  Left lower leg: Edema present  Skin:     General: Skin is warm  Neurological:      Mental Status: She is alert and oriented to person, place, and time  Sensory: Sensory deficit present  Motor: Weakness present  Psychiatric:         Mood and Affect: Mood normal          Additional Data:     Labs:    Results from last 7 days   Lab Units 12/24/20  0404 12/23/20  2344   WBC Thousand/uL 12 62* 10 73*   HEMOGLOBIN g/dL 9 7* 9 6*   HEMATOCRIT % 31 1* 30 2*   PLATELETS Thousands/uL 240 224   NEUTROS PCT %  --  71   LYMPHS PCT %  --  18   MONOS PCT %  --  9   EOS PCT %  --  1     Results from last 7 days   Lab Units 12/23/20  2344   POTASSIUM mmol/L 4 1   CHLORIDE mmol/L 100   CO2 mmol/L 29   BUN mg/dL 27*   CREATININE mg/dL 1 69*   CALCIUM mg/dL 9 6   ALK PHOS U/L 65   ALT U/L 17   AST U/L 11     Results from last 7 days   Lab Units 12/23/20  2344   INR  1 04       * I Have Reviewed All Lab Data Listed Above  * Additional Pertinent Lab Tests Reviewed:  [facility] 66 Admission Reviewed    Imaging:    Imaging Reports Reviewed Today Include: lower extremity xrays  Imaging Personally Reviewed by Myself Includes:  xrays    Recent Cultures (last 7 days):       Last 24 Hours Medication List:   Current Facility-Administered Medications   Medication Dose Route Frequency Provider Last Rate    acetaminophen  975 mg Oral Q8H David Page MD      amLODIPine  5 mg Oral HS Bora Garcia PA-C      aspirin  81 mg Oral Daily [facility] [place]      [START ON 12/25/2020] atorvastatin  40 mg Oral Daily With [employer]      carvedilol  25 mg Oral BID Amari Hogan PA-C      cloNIDine  0 1 mg Oral 4x Daily Bora Garcia PA-C      clopidogrel  75 mg Oral Daily Bora Garcia PA-C      escitalopram  10 mg Oral Daily Al[facility] Course NADIA Garcia      ferrous sulfate  325 mg Oral Daily With Breakfast Bora Garcia PA-C      gabapentin  200 mg Oral TID Nayan Jalloh PA-C      heparin (porcine)  5,000 Units Subcutaneous [facility] [place] NADIA Ramos      HYDROmorphone  0 2 mg Intravenous Q4H PRN Kaiden Adan MD      insulin lispro  1-5 Units Subcutaneous TID AC Bora Garcia PA-C      insulin lispro  1-5 Units Subcutaneous HS Bora Garcia PA-C      ketotifen  1 drop Both Eyes BID PRN Nayan Jalloh PA-C      lidocaine  1 patch Topical Daily Nayan Jalloh PA-C      oxyCODONE  2 5 mg Oral Q4H PRN Kaiden Adan MD      oxyCODONE  5 mg Oral Q4H PRN Kaiden Adan MD      pantoprazole  40 mg Oral Daily Bora Garcia PA-C      potassium chloride  10 mEq Oral TID Nayan Jalloh PA-C      predniSONE  2 5 mg Oral Daily Bora Garcia PA-C      predniSONE  5 mg Oral Daily Bora Garcia PA-C      sodium chloride  75 mL/hr Intravenous Continuous Nayan Jalloh PA-C 75 mL/hr (12/24/20 7827)        Today, Patient Was Seen By: Candi Borrero MD    ** Please Note: This note has been constructed using a voice recognition system   **

## 2020-12-24 NOTE — ASSESSMENT & PLAN NOTE
· Pain in extremity from lateral aspect of right knee to approximately mid calf extending up into the hip  · History of peripheral artery disease  · Palpable pedal pulses bilaterally with good cap refill    · Does have history of osteoarthritis  · Unclear etiology at this time, however rule out DVT with duplex ultrasound  · Topical pain management  · If pain persists, can check arterial studies

## 2020-12-24 NOTE — ASSESSMENT & PLAN NOTE
3rd attempt to move patient to treatment room.  No answer Lab Results   Component Value Date    HGBA1C 7 5 (H) 12/21/2020       No results for input(s): POCGLU in the last 72 hours  Blood Sugar Average: Last 72 hrs:    · Last A1c shows poor control  · Patient is currently taking 7 5 mg daily of prednisone secondary to osteoarthritis  · Will have to optimize sugar control  · Insulin sliding scale  · Accu-Cheks q i d

## 2020-12-24 NOTE — ED PROVIDER NOTES
History  Chief Complaint   Patient presents with    Extremity Weakness     pT REPORTS FEELING MORE LOWER EXTREMITY WEAKNESS TODAY, REPORTS FEELING WEAK WITH HER WALKER AND SLID DOWN TO THE GROUND   rEPORTS WORSENING PAIN IN LEFT UPPER LATERA THIGH ANDLOWER RIGHT ANKLE AND THIGH  History provided by:  Patient   used: No    Extremity Weakness  Associated symptoms: no abdominal pain, no fever, no headaches, no nausea, no shortness of breath and no vomiting    78-year-old female brought for evaluation of generalized weakness and ambulatory dysfunction  Patient stated that her legs seemed to give out on her this evening  Daughter lowered her to the ground and she landed on her left side  She complains of right leg pain, mostly in lateral lower leg and is unable to bear weight  Denies any direct injury  No head strike, neck pain, chest pain, abdominal pain  Takes aspirin  On exam she is awake and alert  Unable to range the right leg due to pain  1+ edema bilaterally  Other joints ranged without difficulty  O2 saturation ranging between 90-94% on room air  Patient denies dyspnea or chest pain  No fever, chills  Plan EKG, labs, chest x-ray, right lower extremity x-rays and will re-evaluate  She declines pain control  States she feels fine if not moving  Otherwise pain is significant with movement of the right lower leg and worse with weight-bearing  Prior to Admission Medications   Prescriptions Last Dose Informant Patient Reported? Taking?    Ascorbic Acid (VITAMIN C) 1000 MG tablet 12/22/2020 at Unknown time Family Member Yes Yes   Sig: Take 1,000 mg by mouth daily   Calcium Citrate-Vitamin D (CALCIUM + D PO) 12/22/2020 at Unknown time Family Member Yes Yes   Sig: Take 600 mg by mouth every other day     Cholecalciferol (VITAMIN D3) 1000 units CAPS 12/23/2020 at Unknown time Family Member Yes Yes   Sig: Take by mouth daily     Cranberry 400 MG CAPS 12/23/2020 at Unknown time Family Member Yes Yes   Sig: Take 1 capsule by mouth daily    Diclofenac Sodium (VOLTAREN) 1 % Not Taking at Unknown time  Yes No   Sig: diclofenac 1 % topical gel   acetaminophen (TYLENOL) 325 mg tablet 12/22/2020 at Unknown time Family Member Yes Yes   Sig: Take 650 mg by mouth every 8 (eight) hours as needed for mild pain     amLODIPine (NORVASC) 5 mg tablet 12/23/2020 at Unknown time  No Yes   Sig: TAKE 1 TABLET BY MOUTH AT BEDTIME   amoxicillin (AMOXIL) 500 mg capsule More than a month at Unknown time Family Member Yes No   Sig: TAKE 4 CAPSULES BY MOUTH 1 HOUR BEFORE DENTAL APPOINTMENT   aspirin 81 MG tablet 12/22/2020 at Unknown time Family Member No Yes   Sig: Take 1 tablet (81 mg total) by mouth daily   atorvastatin (LIPITOR) 40 mg tablet 12/22/2020 at Unknown time  No Yes   Sig: TAKE ONE TABLET EVERY OTHER DAY WITH DINNER   benzonatate (TESSALON PERLES) 100 mg capsule Not Taking at Unknown time Family Member No No   Sig: Take 1 capsule (100 mg total) by mouth 3 (three) times a day as needed for cough   Patient not taking: Reported on 9/9/2020   carvedilol (COREG) 12 5 mg tablet 12/23/2020 at Unknown time  No Yes   Sig: TAKE 2 TABLETS (25 MG TOTAL) BY MOUTH 2 (TWO) TIMES A DAY   cloNIDine (CATAPRES) 0 1 mg tablet 12/23/2020 at Unknown time  No Yes   Sig: Take 1 tablet (0 1 mg total) by mouth 4 (four) times a day   clopidogrel (PLAVIX) 75 mg tablet 12/23/2020 at Unknown time Family Member No Yes   Sig: Take 1 tablet (75 mg total) by mouth daily   cyanocobalamin 1000 MCG tablet Unknown at Unknown time Family Member Yes No   Sig: Take by mouth daily     escitalopram (LEXAPRO) 10 mg tablet 12/23/2020 at Unknown time  No Yes   Sig: Take 1 tablet (10 mg total) by mouth daily   ferrous sulfate 325 (65 FE) MG EC tablet 12/23/2020 at Unknown time Family Member Yes Yes   Sig: Take 325 mg by mouth daily   furosemide (LASIX) 20 mg tablet 12/23/2020 at Unknown time  No Yes   Sig: Take 2 tablets (40 mg total) by mouth daily   gabapentin (NEURONTIN) 100 mg capsule 12/23/2020 at Unknown time Family Member No Yes   Sig: TAKE 2 CAPSULES (200 MG TOTAL) BY MOUTH 3 (THREE) TIMES A DAY   glimepiride (AMARYL) 1 mg tablet 12/23/2020 at Unknown time  No Yes   Sig: Take 0 5 tablets (0 5 mg total) by mouth daily with breakfast   pantoprazole (PROTONIX) 40 mg tablet 12/23/2020 at Unknown time  No Yes   Sig: Take 1 tablet (40 mg total) by mouth daily   potassium chloride (Klor-Con) 10 mEq tablet 12/23/2020 at Unknown time  No Yes   Sig: Take 1 tablet (10 mEq total) by mouth 3 (three) times a day   predniSONE 2 5 mg tablet 12/23/2020 at Unknown time Child Yes Yes   Sig: Take 2 5 mg by mouth daily   predniSONE 5 mg tablet 12/23/2020 at Unknown time  No Yes   Sig: Take 1 tablet (5 mg total) by mouth daily   traMADol (ULTRAM) 50 mg tablet 12/22/2020 at Unknown time  No Yes   Sig: TAKE 1 TABLET (50 MG TOTAL) BY MOUTH 2 (TWO) TIMES A DAY AS NEEDED FOR MODERATE PAIN OR SEVERE PAIN      Facility-Administered Medications: None       Past Medical History:   Diagnosis Date    Anemia     Atrial fibrillation (HCC)     CAD (coronary artery disease)     Cancer (HCC)     Carotid artery obstruction     Coronary artery disease     s/p stent x4     Disease of thyroid gland     TIA    GERD (gastroesophageal reflux disease)     HL (hearing loss)     Wears hearing aids    Hyperlipidemia     Hypertension     Mesenteric ischemia, chronic (HCC)     TIA (transient ischemic attack)        Past Surgical History:   Procedure Laterality Date    ANGIOPLASTY  01/01/2009    x4, stent placed in [facility] Right 01/01/2005    CARDIAC CATHETERIZATION      CATARACT EXTRACTION      CHOLECYSTECTOMY      COLONOSCOPY  02/26/2016    CORONARY ANGIOPLASTY      stent x4 07/11/1996x1 10/09/2009 x3    CORONARY STENT PLACEMENT      HYSTERECTOMY      REPLACEMENT TOTAL KNEE Right        Family History   Problem Relation Age of Onset    Leukemia Father     Hypertension Sister     Heart attack Brother 46    Hypertension Brother     Sudden death Brother 46        scd    Heart failure Maternal Grandmother     Hypertension Maternal Grandmother     Stroke Maternal Grandfather     Hypertension Daughter     Anuerysm Neg Hx     Clotting disorder Neg Hx     Arrhythmia Neg Hx     Hyperlipidemia Neg Hx      I have reviewed and agree with the history as documented  E-Cigarette/Vaping    E-Cigarette Use Never User      E-Cigarette/Vaping Substances    Nicotine No     THC No     CBD No     Flavoring No     Other No     Unknown No      Social History     Tobacco Use    Smoking status: Never Smoker    Smokeless tobacco: Never Used   Substance Use Topics    Alcohol use: No    Drug use: No       Review of Systems   Constitutional: Negative for activity change, appetite change, chills and fever  Respiratory: Negative for chest tightness and shortness of breath  Gastrointestinal: Negative for abdominal pain, nausea and vomiting  Genitourinary: Negative for difficulty urinating  Musculoskeletal: Positive for extremity weakness and gait problem  Negative for back pain and neck pain  Neurological: Positive for weakness  Negative for dizziness and headaches  All other systems reviewed and are negative  Physical Exam  Physical Exam  Vitals signs and nursing note reviewed  Constitutional:       Appearance: Normal appearance  HENT:      Head: Normocephalic and atraumatic  Cardiovascular:      Rate and Rhythm: Normal rate and regular rhythm  Pulmonary:      Effort: Pulmonary effort is normal       Breath sounds: Normal breath sounds  Abdominal:      General: There is no distension  Palpations: Abdomen is soft  Tenderness: There is no abdominal tenderness  Musculoskeletal:      Comments: Unable to range right lower extremity due to pain  Other joints ranged without difficulty    1+ edema in the lower legs bilaterally  Skin:     General: Skin is warm and dry  Comments: Some ecchymosis on the left forearm but no tenderness  Neurological:      General: No focal deficit present  Mental Status: She is alert and oriented to person, place, and time  Psychiatric:         Mood and Affect: Mood normal          Behavior: Behavior normal          Vital Signs  ED Triage Vitals [12/23/20 2325]   Temperature Pulse Respirations Blood Pressure SpO2   97 6 °F (36 4 °C) 79 17 144/64 91 %      Temp Source Heart Rate Source Patient Position - Orthostatic VS BP Location FiO2 (%)   Oral Monitor -- -- --      Pain Score       8           Vitals:    12/23/20 2325   BP: 144/64   Pulse: 79         Visual Acuity      ED Medications  Medications   ketotifen (ZADITOR) 0 025 % ophthalmic solution 1 drop (has no administration in time range)   glycerin-hypromellose- (ARTIFICIAL TEARS) ophthalmic solution 1 drop (has no administration in time range)       Diagnostic Studies  Results Reviewed     Procedure Component Value Units Date/Time    TSH [[id number]]  (Normal) Collected: 12/23/20 2344    Lab Status: Final result Specimen: Blood from Arm, Right Updated: 12/24/20 0021     TSH 3RD [facility] 2 839 uIU/mL     Narrative:      Patients undergoing fluorescein dye angiography may retain small amounts of fluorescein in the body for 48-72 hours post procedure  Samples containing fluorescein can produce falsely depressed TSH values  If the patient had this procedure,a specimen should be resubmitted post fluorescein clearance        NT-BNP PRO [[id number]]  (Abnormal) Collected: 12/23/20 2344    Lab Status: Final result Specimen: Blood from Arm, Right Updated: 12/24/20 0021     NT-proBNP 760 pg/mL     Comprehensive metabolic panel [[id number]]  (Abnormal) Collected: 12/23/20 2344    Lab Status: Final result Specimen: Blood from Arm, Right Updated: 12/24/20 0021     Sodium 137 mmol/L      Potassium 4 1 mmol/L      Chloride 100 mmol/L CO2 29 mmol/L      ANION GAP 8 mmol/L      BUN 27 mg/dL      Creatinine 1 69 mg/dL      Glucose 148 mg/dL      Calcium 9 6 mg/dL      Corrected Calcium 10 4 mg/dL      AST 11 U/L      ALT 17 U/L      Alkaline Phosphatase 65 U/L      Total Protein 5 9 g/dL      Albumin 3 0 g/dL      Total Bilirubin 0 21 mg/dL      eGFR 26 ml/min/1 73sq m     Narrative:      [place] guidelines for Chronic Kidney Disease (CKD):     Stage 1 with normal or high GFR (GFR > 90 mL/min/1 73 square meters)    Stage 2 Mild CKD (GFR = 60-89 mL/min/1 73 square meters)    Stage 3A Moderate CKD (GFR = 45-59 mL/min/1 73 square meters)    Stage 3B Moderate CKD (GFR = 30-44 mL/min/1 73 square meters)    Stage 4 Severe CKD (GFR = 15-29 mL/min/1 73 square meters)    Stage 5 End Stage CKD (GFR <15 mL/min/1 73 square meters)  Note: GFR calculation is accurate only with a steady state creatinine    Magnesium [[id number]]  (Normal) Collected: 12/23/20 2344    Lab Status: Final result Specimen: Blood from Arm, Right Updated: 12/24/20 0021     Magnesium 1 7 mg/dL     CK Total with Reflex CKMB [[id number]]  (Normal) Collected: 12/23/20 2344    Lab Status: Final result Specimen: Blood from Arm, Right Updated: 12/24/20 0021     Total CK 63 U/L     Troponin I [[id number]]  (Normal) Collected: 12/23/20 2344    Lab Status: Final result Specimen: Blood from Arm, Right Updated: 12/24/20 0014     Troponin I <0 02 ng/mL     Protime-INR [[id number]]  (Normal) Collected: 12/23/20 2344    Lab Status: Final result Specimen: Blood from Arm, Right Updated: 12/24/20 0005     Protime 13 7 seconds      INR 1 04    APTT [[id number]]  (Abnormal) Collected: 12/23/20 2344    Lab Status: Final result Specimen: Blood from Arm, Right Updated: 12/24/20 0005     PTT 22 seconds     CBC and differential [[id number]]  (Abnormal) Collected: 12/23/20 2344    Lab Status: Final result Specimen: Blood from Arm, Right Updated: 12/23/20 2351     WBC 10 73 Thousand/uL RBC 2 95 Million/uL      Hemoglobin 9 6 g/dL      Hematocrit 30 2 %       fL      MCH 32 5 pg      MCHC 31 8 g/dL      RDW 13 8 %      MPV 9 0 fL      Platelets 355 Thousands/uL      nRBC 0 /100 WBCs      Neutrophils Relative 71 %      Immat GRANS % 1 %      Lymphocytes Relative 18 %      Monocytes Relative 9 %      Eosinophils Relative 1 %      Basophils Relative 0 %      Neutrophils Absolute 7 58 Thousands/µL      Immature Grans Absolute 0 10 Thousand/uL      Lymphocytes Absolute 1 94 Thousands/µL      Monocytes Absolute 1 00 Thousand/µL      Eosinophils Absolute 0 07 Thousand/µL      Basophils Absolute 0 04 Thousands/µL                  XR chest 1 view portable   ED Interpretation by Forest Go MD (12/24 2642)   No acute changes  XR femur 2 views RIGHT   ED Interpretation by Forest Go MD (12/24 8864)   Possible subacute inferior ramus fracture  XR tibia fibula 2 views RIGHT   ED Interpretation by Forest Go MD (12/24 0100)   No acute fracture  Procedures  ECG 12 Lead Documentation Only    Date/Time: 12/24/2020 12:17 AM  Performed by: Forest Go MD  Authorized by: Forest Go MD     Indications / Diagnosis:  Weakness  ECG reviewed by me, the ED Provider: yes    Patient location:  ED  Previous ECG:     Previous ECG:  Compared to current    Comparison ECG info:  1/1/20    Similarity:  No change  Rate:     ECG rate:  80  Rhythm:     Rhythm: sinus rhythm    Ectopy:     Ectopy: PAC    QRS:     QRS axis:  Normal  Conduction:     Conduction: abnormal      Abnormal conduction: incomplete RBBB    ST segments:     ST segments:  Normal  T waves:     T waves: normal               ED Course  ED Course as of Dec 24 0144   Thu Dec 24, 2020   0026 Creatinine(!): 1 69   0129 Daughter reported patient was being treated Levaquin over the last 3 days for a UTI    Apparently the only symptom she has been having is urinary incontinence and this is now her 3rd different antibiotic in the last few weeks  Will repeat UA here but likely discontinue antibiotic       0134 Patient reporting her eyes feel dry, puffy  Will give lubricating drops and antihistamine  SBIRT 22yo+      Most Recent Value   SBIRT (24 yo +)   In order to provide better care to our patients, we are screening all of our patients for alcohol and drug use  Would it be okay to ask you these screening questions? Unable to answer at this time Filed at: 12/24/2020 0106                    MDM  Number of Diagnoses or Management Options  MEGAN (acute kidney injury) [facility]): new and requires workup  Ambulatory dysfunction: new and requires workup  Generalized weakness: new and requires workup  Right leg pain: new and requires workup  Diagnosis management comments: 43-year-old female with generalized weakness and a slight fall this evening onto her left side  Complaining of right-sided lower leg pain, inability to ambulate due to pain  No visible fracture on x-rays  Lab work notable for MEGAN  Started IV fluids in the ED  Patient undergoing treatment for UTI with Levaquin which is likely unnecessary and may be related to current MEGAN and leg pain  Admitted for further management         Amount and/or Complexity of Data Reviewed  Clinical lab tests: reviewed and ordered  Tests in the radiology section of CPT®: reviewed and ordered  Obtain history from someone other than the patient: yes  Discuss the patient with other providers: yes  Independent visualization of images, tracings, or specimens: yes    Patient Progress  Patient progress: stable      Disposition  Final diagnoses:   Generalized weakness   Ambulatory dysfunction   Right leg pain   MEGAN (acute kidney injury) ([facility] [address] )     Time reflects when diagnosis was documented in both MDM as applicable and the Disposition within this note     Time User Action Codes Description Comment    12/24/2020  1:28 AM Adolfo MURGUIA Add [R53 1] Generalized weakness     12/24/2020  1:28 AM Linda Blue Add [R26 2] Ambulatory dysfunction     12/24/2020  1:28 AM Linda Blue Add [Y09 336] Right leg pain     12/24/2020  1:29 AM Scooter MURGUIA Add [N17 9] MEGAN (acute kidney injury) [facility])       ED Disposition     ED Disposition Condition Date/Time Comment    Admit Stable Thu Dec 24, 2020  1:39 AM Case was discussed with Sharita Mcpherson and the patient's admission status was agreed to be Admission Status: inpatient status to the service of Dr Alex Bonilla   Follow-up Information    None         Patient's Medications   Discharge Prescriptions    No medications on file     No discharge procedures on file      PDMP Review       Value Time User    PDMP Reviewed  Yes 12/9/2020 12:18 PM Chaya Dawkins MD          ED Provider  Electronically Signed by           Emily Pérez MD  12/24/20 8545

## 2020-12-24 NOTE — ED NOTES
Baked sweet pot, butter, mt food cake   Green beans, chocolate milk      Elvira Pablo  12/24/20 0159

## 2020-12-24 NOTE — ASSESSMENT & PLAN NOTE
· History of PAD followed with vascular surgery in 2018  · Did have popliteal ectasia in left   · MONA at that time [id number]% stenosis  · PAD as possible cause to LLE pain? · DP palpable b/l and patient with good cap refill

## 2020-12-24 NOTE — ASSESSMENT & PLAN NOTE
· POA creatinine 1 69  · Baseline around 1  · Dehydration likely cause  · Gentle IV fluids  · Avoid nephrotoxins and hypotension  · Hold Lasix

## 2020-12-24 NOTE — ASSESSMENT & PLAN NOTE
· History of PAD followed with vascular surgery in 2018  /MONA at that time [id number]% stenosis  · DP palpable b/l and patient with good cap refill  · May play a role in current LE pain

## 2020-12-24 NOTE — H&P
H&P- Cy Beny 4/6/1930, 80 y o  female MRN: [id number]    Unit/Bed#: ED 25 Encounter: [id number]    Primary Care Provider: Minda Tinoco MD   Date and time admitted to hospital: 12/23/2020 11:19 PM        * Ambulatory dysfunction  Assessment & Plan  · Patient with assisted fall at home earlier today  She states that her right leg gave out on her  Does ambulate with a walker at baseline  · History of ambulatory dysfunction the past   · PT OT evaluations  · Up with assistance    MEGAN (acute kidney injury) ([facility] [address] )  Assessment & Plan  · POA creatinine 1 69  · Baseline around 1  · Dehydration likely cause  · Gentle IV fluids  · Avoid nephrotoxins and hypotension  · Hold Lasix    Pain in right lower leg  Assessment & Plan  · Pain in extremity from lateral aspect of right knee to approximately mid calf extending up into the hip  · History of peripheral artery disease  · Palpable pedal pulses bilaterally with good cap refill  · Does have history of osteoarthritis  · Unclear etiology at this time, however rule out DVT with duplex ultrasound  · Topical pain management  · If pain persists, can check arterial studies    Paroxysmal atrial fibrillation [facility])  Assessment & Plan  Controlled at this time  Rate/rhythm control: coreg   Anticoagulation: ASA  Monitor heart rate  Peripheral arterial disease ([facility] )  Assessment & Plan  · History of PAD followed with vascular surgery in 2018  · Did have popliteal ectasia in left   · MONA at that time [id number]% stenosis  · PAD as possible cause to LLE pain? 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 (CODE) [facility])   Assessment & Plan  Lab Results   Component Value Date    HGBA1C 7 5 (H) 12/21/2020 No results for input(s): POCGLU in the last 72 hours  Blood Sugar Average: Last 72 hrs:    · Last A1c shows poor control  · Patient is currently taking 7 5 mg daily of prednisone secondary to osteoarthritis  · Will have to optimize sugar control  · Insulin sliding scale  · Accu-Cheks q i d  Pain of left lower extremityresolved as of 12/24/2020  Assessment & Plan  · Pain in left lower extremity  Very severe extending from lateral aspect of right knee down to approximately mid calf      VTE Prophylaxis: Heparin  / sequential compression device   Code Status: level 3 DNR/DNI   POLST: There is no POLST form on file for this patient (pre-hospital)  Discussion with family: patient     Anticipated Length of Stay:  Patient will be admitted on an Inpatient basis with an anticipated length of stay of  > 2 midnights  Justification for Hospital Stay: ambulatory dysfunction     Total Time for Visit, including Counseling / Coordination of Care: 1 hour  Greater than 50% of this total time spent on direct patient counseling and coordination of care  Chief Complaint:   Fall    History of Present Illness:    Kulwinder Amaya is a 80 y o  female who presents with ambulatory dysfunction  Past medical history significant for coronary artery disease, peripheral artery disease, atrial fibrillation, hypertension  She presents to the emergency department for assisted fall which occurred earlier today  Patient normally ambulates with walker at baseline stated that she was getting up to go to the bathroom and was assisted down to the ground after she noticed severe weakness in her right leg  Patient states that she is having sustained pain in her right leg specifically on right lateral aspect of her knee  Has been on for quinolone for possible UTI  No UTI like symptoms currently       Review of Systems:    Review of Systems   Constitutional: Positive for activity change   Negative for chills, fatigue, fever and unexpected weight change  Respiratory: Negative for cough, chest tightness and shortness of breath  Cardiovascular: Negative for chest pain and palpitations  Gastrointestinal: Negative for abdominal pain, diarrhea, nausea and vomiting  Genitourinary: Negative for decreased urine volume, dysuria, frequency and urgency  Musculoskeletal: Positive for gait problem  Negative for arthralgias  Neurological: Positive for weakness  Negative for dizziness, syncope, light-headedness and headaches  All other systems reviewed and are negative  Past Medical and Surgical History:     Past Medical History:   Diagnosis Date    Anemia     [facility]     CAD (coronary artery disease)     Cancer ([facility] )     Carotid artery obstruction     Coronary artery disease     s/p stent x4     Disease of thyroid gland     TIA    GERD (gastroesophageal reflux disease)     HL (hearing loss)     Wears hearing aids    Hyperlipidemia     Hypertension     Mesenteric ischemia, chronic (HCC)     TIA (transient ischemic attack)        Past Surgical History:   Procedure Laterality Date    ANGIOPLASTY  01/01/2009    x4, stent placed in [facility] Right 01/01/2005    CARDIAC CATHETERIZATION      CATARACT EXTRACTION      CHOLECYSTECTOMY      COLONOSCOPY  02/26/2016    CORONARY ANGIOPLASTY      stent x4 07/11/1996x1 10/09/2009 x3    CORONARY STENT PLACEMENT      HYSTERECTOMY      REPLACEMENT TOTAL KNEE Right        Meds/Allergies:    Prior to Admission medications    Medication Sig Start Date End Date Taking?  Authorizing Provider   acetaminophen (TYLENOL) 325 mg tablet Take 650 mg by mouth every 8 (eight) hours as needed for mild pain     Yes Historical Provider, MD   amLODIPine (NORVASC) 5 mg tablet TAKE 1 TABLET BY MOUTH AT BEDTIME 11/17/20  Yes Ira Myers MD   Ascorbic Acid (VITAMIN C) 1000 MG tablet Take 1,000 mg by mouth daily   Yes Historical Provider, MD aspirin 81 MG tablet Take 1 tablet (81 mg total) by mouth daily 9/26/19  Yes RADHA Cooper   atorvastatin (LIPITOR) 40 mg tablet TAKE ONE TABLET EVERY OTHER DAY WITH DINNER 10/5/20  Yes RADHA Cooper   Calcium Citrate-Vitamin D (CALCIUM + D PO) Take 600 mg by mouth every other day     Yes Historical Provider, MD   carvedilol (COREG) 12 5 mg tablet TAKE 2 TABLETS (25 MG TOTAL) BY MOUTH 2 (TWO) TIMES A DAY 11/30/20  Yes Peyman Singh MD   Cholecalciferol (VITAMIN D3) 1000 units CAPS Take by mouth daily     Yes Historical Provider, MD   cloNIDine (CATAPRES) 0 1 mg tablet Take 1 tablet (0 1 mg total) by mouth 4 (four) times a day 8/18/20  Yes Sofia Sheldon MD   clopidogrel (PLAVIX) 75 mg tablet Take 1 tablet (75 mg total) by mouth daily 4/13/20  Yes Andrew Hull DO   Cranberry 400 MG CAPS Take 1 capsule by mouth daily    Yes Historical Provider, MD   escitalopram (LEXAPRO) 10 mg tablet Take 1 tablet (10 mg total) by mouth daily 8/18/20  Yes Sofia Sheldon MD   ferrous sulfate 325 (65 FE) MG EC tablet Take 325 mg by mouth daily   Yes Historical Provider, MD   furosemide (LASIX) 20 mg tablet Take 2 tablets (40 mg total) by mouth daily 11/2/20  Yes Andrew Hull DO   gabapentin (NEURONTIN) 100 mg capsule TAKE 2 CAPSULES (200 MG TOTAL) BY MOUTH 3 (THREE) TIMES A DAY 5/8/20  Yes Peyman Singh MD   glimepiride (AMARYL) 1 mg tablet Take 0 5 tablets (0 5 mg total) by mouth daily with breakfast 9/9/20  Yes Peyman Singh MD   pantoprazole (PROTONIX) 40 mg tablet Take 1 tablet (40 mg total) by mouth daily 11/30/20  Yes Peyman Singh MD   potassium chloride (Klor-Con) 10 mEq tablet Take 1 tablet (10 mEq total) by mouth 3 (three) times a day 11/30/20  Yes Peyman Singh MD   predniSONE 2 5 mg tablet Take 2 5 mg by mouth daily   Yes Historical Provider, MD   predniSONE 5 mg tablet Take 1 tablet (5 mg total) by mouth daily 11/30/20 Yes Moshe Carlson MD   traMADol (ULTRAM) 50 mg tablet TAKE 1 TABLET (50 MG TOTAL) BY MOUTH 2 (TWO) TIMES A DAY AS NEEDED FOR MODERATE PAIN OR SEVERE PAIN 12/9/20  Yes Moshe Carlson MD   amoxicillin (AMOXIL) 500 mg capsule TAKE 4 CAPSULES BY MOUTH 1 HOUR BEFORE DENTAL APPOINTMENT 9/1/19   Historical Provider, MD   benzonatate (TESSALON PERLES) 100 mg capsule Take 1 capsule (100 mg total) by mouth 3 (three) times a day as needed for cough  Patient not taking: Reported on 9/9/2020 1/4/20   Denise Ward MD   cyanocobalamin 1000 MCG tablet Take by mouth daily      Historical Provider, MD   Diclofenac Sodium (VOLTAREN) 1 % diclofenac 1 % topical gel    Historical Provider, MD     I have been unable to obtain / verify an up to date medication list despite all reasonable attempts  Allergies: Allergies   Allergen Reactions    Adhesive [Medical Tape]      Paper tape okay    Ezetimibe     Fenofibrate     Flecainide     Lovastatin     Sulfa Antibiotics     Thioridazine        Social History:     Marital Status:     Occupation: unknown   Patient Pre-hospital Living Situation: lives at home   Patient Pre-hospital Level of Mobility: ambulates with walker   Patient Pre-hospital Diet Restrictions: carb controlled   Substance Use History:   Social History     Substance and Sexual Activity   Alcohol Use No     Social History     Tobacco Use   Smoking Status Never Smoker   Smokeless Tobacco Never Used     Social History     Substance and Sexual Activity   Drug Use No       Family History:    Family History   Problem Relation Age of Onset    Leukemia Father     Hypertension Sister     Heart attack Brother 46    Hypertension Brother     Sudden death Brother 46        scd    Heart failure Maternal Grandmother     Hypertension Maternal Grandmother     Stroke Maternal Grandfather     Hypertension Daughter     Anuerysm Neg Hx     Clotting disorder Neg Hx     Arrhythmia Neg Hx     Hyperlipidemia Neg Hx        Physical Exam:     Vitals:   Blood Pressure: 167/73 (12/24/20 0200)  Pulse: 76 (12/24/20 0200)  Temperature: 97 6 °F (36 4 °C) (12/23/20 2325)  Temp Source: Oral (12/23/20 2325)  Respirations: 18 (12/24/20 0200)  Weight - Scale: 76 7 kg (169 lb 1 8 oz) (12/23/20 2325)  SpO2: 92 % (12/24/20 0200)    Physical Exam  Constitutional:       General: She is not in acute distress  Appearance: She is not diaphoretic  HENT:      Head: Normocephalic and atraumatic  Mouth/Throat:      Mouth: Mucous membranes are moist       Pharynx: Oropharynx is clear  No oropharyngeal exudate  Eyes:      General:         Right eye: No discharge  Left eye: No discharge  Conjunctiva/sclera: Conjunctivae normal       Pupils: Pupils are equal, round, and reactive to light  Neck:      Musculoskeletal: Neck supple  No muscular tenderness  Cardiovascular:      Rate and Rhythm: Normal rate and regular rhythm  Pulses: Normal pulses  Heart sounds: Normal heart sounds  No murmur  Pulmonary:      Effort: Pulmonary effort is normal  No respiratory distress  Breath sounds: Normal breath sounds  No wheezing or rales  Abdominal:      General: Abdomen is flat  There is no distension  Palpations: Abdomen is soft  Tenderness: There is no abdominal tenderness  Musculoskeletal: Normal range of motion  Right lower leg: No edema  Left lower leg: No edema  Comments: Tenderness to palpation along lateral aspect of right lower extremity starting at lateral aspect of the knee and extending down to approximately mid calf  She does have some tenderness the hamstring of right lower extremity as well  Decreased active and passive range of motion secondary to pain  Skin:     General: Skin is warm and dry  Capillary Refill: Capillary refill takes less than 2 seconds  Coloration: Skin is not jaundiced  Findings: No rash        Comments: Pedal pulses intact in bilateral lower extremities  Skin is warm no mottling noted   Neurological:      General: No focal deficit present  Mental Status: She is alert and oriented to person, place, and time  Cranial Nerves: No cranial nerve deficit  Psychiatric:         Mood and Affect: Mood normal              Additional Data:     Lab Results: I have personally reviewed pertinent reports  Results from last 7 days   Lab Units 12/23/20  2344   WBC Thousand/uL 10 73*   HEMOGLOBIN g/dL 9 6*   HEMATOCRIT % 30 2*   PLATELETS Thousands/uL 224   NEUTROS PCT % 71   LYMPHS PCT % 18   MONOS PCT % 9   EOS PCT % 1     Results from last 7 days   Lab Units 12/23/20  2344   SODIUM mmol/L 137   POTASSIUM mmol/L 4 1   CHLORIDE mmol/L 100   CO2 mmol/L 29   BUN mg/dL 27*   CREATININE mg/dL 1 69*   ANION GAP mmol/L 8   CALCIUM mg/dL 9 6   ALBUMIN g/dL 3 0*   TOTAL BILIRUBIN mg/dL 0 21   ALK PHOS U/L 65   ALT U/L 17   AST U/L 11   GLUCOSE RANDOM mg/dL 148*     Results from last 7 days   Lab Units 12/23/20  2344   INR  1 04         Results from last 7 days   Lab Units 12/21/20  0717   HEMOGLOBIN A1C % 7 5*           Imaging: I have personally reviewed pertinent reports  XR chest 1 view portable   ED Interpretation by Cr Arnold MD (12/24 0059)   No acute changes  XR femur 2 views RIGHT   ED Interpretation by Cr Arnold MD (12/24 1673)   Possible subacute inferior ramus fracture  XR tibia fibula 2 views RIGHT   ED Interpretation by Cr Arnold MD (12/24 0100)   No acute fracture  VAS lower limb venous duplex study, complete bilateral    (Results Pending)       EKG, Pathology, and Other Studies Reviewed on Admission:   · X ray tibia: no acute fracture   · X ray femur: no obvious fracture     Allscripts / Epic Records Reviewed: Yes     ** Please Note: This note has been constructed using a voice recognition system   **

## 2020-12-25 PROBLEM — I73.9 PERIPHERAL ARTERIAL DISEASE (HCC): Status: RESOLVED | Noted: 2018-03-05 | Resolved: 2020-12-25

## 2020-12-25 LAB
ANION GAP SERPL CALCULATED.3IONS-SCNC: 8 MMOL/L (ref 4–13)
BUN SERPL-MCNC: 12 MG/DL (ref 5–25)
CALCIUM SERPL-MCNC: 8.4 MG/DL (ref 8.3–10.1)
CHLORIDE SERPL-SCNC: 105 MMOL/L (ref 100–108)
CO2 SERPL-SCNC: 28 MMOL/L (ref 21–32)
CREAT SERPL-MCNC: 0.85 MG/DL (ref 0.6–1.3)
ERYTHROCYTE [DISTWIDTH] IN BLOOD BY AUTOMATED COUNT: 13.9 % (ref 11.6–15.1)
GFR SERPL CREATININE-BSD FRML MDRD: 61 ML/MIN/1.73SQ M
GLUCOSE SERPL-MCNC: 119 MG/DL (ref 65–140)
GLUCOSE SERPL-MCNC: 123 MG/DL (ref 65–140)
GLUCOSE SERPL-MCNC: 164 MG/DL (ref 65–140)
GLUCOSE SERPL-MCNC: 214 MG/DL (ref 65–140)
GLUCOSE SERPL-MCNC: 237 MG/DL (ref 65–140)
HCT VFR BLD AUTO: 28.3 % (ref 34.8–46.1)
HGB BLD-MCNC: 8.8 G/DL (ref 11.5–15.4)
MCH RBC QN AUTO: 32.2 PG (ref 26.8–34.3)
MCHC RBC AUTO-ENTMCNC: 31.1 G/DL (ref 31.4–37.4)
MCV RBC AUTO: 104 FL (ref 82–98)
PLATELET # BLD AUTO: 204 THOUSANDS/UL (ref 149–390)
PMV BLD AUTO: 8.7 FL (ref 8.9–12.7)
POTASSIUM SERPL-SCNC: 3.5 MMOL/L (ref 3.5–5.3)
RBC # BLD AUTO: 2.73 MILLION/UL (ref 3.81–5.12)
SODIUM SERPL-SCNC: 141 MMOL/L (ref 136–145)
WBC # BLD AUTO: 10.5 THOUSAND/UL (ref 4.31–10.16)

## 2020-12-25 PROCEDURE — 97163 PT EVAL HIGH COMPLEX 45 MIN: CPT

## 2020-12-25 PROCEDURE — 97530 THERAPEUTIC ACTIVITIES: CPT

## 2020-12-25 PROCEDURE — 99232 SBSQ HOSP IP/OBS MODERATE 35: CPT | Performed by: INTERNAL MEDICINE

## 2020-12-25 PROCEDURE — 80048 BASIC METABOLIC PNL TOTAL CA: CPT | Performed by: STUDENT IN AN ORGANIZED HEALTH CARE EDUCATION/TRAINING PROGRAM

## 2020-12-25 PROCEDURE — 85027 COMPLETE CBC AUTOMATED: CPT | Performed by: STUDENT IN AN ORGANIZED HEALTH CARE EDUCATION/TRAINING PROGRAM

## 2020-12-25 PROCEDURE — 82948 REAGENT STRIP/BLOOD GLUCOSE: CPT

## 2020-12-25 RX ORDER — DOCUSATE SODIUM 100 MG/1
100 CAPSULE, LIQUID FILLED ORAL 2 TIMES DAILY
Status: DISCONTINUED | OUTPATIENT
Start: 2020-12-25 | End: 2020-12-29 | Stop reason: HOSPADM

## 2020-12-25 RX ORDER — SENNOSIDES 8.6 MG
2 TABLET ORAL
Status: DISCONTINUED | OUTPATIENT
Start: 2020-12-25 | End: 2020-12-29 | Stop reason: HOSPADM

## 2020-12-25 RX ADMIN — HEPARIN SODIUM 5000 UNITS: 5000 INJECTION INTRAVENOUS; SUBCUTANEOUS at 13:26

## 2020-12-25 RX ADMIN — FERROUS SULFATE TAB 325 MG (65 MG ELEMENTAL FE) 325 MG: 325 (65 FE) TAB at 08:30

## 2020-12-25 RX ADMIN — HEPARIN SODIUM 5000 UNITS: 5000 INJECTION INTRAVENOUS; SUBCUTANEOUS at 05:37

## 2020-12-25 RX ADMIN — INSULIN LISPRO 2 UNITS: 100 INJECTION, SOLUTION INTRAVENOUS; SUBCUTANEOUS at 16:24

## 2020-12-25 RX ADMIN — ATORVASTATIN CALCIUM 40 MG: 40 TABLET, FILM COATED ORAL at 16:16

## 2020-12-25 RX ADMIN — POTASSIUM CHLORIDE 10 MEQ: 750 TABLET, EXTENDED RELEASE ORAL at 21:46

## 2020-12-25 RX ADMIN — GABAPENTIN 200 MG: 100 CAPSULE ORAL at 21:46

## 2020-12-25 RX ADMIN — CLONIDINE HYDROCHLORIDE 0.1 MG: 0.1 TABLET ORAL at 12:02

## 2020-12-25 RX ADMIN — CARVEDILOL 25 MG: 12.5 TABLET, FILM COATED ORAL at 08:30

## 2020-12-25 RX ADMIN — GABAPENTIN 200 MG: 100 CAPSULE ORAL at 08:30

## 2020-12-25 RX ADMIN — CLONIDINE HYDROCHLORIDE 0.1 MG: 0.1 TABLET ORAL at 16:19

## 2020-12-25 RX ADMIN — ACETAMINOPHEN 975 MG: 325 TABLET, FILM COATED ORAL at 13:26

## 2020-12-25 RX ADMIN — SENNOSIDES 17.2 MG: 8.6 TABLET ORAL at 21:46

## 2020-12-25 RX ADMIN — INSULIN LISPRO 2 UNITS: 100 INJECTION, SOLUTION INTRAVENOUS; SUBCUTANEOUS at 12:03

## 2020-12-25 RX ADMIN — POTASSIUM CHLORIDE 10 MEQ: 750 TABLET, EXTENDED RELEASE ORAL at 08:30

## 2020-12-25 RX ADMIN — DOCUSATE SODIUM 100 MG: 100 CAPSULE, LIQUID FILLED ORAL at 12:06

## 2020-12-25 RX ADMIN — CARVEDILOL 25 MG: 12.5 TABLET, FILM COATED ORAL at 16:16

## 2020-12-25 RX ADMIN — CLONIDINE HYDROCHLORIDE 0.1 MG: 0.1 TABLET ORAL at 21:45

## 2020-12-25 RX ADMIN — DOCUSATE SODIUM 100 MG: 100 CAPSULE, LIQUID FILLED ORAL at 16:19

## 2020-12-25 RX ADMIN — CLOPIDOGREL BISULFATE 75 MG: 75 TABLET ORAL at 08:30

## 2020-12-25 RX ADMIN — CLONIDINE HYDROCHLORIDE 0.1 MG: 0.1 TABLET ORAL at 08:30

## 2020-12-25 RX ADMIN — PREDNISONE 5 MG: 1 TABLET ORAL at 08:30

## 2020-12-25 RX ADMIN — PREDNISONE 2.5 MG: 1 TABLET ORAL at 08:31

## 2020-12-25 RX ADMIN — AMLODIPINE BESYLATE 5 MG: 5 TABLET ORAL at 21:46

## 2020-12-25 RX ADMIN — HEPARIN SODIUM 5000 UNITS: 5000 INJECTION INTRAVENOUS; SUBCUTANEOUS at 21:46

## 2020-12-25 RX ADMIN — ACETAMINOPHEN 975 MG: 325 TABLET, FILM COATED ORAL at 21:46

## 2020-12-25 RX ADMIN — PANTOPRAZOLE SODIUM 40 MG: 40 TABLET, DELAYED RELEASE ORAL at 08:30

## 2020-12-25 RX ADMIN — INSULIN LISPRO 1 UNITS: 100 INJECTION, SOLUTION INTRAVENOUS; SUBCUTANEOUS at 21:47

## 2020-12-25 RX ADMIN — ESCITALOPRAM 10 MG: 10 TABLET, FILM COATED ORAL at 08:31

## 2020-12-25 RX ADMIN — ACETAMINOPHEN 975 MG: 325 TABLET, FILM COATED ORAL at 05:37

## 2020-12-25 RX ADMIN — POTASSIUM CHLORIDE 10 MEQ: 750 TABLET, EXTENDED RELEASE ORAL at 16:19

## 2020-12-25 RX ADMIN — GABAPENTIN 200 MG: 100 CAPSULE ORAL at 16:19

## 2020-12-25 RX ADMIN — ASPIRIN 81 MG: 81 TABLET, COATED ORAL at 08:30

## 2020-12-25 NOTE — PROGRESS NOTES
Progress Note - Christina Montanez 4/6/1930, 80 y o  female MRN: [id number]    Unit/Bed#: S MS 52989 Encounter: [id number]    Primary Care Provider: Jose M Gibson MD   Date and time admitted to hospital: 12/23/2020 11:19 PM    * Pain in right lower leg  Assessment & Plan  · Pain in bilateral extremities however more pronounced in the right lower extremity  Pain was predominantly of the right thigh yesterday, however today complains of pain mainly in the foot  X ray shows significant right hip degenerative osteoarthritis  · Duplex ultrasound shows no sign of DVT    Plan:  · Geriatric pain regimen   · PT/OT treatment     Ambulatory dysfunction  Assessment & Plan  · Patient with assisted fall at home  She states that her right leg gave out on her  Does ambulate with a walker at baseline and has history of ambulatory dysfunction the past    · LE/lumbar spine xrays showed no fracture or displacement  · Pain is now refered mainly of Right foot although there is referred pain with movement of all lower extremities  · Decreased sensation of bilateral lower extremities noted  Plan:   · PT OT evaluations  MEGAN (acute kidney injury) ([facility] [address] )  Assessment & Plan  · Creatinine 1 69 (Baseline around 0 9-1 2)  · 2a to dehydration, responded well to IV hydration  · Avoid nephrotoxins and hypotension  · Hold Lasix    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 (CODE) [facility])   Assessment & Plan  Lab Results   Component Value Date    HGBA1C 7 5 (H) 12/21/2020       Recent Labs     12/24/20  1743 12/24/20  2141 12/25/20  0723 12/25/20  1106   POCGLU 234* 140 119 237*       Blood Sugar Average: Last 72 hrs:    · Last A1c shows poor control  · Patient is currently taking 7 5 mg daily of prednisone secondary to osteoarthritis  · Will have to optimize sugar control  · Insulin sliding scale    · Accu-Cheks q i d   (P) 171 5    Benign essential hypertension  Assessment & Plan  · Blood pressure reviewed has been slightly elevated  · Continue home medications including amlodipine, Coreg, clonidine  · Monitor pressure    Paroxysmal atrial fibrillation (HCC)  Assessment & Plan  Controlled at this time  Rate/rhythm control: coreg   Currently on ASA/Plavix  Monitor heart rate    Peripheral arterial disease (HCC)resolved as of 2020  Assessment & Plan  · History of PAD followed with vascular surgery in 2018  /MONA at that time [id number]% stenosis  · DP palpable b/l and patient with good cap refill  · May play a role in current LE pain  VTE Pharmacologic Prophylaxis:   Pharmacologic: Heparin  Mechanical VTE Prophylaxis in Place: Yes    Discussions with Specialists or Other Care Team Provider: PT    Education and Discussions with Family / Patient: family    Current Length of Stay: 1 day(s)    Current Patient Status: Inpatient     Discharge Plan / Estimated Discharge Date: 24-48hrs    Code Status: Level 3 - DNAR and DNI      Subjective:   Feels slight improvement in pain     Objective:     Vitals:   Temp (24hrs), Av 4 °F (36 9 °C), Min:98 3 °F (36 8 °C), Max:98 5 °F (36 9 °C)    Temp:  [98 3 °F (36 8 °C)-98 5 °F (36 9 °C)] 98 5 °F (36 9 °C)  HR:  [79-82] 79  Resp:  [16-18] 18  BP: (124-161)/(55-87) 157/67  SpO2:  [90 %-97 %] 92 %  Body mass index is 29 88 kg/m²  Input and Output Summary (last 24 hours): Intake/Output Summary (Last 24 hours) at 2020 1316  Last data filed at 2020 0501  Gross per 24 hour   Intake 990 ml   Output 400 ml   Net 590 ml       Physical Exam:     Physical Exam  Eyes:      Extraocular Movements: Extraocular movements intact  Cardiovascular:      Rate and Rhythm: Normal rate  Rhythm irregular  Pulmonary:      Effort: Pulmonary effort is normal       Breath sounds: Normal breath sounds  Abdominal:      General: Abdomen is flat  Palpations: Abdomen is soft  Tenderness: There is no abdominal tenderness  There is no guarding     Musculoskeletal: General: Swelling and tenderness present  Right lower leg: Edema present  Left lower leg: Edema present  Skin:     General: Skin is warm  Neurological:      Mental Status: She is alert and oriented to person, place, and time  Sensory: Sensory deficit present  Motor: Weakness present  Psychiatric:         Mood and Affect: Mood normal          Additional Data:     Labs:    Results from last 7 days   Lab Units 12/25/20  0743  12/23/20  2344   WBC Thousand/uL 10 50*   < > 10 73*   HEMOGLOBIN g/dL 8 8*   < > 9 6*   HEMATOCRIT % 28 3*   < > 30 2*   PLATELETS Thousands/uL 204   < > 224   NEUTROS PCT %  --   --  71   LYMPHS PCT %  --   --  18   MONOS PCT %  --   --  9   EOS PCT %  --   --  1    < > = values in this interval not displayed  Results from last 7 days   Lab Units 12/25/20  0743 12/23/20  2344   POTASSIUM mmol/L 3 5 4 1   CHLORIDE mmol/L 105 100   CO2 mmol/L 28 29   BUN mg/dL 12 27*   CREATININE mg/dL 0 85 1 69*   CALCIUM mg/dL 8 4 9 6   ALK PHOS U/L  --  65   ALT U/L  --  17   AST U/L  --  11     Results from last 7 days   Lab Units 12/23/20  2344   INR  1 04       * I Have Reviewed All Lab Data Listed Above  * Additional Pertinent Lab Tests Reviewed:  All Labs Within Last 24 Hours Reviewed    Imaging:    Imaging Reports Reviewed Today Include: no new  Imaging Personally Reviewed by Myself Includes:  No new    Recent Cultures (last 7 days):           Last 24 Hours Medication List:   Current Facility-Administered Medications   Medication Dose Route Frequency Provider Last Rate    acetaminophen  975 mg Oral Q8H [address] Mao Vernon MD      amLODIPine  5 mg Oral HS Bora Garcia PA-C      aspirin  81 mg Oral Daily Bora Garcia PA-C      atorvastatin  40 mg Oral Daily With [employer]      carvedilol  25 mg Oral BID Bora Garcia PA-C      cloNIDine  0 1 mg Oral 4x Daily Bora Garcia PA-C      clopidogrel  75 mg Oral Daily Radha Jimenez PA-C      docusate sodium  100 mg Oral BID Yadiel Parrish MD      escitalopram  10 mg Oral Daily Bora Garcia PA-C      ferrous sulfate  325 mg Oral Daily With Breakfast Bora Garcia PA-C      gabapentin  200 mg Oral TID Naty Jiménez PA-C      heparin (porcine)  5,000 Units Subcutaneous Replaced by [facility] Bora Ramos PA-C      HYDROmorphone  0 2 mg Intravenous Q4H PRN Yadiel Parrish MD      insulin lispro  1-5 Units Subcutaneous TID AC Bora Garcia PA-C      insulin lispro  1-5 Units Subcutaneous HS Bora Garcia PA-C      ketotifen  1 drop Both Eyes BID PRN Naty Jiménez PA-C      oxyCODONE  2 5 mg Oral Q4H PRN Yadiel Parrish MD      oxyCODONE  5 mg Oral Q4H PRN Yadiel Parrish MD      pantoprazole  40 mg Oral Daily Bora Garcia PA-C      potassium chloride  10 mEq Oral TID Naty Jiménez PA-C      predniSONE  2 5 mg Oral Daily Bora Garcia PA-C      predniSONE  5 mg Oral Daily Bora Ramos PA-C      senna  2 tablet Oral HS Yadiel Parrish MD          Today, Patient Was Seen By: Cameron Martinez MD    ** Please Note: This note has been constructed using a voice recognition system   **

## 2020-12-25 NOTE — PHYSICAL THERAPY NOTE
PHYSICAL THERAPY EVALUATION  Time: 7354-4806    NAME:  Vanesa Ruiz  DATE: 12/25/20    AGE:   80 y o  Mrn:   [id number]  Length Of Stay: 1    ADMIT DX:  Right leg pain [M79 604]  MEGAN (acute kidney injury) ([address] ) [N17 9]  Generalized weakness [R53 1]  Ambulatory dysfunction [R26 2]    Past Medical History:   Diagnosis Date    Anemia     Atrial fibrillation ([facility] )     CAD (coronary artery disease)     Cancer ([place] [address] )     Carotid artery obstruction     Coronary artery disease     s/p stent x4     Disease of thyroid gland     TIA    GERD (gastroesophageal reflux disease)     HL (hearing loss)     Wears hearing aids    Hyperlipidemia     Hypertension     Mesenteric ischemia, chronic (HCC)     TIA (transient ischemic attack)      Past Surgical History:   Procedure Laterality Date    ANGIOPLASTY  01/01/2009    x4, stent placed in [facility] Right 01/01/2005    CARDIAC CATHETERIZATION      CATARACT EXTRACTION      CHOLECYSTECTOMY      COLONOSCOPY  02/26/2016    CORONARY ANGIOPLASTY      stent x4 07/11/1996x1 10/09/2009 x3    CORONARY STENT PLACEMENT      HYSTERECTOMY      REPLACEMENT TOTAL KNEE Right        Performed at least 2 patient identifiers during session: Name, Osvaldo Lefort and ID bracelet        12/25/20 9545   PT Last Visit   PT Visit Date 12/25/20   Note Type   Note type Evaluation  (& treatment, see below for treatment note )   Pain Assessment   Pain Assessment Tool 0-10   Pain Score 6   Pain Location/Orientation Orientation: Bilateral;Location: Leg  (R greater than L)   Pain Onset/Description Onset: Ongoing   Effect of Pain on Daily Activities limits indep, safety, tolerance of functional mobility    Patient's Stated Pain Goal No pain   Hospital Pain Intervention(s) Repositioned; Ambulation/increased activity; Emotional support;Elevated   Multiple Pain Sites No   Home Living   Type of [address] [place] [address] Multi-level;Stairs to enter with rails;Bed/bath upstairs  (1 (4") LONNIE and stair glide to 2nd floor bedroom/bathroom)   Home Equipment Walker; Other (Comment); Wheelchair-manual  (rolling walker, rollator walker, adjustable bed)   Additional Comments   Prior Function   Level of [place] Other (Comment)  (+A ADLs/IADLs, MILO HH ambulation with RW )   Lives With Daughter   Receives Help From Family;Home health   ADL Assistance Needs assistance   IADLs Needs assistance   Falls in the last 6 months 1 to 4  ("at least"; 1 fall resulting in current hospitalization )   Vocational Retired   Comments Pt reports living at home with her daughter in a [place] - 1 LONNIE which family bumps pt up/down in [facility], then stair glide to 2nd floor bedroom/bathroom  Pt reports that her daughter is at home all the time to assist with needs  Pt has HHA come MWF to assist in showering  Pt uses RW for [facility] mobility  Reports h/o multiple falls  Restrictions/Precautions   Weight Bearing Precautions Per Order No   Other Precautions Chair Alarm; Bed Alarm;Multiple lines; Fall Risk;Pain;Hard of hearing   General   Additional Pertinent History Pt admitted 12/23/2020 s/p fall, B LE weakness, c/o pain in L thigh and R ankle  All imaging (-) for acute abnormality      Family/Caregiver Present No   Cognition   Overall Cognitive Status WFL   Arousal/Participation Cooperative   Orientation Level Oriented X4   Memory Within functional limits   Following Commands Follows one step commands without difficulty   RUE Assessment   RUE Assessment WFL   LUE Assessment   LUE Assessment WFL   RLE Assessment   RLE Assessment X   Strength RLE   RLE Overall Strength 2/5  (grossly, limited by pain )   LLE Assessment   LLE Assessment X   Strength LLE   LLE Overall Strength 3/5  (grossly, limited by pain )   Coordination   Movements are Fluid and Coordinated 1   Sensation X   Light Touch   RLE Light Touch Impaired   LLE Light Touch Impaired   Bed Mobility   Supine to Sit 2  Maximal assistance   Additional items Assist x 1;Bedrails;HOB elevated; Increased time required;Verbal cues;LE management  (+trunk management )   Sit to Supine Unable to assess   Transfers   Sit to Stand 2  Maximal assistance   Additional items Assist x 1; Increased time required;Verbal cues  (+RW)   Stand to Sit 3  Moderate assistance   Additional items Assist x 1; Impulsive;Verbal cues  (impulsive return to sit, uncontrolled descent )   Additional items Other  (30 SEC STS ASSESSMENT: 0 stands)   Additional Comments Pt with VERY high fear of falling during transfers, becomes emotional at times, requiring increased emotional support from therapist  Pt unable to tolerate >5 seconds of functional supported static stand  Ambulation/Elevation   Gait pattern Not tested   Balance   Static Sitting Fair   Dynamic Sitting Fair -   Static Standing Poor   Dynamic Standing Poor -   Endurance Deficit   Endurance Deficit Yes   Endurance Deficit Description Pt with high levels of fatigue t/o session  Requiring rest break between each transfer attempt  Activity Tolerance   Activity Tolerance Patient limited by pain; Patient limited by fatigue   Assessment   Prognosis Fair   Problem List Decreased strength;Decreased range of motion;Decreased endurance; Impaired balance;Decreased mobility; Decreased safety awareness; Impaired sensation;Obesity;Pain   Assessment Pt seen for PT evaluation, activity orders of "up with assist"  Pt admitted 12/23/2020 w/ c/o B LE pain and weakness s/p fall at home, dx Pain in right lower leg  Comorbidities affecting pt's fnxl performance include: afib, anemia, CAD, cardiac disease, falls, GERD, hypertension, hyperlipidemia, obesity, PAD, TIA, cancer   Personal factors affecting pt at time of PT IE include: lives in 2 story house, ambulating with assistive device, inability to ambulate household distances, inability to navigate community distances, inability to navigate level surfaces without external assistance, positive fall history, anxiety, hearing impairments, inability to perform physical activity, inability to perform ADLS and inability to perform IADLS   PTA, pt was independent with functional mobility with RW, requiring assist for ADLS, requiring assist for IADLS, living with her daughter in a 2 level home with 1 steps to enter, ambulating household distance and home with family assist  Pt demonstrates fnxl impairments identified in objective findings from 27 Second STS assessment, scoring 0 stands, indicating high risk of falls/morbidity/mortality  Pt scores 10/24 on AM-PAC objective tool, indicating need for extensive rehab services  The Barthel Index outcome tool was used & pt scores 15 indicating total limitations of fnxl mobility/ADLS  Pt is at risk of falls d/t multiple comorbidities, history of previous falls, impaired balance, impaired sensation, acuity of medical illness, use of assistive device, varying levels of pain , advanced age and ongoing medical treatment of primary diagnosis  Pt's clinical presentation is currently evolving seen in pt's presentation of vital sign response, changing level of pain, varying levels of cognitive performance, increased fall risk, new onset of impairment of functional mobility, decreased endurance and new onset of weakness  This PT IE requires high complexity clinical decision making, based on multiple medical/physiological/fnxl/social factors which affect pt's performance w/ evaluation & therapist judgement for disposition recommendations  Pt will benefit from continued PT treatment in order to address impairments, decrease risk of falls, maximize independence w/ fnxl mobility, & ensure safety w/ mobility for transition to next level of care  Based on pt presentation & impairments, pt would most appropriately benefit from post acute STR upon d/c     Barriers to Discharge Decreased caregiver support; Inaccessible home environment   Goals   Patient Goals "to have less pain and maybe walk again"   STG Expiration Date 01/04/21   Short Term Goal #1 1  Pt will complete all bed mobility in hospital bed with no bedrails with DERICK, in order to promote increased OOB functional mobility to improve overall activity tolerance  2  Pt will complete all transfers with RW and DERICK, in order to increase safety with functional mobility  3  Pt will ambulate >20ft with RW and MODA in order to increase safety with in facility distance functional mobility  4  Pt will tolerate >3hrs OOB in upright position, in order to improve muscular endurance and respiratory status  5  Pt will improve B LE strength to >/= 3+/5 MMT throughout, in order to increase safety with functional mobility and decrease risk of falls  6  Pt will improve AM-PAC score to >/= 15/24 in order to increase independence with mobility and decrease burden of care  7  Pt will improve Barthel Index score to >/= 25/100 in order to increase independence and decrease risk of falls  8  Pt will improve static standing balance by >/= 1/2 grade in order to promote safety and increased independence with mobility  9  Pt will demonstrate understanding and independence with LE strengthening HEP  PT Treatment Day 1  (See below for treatment note )   Plan   Treatment/Interventions Functional transfer training;LE strengthening/ROM; Therapeutic exercise; Endurance training;Patient/family training;Equipment eval/education; Bed mobility;Gait training;OT   PT Frequency Other (Comment)  (3-5x/wk )   Recommendation   PT Discharge Recommendation Post-Acute Rehabilitation Services   Equipment Recommended Walker; Wheelchair   PT - OK to Discharge Yes  (to rehab once medically cleared )   AM-PAC Basic Mobility Inpatient   Turning in Bed Without Bedrails 2   Lying on Back to Sitting on Edge of Flat Bed 2   Moving Bed to Chair 2   Standing Up From Chair 2   Walk in Room 1   Climb 3-5 Stairs 1   Basic Mobility Inpatient Raw Score 10   Turning Head Towards Sound 4   Follow Simple Instructions 3   Low Function Basic Mobility Raw Score 17   Low Function Basic Mobility Standardized Score 27 46   Modified [place] Scale   Modified [place] Scale 5   Barthel Index   Feeding 5   Bathing 0   Grooming Score 0   Dressing Score 0   Bladder Score 0   Bowels Score 5   Toilet Use Score 0   Transfers (Bed/Chair) Score 5   Mobility (Level Surface) Score 0   Stairs Score 0   Barthel Index Score 15     30 Second Sit to Stand Norms  Age 80-79 y/o   Male: <7   Female: <4  Less than the number indicated for the appropriate age and gender suggests that patient is at an increased risk of falls  These norms are also taken w/ patients who do not use upper extremities to assist to get up from the chair  PHYSICAL THERAPY TREATMENT NOTE:    TIME IN: 1435  TIME OUT: 1500  TOTAL TREATMENT TIME: 25 minutes     Subjective: "I am so afraid of falling!" Pt c/o ongoing B LE pain and high anxiety regarding mobility, however is motivated to get OOB to recliner chair  Objective: Pt completes further transfer training from EOB with Diana 69 from therapist and use of RW  For SPT to recliner chair, therapist obtained second staff member for pt and therapist safety  Pt completes STS and SPT bed>chair with RW and MAXA from therapist, Emanuel 62 from second staff member for safety  During SPT pt with max difficulty advancing LEs due to B knee buckling, requiring therapist blockage of knees during stance phase  Pt requiring MODA for safe & controlled descent to sit in chair  At end of session pt was left seated in bedside recliner chair with B LEs elevated, chair alarm engaged, needs within reach  Assessment: Regarding functional mobility, pt remains unsafe for return to home at her current level of functional mobility  Pt requiring extensive assistance for all aspects of functional mobility  Plan: Recommend post acute STR  once medically cleared for d/c from the acute care setting   Continue skilled PT POC as able and appropriate to address continued yonas Montez, PT, DPT  12/25/2020

## 2020-12-25 NOTE — ASSESSMENT & PLAN NOTE
· Pain in bilateral extremities however more pronounced in the right lower extremity  Pain was predominantly of the right thigh yesterday, however today complains of pain mainly in the foot   X ray shows significant right hip degenerative osteoarthritis  · Duplex ultrasound shows no sign of DVT    Plan:  · Geriatric pain regimen   · PT/OT treatment

## 2020-12-25 NOTE — PLAN OF CARE
Problem: Potential for Falls  Goal: Patient will remain free of falls  Description: INTERVENTIONS:  - Assess patient frequently for physical needs  -  Identify cognitive and physical deficits and behaviors that affect risk of falls    -  [place] fall precautions as indicated by assessment   - Educate patient/family on patient safety including physical limitations  - Instruct patient to call for assistance with activity based on assessment  - Modify environment to reduce risk of injury  - Consider OT/PT consult to assist with strengthening/mobility  Outcome: Progressing     Problem: Prexisting or High Potential for Compromised Skin Integrity  Goal: Skin integrity is maintained or improved  Description: INTERVENTIONS:  - Identify patients at risk for skin breakdown  - Assess and monitor skin integrity  - Assess and monitor nutrition and hydration status  - Monitor labs   - Assess for incontinence   - Turn and reposition patient  - Assist with mobility/ambulation  - Relieve pressure over bony prominences  - Avoid friction and shearing  - Provide appropriate hygiene as needed including keeping skin clean and dry  - Evaluate need for skin moisturizer/barrier cream  - Collaborate with interdisciplinary team   - Patient/family teaching  - Consider wound care consult   Outcome: Progressing     Problem: PAIN - ADULT  Goal: Verbalizes/displays adequate comfort level or baseline comfort level  Description: Interventions:  - Encourage patient to monitor pain and request assistance  - Assess pain using appropriate pain scale  - Administer analgesics based on type and severity of pain and evaluate response  - Implement non-pharmacological measures as appropriate and evaluate response  - Consider cultural and social influences on pain and pain management  - Notify physician/advanced practitioner if interventions unsuccessful or patient reports new pain  Outcome: Progressing

## 2020-12-25 NOTE — PLAN OF CARE
Problem: PHYSICAL THERAPY ADULT  Goal: Performs mobility at highest level of function for planned discharge setting  See evaluation for individualized goals  Description: Treatment/Interventions: Functional transfer training, LE strengthening/ROM, Therapeutic exercise, Endurance training, Patient/family training, Equipment eval/education, Bed mobility, Gait training, OT  Equipment Recommended: Mina Houser, Wheelchair       See flowsheet documentation for full assessment, interventions and recommendations  Outcome: Progressing  Note: Prognosis: Fair  Problem List: Decreased strength, Decreased range of motion, Decreased endurance, Impaired balance, Decreased mobility, Decreased safety awareness, Impaired sensation, Obesity, Pain  Assessment: Pt seen for PT evaluation, activity orders of "up with assist"  Pt admitted 12/23/2020 w/ c/o B LE pain and weakness s/p fall at home, dx Pain in right lower leg  Comorbidities affecting pt's fnxl performance include: afib, anemia, CAD, cardiac disease, falls, GERD, hypertension, hyperlipidemia, obesity, PAD, TIA, cancer  Personal factors affecting pt at time of PT IE include: lives in 2 story house, ambulating with assistive device, inability to ambulate household distances, inability to navigate community distances, inability to navigate level surfaces without external assistance, positive fall history, anxiety, hearing impairments, inability to perform physical activity, inability to perform ADLS and inability to perform IADLS   PTA, pt was independent with functional mobility with RW, requiring assist for ADLS, requiring assist for IADLS, living with her daughter in a 2 level home with 1 steps to enter, ambulating household distance and home with family assist  Pt demonstrates fnxl impairments identified in objective findings from 27 Second STS assessment, scoring 0 stands, indicating high risk of falls/morbidity/mortality   Pt scores 10/24 on AM-PAC objective tool, indicating need for extensive rehab services  The Barthel Index outcome tool was used & pt scores 15 indicating total limitations of fnxl mobility/ADLS  Pt is at risk of falls d/t multiple comorbidities, history of previous falls, impaired balance, impaired sensation, acuity of medical illness, use of assistive device, varying levels of pain , advanced age and ongoing medical treatment of primary diagnosis  Pt's clinical presentation is currently evolving seen in pt's presentation of vital sign response, changing level of pain, varying levels of cognitive performance, increased fall risk, new onset of impairment of functional mobility, decreased endurance and new onset of weakness  This PT IE requires high complexity clinical decision making, based on multiple medical/physiological/fnxl/social factors which affect pt's performance w/ evaluation & therapist judgement for disposition recommendations  Pt will benefit from continued PT treatment in order to address impairments, decrease risk of falls, maximize independence w/ fnxl mobility, & ensure safety w/ mobility for transition to next level of care  Based on pt presentation & impairments, pt would most appropriately benefit from post acute STR upon d/c    Barriers to Discharge: Decreased caregiver support, Inaccessible home environment     PT Discharge Recommendation: [address]     PT - OK to Discharge: Yes(to rehab once medically cleared )    See flowsheet documentation for full assessment

## 2020-12-25 NOTE — ASSESSMENT & PLAN NOTE
· Patient with assisted fall at home earlier today  She states that her right leg gave out on her  Does ambulate with a walker at baseline and has history of ambulatory dysfunction the past    · LE/lumbar spine xrays showed no fracture or displacement  · Pain is now refered mainly of Right foot although there is referred pain with movement of all lower extremities  · Decreased sensation of bilateral lower extremities noted  Plan:   · PT OT evaluations

## 2020-12-25 NOTE — ASSESSMENT & PLAN NOTE
Lab Results   Component Value Date    HGBA1C 7 5 (H) 12/21/2020       Recent Labs     12/24/20  1743 12/24/20  2141 12/25/20  0723 12/25/20  1106   POCGLU 234* 140 119 237*       Blood Sugar Average: Last 72 hrs:    · Last A1c shows poor control  · Patient is currently taking 7 5 mg daily of prednisone secondary to osteoarthritis  · Will have to optimize sugar control  · Insulin sliding scale    · Accu-Cheks q i d   (P) 171 5

## 2020-12-25 NOTE — ED NOTES
Assisted patient with brushing teeth, washing her face and washing up a bit before going up to the unit       Gucci Barrow  12/24/20 2052

## 2020-12-25 NOTE — ASSESSMENT & PLAN NOTE
· Creatinine 1 69 (Baseline around 0 9-1 2)  · 2a to dehydration, responded well to IV hydration  · Avoid nephrotoxins and hypotension  · Hold Lasix

## 2020-12-25 NOTE — ASSESSMENT & PLAN NOTE
· History of PAD followed with vascular surgery in 2018  /MONA at that time kppo80-89% stenosis  · DP palpable b/l and patient with good cap refill  · May play a role in current LE pain

## 2020-12-26 LAB
ANION GAP SERPL CALCULATED.3IONS-SCNC: 12 MMOL/L (ref 4–13)
BUN SERPL-MCNC: 13 MG/DL (ref 5–25)
CALCIUM SERPL-MCNC: 8.7 MG/DL (ref 8.3–10.1)
CHLORIDE SERPL-SCNC: 106 MMOL/L (ref 100–108)
CO2 SERPL-SCNC: 25 MMOL/L (ref 21–32)
CREAT SERPL-MCNC: 0.89 MG/DL (ref 0.6–1.3)
ERYTHROCYTE [DISTWIDTH] IN BLOOD BY AUTOMATED COUNT: 14 % (ref 11.6–15.1)
GFR SERPL CREATININE-BSD FRML MDRD: 57 ML/MIN/1.73SQ M
GLUCOSE SERPL-MCNC: 141 MG/DL (ref 65–140)
GLUCOSE SERPL-MCNC: 146 MG/DL (ref 65–140)
GLUCOSE SERPL-MCNC: 170 MG/DL (ref 65–140)
GLUCOSE SERPL-MCNC: 215 MG/DL (ref 65–140)
GLUCOSE SERPL-MCNC: 226 MG/DL (ref 65–140)
HCT VFR BLD AUTO: 29.1 % (ref 34.8–46.1)
HGB BLD-MCNC: 9.2 G/DL (ref 11.5–15.4)
MCH RBC QN AUTO: 32.9 PG (ref 26.8–34.3)
MCHC RBC AUTO-ENTMCNC: 31.6 G/DL (ref 31.4–37.4)
MCV RBC AUTO: 104 FL (ref 82–98)
PLATELET # BLD AUTO: 210 THOUSANDS/UL (ref 149–390)
PMV BLD AUTO: 9 FL (ref 8.9–12.7)
POTASSIUM SERPL-SCNC: 3.6 MMOL/L (ref 3.5–5.3)
RBC # BLD AUTO: 2.8 MILLION/UL (ref 3.81–5.12)
RPR SER QL: NORMAL
RYE IGE QN: NEGATIVE
SODIUM SERPL-SCNC: 143 MMOL/L (ref 136–145)
WBC # BLD AUTO: 8.85 THOUSAND/UL (ref 4.31–10.16)

## 2020-12-26 PROCEDURE — 85027 COMPLETE CBC AUTOMATED: CPT | Performed by: STUDENT IN AN ORGANIZED HEALTH CARE EDUCATION/TRAINING PROGRAM

## 2020-12-26 PROCEDURE — 80048 BASIC METABOLIC PNL TOTAL CA: CPT | Performed by: STUDENT IN AN ORGANIZED HEALTH CARE EDUCATION/TRAINING PROGRAM

## 2020-12-26 PROCEDURE — 82948 REAGENT STRIP/BLOOD GLUCOSE: CPT

## 2020-12-26 PROCEDURE — 99232 SBSQ HOSP IP/OBS MODERATE 35: CPT | Performed by: INTERNAL MEDICINE

## 2020-12-26 RX ADMIN — CLONIDINE HYDROCHLORIDE 0.1 MG: 0.1 TABLET ORAL at 22:15

## 2020-12-26 RX ADMIN — GABAPENTIN 200 MG: 100 CAPSULE ORAL at 17:08

## 2020-12-26 RX ADMIN — INSULIN LISPRO 1 UNITS: 100 INJECTION, SOLUTION INTRAVENOUS; SUBCUTANEOUS at 22:16

## 2020-12-26 RX ADMIN — PREDNISONE 2.5 MG: 1 TABLET ORAL at 08:17

## 2020-12-26 RX ADMIN — CARVEDILOL 25 MG: 12.5 TABLET, FILM COATED ORAL at 17:08

## 2020-12-26 RX ADMIN — OXYCODONE HYDROCHLORIDE 5 MG: 5 TABLET ORAL at 22:06

## 2020-12-26 RX ADMIN — DOCUSATE SODIUM 100 MG: 100 CAPSULE, LIQUID FILLED ORAL at 17:08

## 2020-12-26 RX ADMIN — AMLODIPINE BESYLATE 5 MG: 5 TABLET ORAL at 22:07

## 2020-12-26 RX ADMIN — FERROUS SULFATE TAB 325 MG (65 MG ELEMENTAL FE) 325 MG: 325 (65 FE) TAB at 08:16

## 2020-12-26 RX ADMIN — ACETAMINOPHEN 975 MG: 325 TABLET, FILM COATED ORAL at 14:38

## 2020-12-26 RX ADMIN — POTASSIUM CHLORIDE 10 MEQ: 750 TABLET, EXTENDED RELEASE ORAL at 08:16

## 2020-12-26 RX ADMIN — POTASSIUM CHLORIDE 10 MEQ: 750 TABLET, EXTENDED RELEASE ORAL at 17:08

## 2020-12-26 RX ADMIN — ESCITALOPRAM 10 MG: 10 TABLET, FILM COATED ORAL at 08:16

## 2020-12-26 RX ADMIN — CLONIDINE HYDROCHLORIDE 0.1 MG: 0.1 TABLET ORAL at 11:14

## 2020-12-26 RX ADMIN — GABAPENTIN 200 MG: 100 CAPSULE ORAL at 08:16

## 2020-12-26 RX ADMIN — POTASSIUM CHLORIDE 10 MEQ: 750 TABLET, EXTENDED RELEASE ORAL at 22:07

## 2020-12-26 RX ADMIN — ATORVASTATIN CALCIUM 40 MG: 40 TABLET, FILM COATED ORAL at 17:08

## 2020-12-26 RX ADMIN — GABAPENTIN 200 MG: 100 CAPSULE ORAL at 22:06

## 2020-12-26 RX ADMIN — SENNOSIDES 17.2 MG: 8.6 TABLET ORAL at 22:07

## 2020-12-26 RX ADMIN — INSULIN LISPRO 2 UNITS: 100 INJECTION, SOLUTION INTRAVENOUS; SUBCUTANEOUS at 11:57

## 2020-12-26 RX ADMIN — HEPARIN SODIUM 5000 UNITS: 5000 INJECTION INTRAVENOUS; SUBCUTANEOUS at 05:02

## 2020-12-26 RX ADMIN — CLONIDINE HYDROCHLORIDE 0.1 MG: 0.1 TABLET ORAL at 08:16

## 2020-12-26 RX ADMIN — ACETAMINOPHEN 975 MG: 325 TABLET, FILM COATED ORAL at 05:02

## 2020-12-26 RX ADMIN — ASPIRIN 81 MG: 81 TABLET, COATED ORAL at 08:16

## 2020-12-26 RX ADMIN — OXYCODONE HYDROCHLORIDE 5 MG: 5 TABLET ORAL at 05:02

## 2020-12-26 RX ADMIN — CLONIDINE HYDROCHLORIDE 0.1 MG: 0.1 TABLET ORAL at 17:08

## 2020-12-26 RX ADMIN — INSULIN LISPRO 2 UNITS: 100 INJECTION, SOLUTION INTRAVENOUS; SUBCUTANEOUS at 17:57

## 2020-12-26 RX ADMIN — HEPARIN SODIUM 5000 UNITS: 5000 INJECTION INTRAVENOUS; SUBCUTANEOUS at 22:07

## 2020-12-26 RX ADMIN — CARVEDILOL 25 MG: 12.5 TABLET, FILM COATED ORAL at 08:16

## 2020-12-26 RX ADMIN — PREDNISONE 5 MG: 1 TABLET ORAL at 08:17

## 2020-12-26 RX ADMIN — ACETAMINOPHEN 975 MG: 325 TABLET, FILM COATED ORAL at 22:06

## 2020-12-26 RX ADMIN — CLOPIDOGREL BISULFATE 75 MG: 75 TABLET ORAL at 08:16

## 2020-12-26 RX ADMIN — DOCUSATE SODIUM 100 MG: 100 CAPSULE, LIQUID FILLED ORAL at 08:17

## 2020-12-26 RX ADMIN — PANTOPRAZOLE SODIUM 40 MG: 40 TABLET, DELAYED RELEASE ORAL at 08:16

## 2020-12-26 NOTE — ASSESSMENT & PLAN NOTE
· Blood pressure reviewed has been slightly elevated    · Continue home medications including amlodipine, Coreg, clonidine  · Blood pressure stable on current antihypertensive regimen

## 2020-12-26 NOTE — PROGRESS NOTES
Progress Note - Keyla Kilo 4/6/1930, 80 y o  female MRN: [id number]    Unit/Bed#: S -24 Encounter: [id number]    Primary Care Provider: Beatriz Byrne MD   Date and time admitted to hospital: 12/23/2020 11:19 PM        * Pain in right lower leg  Assessment & Plan  · Pain in bilateral extremities however more pronounced in the right lower extremity  Pain was predominantly of the right thigh yesterday, however today complains of pain mainly in the foot  X ray shows significant right hip degenerative osteoarthritis  · Duplex ultrasound shows no sign of DVT    Plan:  · Continue with Geriatric pain regimen   · PT input appreciated  Will likely need short-term rehabilitation upon discharge    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 (CODE) [facility])   Assessment & Plan  Lab Results   Component Value Date    HGBA1C 7 5 (H) 12/21/2020       Recent Labs     12/25/20  1608 12/25/20  2141 12/26/20  0717 12/26/20  1128   POCGLU 214* 164* 146* 215*       Blood Sugar Average: Last 72 hrs:    · Last A1c shows poor control  · Patient is currently taking 7 5 mg daily of prednisone secondary to osteoarthritis  · Will have to optimize sugar control  · Insulin sliding scale  · Accu-Cheks q i d   (P) 176 8    Ambulatory dysfunction  Assessment & Plan  · Patient with assisted fall at home earlier today  She states that her right leg gave out on her  Does ambulate with a walker at baseline and has history of ambulatory dysfunction the past    · LE/lumbar spine xrays showed no fracture or displacement  · Pain is now refered mainly of Right foot although there is referred pain with movement of all lower extremities  · Decreased sensation of bilateral lower extremities noted  Plan:   · PT OT evaluation appreciated  Will need short-term rehabilitation upon discharge      MEGAN (acute kidney injury) ([facility] [address] )  Assessment & Plan  · Creatinine 1 69 (Baseline around 0 9-1 2)  · Secondary dehydration, responded well to IV hydration  · Creatinine at baseline now  · Avoid nephrotoxins and hypotension  · Continue to hold diuretics    Benign essential hypertension  Assessment & Plan  · Blood pressure reviewed has been slightly elevated  · Continue home medications including amlodipine, Coreg, clonidine  · Blood pressure stable on current antihypertensive regimen    Paroxysmal atrial fibrillation ([address] )  Assessment & Plan  Controlled at this time  Rate/rhythm control: coreg   Currently on ASA/Plavix  Monitor heart rate        VTE Pharmacologic Prophylaxis:   Pharmacologic: Heparin  Mechanical VTE Prophylaxis in Place: No    Patient Centered Rounds: I have performed bedside rounds with nursing staff today  Discussions with Specialists or Other Care Team Provider:  Discussed with case management  Education and Discussions with Family / Patient:  Discussed with daughter over the phone  Time Spent for Care: 20 minutes  More than 50% of total time spent on counseling and coordination of care as described above  Current Length of Stay: 2 day(s)    Current Patient Status: Inpatient   Certification Statement: The patient will continue to require additional inpatient hospital stay due to Await short-term rehabilitation    Discharge Plan: Will need short-term rehabilitation upon discharge  Code Status: Level 3 - DNAR and DNI      Subjective:     Patient seen and examined  States that her pain is slightly improved  Does not offer any other complaints  Objective:     Vitals:   Temp (24hrs), Av 3 °F (36 8 °C), Min:98 1 °F (36 7 °C), Max:98 5 °F (36 9 °C)    Temp:  [98 1 °F (36 7 °C)-98 5 °F (36 9 °C)] 98 1 °F (36 7 °C)  HR:  [74-82] 82  Resp:  [20] 20  BP: (119-153)/(56-67) 129/58  SpO2:  [84 %-96 %] 96 %  Body mass index is 29 6 kg/m²  Input and Output Summary (last 24 hours):        Intake/Output Summary (Last 24 hours) at 2020 1258  Last data filed at 2020 1205  Gross per 24 hour Intake    Output 350 ml   Net -350 ml       Physical Exam:     Physical Exam  Constitutional:       Appearance: Normal appearance  HENT:      Head: Normocephalic  Pulmonary:      Comments: Diminished breath sounds at bases  Abdominal:      General: Abdomen is flat  Palpations: Abdomen is soft  Neurological:      General: No focal deficit present  Mental Status: She is alert and oriented to person, place, and time  Additional Data:     Labs:    Results from last 7 days   Lab Units 12/26/20  0449  12/23/20  2344   WBC Thousand/uL 8 85   < > 10 73*   HEMOGLOBIN g/dL 9 2*   < > 9 6*   HEMATOCRIT % 29 1*   < > 30 2*   PLATELETS Thousands/uL 210   < > 224   NEUTROS PCT %  --   --  71   LYMPHS PCT %  --   --  18   MONOS PCT %  --   --  9   EOS PCT %  --   --  1    < > = values in this interval not displayed  Results from last 7 days   Lab Units 12/26/20  0449  12/23/20  2344   SODIUM mmol/L 143   < > 137   POTASSIUM mmol/L 3 6   < > 4 1   CHLORIDE mmol/L 106   < > 100   CO2 mmol/L 25   < > 29   BUN mg/dL 13   < > 27*   CREATININE mg/dL 0 89   < > 1 69*   ANION GAP mmol/L 12   < > 8   CALCIUM mg/dL 8 7   < > 9 6   ALBUMIN g/dL  --   --  3 0*   TOTAL BILIRUBIN mg/dL  --   --  0 21   ALK PHOS U/L  --   --  65   ALT U/L  --   --  17   AST U/L  --   --  11   GLUCOSE RANDOM mg/dL 141*   < > 148*    < > = values in this interval not displayed  Results from last 7 days   Lab Units 12/23/20  2344   INR  1 04     Results from last 7 days   Lab Units 12/26/20  1128 12/26/20  0717 12/25/20  2141 12/25/20  1608 12/25/20  1106 12/25/20  0723 12/24/20  2141 12/24/20  1743 12/24/20  1248 12/24/20  0608   POC GLUCOSE mg/dl 215* 146* 164* 214* 237* 119 140 234* 173* 126     Results from last 7 days   Lab Units 12/21/20  0717   HEMOGLOBIN A1C % 7 5*               * I Have Reviewed All Lab Data Listed Above  * Additional Pertinent Lab Tests Reviewed:  Darren [facility] Admission Reviewed    Imaging:    Imaging Reports Reviewed Today Include: NA    Recent Cultures (last 7 days):           Last 24 Hours Medication List:   Current Facility-Administered Medications   Medication Dose Route Frequency Provider Last Rate    acetaminophen  975 mg Oral Q8H [facility] Ro Thorpe MD      amLODIPine  5 mg Oral HS Bora Garcia PA-C      aspirin  81 mg Oral Daily Bora Garcia PA-C      atorvastatin  40 mg Oral Daily With [facility]      carvedilol  25 mg Oral BID Hannah Kramer PA-C      cloNIDine  0 1 mg Oral 4x Daily Bora Garcia PA-C      clopidogrel  75 mg Oral Daily Bora Garcia PA-C      docusate sodium  100 mg Oral BID Ro Thorpe MD      escitalopram  10 mg Oral Daily Bora Garcia PA-C      ferrous sulfate  325 mg Oral Daily With Breakfast Bora Garcia PA-C      gabapentin  200 mg Oral TID Hannah Kramer PA-C      heparin (porcine)  5,000 Units Subcutaneous Q8H [facility]      HYDROmorphone  0 2 mg Intravenous Q4H PRN Ro Thorpe MD      insulin lispro  1-5 Units Subcutaneous TID AC Bora Garcia PA-C      insulin lispro  1-5 Units Subcutaneous HS Bora Garcia PA-C      ketotifen  1 drop Both Eyes BID PRN Hannah Kramer PA-C      oxyCODONE  2 5 mg Oral Q4H PRN Ro Thorpe MD      oxyCODONE  5 mg Oral Q4H PRN Ro Thorpe MD      pantoprazole  40 mg Oral Daily Bora Garcia PA-C      potassium chloride  10 mEq Oral TID Hannah Kramer PA-C      predniSONE  2 5 mg Oral Daily Bora Garcia PA-C      predniSONE  5 mg Oral Daily Bora Garcia PA-C      senna  2 tablet Oral HS Ro Thorpe MD          Today, Patient Was Seen By: Satish Damon MD    ** Please Note: Dictation voice to text software may have been used in the creation of this document   **

## 2020-12-26 NOTE — ASSESSMENT & PLAN NOTE
· Pain in bilateral extremities however more pronounced in the right lower extremity  Pain was predominantly of the right thigh yesterday, however today complains of pain mainly in the foot  X ray shows significant right hip degenerative osteoarthritis  · Duplex ultrasound shows no sign of DVT    Plan:  · Continue with Geriatric pain regimen   · PT input appreciated    Will likely need short-term rehabilitation upon discharge

## 2020-12-26 NOTE — SOCIAL WORK
CM attempted to meet with Pt but she presented confused  CM was in contact with Pt's daughter aDvi Duran [phone] with an introduction and explanation of role  Davi Duran confirmed the Pt resides with her in a 2 story home with the use a roller walker, chair llift, shower chair, raised toilet seat and 1 step to enter the home  Davi Duran reported Pt requires some assist with ADLs, had [facility] and was at [facility] in the past  Davi Duran denied any mental health or drug/alcohol placements  Pt was reported using [facility] on [address] and has Nathaly Finn as a PCP  CM discussed with Davi Duran the post acute care recommendation was made by your care team for STR  Discussed Freedom of Choice with caregiver  List of facilities given to caregiver via phone  caregiver aware the list is custom filtered for them by preference  and that [facility] post acute providers are designated  Davi Duran reported being agreeable to SNF and requested a blanket referral to the surrounding area, excluding all [facility] facilities  CM made the requested blanket referral and will continue to follow   CM reviewed d/c planning process including the following: identifying help at home, patient preference for d/c planning needs, Discharge Lounge, [facility] Meds to Bed program, availability of treatment team to discuss questions or concerns patient and/or family may have regarding understanding medications and recognizing signs and symptoms once discharged  CM also encouraged patient to follow up with all recommended appointments after discharge  Patient advised of importance for patient and family to participate in managing patients medical well being

## 2020-12-26 NOTE — ASSESSMENT & PLAN NOTE
· Creatinine 1 69 (Baseline around 0 9-1 2)  · Secondary dehydration, responded well to IV hydration  · Creatinine at baseline now  · Avoid nephrotoxins and hypotension  · Continue to hold diuretics

## 2020-12-26 NOTE — ASSESSMENT & PLAN NOTE
Lab Results   Component Value Date    HGBA1C 7 5 (H) 12/21/2020       Recent Labs     12/25/20  1608 12/25/20  2141 12/26/20  0717 12/26/20  1128   POCGLU 214* 164* 146* 215*       Blood Sugar Average: Last 72 hrs:    · Last A1c shows poor control  · Patient is currently taking 7 5 mg daily of prednisone secondary to osteoarthritis  · Will have to optimize sugar control  · Insulin sliding scale    · Accu-Cheks q i d   (P) 176 8

## 2020-12-26 NOTE — ASSESSMENT & PLAN NOTE
· Patient with assisted fall at home earlier today  She states that her right leg gave out on her  Does ambulate with a walker at baseline and has history of ambulatory dysfunction the past    · LE/lumbar spine xrays showed no fracture or displacement  · Pain is now refered mainly of Right foot although there is referred pain with movement of all lower extremities  · Decreased sensation of bilateral lower extremities noted  Plan:   · PT OT evaluation appreciated  Will need short-term rehabilitation upon discharge

## 2020-12-27 PROBLEM — N17.9 AKI (ACUTE KIDNEY INJURY) (HCC): Status: RESOLVED | Noted: 2019-07-01 | Resolved: 2020-12-27

## 2020-12-27 LAB
GLUCOSE SERPL-MCNC: 145 MG/DL (ref 65–140)
GLUCOSE SERPL-MCNC: 165 MG/DL (ref 65–140)
GLUCOSE SERPL-MCNC: 198 MG/DL (ref 65–140)
GLUCOSE SERPL-MCNC: 226 MG/DL (ref 65–140)

## 2020-12-27 PROCEDURE — 82948 REAGENT STRIP/BLOOD GLUCOSE: CPT

## 2020-12-27 PROCEDURE — 99232 SBSQ HOSP IP/OBS MODERATE 35: CPT | Performed by: INTERNAL MEDICINE

## 2020-12-27 RX ADMIN — ACETAMINOPHEN 975 MG: 325 TABLET, FILM COATED ORAL at 05:23

## 2020-12-27 RX ADMIN — POTASSIUM CHLORIDE 10 MEQ: 750 TABLET, EXTENDED RELEASE ORAL at 08:47

## 2020-12-27 RX ADMIN — CARVEDILOL 25 MG: 12.5 TABLET, FILM COATED ORAL at 08:47

## 2020-12-27 RX ADMIN — HEPARIN SODIUM 5000 UNITS: 5000 INJECTION INTRAVENOUS; SUBCUTANEOUS at 05:23

## 2020-12-27 RX ADMIN — CARVEDILOL 25 MG: 12.5 TABLET, FILM COATED ORAL at 17:04

## 2020-12-27 RX ADMIN — POTASSIUM CHLORIDE 10 MEQ: 750 TABLET, EXTENDED RELEASE ORAL at 17:00

## 2020-12-27 RX ADMIN — CLONIDINE HYDROCHLORIDE 0.1 MG: 0.1 TABLET ORAL at 12:33

## 2020-12-27 RX ADMIN — ATORVASTATIN CALCIUM 40 MG: 40 TABLET, FILM COATED ORAL at 17:03

## 2020-12-27 RX ADMIN — CLONIDINE HYDROCHLORIDE 0.1 MG: 0.1 TABLET ORAL at 22:29

## 2020-12-27 RX ADMIN — HEPARIN SODIUM 5000 UNITS: 5000 INJECTION INTRAVENOUS; SUBCUTANEOUS at 22:29

## 2020-12-27 RX ADMIN — DOCUSATE SODIUM 100 MG: 100 CAPSULE, LIQUID FILLED ORAL at 08:46

## 2020-12-27 RX ADMIN — POTASSIUM CHLORIDE 10 MEQ: 750 TABLET, EXTENDED RELEASE ORAL at 22:29

## 2020-12-27 RX ADMIN — OXYCODONE HYDROCHLORIDE 5 MG: 5 TABLET ORAL at 05:23

## 2020-12-27 RX ADMIN — GABAPENTIN 200 MG: 100 CAPSULE ORAL at 08:45

## 2020-12-27 RX ADMIN — CLONIDINE HYDROCHLORIDE 0.1 MG: 0.1 TABLET ORAL at 08:45

## 2020-12-27 RX ADMIN — CLONIDINE HYDROCHLORIDE 0.1 MG: 0.1 TABLET ORAL at 17:04

## 2020-12-27 RX ADMIN — ESCITALOPRAM 10 MG: 10 TABLET, FILM COATED ORAL at 08:45

## 2020-12-27 RX ADMIN — ACETAMINOPHEN 975 MG: 325 TABLET, FILM COATED ORAL at 14:54

## 2020-12-27 RX ADMIN — INSULIN LISPRO 1 UNITS: 100 INJECTION, SOLUTION INTRAVENOUS; SUBCUTANEOUS at 16:52

## 2020-12-27 RX ADMIN — GABAPENTIN 200 MG: 100 CAPSULE ORAL at 17:00

## 2020-12-27 RX ADMIN — PANTOPRAZOLE SODIUM 40 MG: 40 TABLET, DELAYED RELEASE ORAL at 08:45

## 2020-12-27 RX ADMIN — AMLODIPINE BESYLATE 5 MG: 5 TABLET ORAL at 22:29

## 2020-12-27 RX ADMIN — INSULIN LISPRO 1 UNITS: 100 INJECTION, SOLUTION INTRAVENOUS; SUBCUTANEOUS at 22:33

## 2020-12-27 RX ADMIN — DOCUSATE SODIUM 100 MG: 100 CAPSULE, LIQUID FILLED ORAL at 17:04

## 2020-12-27 RX ADMIN — SENNOSIDES 17.2 MG: 8.6 TABLET ORAL at 22:29

## 2020-12-27 RX ADMIN — PREDNISONE 5 MG: 1 TABLET ORAL at 08:46

## 2020-12-27 RX ADMIN — GABAPENTIN 200 MG: 100 CAPSULE ORAL at 22:28

## 2020-12-27 RX ADMIN — ASPIRIN 81 MG: 81 TABLET, COATED ORAL at 08:45

## 2020-12-27 RX ADMIN — INSULIN LISPRO 2 UNITS: 100 INJECTION, SOLUTION INTRAVENOUS; SUBCUTANEOUS at 12:34

## 2020-12-27 RX ADMIN — HEPARIN SODIUM 5000 UNITS: 5000 INJECTION INTRAVENOUS; SUBCUTANEOUS at 14:54

## 2020-12-27 RX ADMIN — ACETAMINOPHEN 975 MG: 325 TABLET, FILM COATED ORAL at 22:29

## 2020-12-27 RX ADMIN — CLOPIDOGREL BISULFATE 75 MG: 75 TABLET ORAL at 08:46

## 2020-12-27 RX ADMIN — PREDNISONE 2.5 MG: 1 TABLET ORAL at 08:46

## 2020-12-27 RX ADMIN — FERROUS SULFATE TAB 325 MG (65 MG ELEMENTAL FE) 325 MG: 325 (65 FE) TAB at 08:46

## 2020-12-27 NOTE — PROGRESS NOTES
Progress Note - Bryant Citizen 4/6/1930, 80 y o  female MRN: [id number]    Unit/Bed#: S -53 Encounter: [id number]    Primary Care Provider: Becky Mata MD   Date and time admitted to hospital: 12/23/2020 11:19 PM      * Ambulatory dysfunction  Assessment & Plan  · Patient with assisted fall at home  · LE/lumbar spine xrays showed no fracture or displacement  · baseline ambulatory dysfunction 2a to severe hip degenerative arthritis   · Patient will benefit from longterm pain management consultation     Plan:   · PT OT treatment  · Will need short-term rehabilitation upon discharge  · Ambulatory pain management     Pain in right lower leg  Assessment & Plan  · Pain in bilateral extremities however more pronounced in the right lower extremity  · Duplex ultrasound shows no sign of DVT  · Pain is probably 2a to degenerative osteoarthritis    Plan:  · Continue with Geriatric pain regimen   · PT treatment/short-term rehabilitation upon discharge  · pain management consultation upon discharge    MEGAN (acute kidney injury) (HCC)resolved as of 12/27/2020  Assessment & Plan  · Creatinine 1 69 (Baseline around 0 9-1 2)  · Secondary dehydration, responded well to IV hydration  · Creatinine at baseline now  · Avoid nephrotoxins and hypotension  · Continue to hold diuretics    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 (CODE) [facility])   Assessment & Plan  Lab Results   Component Value Date    HGBA1C 7 5 (H) 12/21/2020       Recent Labs     12/26/20  1128 12/26/20  1723 12/26/20  2054 12/27/20  0725   POCGLU 215* 226* 170* 145*       Blood Sugar Average: Last 72 hrs:    · Last A1c shows poor control  · Patient is currently taking 7 5 mg daily of prednisone secondary to osteoarthritis  · Will have to optimize sugar control  · Insulin sliding scale    · Accu-Cheks q i d   (P) [phone]    Benign essential hypertension  Assessment & Plan  · Blood pressure reviewed has been slightly elevated  · Continue home medications including amlodipine, Coreg, clonidine  · Blood pressure stable on current antihypertensive regimen    Paroxysmal atrial fibrillation ([address] )  Assessment & Plan  Controlled at this time  Rate/rhythm control: coreg   Currently on ASA/Plavix  Monitor heart rate      VTE Pharmacologic Prophylaxis:   Pharmacologic: Heparin  Mechanical VTE Prophylaxis in Place: Yes    Discussions with Specialists or Other Care Team Provider: PT    Education and Discussions with Family / Patient: family     Current Length of Stay: 3 day(s)    Current Patient Status: Inpatient     Discharge Plan / Estimated Discharge Date: 24 hrs    Code Status: Level 3 - DNAR and DNI      Subjective:   Still complains of pain although some improvement is noted    Objective:     Vitals:   Temp (24hrs), Av 9 °F (36 6 °C), Min:97 5 °F (36 4 °C), Max:98 3 °F (36 8 °C)    Temp:  [97 5 °F (36 4 °C)-98 3 °F (36 8 °C)] 97 5 °F (36 4 °C)  HR:  [68-97] 68  Resp:  [18] 18  BP: (125-167)/(58-68) 140/63  SpO2:  [92 %-96 %] 94 %  Body mass index is 29 88 kg/m²  Input and Output Summary (last 24 hours): Intake/Output Summary (Last 24 hours) at 2020 1108  Last data filed at 2020 0720  Gross per 24 hour   Intake 720 ml   Output 275 ml   Net 445 ml       Physical Exam:     Physical Exam  Eyes:      Extraocular Movements: Extraocular movements intact  Cardiovascular:      Rate and Rhythm: Normal rate  Rhythm irregular  Pulmonary:      Effort: Pulmonary effort is normal       Breath sounds: Normal breath sounds  Abdominal:      General: Abdomen is flat  Palpations: Abdomen is soft  Tenderness: There is no abdominal tenderness  There is no guarding  Musculoskeletal:         General: Swelling and tenderness present  No deformity  Skin:     General: Skin is warm  Neurological:      General: No focal deficit present  Mental Status: She is alert and oriented to person, place, and time  Sensory: Sensory deficit present  Motor: Weakness present  Psychiatric:         Mood and Affect: Mood normal          Additional Data:     Labs:    Results from last 7 days   Lab Units 12/26/20  0449  12/23/20  2344   WBC Thousand/uL 8 85   < > 10 73*   HEMOGLOBIN g/dL 9 2*   < > 9 6*   HEMATOCRIT % 29 1*   < > 30 2*   PLATELETS Thousands/uL 210   < > 224   NEUTROS PCT %  --   --  71   LYMPHS PCT %  --   --  18   MONOS PCT %  --   --  9   EOS PCT %  --   --  1    < > = values in this interval not displayed  Results from last 7 days   Lab Units 12/26/20  0449  12/23/20  2344   POTASSIUM mmol/L 3 6   < > 4 1   CHLORIDE mmol/L 106   < > 100   CO2 mmol/L 25   < > 29   BUN mg/dL 13   < > 27*   CREATININE mg/dL 0 89   < > 1 69*   CALCIUM mg/dL 8 7   < > 9 6   ALK PHOS U/L  --   --  65   ALT U/L  --   --  17   AST U/L  --   --  11    < > = values in this interval not displayed  Results from last 7 days   Lab Units 12/23/20  2344   INR  1 04       * I Have Reviewed All Lab Data Listed Above  * Additional Pertinent Lab Tests Reviewed:  All Labs Within Last 24 Hours Reviewed    Imaging:    Imaging Reports Reviewed Today Include: no new  Imaging Personally Reviewed by Myself Includes:  No new    Recent Cultures (last 7 days):           Last 24 Hours Medication List:   Current Facility-Administered Medications   Medication Dose Route Frequency Provider Last Rate    acetaminophen  975 mg Oral Q8H [facility] Nuno Chaney MD      amLODIPine  5 mg Oral HS Bora Garcia PA-C      aspirin  81 mg Oral Daily Bora Garcia PA-C      atorvastatin  40 mg Oral Daily With [facility]      carvedilol  25 mg Oral BID Donnie Rodriguez PA-C      cloNIDine  0 1 mg Oral 4x Daily Bora Garcia PA-C      clopidogrel  75 mg Oral Daily Bora Garcia PA-C      docusate sodium  100 mg Oral BID Nuno Chaney MD      escitalopram  10 mg Oral Daily Bora Garcia PA-C      ferrous sulfate  325 mg Oral Daily With Breakfast Bora Garcia PA-C      gabapentin  200 mg Oral TID Tere Omer PA-C      heparin (porcine)  5,000 Units Subcutaneous [place]      HYDROmorphone  0 2 mg Intravenous Q4H PRN Uche Blount MD      insulin lispro  1-5 Units Subcutaneous TID AC Bora Garcia PA-C      insulin lispro  1-5 Units Subcutaneous HS Bora Garcia PA-C      ketotifen  1 drop Both Eyes BID PRN Tere Omer PA-C      oxyCODONE  2 5 mg Oral Q4H PRN Uche Blount MD      oxyCODONE  5 mg Oral Q4H PRN Uche Blount MD      pantoprazole  40 mg Oral Daily Bora Garcia PA-C      potassium chloride  10 mEq Oral TID Tere Omer PA-C      predniSONE  2 5 mg Oral Daily Bora Garcia PA-C      predniSONE  5 mg Oral Daily Bora Ramos PA-C      senna  2 tablet Oral HS Uche Blount MD          Today, Patient Was Seen By: Frederick Quintanilla MD    ** Please Note: This note has been constructed using a voice recognition system   **

## 2020-12-27 NOTE — ASSESSMENT & PLAN NOTE
· Patient with assisted fall at home  · LE/lumbar spine xrays showed no fracture or displacement  · baseline ambulatory dysfunction 2a to severe hip degenerative arthritis   · Patient will benefit from longterm pain management consultation     Plan:   · PT OT treatment  · Will need short-term rehabilitation upon discharge    · Ambulatory pain management

## 2020-12-27 NOTE — ASSESSMENT & PLAN NOTE
Lab Results   Component Value Date    HGBA1C 7 5 (H) 12/21/2020       Recent Labs     12/26/20  1128 12/26/20  1723 12/26/20 2054 12/27/20  0725   POCGLU 215* 226* 170* 145*       Blood Sugar Average: Last 72 hrs:    · Last A1c shows poor control  · Patient is currently taking 7 5 mg daily of prednisone secondary to osteoarthritis  · Will have to optimize sugar control  · Insulin sliding scale    · Accu-Cheks q i d   (P) [phone] [id number]

## 2020-12-28 LAB
FLUAV RNA RESP QL NAA+PROBE: NEGATIVE
FLUBV RNA RESP QL NAA+PROBE: NEGATIVE
GLUCOSE SERPL-MCNC: 135 MG/DL (ref 65–140)
GLUCOSE SERPL-MCNC: 145 MG/DL (ref 65–140)
GLUCOSE SERPL-MCNC: 214 MG/DL (ref 65–140)
GLUCOSE SERPL-MCNC: 270 MG/DL (ref 65–140)
RSV RNA RESP QL NAA+PROBE: NEGATIVE
SARS-COV-2 RNA RESP QL NAA+PROBE: NEGATIVE

## 2020-12-28 PROCEDURE — RECHECK: Performed by: INTERNAL MEDICINE

## 2020-12-28 PROCEDURE — 0241U HB NFCT DS VIR RESP RNA 4 TRGT: CPT | Performed by: STUDENT IN AN ORGANIZED HEALTH CARE EDUCATION/TRAINING PROGRAM

## 2020-12-28 PROCEDURE — 82948 REAGENT STRIP/BLOOD GLUCOSE: CPT

## 2020-12-28 PROCEDURE — 99232 SBSQ HOSP IP/OBS MODERATE 35: CPT | Performed by: INTERNAL MEDICINE

## 2020-12-28 PROCEDURE — 97110 THERAPEUTIC EXERCISES: CPT

## 2020-12-28 PROCEDURE — 97116 GAIT TRAINING THERAPY: CPT

## 2020-12-28 PROCEDURE — 97530 THERAPEUTIC ACTIVITIES: CPT

## 2020-12-28 RX ORDER — OXYCODONE HYDROCHLORIDE 5 MG/1
2.5 TABLET ORAL EVERY 4 HOURS PRN
Qty: 30 TABLET | Refills: 0 | Status: CANCELLED | OUTPATIENT
Start: 2020-12-28 | End: 2021-01-27

## 2020-12-28 RX ORDER — LOPERAMIDE HYDROCHLORIDE 2 MG/1
2 CAPSULE ORAL 2 TIMES DAILY
Status: COMPLETED | OUTPATIENT
Start: 2020-12-28 | End: 2020-12-28

## 2020-12-28 RX ADMIN — FERROUS SULFATE TAB 325 MG (65 MG ELEMENTAL FE) 325 MG: 325 (65 FE) TAB at 08:36

## 2020-12-28 RX ADMIN — GABAPENTIN 200 MG: 100 CAPSULE ORAL at 08:36

## 2020-12-28 RX ADMIN — DOCUSATE SODIUM 100 MG: 100 CAPSULE, LIQUID FILLED ORAL at 17:02

## 2020-12-28 RX ADMIN — LOPERAMIDE HYDROCHLORIDE 2 MG: 2 CAPSULE ORAL at 21:54

## 2020-12-28 RX ADMIN — PREDNISONE 5 MG: 1 TABLET ORAL at 08:36

## 2020-12-28 RX ADMIN — AMLODIPINE BESYLATE 5 MG: 5 TABLET ORAL at 21:59

## 2020-12-28 RX ADMIN — PANTOPRAZOLE SODIUM 40 MG: 40 TABLET, DELAYED RELEASE ORAL at 08:37

## 2020-12-28 RX ADMIN — ESCITALOPRAM 10 MG: 10 TABLET, FILM COATED ORAL at 08:36

## 2020-12-28 RX ADMIN — CLONIDINE HYDROCHLORIDE 0.1 MG: 0.1 TABLET ORAL at 08:37

## 2020-12-28 RX ADMIN — CLONIDINE HYDROCHLORIDE 0.1 MG: 0.1 TABLET ORAL at 17:02

## 2020-12-28 RX ADMIN — ACETAMINOPHEN 975 MG: 325 TABLET, FILM COATED ORAL at 13:58

## 2020-12-28 RX ADMIN — HEPARIN SODIUM 5000 UNITS: 5000 INJECTION INTRAVENOUS; SUBCUTANEOUS at 05:04

## 2020-12-28 RX ADMIN — LOPERAMIDE HYDROCHLORIDE 2 MG: 2 CAPSULE ORAL at 10:50

## 2020-12-28 RX ADMIN — CLONIDINE HYDROCHLORIDE 0.1 MG: 0.1 TABLET ORAL at 21:59

## 2020-12-28 RX ADMIN — CARVEDILOL 25 MG: 12.5 TABLET, FILM COATED ORAL at 17:01

## 2020-12-28 RX ADMIN — CARVEDILOL 25 MG: 12.5 TABLET, FILM COATED ORAL at 08:36

## 2020-12-28 RX ADMIN — INSULIN LISPRO 3 UNITS: 100 INJECTION, SOLUTION INTRAVENOUS; SUBCUTANEOUS at 17:00

## 2020-12-28 RX ADMIN — ACETAMINOPHEN 975 MG: 325 TABLET, FILM COATED ORAL at 05:04

## 2020-12-28 RX ADMIN — GABAPENTIN 200 MG: 100 CAPSULE ORAL at 17:01

## 2020-12-28 RX ADMIN — POTASSIUM CHLORIDE 10 MEQ: 750 TABLET, EXTENDED RELEASE ORAL at 21:54

## 2020-12-28 RX ADMIN — HEPARIN SODIUM 5000 UNITS: 5000 INJECTION INTRAVENOUS; SUBCUTANEOUS at 21:54

## 2020-12-28 RX ADMIN — ACETAMINOPHEN 975 MG: 325 TABLET, FILM COATED ORAL at 21:53

## 2020-12-28 RX ADMIN — POTASSIUM CHLORIDE 10 MEQ: 750 TABLET, EXTENDED RELEASE ORAL at 08:36

## 2020-12-28 RX ADMIN — INSULIN LISPRO 2 UNITS: 100 INJECTION, SOLUTION INTRAVENOUS; SUBCUTANEOUS at 12:01

## 2020-12-28 RX ADMIN — ATORVASTATIN CALCIUM 40 MG: 40 TABLET, FILM COATED ORAL at 17:01

## 2020-12-28 RX ADMIN — CLOPIDOGREL BISULFATE 75 MG: 75 TABLET ORAL at 08:37

## 2020-12-28 RX ADMIN — POTASSIUM CHLORIDE 10 MEQ: 750 TABLET, EXTENDED RELEASE ORAL at 17:02

## 2020-12-28 RX ADMIN — ASPIRIN 81 MG: 81 TABLET, COATED ORAL at 08:36

## 2020-12-28 RX ADMIN — CLONIDINE HYDROCHLORIDE 0.1 MG: 0.1 TABLET ORAL at 11:05

## 2020-12-28 RX ADMIN — GABAPENTIN 200 MG: 100 CAPSULE ORAL at 21:53

## 2020-12-28 RX ADMIN — HEPARIN SODIUM 5000 UNITS: 5000 INJECTION INTRAVENOUS; SUBCUTANEOUS at 13:58

## 2020-12-28 RX ADMIN — DOCUSATE SODIUM 100 MG: 100 CAPSULE, LIQUID FILLED ORAL at 08:37

## 2020-12-28 RX ADMIN — OXYCODONE HYDROCHLORIDE 5 MG: 5 TABLET ORAL at 16:59

## 2020-12-28 RX ADMIN — PREDNISONE 2.5 MG: 1 TABLET ORAL at 08:37

## 2020-12-28 NOTE — ASSESSMENT & PLAN NOTE
Lab Results   Component Value Date    HGBA1C 7 5 (H) 12/21/2020       Recent Labs     12/27/20  1544 12/27/20  2045 12/28/20  0716 12/28/20  1147   POCGLU 198* 165* 145* 214*       Blood Sugar Average: Last 72 hrs:    · Last A1c shows poor control  · Patient is currently taking 7 5 mg daily of prednisone secondary to osteoarthritis  · Will have to optimize sugar control  · Insulin sliding scale    · Accu-Cheks q i d   (P) [phone] [id number]

## 2020-12-28 NOTE — DISCHARGE INSTR - AVS FIRST PAGE
Dear Leti Montilla,     It was our pleasure to care for you here at [facility]  It is our hope that we were always able to exceed the expected standards for your care during your stay  You were hospitalized due to ambulatory dysfunction and pain  You were cared for on the 4th floor by Leonor Smith MD under the service of Tad Benitez MD with the [facility] who covers for your primary care physician (PCP), Rk Mercado MD, while you were hospitalized  If you have any questions or concerns related to this hospitalization, you may contact us at [phone]  For follow up as well as any medication refills, we recommend that you follow up with your primary care physician  A registered nurse will reach out to you by phone within a few days after your discharge to answer any additional questions that you may have after going home  However, at this time we provide for you here, the most important instructions / recommendations at discharge:     · Notable Medication Adjustments -   · May take oxycodone 2 5 mg as prescribed when needed for pain  · Testing Required after Discharge -   · none  · Important follow up information -   · Please follow up with pain management physician  · Other Instructions -   · Complete physical therapy  · Please review this entire after visit summary as additional general instructions including medication list, appointments, activity, diet, any pertinent wound care, and other additional recommendations from your care team that may be provided for you        Sincerely,     Leonor Smith MD

## 2020-12-28 NOTE — ASSESSMENT & PLAN NOTE
Lab Results   Component Value Date    HGBA1C 7 5 (H) 12/21/2020       Recent Labs     12/27/20  1110 12/27/20  1544 12/27/20  2045 12/28/20  0716   POCGLU 226* 198* 165* 145*       Blood Sugar Average: Last 72 hrs:    · Last A1c shows poor control  · Patient is currently taking 7 5 mg daily of prednisone secondary to osteoarthritis  · Will have to optimize sugar control  · Insulin sliding scale    · Accu-Cheks q i d   (P) [phone]

## 2020-12-28 NOTE — CASE MANAGEMENT
CM spoke with patient's daughter Anthony Flores on the phone  Patient's daughter updated [address] and [facility]  are able to offer an inpatient rehab bed at discharge  Patient's daughter requesting [address] at discharge for inpatient rehab  Patient's daughter stated she spoke with her mother on the phone  Patient requesting [address] at discharge  CM discussed with patient's daughter transportation at discharge  CM reviewed PT note with daughter  Daughter aware a wheelchair [employer] is an out of pocket expense between $  Daughter expressed understanding on the phone  Daughter stated patient has a set up clothes at bedside with her shoes  CM provided contact number to daughter for any further questions/concerns  CM sent updated referral to [address]  [address] is able to accept tomorrow 12/29/2020 for inpatient rehab  CM sent referral to [facility] for transport  Waiting for transport time

## 2020-12-28 NOTE — DISCHARGE INSTR - OTHER ORDERS
Skin and Wound Care Plan:   1  Ehob offloading waffle cushion to chair when OOB  2  Apply skin nourishing cream to all skin daily  3  Cleanse sacro-buttocks with soap and water, pat dry, apply Calazime to open area on right buttock TID and PRN with incontinence care  4  Turn and reposition patient every 2 hours  5   Prevalon boots to bilateral lower extremities while in bed and apply after patient has sat down in recliner

## 2020-12-28 NOTE — DISCHARGE SUMMARY
Discharge- Bryant Citizen 4/6/1930, 80 y o  female MRN: [id number]    Unit/Bed#: S -30 Encounter: [id number]    Primary Care Provider: Becky Mata MD   Date and time admitted to hospital: 12/23/2020 11:19 PM    * Ambulatory dysfunction  Assessment & Plan  · Patient with assisted fall at home  · LE/lumbar spine xrays showed no fracture or displacement  · baseline ambulatory dysfunction 2a to severe hip degenerative arthritis   · Patient will benefit from longterm pain management consultation     Plan:   · PT OT treatment  · Will need short-term rehabilitation upon discharge  · Ambulatory pain management     Pain in right lower leg  Assessment & Plan  · Pain in bilateral extremities however more pronounced in the right lower extremity  Pain was predominantly of the right thigh yesterday, however today complains of pain mainly in the foot  X ray shows significant right hip degenerative osteoarthritis  · Duplex ultrasound shows no sign of DVT    Plan:  · Continue with Geriatric pain regimen   · PT input appreciated  Will likely need short-term rehabilitation upon discharge    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 (CODE) [facility])   Assessment & Plan  Lab Results   Component Value Date    HGBA1C 7 5 (H) 12/21/2020       Recent Labs     12/27/20  1110 12/27/20  1544 12/27/20  2045 12/28/20  0716   POCGLU 226* 198* 165* 145*       Blood Sugar Average: Last 72 hrs:    · Last A1c shows poor control  · Patient is currently taking 7 5 mg daily of prednisone secondary to osteoarthritis  · Will have to optimize sugar control  · Insulin sliding scale  · Accu-Cheks q i d   (P) [phone] [id number]    Benign essential hypertension  Assessment & Plan  · Blood pressure reviewed has been slightly elevated    · Continue home medications including amlodipine, Coreg, clonidine  · Blood pressure stable on current antihypertensive regimen    Paroxysmal atrial fibrillation (HCC)  Assessment & Plan  Controlled at this time  Rate/rhythm control: coreg   Currently on ASA/Plavix  Monitor heart rate        Discharging Resident Physician: Mauricio Aguirer MD  Attending: Tomas Eugene MD  PCP: Ronn Judd MD  Admission Date: 12/23/2020  Discharge Date: 12/29/20    Disposition:     Other: post acute rehab physcial therapy    Reason for Admission: ambulatory dysfunction     Consultations During Hospital Stay:  · PT    Procedures Performed:     · none    Significant Findings / Test Results:     · none    Incidental Findings:   · none     Test Results Pending at Discharge (will require follow up):   · none     Outpatient Tests Requested:  · none    Complications:  none    Hospital Course:     Digna Rutledge is a 80 y o  female patient who originally presented to the hospital on 12/23/2020 due to assisted fall at home and lower extremity pain  Work up including imaging and LE duplex negative  Will require physical therapy and pain management going forward for degenerative osteoarthritis and muscle strengthening    Condition at Discharge: fair     Discharge Day Visit / Exam:     Subjective:  Feels slight improvement in pain   Vitals: Blood Pressure: 127/65 (12/28/20 1105)  Pulse: 78 (12/28/20 0700)  Temperature: 98 1 °F (36 7 °C) (12/28/20 0700)  Temp Source: Oral (12/28/20 0700)  Respirations: 18 (12/28/20 0700)  Weight - Scale: 72 9 kg (160 lb 11 5 oz) (12/28/20 0600)  SpO2: 91 % (12/28/20 0700)  Exam:   Physical Exam  Eyes:      Extraocular Movements: Extraocular movements intact  Cardiovascular:      Rate and Rhythm: Normal rate  Rhythm irregular  Pulmonary:      Effort: Pulmonary effort is normal       Breath sounds: Normal breath sounds  Abdominal:      General: Abdomen is flat  Palpations: Abdomen is soft  Tenderness: There is no abdominal tenderness  There is no guarding  Musculoskeletal:         General: Swelling and tenderness present  No deformity  Skin:     General: Skin is warm  Neurological:      General: No focal deficit present  Mental Status: She is alert and oriented to person, place, and time  Motor: Weakness present  Psychiatric:         Mood and Affect: Mood normal          Discussion with Family: family  Discharge instructions/Information to patient and family:   See after visit summary for information provided to patient and family  Provisions for Follow-Up Care:  See after visit summary for information related to follow-up care and any pertinent home health orders  Planned Readmission: none    Discharge Medications:  See after visit summary for reconciled discharge medications provided to patient and family        ** Please Note: This note has been constructed using a voice recognition system **

## 2020-12-28 NOTE — PLAN OF CARE
Problem: PHYSICAL THERAPY ADULT  Goal: Performs mobility at highest level of function for planned discharge setting  See evaluation for individualized goals  Description: Treatment/Interventions: Functional transfer training, LE strengthening/ROM, Therapeutic exercise, Endurance training, Patient/family training, Equipment eval/education, Bed mobility, Gait training, OT  Equipment Recommended: Mayda Soares, Wheelchair       See flowsheet documentation for full assessment, interventions and recommendations  Outcome: Progressing  Note: Prognosis: Fair  Problem List: Decreased strength, Decreased range of motion, Decreased endurance, Impaired balance, Decreased mobility, Decreased safety awareness, Impaired sensation, Obesity, Pain  Assessment: Patien agreeable and motivated to participate in therapy session  Patient requires max ax1 for supine to sit and mod ax1 for sit to supine with LE management, Dependent x2 for repositioning HOB  Multiple sit<>stand transfers with min ax1 and instruction for body positioning and hand placement  Pt able to ambulate few steps x 2 trials with roller walker and mod ax1  Requires steadying balance assist, weight shift and stepping sequence instructions  Pt able to perform lateral step and scoot to [facility] with min ax1  Declines sitting OOB in recliner secondary to comfort  Participated in seated B LE exercise progarm with input for form and pace  Pt supine in bed with prevlon boots in place, call bell and belongings in reach  Barriers to Discharge: Decreased caregiver support, Inaccessible home environment     PT Discharge Recommendation: [address]     PT - OK to Discharge: Yes(to rehab once medically cleared )    See flowsheet documentation for full assessment

## 2020-12-28 NOTE — PROGRESS NOTES
Progress Note - Jay Garcia 4/6/1930, 80 y o  female MRN: [id number]    Unit/Bed#: S -40 Encounter: [id number]    Primary Care Provider: Chelo Panda MD   Date and time admitted to hospital: 12/23/2020 11:19 PM      * Ambulatory dysfunction  Assessment & Plan  · Patient with assisted fall at home  · LE/lumbar spine xrays showed no fracture or displacement  · baseline ambulatory dysfunction 2a to severe hip degenerative arthritis   · Patient will benefit from longterm pain management consultation     Plan:   · PT OT treatment  · Will need short-term rehabilitation upon discharge  · Ambulatory pain management     Pain in right lower leg  Assessment & Plan  · Pain in bilateral extremities however more pronounced in the right lower extremity  Pain was predominantly of the right thigh yesterday, however today complains of pain mainly in the foot  X ray shows significant right hip degenerative osteoarthritis  · Duplex ultrasound shows no sign of DVT    Plan:  · Continue with Geriatric pain regimen   · PT input appreciated  Will likely need short-term rehabilitation upon discharge    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 (CODE) [facility])   Assessment & Plan  Lab Results   Component Value Date    HGBA1C 7 5 (H) 12/21/2020       Recent Labs     12/27/20  1544 12/27/20  2045 12/28/20  0716 12/28/20  1147   POCGLU 198* 165* 145* 214*       Blood Sugar Average: Last 72 hrs:    · Last A1c shows poor control  · Patient is currently taking 7 5 mg daily of prednisone secondary to osteoarthritis  · Will have to optimize sugar control  · Insulin sliding scale  · Accu-Cheks q i d   (P) [phone] [id number]    Benign essential hypertension  Assessment & Plan  · Blood pressure reviewed has been slightly elevated    · Continue home medications including amlodipine, Coreg, clonidine  · Blood pressure stable on current antihypertensive regimen    Paroxysmal atrial fibrillation [facility])  Assessment & Plan  Controlled at this time  Rate/rhythm control: coreg   Currently on ASA/Plavix  Monitor heart rate          VTE Pharmacologic Prophylaxis:   Pharmacologic: Heparin  Mechanical VTE Prophylaxis in Place: Yes    Discussions with Specialists or Other Care Team Provider: none    Education and Discussions with Family / Patient: family    Current Length of Stay: 4 day(s)    Current Patient Status: Inpatient     Discharge Plan / Estimated Discharge Date: tomorrow    Code Status: Level 3 - DNAR and DNI      Subjective:   Feels a bit better    Objective:     Vitals:   Temp (24hrs), Av °F (36 7 °C), Min:97 8 °F (36 6 °C), Max:98 1 °F (36 7 °C)    Temp:  [97 8 °F (36 6 °C)-98 1 °F (36 7 °C)] 98 °F (36 7 °C)  HR:  [78-82] 82  Resp:  [18] 18  BP: (127-164)/(57-65) 150/57  SpO2:  [91 %-98 %] 92 %  Body mass index is 29 4 kg/m²  Input and Output Summary (last 24 hours): Intake/Output Summary (Last 24 hours) at 2020 1546  Last data filed at 2020 1513  Gross per 24 hour   Intake 440 ml   Output 2500 ml   Net -2060 ml       Physical Exam:     Physical Exam  Eyes:      Extraocular Movements: Extraocular movements intact  Cardiovascular:      Rate and Rhythm: Normal rate  Rhythm irregular  Pulmonary:      Effort: Pulmonary effort is normal       Breath sounds: Normal breath sounds  Abdominal:      General: Abdomen is flat  Palpations: Abdomen is soft  Tenderness: There is no abdominal tenderness  There is no guarding  Musculoskeletal:         General: Swelling and tenderness present  No deformity  Skin:     General: Skin is warm  Neurological:      General: No focal deficit present  Mental Status: She is alert and oriented to person, place, and time  Motor: Weakness present     Psychiatric:         Mood and Affect: Mood normal            Additional Data:     Labs:    Results from last 7 days   Lab Units 20  0449  20  2344   WBC Thousand/uL 8 85   < > 10 73*   HEMOGLOBIN g/dL 9 2*   < > 9 6*   HEMATOCRIT % 29 1*   < > 30 2*   PLATELETS Thousands/uL 210   < > 224   NEUTROS PCT %  --   --  71   LYMPHS PCT %  --   --  18   MONOS PCT %  --   --  9   EOS PCT %  --   --  1    < > = values in this interval not displayed  Results from last 7 days   Lab Units 12/26/20  0449  12/23/20  2344   POTASSIUM mmol/L 3 6   < > 4 1   CHLORIDE mmol/L 106   < > 100   CO2 mmol/L 25   < > 29   BUN mg/dL 13   < > 27*   CREATININE mg/dL 0 89   < > 1 69*   CALCIUM mg/dL 8 7   < > 9 6   ALK PHOS U/L  --   --  65   ALT U/L  --   --  17   AST U/L  --   --  11    < > = values in this interval not displayed  Results from last 7 days   Lab Units 12/23/20  2344   INR  1 04       * I Have Reviewed All Lab Data Listed Above  * Additional Pertinent Lab Tests Reviewed:  All Labs Within Last 24 Hours Reviewed    Imaging:    Imaging Reports Reviewed Today Include: no new  Imaging Personally Reviewed by Myself Includes:  No new    Recent Cultures (last 7 days):           Last 24 Hours Medication List:   Current Facility-Administered Medications   Medication Dose Route Frequency Provider Last Rate    acetaminophen  975 mg Oral Q8H [facility] Nuno Chaney MD      amLODIPine  5 mg Oral HS Bora Garcia PA-C      aspirin  81 mg Oral Daily Bora Garcia PA-C      atorvastatin  40 mg Oral Daily With PatientsLikeMeNADIA      carvedilol  25 mg Oral BID Donnie Rodriguez PA-C      cloNIDine  0 1 mg Oral 4x Daily Bora Garcia PA-C      clopidogrel  75 mg Oral Daily Bora Garcia PA-C      docusate sodium  100 mg Oral BID Nuno Chaney MD      escitalopram  10 mg Oral Daily Bora Garcia PA-C      ferrous sulfate  325 mg Oral Daily With Breakfast Bora Garcia PA-C      gabapentin  200 mg Oral TID Donnie Rodriguez PA-C      heparin (porcine)  5,000 Units Subcutaneous Q8H [facility] Bora Garcia PA-C      HYDROmorphone  0 2 mg Intravenous Q4H PRN MD Spencer Leahy insulin lispro  1-5 Units Subcutaneous TID AC Bora Garcia PA-C      insulin lispro  1-5 Units Subcutaneous HS Bora Garcia PA-C      ketotifen  1 drop Both Eyes BID PRN Curtis Harris PA-C      loperamide  2 mg Oral BID Miryam Yeung MD      oxyCODONE  2 5 mg Oral Q4H PRN Miryam Yeung MD      oxyCODONE  5 mg Oral Q4H PRN Miryam Yeung MD      pantoprazole  40 mg Oral Daily Bora Garcia PA-C      potassium chloride  10 mEq Oral TID Curtis Harris PA-C      predniSONE  2 5 mg Oral Daily Bora Garcia PA-C      predniSONE  5 mg Oral Daily Bora Ramos PA-C      senna  2 tablet Oral HS Miryam Yeung MD          Today, Patient Was Seen By: Kennedy Galeazzi, MD    ** Please Note: This note has been constructed using a voice recognition system   **

## 2020-12-28 NOTE — PHYSICAL THERAPY NOTE
PHYSICAL THERAPY NOTE    Patient Name: Samantha Montoya  [id number] Date: 2020 1313   PT Last Visit   PT Visit Date 20   Note Type   Note Type Treatment   Pain Assessment   Pain Assessment Tool 0-10   Pain Score Worst Possible Pain  ("15/10 all over, my stomach")   Pain Location/Orientation Location: Generalized; Location: Abdomen   Restrictions/Precautions   Weight Bearing Precautions Per Order No   Other Precautions Chair Alarm; Bed Alarm;Multiple lines; Fall Risk;Pain;Hard of hearing   General   Family/Caregiver Present No   Subjective   Subjective Patient supine in bed and is agreeable to participate in therapy session  Pt identifers obtained from name &   Bed Mobility   Supine to Sit 2  Maximal assistance   Additional items Assist x 1;HOB elevated; Bedrails; Increased time required;Verbal cues;LE management   Sit to Supine 3  Moderate assistance   Additional items Assist x 1;Bedrails; Increased time required;Verbal cues;LE management   Additional Comments Pt supine in bed post session with prevlon boots intact, call bell and belongings in reach  Transfers   Sit to Stand 4  Minimal assistance   Additional items Assist x 1; Armrests; Increased time required;Verbal cues   Stand to Sit 4  Minimal assistance   Additional items Assist x 1; Armrests; Increased time required;Verbal cues   Ambulation/Elevation   Gait pattern Poor UE support; Improper Weight shift;L Knee Lele;R Knee Lele; Forward Flexion; Wide VANESSA; Decreased R stance; Redundant gait at times; Excessively slow; Short stride   Gait Assistance 3  Moderate assist   Additional items Assist x 1;Verbal cues   Assistive Device Rolling walker   Distance 3 feet x2   Endurance Deficit   Endurance Deficit Yes   Endurance Deficit Description limited activity tolerance   Activity Tolerance   Activity Tolerance Patient limited by pain; Patient limited by fatigue; Other (Comment)  (fear of falling)   Exercises   Knee AROM Long Arc Quad Sitting;10 reps;AROM; Bilateral   Ankle Pumps Sitting;10 reps;AROM; Bilateral   Marching Sitting;5 reps;AROM; Bilateral   Assessment   Prognosis Fair   Problem List Decreased strength;Decreased range of motion;Decreased endurance; Impaired balance;Decreased mobility; Decreased safety awareness; Impaired sensation;Obesity;Pain   Assessment Patien agreeable and motivated to participate in therapy session  Patient requires max ax1 for supine to sit and mod ax1 for sit to supine with LE management, Dependent x2 for repositioning HOB  Multiple sit<>stand transfers with min ax1 and instruction for body positioning and hand placement  Pt able to ambulate few steps x 2 trials with roller walker and mod ax1  Requires steadying balance assist, weight shift and stepping sequence instructions  Pt able to perform lateral step and scoot to [facility] with min ax1  Declines sitting OOB in recliner secondary to comfort  Participated in seated B LE exercise progarm with input for form and pace  Pt supine in bed with prevlon boots in place, call bell and belongings in reach  Barriers to Discharge Decreased caregiver support; Inaccessible home environment   Goals   Patient Goals none stated   STG Expiration Date 01/04/21   PT Treatment Day 2   Plan   Treatment/Interventions Functional transfer training;LE strengthening/ROM; Therapeutic exercise; Endurance training;Patient/family training;Equipment eval/education; Bed mobility;Gait training;Spoke to nursing   PT Frequency Other (Comment)  (3-5x/week)   Recommendation   PT Discharge Recommendation Post-Acute Rehabilitation Services   Equipment Recommended Serenity Sandra; Wheelchair       [place], [place]

## 2020-12-28 NOTE — WOUND OSTOMY CARE
Consult Note - Wound   Lola Diop 80 y o  female MRN: [id number]  Unit/Bed#: S -19 Encounter: [id number]        History and Present Illness:  Wound care consulted for patient admitted with ambulatory dysfunction  Patient history significant for a-fib, HTN, DDM2, anemia, recurrent UTI's and CKD stage 3  Assessment Findings:   Patient in bed for assessment  Sh currently  has a Purewick in place for urinary management  She is incontinent of urine and at times stool  She is alert and oriented, pleasant and cooperative with assessment  She states she is not able to get OOB and stand at this time, she states she is too weak  She was able to turn on her side with assistance for assessment  Prevalon boots placed to bilateral heels with instructions to take home on discharge and to be used while in bed and when legs are elevated in the chair, not to be worn to ambulate  Chair cushion provided with instructions to take and use at home after discharge  1  HA-Right heel DTI, two small purple non-blanchable areas with blanchable erythema to the periwound, suspect stage 3 or stage 4 pressure injury when DTI evolves  2  POA-Left heel DTI, patient states her sister was putting lotion on her heel at home, purple intact skin, no fluctuance or induration, periwound is blanchable erythema with dry intact scab noted to the medial area of the heel, suspect stage 3 or stage 4 pressure injury when DTI evolves  3  POA-stage 2 pressure injury to the right buttock, located within area of old scarring and hyperpigmented skin, epithelial tissue noted to the edge  4  Dry intact skin noted to the left buttock, blanchable, hyperpigmented and scar tissue in periwound  5  Blanchable dry skin with pink base noted at the sacral slit  6  POA-Scabbed area noted to the left knee and ecchymotic areas to the left lateral chest wall and left lateral abdomen    Patient states she slipped to the floor at home prior to admission, she states she did not fall, her legs were weak and she was assisted to the floor      Skin and Wound Care Plan:   1  Ehob offloading waffle cushion to chair when OOB  2  Apply skin nourishing cream to all skin daily  3  Cleanse sacro-buttocks with soap and water, pat dry, apply Calazime to open area on right buttock TID and PRN with incontinence care  4  Turn and reposition patient every 2 hours  5   Prevalon boots to bilateral lower extremities while in bed and apply after patient has sat down in recliner    Wounds:  Wound 12/28/20 Pressure Injury Heel Left (Active)   Wound Image   12/28/20 1224   Wound Description Beefy red;Fragile;Light purple 12/28/20 1224   Pressure Injury Stage DTPI 12/28/20 1224   Pao-wound Assessment Erythema 12/28/20 1224   Wound Length (cm) 1 5 cm 12/28/20 1224   Wound Width (cm) 1 5 cm 12/28/20 1224   Wound Depth (cm) 0 cm 12/28/20 1224   Wound Surface Area (cm^2) 2 25 cm^2 12/28/20 1224   Wound Volume (cm^3) 0 cm^3 12/28/20 1224   Calculated Wound Volume (cm^3) 0 cm^3 12/28/20 1224   Drainage Amount None 12/28/20 1224   Treatments Other (Comment) 12/28/20 1224   Dressing Protective barrier 12/28/20 1224   Patient Tolerance Tolerated well 12/28/20 1224       Wound 12/28/20 Pressure Injury Heel Right (Active)   Wound Image   12/28/20 1230   Wound Description Light purple 12/28/20 1230   Pressure Injury Stage DTPI 12/28/20 1230   Pao-wound Assessment Erythema 12/28/20 1230   Wound Length (cm) 0 4 cm 12/28/20 1230   Wound Width (cm) 1 cm 12/28/20 1230   Wound Depth (cm) 0 cm 12/28/20 1230   Wound Surface Area (cm^2) 0 4 cm^2 12/28/20 1230   Wound Volume (cm^3) 0 cm^3 12/28/20 1230   Calculated Wound Volume (cm^3) 0 cm^3 12/28/20 1230   Drainage Amount None 12/28/20 1230   Dressing Protective barrier 12/28/20 1230   Patient Tolerance Tolerated well 12/28/20 1230       Wound 12/28/20 Pressure Injury Buttocks Right (Active)   Wound Image   12/28/20 1243   Wound Description Beef red;Non-blanchable erythema;Epithelialization 12/28/20 1243   Pressure Injury Stage 2 12/28/20 1243   Pao-wound Assessment Hyperpigmented; Intact;Scar Tissue 12/28/20 1243   Wound Length (cm) 0 7 cm 12/28/20 1243   Wound Width (cm) 0 5 cm 12/28/20 1243   Wound Depth (cm) 0 1 cm 12/28/20 1243   Wound Surface Area (cm^2) 0 35 cm^2 12/28/20 1243   Wound Volume (cm^3) 0 04 cm^3 12/28/20 1243   Calculated Wound Volume (cm^3) 0 04 cm^3 12/28/20 1243   Drainage Amount None 12/28/20 1243   Treatments Cleansed 12/28/20 1243   Dressing Protective barrier 12/28/20 1243   Patient Tolerance Tolerated well 12/28/20 1243     Reviewed plan of care with primary RN  Recommendations written as orders  Wound care to follow weekly while admitted  Questions or concerns Becca Burnham BSN, RN, [facility], [facility]

## 2020-12-28 NOTE — CASE MANAGEMENT
CM spoke with patient's daughter Mariann Vance on the phone, [phone]  CM introduced self and role  Patient's daughter updated the following facilities are currently reviewing for inpatient rehab: [facility], [facility], [address]  [facility] and [facility] unable to accept at this time  Patient's daughter requesting CM contact her once other facilities have provided an answer re:inpatient rehab bed  Daughter updated Covid test negative today

## 2020-12-29 VITALS
DIASTOLIC BLOOD PRESSURE: 66 MMHG | TEMPERATURE: 98.3 F | RESPIRATION RATE: 20 BRPM | BODY MASS INDEX: 29.27 KG/M2 | SYSTOLIC BLOOD PRESSURE: 158 MMHG | WEIGHT: 160.05 LBS | OXYGEN SATURATION: 92 % | HEART RATE: 85 BPM

## 2020-12-29 LAB
GLUCOSE SERPL-MCNC: 141 MG/DL (ref 65–140)
GLUCOSE SERPL-MCNC: 224 MG/DL (ref 65–140)

## 2020-12-29 PROCEDURE — 82948 REAGENT STRIP/BLOOD GLUCOSE: CPT

## 2020-12-29 PROCEDURE — 99238 HOSP IP/OBS DSCHRG MGMT 30/<: CPT | Performed by: INTERNAL MEDICINE

## 2020-12-29 RX ORDER — OXYCODONE HYDROCHLORIDE 5 MG/1
5 TABLET ORAL EVERY 4 HOURS PRN
Qty: 15 TABLET | Refills: 0 | Status: SHIPPED | OUTPATIENT
Start: 2020-12-29 | End: 2021-01-08

## 2020-12-29 RX ORDER — OXYCODONE HYDROCHLORIDE 5 MG/1
5 TABLET ORAL EVERY 4 HOURS PRN
Qty: 15 TABLET | Refills: 0 | OUTPATIENT
Start: 2020-12-29 | End: 2020-12-29

## 2020-12-29 RX ADMIN — GABAPENTIN 200 MG: 100 CAPSULE ORAL at 08:26

## 2020-12-29 RX ADMIN — PANTOPRAZOLE SODIUM 40 MG: 40 TABLET, DELAYED RELEASE ORAL at 08:25

## 2020-12-29 RX ADMIN — ACETAMINOPHEN 975 MG: 325 TABLET, FILM COATED ORAL at 06:59

## 2020-12-29 RX ADMIN — CLONIDINE HYDROCHLORIDE 0.1 MG: 0.1 TABLET ORAL at 08:25

## 2020-12-29 RX ADMIN — PREDNISONE 2.5 MG: 1 TABLET ORAL at 08:25

## 2020-12-29 RX ADMIN — CLOPIDOGREL BISULFATE 75 MG: 75 TABLET ORAL at 08:25

## 2020-12-29 RX ADMIN — FERROUS SULFATE TAB 325 MG (65 MG ELEMENTAL FE) 325 MG: 325 (65 FE) TAB at 08:25

## 2020-12-29 RX ADMIN — POTASSIUM CHLORIDE 10 MEQ: 750 TABLET, EXTENDED RELEASE ORAL at 08:25

## 2020-12-29 RX ADMIN — DOCUSATE SODIUM 100 MG: 100 CAPSULE, LIQUID FILLED ORAL at 08:25

## 2020-12-29 RX ADMIN — PREDNISONE 5 MG: 1 TABLET ORAL at 08:26

## 2020-12-29 RX ADMIN — HEPARIN SODIUM 5000 UNITS: 5000 INJECTION INTRAVENOUS; SUBCUTANEOUS at 06:59

## 2020-12-29 RX ADMIN — CARVEDILOL 25 MG: 12.5 TABLET, FILM COATED ORAL at 08:25

## 2020-12-29 RX ADMIN — ASPIRIN 81 MG: 81 TABLET, COATED ORAL at 08:25

## 2020-12-29 RX ADMIN — ESCITALOPRAM 10 MG: 10 TABLET, FILM COATED ORAL at 08:24

## 2020-12-29 NOTE — PLAN OF CARE
Problem: Potential for Falls  Goal: Patient will remain free of falls  Description: INTERVENTIONS:  - Assess patient frequently for physical needs  -  Identify cognitive and physical deficits and behaviors that affect risk of falls    -  [place] fall precautions as indicated by assessment   - Educate patient/family on patient safety including physical limitations  - Instruct patient to call for assistance with activity based on assessment  - Modify environment to reduce risk of injury  - Consider OT/PT consult to assist with strengthening/mobility  Outcome: Progressing     Problem: Prexisting or High Potential for Compromised Skin Integrity  Goal: Skin integrity is maintained or improved  Description: INTERVENTIONS:  - Identify patients at risk for skin breakdown  - Assess and monitor skin integrity  - Assess and monitor nutrition and hydration status  - Monitor labs   - Assess for incontinence   - Turn and reposition patient  - Assist with mobility/ambulation  - Relieve pressure over bony prominences  - Avoid friction and shearing  - Provide appropriate hygiene as needed including keeping skin clean and dry  - Evaluate need for skin moisturizer/barrier cream  - Collaborate with interdisciplinary team   - Patient/family teaching  - Consider wound care consult   Outcome: Progressing     Problem: PAIN - ADULT  Goal: Verbalizes/displays adequate comfort level or baseline comfort level  Description: Interventions:  - Encourage patient to monitor pain and request assistance  - Assess pain using appropriate pain scale  - Administer analgesics based on type and severity of pain and evaluate response  - Implement non-pharmacological measures as appropriate and evaluate response  - Consider cultural and social influences on pain and pain management  - Notify physician/advanced practitioner if interventions unsuccessful or patient reports new pain  Outcome: Progressing

## 2020-12-29 NOTE — CASE MANAGEMENT
CM spoke with patient's daughter Cheryle Florez on the phone  CM updated daughter patient's transport time is set up with [facility] at 1200 to [place] [facility]  No other questions/concerns noted at this time  CM attempted to update patient at the bedside  Patient currently on phone at bedside  24-Oct-2017

## 2020-12-29 NOTE — CASE MANAGEMENT
CM updated by RUDDY  Patient's transport time is 1200 with [facility]  CM updated [facility], nursing and receiving facility with transport time

## 2020-12-30 ENCOUNTER — NURSING HOME VISIT (OUTPATIENT)
Dept: WOUND CARE | Facility: HOSPITAL | Age: 85
End: 2020-12-30
Payer: MEDICARE

## 2020-12-30 ENCOUNTER — NURSING HOME VISIT (OUTPATIENT)
Dept: GERIATRICS | Facility: OTHER | Age: 85
End: 2020-12-30
Payer: MEDICARE

## 2020-12-30 VITALS
RESPIRATION RATE: 18 BRPM | TEMPERATURE: 97.9 F | BODY MASS INDEX: 29.1 KG/M2 | OXYGEN SATURATION: 97 % | WEIGHT: 159.1 LBS | SYSTOLIC BLOOD PRESSURE: 140 MMHG | DIASTOLIC BLOOD PRESSURE: 66 MMHG | HEART RATE: 74 BPM

## 2020-12-30 DIAGNOSIS — N18.30 STAGE 3 CHRONIC KIDNEY DISEASE, UNSPECIFIED WHETHER STAGE 3A OR 3B CKD (HCC): ICD-10-CM

## 2020-12-30 DIAGNOSIS — E78.5 HYPERLIPIDEMIA, UNSPECIFIED HYPERLIPIDEMIA TYPE: ICD-10-CM

## 2020-12-30 DIAGNOSIS — I10 HYPERTENSION, UNSPECIFIED TYPE: ICD-10-CM

## 2020-12-30 DIAGNOSIS — D64.9 ANEMIA, UNSPECIFIED TYPE: ICD-10-CM

## 2020-12-30 DIAGNOSIS — L89.312 PRESSURE INJURY OF RIGHT BUTTOCK, STAGE 2 (HCC): ICD-10-CM

## 2020-12-30 DIAGNOSIS — L89.322 PRESSURE INJURY OF LEFT BUTTOCK, STAGE 2 (HCC): Primary | ICD-10-CM

## 2020-12-30 DIAGNOSIS — R53.1 WEAKNESS: ICD-10-CM

## 2020-12-30 DIAGNOSIS — M79.661 PAIN IN RIGHT LOWER LEG: ICD-10-CM

## 2020-12-30 DIAGNOSIS — R26.2 AMBULATORY DYSFUNCTION: ICD-10-CM

## 2020-12-30 DIAGNOSIS — R68.89 SUSPECTED DEEP TISSUE INJURY: ICD-10-CM

## 2020-12-30 DIAGNOSIS — M16.11 PRIMARY OSTEOARTHRITIS OF RIGHT HIP: ICD-10-CM

## 2020-12-30 DIAGNOSIS — E08.622 DIABETES MELLITUS DUE TO UNDERLYING CONDITION WITH OTHER SKIN ULCER (CODE) (HCC): Primary | ICD-10-CM

## 2020-12-30 PROCEDURE — 99304 1ST NF CARE SF/LOW MDM 25: CPT | Performed by: NURSE PRACTITIONER

## 2020-12-30 PROCEDURE — 99305 1ST NF CARE MODERATE MDM 35: CPT | Performed by: INTERNAL MEDICINE

## 2020-12-31 LAB
25(OH)D2 SERPL-MCNC: <1 NG/ML
25(OH)D3 SERPL-MCNC: 41 NG/ML
25(OH)D3+25(OH)D2 SERPL-MCNC: 41 NG/ML

## 2021-01-01 ENCOUNTER — HOSPITAL ENCOUNTER (OUTPATIENT)
Facility: HOSPITAL | Age: 86
Setting detail: OBSERVATION
Discharge: NURSING, REHAB OR LONG-TERM CARE | End: 2021-08-10
Attending: EMERGENCY MEDICINE | Admitting: HOSPITALIST
Payer: MEDICARE

## 2021-01-01 ENCOUNTER — TRANSITIONAL CARE MANAGEMENT (OUTPATIENT)
Dept: FAMILY MEDICINE CLINIC | Facility: CLINIC | Age: 86
End: 2021-01-01

## 2021-01-01 ENCOUNTER — TELEPHONE (OUTPATIENT)
Dept: OTHER | Facility: OTHER | Age: 86
End: 2021-01-01

## 2021-01-01 ENCOUNTER — NURSING HOME VISIT (OUTPATIENT)
Dept: GERIATRICS | Facility: OTHER | Age: 86
End: 2021-01-01
Payer: MEDICARE

## 2021-01-01 ENCOUNTER — TELEMEDICINE (OUTPATIENT)
Dept: FAMILY MEDICINE CLINIC | Facility: CLINIC | Age: 86
End: 2021-01-01
Payer: MEDICARE

## 2021-01-01 ENCOUNTER — TELEPHONE (OUTPATIENT)
Dept: FAMILY MEDICINE CLINIC | Facility: CLINIC | Age: 86
End: 2021-01-01

## 2021-01-01 ENCOUNTER — APPOINTMENT (EMERGENCY)
Dept: RADIOLOGY | Facility: HOSPITAL | Age: 86
End: 2021-01-01
Payer: MEDICARE

## 2021-01-01 ENCOUNTER — NURSING HOME VISIT (OUTPATIENT)
Dept: GERIATRICS | Age: 86
End: 2021-01-01
Payer: MEDICARE

## 2021-01-01 ENCOUNTER — TELEMEDICINE (OUTPATIENT)
Dept: CARDIOLOGY CLINIC | Facility: CLINIC | Age: 86
End: 2021-01-01
Payer: MEDICARE

## 2021-01-01 ENCOUNTER — APPOINTMENT (EMERGENCY)
Dept: RADIOLOGY | Facility: HOSPITAL | Age: 86
DRG: 690 | End: 2021-01-01
Payer: MEDICARE

## 2021-01-01 ENCOUNTER — APPOINTMENT (INPATIENT)
Dept: RADIOLOGY | Facility: HOSPITAL | Age: 86
DRG: 690 | End: 2021-01-01
Payer: MEDICARE

## 2021-01-01 ENCOUNTER — HOSPITAL ENCOUNTER (INPATIENT)
Facility: HOSPITAL | Age: 86
LOS: 2 days | Discharge: NURSING, REHAB OR LONG-TERM CARE | DRG: 690 | End: 2021-06-28
Attending: EMERGENCY MEDICINE | Admitting: SURGERY
Payer: MEDICARE

## 2021-01-01 ENCOUNTER — APPOINTMENT (EMERGENCY)
Dept: CT IMAGING | Facility: HOSPITAL | Age: 86
DRG: 690 | End: 2021-01-01
Payer: MEDICARE

## 2021-01-01 VITALS
RESPIRATION RATE: 16 BRPM | DIASTOLIC BLOOD PRESSURE: 60 MMHG | SYSTOLIC BLOOD PRESSURE: 141 MMHG | WEIGHT: 145 LBS | HEART RATE: 68 BPM | OXYGEN SATURATION: 95 % | BODY MASS INDEX: 26.68 KG/M2 | TEMPERATURE: 97.6 F | HEIGHT: 62 IN

## 2021-01-01 VITALS — SYSTOLIC BLOOD PRESSURE: 144 MMHG | HEART RATE: 67 BPM | DIASTOLIC BLOOD PRESSURE: 66 MMHG

## 2021-01-01 VITALS
BODY MASS INDEX: 28.64 KG/M2 | HEART RATE: 73 BPM | OXYGEN SATURATION: 91 % | SYSTOLIC BLOOD PRESSURE: 132 MMHG | DIASTOLIC BLOOD PRESSURE: 60 MMHG | TEMPERATURE: 98 F | RESPIRATION RATE: 16 BRPM | HEIGHT: 62 IN | WEIGHT: 155.65 LBS

## 2021-01-01 DIAGNOSIS — N18.30 STAGE 3 CHRONIC KIDNEY DISEASE, UNSPECIFIED WHETHER STAGE 3A OR 3B CKD (HCC): ICD-10-CM

## 2021-01-01 DIAGNOSIS — R41.89 COGNITIVE IMPAIRMENT: ICD-10-CM

## 2021-01-01 DIAGNOSIS — S00.83XD CONTUSION OF CHIN, SUBSEQUENT ENCOUNTER: ICD-10-CM

## 2021-01-01 DIAGNOSIS — I10 HYPERTENSION, UNSPECIFIED TYPE: ICD-10-CM

## 2021-01-01 DIAGNOSIS — L89.312 PRESSURE INJURY OF RIGHT BUTTOCK, STAGE 2 (HCC): ICD-10-CM

## 2021-01-01 DIAGNOSIS — L89.620 PRESSURE INJURY OF LEFT HEEL, UNSTAGEABLE (HCC): ICD-10-CM
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
## 2021-01-01 DIAGNOSIS — M15.9 PRIMARY OSTEOARTHRITIS INVOLVING MULTIPLE JOINTS: Primary | ICD-10-CM

## 2021-01-01 DIAGNOSIS — G62.9 NEUROPATHY: ICD-10-CM

## 2021-01-01 DIAGNOSIS — R10.9 STOMACH DISCOMFORT: Primary | ICD-10-CM

## 2021-01-01 DIAGNOSIS — E11.9 TYPE 2 DIABETES MELLITUS WITHOUT COMPLICATION, WITHOUT LONG-TERM CURRENT USE OF INSULIN (HCC): ICD-10-CM

## 2021-01-01 DIAGNOSIS — I25.10 CORONARY ARTERY DISEASE INVOLVING NATIVE CORONARY ARTERY OF NATIVE HEART WITHOUT ANGINA PECTORIS: Primary | ICD-10-CM

## 2021-01-01 DIAGNOSIS — E08.622 DIABETES MELLITUS DUE TO UNDERLYING CONDITION WITH OTHER SKIN ULCER (CODE) (HCC): ICD-10-CM

## 2021-01-01 DIAGNOSIS — N39.0 RECURRENT UTI: Primary | ICD-10-CM

## 2021-01-01 DIAGNOSIS — I10 PRIMARY HYPERTENSION: ICD-10-CM

## 2021-01-01 DIAGNOSIS — I10 ESSENTIAL HYPERTENSION: ICD-10-CM

## 2021-01-01 DIAGNOSIS — R07.9 CHEST PAIN: Primary | ICD-10-CM

## 2021-01-01 DIAGNOSIS — N39.0 RECURRENT UTI: ICD-10-CM

## 2021-01-01 DIAGNOSIS — I50.22 CHRONIC SYSTOLIC CONGESTIVE HEART FAILURE (HCC): ICD-10-CM

## 2021-01-01 DIAGNOSIS — I48.0 PAROXYSMAL ATRIAL FIBRILLATION (HCC): ICD-10-CM

## 2021-01-01 DIAGNOSIS — S51.019A SKIN TEAR OF ELBOW WITHOUT COMPLICATION: ICD-10-CM

## 2021-01-01 DIAGNOSIS — I21.9 ACUTE MI (HCC): ICD-10-CM

## 2021-01-01 DIAGNOSIS — M79.602 LEFT ARM PAIN: ICD-10-CM
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
## 2021-01-01 DIAGNOSIS — M15.9 PRIMARY OSTEOARTHRITIS INVOLVING MULTIPLE JOINTS: ICD-10-CM

## 2021-01-01 DIAGNOSIS — I25.10 CORONARY ARTERY DISEASE INVOLVING NATIVE CORONARY ARTERY OF NATIVE HEART WITHOUT ANGINA PECTORIS: ICD-10-CM

## 2021-01-01 DIAGNOSIS — R26.2 AMBULATORY DYSFUNCTION: ICD-10-CM

## 2021-01-01 DIAGNOSIS — I10 BENIGN ESSENTIAL HYPERTENSION: ICD-10-CM

## 2021-01-01 DIAGNOSIS — I21.4 NSTEMI (NON-ST ELEVATED MYOCARDIAL INFARCTION) (HCC): ICD-10-CM

## 2021-01-01 DIAGNOSIS — E78.01 FAMILIAL HYPERCHOLESTEREMIA: ICD-10-CM

## 2021-01-01 DIAGNOSIS — L89.322 PRESSURE INJURY OF LEFT BUTTOCK, STAGE 2 (HCC): Primary | ICD-10-CM

## 2021-01-01 DIAGNOSIS — L89.312 PRESSURE INJURY OF RIGHT BUTTOCK, STAGE 2 (HCC): Primary | ICD-10-CM

## 2021-01-01 DIAGNOSIS — R53.1 GENERALIZED WEAKNESS: ICD-10-CM

## 2021-01-01 DIAGNOSIS — R40.0 SOMNOLENCE: ICD-10-CM

## 2021-01-01 DIAGNOSIS — M16.11 PRIMARY OSTEOARTHRITIS OF RIGHT HIP: ICD-10-CM

## 2021-01-01 DIAGNOSIS — F33.40 RECURRENT MAJOR DEPRESSIVE DISORDER, IN REMISSION (HCC): ICD-10-CM

## 2021-01-01 DIAGNOSIS — E86.0 DEHYDRATION: ICD-10-CM

## 2021-01-01 DIAGNOSIS — R20.0 NUMBNESS OF FINGERS OF BOTH HANDS: ICD-10-CM

## 2021-01-01 DIAGNOSIS — E78.01 FAMILIAL HYPERCHOLESTEROLEMIA: ICD-10-CM

## 2021-01-01 DIAGNOSIS — N18.31 STAGE 3A CHRONIC KIDNEY DISEASE (HCC): ICD-10-CM

## 2021-01-01 DIAGNOSIS — H61.23 BILATERAL HEARING LOSS DUE TO CERUMEN IMPACTION: Primary | ICD-10-CM

## 2021-01-01 DIAGNOSIS — F41.9 ANXIETY: ICD-10-CM

## 2021-01-01 DIAGNOSIS — R19.7 DIARRHEA, UNSPECIFIED TYPE: Primary | ICD-10-CM

## 2021-01-01 DIAGNOSIS — R07.9 CHEST PAIN, UNSPECIFIED TYPE: Primary | ICD-10-CM

## 2021-01-01 DIAGNOSIS — R10.31 RIGHT LOWER QUADRANT ABDOMINAL PAIN: Primary | ICD-10-CM

## 2021-01-01 LAB
ALBUMIN SERPL BCP-MCNC: 3.1 G/DL (ref 3.5–5)
ALP SERPL-CCNC: 72 U/L (ref 46–116)
ALT SERPL W P-5'-P-CCNC: 16 U/L (ref 12–78)
ANION GAP SERPL CALCULATED.3IONS-SCNC: 10 MMOL/L (ref 4–13)
ANION GAP SERPL CALCULATED.3IONS-SCNC: 11 MMOL/L (ref 4–13)
ANION GAP SERPL CALCULATED.3IONS-SCNC: 11 MMOL/L (ref 4–13)
ANION GAP SERPL CALCULATED.3IONS-SCNC: 12 MMOL/L (ref 4–13)
APTT PPP: 23 SECONDS (ref 23–37)
AST SERPL W P-5'-P-CCNC: 10 U/L (ref 5–45)
ATRIAL RATE: 68 BPM
ATRIAL RATE: 81 BPM
BACTERIA UR CULT: NORMAL
BACTERIA UR QL AUTO: ABNORMAL /HPF
BASOPHILS # BLD AUTO: 0.02 THOUSANDS/ΜL (ref 0–0.1)
BASOPHILS # BLD AUTO: 0.05 THOUSANDS/ΜL (ref 0–0.1)
BASOPHILS # BLD AUTO: 0.05 THOUSANDS/ΜL (ref 0–0.1)
BASOPHILS NFR BLD AUTO: 0 % (ref 0–1)
BASOPHILS NFR BLD AUTO: 0 % (ref 0–1)
BASOPHILS NFR BLD AUTO: 1 % (ref 0–1)
BILIRUB SERPL-MCNC: 0.34 MG/DL (ref 0.2–1)
BILIRUB UR QL STRIP: NEGATIVE
BUN SERPL-MCNC: 22 MG/DL (ref 5–25)
BUN SERPL-MCNC: 22 MG/DL (ref 5–25)
BUN SERPL-MCNC: 24 MG/DL (ref 5–25)
BUN SERPL-MCNC: 25 MG/DL (ref 5–25)
CALCIUM ALBUM COR SERPL-MCNC: 8.7 MG/DL (ref 8.3–10.1)
CALCIUM SERPL-MCNC: 8 MG/DL (ref 8.3–10.1)
CALCIUM SERPL-MCNC: 8.1 MG/DL (ref 8.3–10.1)
CALCIUM SERPL-MCNC: 8.3 MG/DL (ref 8.3–10.1)
CALCIUM SERPL-MCNC: 8.7 MG/DL (ref 8.3–10.1)
CALCIUM SERPL-MCNC: 8.8 MG/DL (ref 8.3–10.1)
CALCIUM SERPL-MCNC: 9 MG/DL (ref 8.3–10.1)
CHLORIDE SERPL-SCNC: 102 MMOL/L (ref 100–108)
CHLORIDE SERPL-SCNC: 103 MMOL/L (ref 100–108)
CHLORIDE SERPL-SCNC: 104 MMOL/L (ref 100–108)
CHOLEST SERPL-MCNC: 186 MG/DL (ref 50–200)
CK SERPL-CCNC: 59 U/L (ref 26–192)
CLARITY UR: CLEAR
CO2 SERPL-SCNC: 25 MMOL/L (ref 21–32)
CO2 SERPL-SCNC: 25 MMOL/L (ref 21–32)
CO2 SERPL-SCNC: 26 MMOL/L (ref 21–32)
CO2 SERPL-SCNC: 27 MMOL/L (ref 21–32)
CO2 SERPL-SCNC: 27 MMOL/L (ref 21–32)
CO2 SERPL-SCNC: 28 MMOL/L (ref 21–32)
COLOR UR: ABNORMAL
CREAT SERPL-MCNC: 1.01 MG/DL (ref 0.6–1.3)
CREAT SERPL-MCNC: 1.13 MG/DL (ref 0.6–1.3)
CREAT SERPL-MCNC: 1.15 MG/DL (ref 0.6–1.3)
CREAT SERPL-MCNC: 1.17 MG/DL (ref 0.6–1.3)
CREAT SERPL-MCNC: 1.18 MG/DL (ref 0.6–1.3)
CREAT SERPL-MCNC: 1.27 MG/DL (ref 0.6–1.3)
EOSINOPHIL # BLD AUTO: 0.1 THOUSAND/ΜL (ref 0–0.61)
EOSINOPHIL # BLD AUTO: 0.2 THOUSAND/ΜL (ref 0–0.61)
EOSINOPHIL # BLD AUTO: 0.22 THOUSAND/ΜL (ref 0–0.61)
EOSINOPHIL NFR BLD AUTO: 1 % (ref 0–6)
EOSINOPHIL NFR BLD AUTO: 2 % (ref 0–6)
EOSINOPHIL NFR BLD AUTO: 3 % (ref 0–6)
ERYTHROCYTE [DISTWIDTH] IN BLOOD BY AUTOMATED COUNT: 13.2 % (ref 11.6–15.1)
ERYTHROCYTE [DISTWIDTH] IN BLOOD BY AUTOMATED COUNT: 13.2 % (ref 11.6–15.1)
ERYTHROCYTE [DISTWIDTH] IN BLOOD BY AUTOMATED COUNT: 13.8 % (ref 11.6–15.1)
ERYTHROCYTE [DISTWIDTH] IN BLOOD BY AUTOMATED COUNT: 14 % (ref 11.6–15.1)
ERYTHROCYTE [DISTWIDTH] IN BLOOD BY AUTOMATED COUNT: 14.1 % (ref 11.6–15.1)
EST. AVERAGE GLUCOSE BLD GHB EST-MCNC: 183 MG/DL
GFR SERPL CREATININE-BSD FRML MDRD: 37 ML/MIN/1.73SQ M
GFR SERPL CREATININE-BSD FRML MDRD: 40 ML/MIN/1.73SQ M
GFR SERPL CREATININE-BSD FRML MDRD: 41 ML/MIN/1.73SQ M
GFR SERPL CREATININE-BSD FRML MDRD: 42 ML/MIN/1.73SQ M
GFR SERPL CREATININE-BSD FRML MDRD: 43 ML/MIN/1.73SQ M
GFR SERPL CREATININE-BSD FRML MDRD: 49 ML/MIN/1.73SQ M
GLUCOSE P FAST SERPL-MCNC: 134 MG/DL (ref 65–99)
GLUCOSE SERPL-MCNC: 134 MG/DL (ref 65–140)
GLUCOSE SERPL-MCNC: 142 MG/DL (ref 65–140)
GLUCOSE SERPL-MCNC: 143 MG/DL (ref 65–140)
GLUCOSE SERPL-MCNC: 144 MG/DL (ref 65–140)
GLUCOSE SERPL-MCNC: 150 MG/DL (ref 65–140)
GLUCOSE SERPL-MCNC: 153 MG/DL (ref 65–140)
GLUCOSE SERPL-MCNC: 154 MG/DL (ref 65–140)
GLUCOSE SERPL-MCNC: 157 MG/DL (ref 65–140)
GLUCOSE SERPL-MCNC: 158 MG/DL (ref 65–140)
GLUCOSE SERPL-MCNC: 160 MG/DL (ref 65–140)
GLUCOSE SERPL-MCNC: 163 MG/DL (ref 65–140)
GLUCOSE SERPL-MCNC: 163 MG/DL (ref 65–140)
GLUCOSE SERPL-MCNC: 164 MG/DL (ref 65–140)
GLUCOSE SERPL-MCNC: 166 MG/DL (ref 65–140)
GLUCOSE SERPL-MCNC: 167 MG/DL (ref 65–140)
GLUCOSE SERPL-MCNC: 168 MG/DL (ref 65–140)
GLUCOSE SERPL-MCNC: 173 MG/DL (ref 65–140)
GLUCOSE SERPL-MCNC: 178 MG/DL (ref 65–140)
GLUCOSE SERPL-MCNC: 190 MG/DL (ref 65–140)
GLUCOSE SERPL-MCNC: 213 MG/DL (ref 65–140)
GLUCOSE SERPL-MCNC: 215 MG/DL (ref 65–140)
GLUCOSE UR STRIP-MCNC: NEGATIVE MG/DL
HBA1C MFR BLD: 8 %
HCT VFR BLD AUTO: 29.1 % (ref 34.8–46.1)
HCT VFR BLD AUTO: 30.9 % (ref 34.8–46.1)
HCT VFR BLD AUTO: 33.6 % (ref 34.8–46.1)
HCT VFR BLD AUTO: 33.8 % (ref 34.8–46.1)
HCT VFR BLD AUTO: 38.1 % (ref 34.8–46.1)
HDLC SERPL-MCNC: 42 MG/DL
HGB BLD-MCNC: 10 G/DL (ref 11.5–15.4)
HGB BLD-MCNC: 10.7 G/DL (ref 11.5–15.4)
HGB BLD-MCNC: 10.8 G/DL (ref 11.5–15.4)
HGB BLD-MCNC: 12.4 G/DL (ref 11.5–15.4)
HGB BLD-MCNC: 9.6 G/DL (ref 11.5–15.4)
HGB UR QL STRIP.AUTO: NEGATIVE
IMM GRANULOCYTES # BLD AUTO: 0.07 THOUSAND/UL (ref 0–0.2)
IMM GRANULOCYTES # BLD AUTO: 0.08 THOUSAND/UL (ref 0–0.2)
IMM GRANULOCYTES # BLD AUTO: 0.14 THOUSAND/UL (ref 0–0.2)
IMM GRANULOCYTES NFR BLD AUTO: 1 % (ref 0–2)
INR PPP: 0.99 (ref 0.84–1.19)
KETONES UR STRIP-MCNC: NEGATIVE MG/DL
LDLC SERPL CALC-MCNC: 72 MG/DL (ref 0–100)
LEUKOCYTE ESTERASE UR QL STRIP: ABNORMAL
LIPASE SERPL-CCNC: 159 U/L (ref 73–393)
LYMPHOCYTES # BLD AUTO: 2.17 THOUSANDS/ΜL (ref 0.6–4.47)
LYMPHOCYTES # BLD AUTO: 2.91 THOUSANDS/ΜL (ref 0.6–4.47)
LYMPHOCYTES # BLD AUTO: 3.07 THOUSANDS/ΜL (ref 0.6–4.47)
LYMPHOCYTES NFR BLD AUTO: 26 % (ref 14–44)
LYMPHOCYTES NFR BLD AUTO: 27 % (ref 14–44)
LYMPHOCYTES NFR BLD AUTO: 35 % (ref 14–44)
MAGNESIUM SERPL-MCNC: 1.5 MG/DL (ref 1.6–2.6)
MCH RBC QN AUTO: 31.4 PG (ref 26.8–34.3)
MCH RBC QN AUTO: 31.4 PG (ref 26.8–34.3)
MCH RBC QN AUTO: 31.6 PG (ref 26.8–34.3)
MCH RBC QN AUTO: 31.7 PG (ref 26.8–34.3)
MCH RBC QN AUTO: 32.4 PG (ref 26.8–34.3)
MCHC RBC AUTO-ENTMCNC: 31.7 G/DL (ref 31.4–37.4)
MCHC RBC AUTO-ENTMCNC: 32.1 G/DL (ref 31.4–37.4)
MCHC RBC AUTO-ENTMCNC: 32.4 G/DL (ref 31.4–37.4)
MCHC RBC AUTO-ENTMCNC: 32.5 G/DL (ref 31.4–37.4)
MCHC RBC AUTO-ENTMCNC: 33 G/DL (ref 31.4–37.4)
MCV RBC AUTO: 97 FL (ref 82–98)
MCV RBC AUTO: 98 FL (ref 82–98)
MCV RBC AUTO: 99 FL (ref 82–98)
MONOCYTES # BLD AUTO: 0.64 THOUSAND/ΜL (ref 0.17–1.22)
MONOCYTES # BLD AUTO: 0.79 THOUSAND/ΜL (ref 0.17–1.22)
MONOCYTES # BLD AUTO: 0.8 THOUSAND/ΜL (ref 0.17–1.22)
MONOCYTES NFR BLD AUTO: 7 % (ref 4–12)
MONOCYTES NFR BLD AUTO: 8 % (ref 4–12)
MONOCYTES NFR BLD AUTO: 9 % (ref 4–12)
NEUTROPHILS # BLD AUTO: 4.68 THOUSANDS/ΜL (ref 1.85–7.62)
NEUTROPHILS # BLD AUTO: 4.96 THOUSANDS/ΜL (ref 1.85–7.62)
NEUTROPHILS # BLD AUTO: 7.26 THOUSANDS/ΜL (ref 1.85–7.62)
NEUTS SEG NFR BLD AUTO: 51 % (ref 43–75)
NEUTS SEG NFR BLD AUTO: 63 % (ref 43–75)
NEUTS SEG NFR BLD AUTO: 64 % (ref 43–75)
NITRITE UR QL STRIP: POSITIVE
NON-SQ EPI CELLS URNS QL MICRO: ABNORMAL /HPF
NONHDLC SERPL-MCNC: 144 MG/DL
NRBC BLD AUTO-RTO: 0 /100 WBCS
PH UR STRIP.AUTO: 5.5 [PH]
PLATELET # BLD AUTO: 233 THOUSANDS/UL (ref 149–390)
PLATELET # BLD AUTO: 234 THOUSANDS/UL (ref 149–390)
PLATELET # BLD AUTO: 249 THOUSANDS/UL (ref 149–390)
PLATELET # BLD AUTO: 253 THOUSANDS/UL (ref 149–390)
PLATELET # BLD AUTO: 266 THOUSANDS/UL (ref 149–390)
PMV BLD AUTO: 8.4 FL (ref 8.9–12.7)
PMV BLD AUTO: 9.1 FL (ref 8.9–12.7)
PMV BLD AUTO: 9.3 FL (ref 8.9–12.7)
POTASSIUM SERPL-SCNC: 3.4 MMOL/L (ref 3.5–5.3)
POTASSIUM SERPL-SCNC: 3.5 MMOL/L (ref 3.5–5.3)
POTASSIUM SERPL-SCNC: 3.5 MMOL/L (ref 3.5–5.3)
POTASSIUM SERPL-SCNC: 3.7 MMOL/L (ref 3.5–5.3)
POTASSIUM SERPL-SCNC: 3.9 MMOL/L (ref 3.5–5.3)
POTASSIUM SERPL-SCNC: 4.1 MMOL/L (ref 3.5–5.3)
PROT SERPL-MCNC: 6.2 G/DL (ref 6.4–8.2)
PROT UR STRIP-MCNC: NEGATIVE MG/DL
PROTHROMBIN TIME: 13.2 SECONDS (ref 11.6–14.5)
QRS AXIS: 48 DEGREES
QRS AXIS: 6 DEGREES
QRSD INTERVAL: 110 MS
QRSD INTERVAL: 128 MS
QT INTERVAL: 372 MS
QT INTERVAL: 456 MS
QTC INTERVAL: 421 MS
QTC INTERVAL: 478 MS
RBC # BLD AUTO: 2.96 MILLION/UL (ref 3.81–5.12)
RBC # BLD AUTO: 3.15 MILLION/UL (ref 3.81–5.12)
RBC # BLD AUTO: 3.41 MILLION/UL (ref 3.81–5.12)
RBC # BLD AUTO: 3.44 MILLION/UL (ref 3.81–5.12)
RBC # BLD AUTO: 3.92 MILLION/UL (ref 3.81–5.12)
RBC #/AREA URNS AUTO: ABNORMAL /HPF
SARS-COV-2 RNA RESP QL NAA+PROBE: NEGATIVE
SODIUM SERPL-SCNC: 138 MMOL/L (ref 136–145)
SODIUM SERPL-SCNC: 139 MMOL/L (ref 136–145)
SODIUM SERPL-SCNC: 139 MMOL/L (ref 136–145)
SODIUM SERPL-SCNC: 140 MMOL/L (ref 136–145)
SODIUM SERPL-SCNC: 141 MMOL/L (ref 136–145)
SODIUM SERPL-SCNC: 143 MMOL/L (ref 136–145)
SP GR UR STRIP.AUTO: 1.01 (ref 1–1.03)
T WAVE AXIS: -31 DEGREES
T WAVE AXIS: 41 DEGREES
TRIGL SERPL-MCNC: 358 MG/DL
TROPONIN I SERPL-MCNC: <0.02 NG/ML
UROBILINOGEN UR QL STRIP.AUTO: 0.2 E.U./DL
VENTRICULAR RATE: 66 BPM
VENTRICULAR RATE: 77 BPM
WBC # BLD AUTO: 10.36 THOUSAND/UL (ref 4.31–10.16)
WBC # BLD AUTO: 11.36 THOUSAND/UL (ref 4.31–10.16)
WBC # BLD AUTO: 7.96 THOUSAND/UL (ref 4.31–10.16)
WBC # BLD AUTO: 8.25 THOUSAND/UL (ref 4.31–10.16)
WBC # BLD AUTO: 8.89 THOUSAND/UL (ref 4.31–10.16)
WBC #/AREA URNS AUTO: ABNORMAL /HPF

## 2021-01-01 PROCEDURE — 99309 SBSQ NF CARE MODERATE MDM 30: CPT | Performed by: NURSE PRACTITIONER

## 2021-01-01 PROCEDURE — 70450 CT HEAD/BRAIN W/O DYE: CPT

## 2021-01-01 PROCEDURE — 90715 TDAP VACCINE 7 YRS/> IM: CPT | Performed by: PHYSICIAN ASSISTANT

## 2021-01-01 PROCEDURE — 99232 SBSQ HOSP IP/OBS MODERATE 35: CPT | Performed by: SURGERY

## 2021-01-01 PROCEDURE — U0005 INFEC AGEN DETEC AMPLI PROBE: HCPCS | Performed by: PHYSICIAN ASSISTANT

## 2021-01-01 PROCEDURE — 85027 COMPLETE CBC AUTOMATED: CPT | Performed by: PHYSICIAN ASSISTANT

## 2021-01-01 PROCEDURE — 85025 COMPLETE CBC W/AUTO DIFF WBC: CPT | Performed by: PHYSICIAN ASSISTANT

## 2021-01-01 PROCEDURE — 99285 EMERGENCY DEPT VISIT HI MDM: CPT

## 2021-01-01 PROCEDURE — 99232 SBSQ HOSP IP/OBS MODERATE 35: CPT | Performed by: PHYSICIAN ASSISTANT

## 2021-01-01 PROCEDURE — 83036 HEMOGLOBIN GLYCOSYLATED A1C: CPT | Performed by: NURSE PRACTITIONER

## 2021-01-01 PROCEDURE — 1123F ACP DISCUSS/DSCN MKR DOCD: CPT | Performed by: EMERGENCY MEDICINE

## 2021-01-01 PROCEDURE — 80048 BASIC METABOLIC PNL TOTAL CA: CPT | Performed by: PHYSICIAN ASSISTANT

## 2021-01-01 PROCEDURE — 99219 PR INITIAL OBSERVATION CARE/DAY 50 MINUTES: CPT | Performed by: SURGERY

## 2021-01-01 PROCEDURE — 80061 LIPID PANEL: CPT | Performed by: NURSE PRACTITIONER

## 2021-01-01 PROCEDURE — 82948 REAGENT STRIP/BLOOD GLUCOSE: CPT

## 2021-01-01 PROCEDURE — 69210 REMOVE IMPACTED EAR WAX UNI: CPT | Performed by: NURSE PRACTITIONER

## 2021-01-01 PROCEDURE — 99214 OFFICE O/P EST MOD 30 MIN: CPT | Performed by: INTERNAL MEDICINE

## 2021-01-01 PROCEDURE — 84484 ASSAY OF TROPONIN QUANT: CPT | Performed by: EMERGENCY MEDICINE

## 2021-01-01 PROCEDURE — 99309 SBSQ NF CARE MODERATE MDM 30: CPT | Performed by: FAMILY MEDICINE

## 2021-01-01 PROCEDURE — 80053 COMPREHEN METABOLIC PANEL: CPT | Performed by: EMERGENCY MEDICINE

## 2021-01-01 PROCEDURE — NC001 PR NO CHARGE: Performed by: PHYSICIAN ASSISTANT

## 2021-01-01 PROCEDURE — 99285 EMERGENCY DEPT VISIT HI MDM: CPT | Performed by: EMERGENCY MEDICINE

## 2021-01-01 PROCEDURE — 71045 X-RAY EXAM CHEST 1 VIEW: CPT

## 2021-01-01 PROCEDURE — 83690 ASSAY OF LIPASE: CPT | Performed by: EMERGENCY MEDICINE

## 2021-01-01 PROCEDURE — 99214 OFFICE O/P EST MOD 30 MIN: CPT | Performed by: FAMILY MEDICINE

## 2021-01-01 PROCEDURE — 99308 SBSQ NF CARE LOW MDM 20: CPT | Performed by: NURSE PRACTITIONER

## 2021-01-01 PROCEDURE — 85025 COMPLETE CBC W/AUTO DIFF WBC: CPT | Performed by: EMERGENCY MEDICINE

## 2021-01-01 PROCEDURE — 85610 PROTHROMBIN TIME: CPT | Performed by: EMERGENCY MEDICINE

## 2021-01-01 PROCEDURE — 84484 ASSAY OF TROPONIN QUANT: CPT | Performed by: NURSE PRACTITIONER

## 2021-01-01 PROCEDURE — 97163 PT EVAL HIGH COMPLEX 45 MIN: CPT | Performed by: PHYSICAL THERAPIST

## 2021-01-01 PROCEDURE — 80048 BASIC METABOLIC PNL TOTAL CA: CPT | Performed by: NURSE PRACTITIONER

## 2021-01-01 PROCEDURE — 80048 BASIC METABOLIC PNL TOTAL CA: CPT | Performed by: EMERGENCY MEDICINE

## 2021-01-01 PROCEDURE — 93010 ELECTROCARDIOGRAM REPORT: CPT | Performed by: INTERNAL MEDICINE

## 2021-01-01 PROCEDURE — 93005 ELECTROCARDIOGRAM TRACING: CPT

## 2021-01-01 PROCEDURE — 92610 EVALUATE SWALLOWING FUNCTION: CPT

## 2021-01-01 PROCEDURE — U0003 INFECTIOUS AGENT DETECTION BY NUCLEIC ACID (DNA OR RNA); SEVERE ACUTE RESPIRATORY SYNDROME CORONAVIRUS 2 (SARS-COV-2) (CORONAVIRUS DISEASE [COVID-19]), AMPLIFIED PROBE TECHNIQUE, MAKING USE OF HIGH THROUGHPUT TECHNOLOGIES AS DESCRIBED BY CMS-2020-01-R: HCPCS | Performed by: PHYSICIAN ASSISTANT

## 2021-01-01 PROCEDURE — 99306 1ST NF CARE HIGH MDM 50: CPT | Performed by: FAMILY MEDICINE

## 2021-01-01 PROCEDURE — 36415 COLL VENOUS BLD VENIPUNCTURE: CPT | Performed by: EMERGENCY MEDICINE

## 2021-01-01 PROCEDURE — 82550 ASSAY OF CK (CPK): CPT | Performed by: EMERGENCY MEDICINE

## 2021-01-01 PROCEDURE — 81001 URINALYSIS AUTO W/SCOPE: CPT | Performed by: EMERGENCY MEDICINE

## 2021-01-01 PROCEDURE — 72125 CT NECK SPINE W/O DYE: CPT

## 2021-01-01 PROCEDURE — 96365 THER/PROPH/DIAG IV INF INIT: CPT

## 2021-01-01 PROCEDURE — 99236 HOSP IP/OBS SAME DATE HI 85: CPT | Performed by: NURSE PRACTITIONER

## 2021-01-01 PROCEDURE — 83735 ASSAY OF MAGNESIUM: CPT | Performed by: NURSE PRACTITIONER

## 2021-01-01 PROCEDURE — 87086 URINE CULTURE/COLONY COUNT: CPT | Performed by: PHYSICIAN ASSISTANT

## 2021-01-01 PROCEDURE — 99215 OFFICE O/P EST HI 40 MIN: CPT | Performed by: INTERNAL MEDICINE

## 2021-01-01 PROCEDURE — 97167 OT EVAL HIGH COMPLEX 60 MIN: CPT

## 2021-01-01 PROCEDURE — 99213 OFFICE O/P EST LOW 20 MIN: CPT | Performed by: INTERNAL MEDICINE

## 2021-01-01 PROCEDURE — 85730 THROMBOPLASTIN TIME PARTIAL: CPT | Performed by: EMERGENCY MEDICINE

## 2021-01-01 RX ORDER — CLONIDINE HYDROCHLORIDE 0.1 MG/1
0.1 TABLET ORAL 4 TIMES DAILY
Status: DISCONTINUED | OUTPATIENT
Start: 2021-01-01 | End: 2021-01-01 | Stop reason: HOSPADM

## 2021-01-01 RX ORDER — MELATONIN
1000 DAILY
Status: DISCONTINUED | OUTPATIENT
Start: 2021-01-01 | End: 2021-01-01 | Stop reason: HOSPADM

## 2021-01-01 RX ORDER — CLOPIDOGREL BISULFATE 75 MG/1
75 TABLET ORAL DAILY
Status: DISCONTINUED | OUTPATIENT
Start: 2021-01-01 | End: 2021-01-01 | Stop reason: HOSPADM

## 2021-01-01 RX ORDER — FERROUS SULFATE 325(65) MG
325 TABLET ORAL
Status: DISCONTINUED | OUTPATIENT
Start: 2021-01-01 | End: 2021-01-01 | Stop reason: HOSPADM

## 2021-01-01 RX ORDER — CLONIDINE HYDROCHLORIDE 0.1 MG/1
0.1 TABLET ORAL ONCE
Status: COMPLETED | OUTPATIENT
Start: 2021-01-01 | End: 2021-01-01

## 2021-01-01 RX ORDER — FUROSEMIDE 40 MG/1
40 TABLET ORAL DAILY
Status: DISCONTINUED | OUTPATIENT
Start: 2021-01-01 | End: 2021-01-01 | Stop reason: HOSPADM

## 2021-01-01 RX ORDER — AMOXICILLIN 250 MG
1 CAPSULE ORAL 2 TIMES DAILY
Status: DISCONTINUED | OUTPATIENT
Start: 2021-01-01 | End: 2021-01-01 | Stop reason: HOSPADM

## 2021-01-01 RX ORDER — AMLODIPINE BESYLATE 5 MG/1
TABLET ORAL
Qty: 90 TABLET | Refills: 1 | Status: SHIPPED | OUTPATIENT
Start: 2021-01-01

## 2021-01-01 RX ORDER — AMLODIPINE BESYLATE 5 MG/1
5 TABLET ORAL
Status: DISCONTINUED | OUTPATIENT
Start: 2021-01-01 | End: 2021-01-01 | Stop reason: HOSPADM

## 2021-01-01 RX ORDER — ATORVASTATIN CALCIUM 40 MG/1
TABLET, FILM COATED ORAL
Qty: 30 TABLET | Refills: 1 | Status: SHIPPED | OUTPATIENT
Start: 2021-01-01

## 2021-01-01 RX ORDER — CARVEDILOL 12.5 MG/1
25 TABLET ORAL 2 TIMES DAILY
Status: DISCONTINUED | OUTPATIENT
Start: 2021-01-01 | End: 2021-01-01 | Stop reason: SDUPTHER

## 2021-01-01 RX ORDER — CEFAZOLIN SODIUM 1 G/50ML
1000 SOLUTION INTRAVENOUS EVERY 12 HOURS
Status: COMPLETED | OUTPATIENT
Start: 2021-01-01 | End: 2021-01-01

## 2021-01-01 RX ORDER — HEPARIN SODIUM 5000 [USP'U]/ML
5000 INJECTION, SOLUTION INTRAVENOUS; SUBCUTANEOUS EVERY 8 HOURS SCHEDULED
Status: DISCONTINUED | OUTPATIENT
Start: 2021-01-01 | End: 2021-01-01 | Stop reason: HOSPADM

## 2021-01-01 RX ORDER — INSULIN GLARGINE 100 [IU]/ML
10 INJECTION, SOLUTION SUBCUTANEOUS
Status: DISCONTINUED | OUTPATIENT
Start: 2021-01-01 | End: 2021-01-01 | Stop reason: HOSPADM

## 2021-01-01 RX ORDER — TRAMADOL HYDROCHLORIDE 50 MG/1
50 TABLET ORAL 2 TIMES DAILY PRN
Qty: 60 TABLET | Refills: 1 | Status: SHIPPED | OUTPATIENT
Start: 2021-01-01 | End: 2021-01-01 | Stop reason: SDUPTHER

## 2021-01-01 RX ORDER — CARVEDILOL 12.5 MG/1
25 TABLET ORAL 2 TIMES DAILY
Qty: 360 TABLET | Refills: 1 | Status: SHIPPED | OUTPATIENT
Start: 2021-01-01

## 2021-01-01 RX ORDER — FUROSEMIDE 20 MG/1
TABLET ORAL
Qty: 180 TABLET | Refills: 1 | Status: SHIPPED | OUTPATIENT
Start: 2021-01-01

## 2021-01-01 RX ORDER — OXYCODONE HYDROCHLORIDE 5 MG/1
2.5 TABLET ORAL EVERY 4 HOURS PRN
Status: DISCONTINUED | OUTPATIENT
Start: 2021-01-01 | End: 2021-01-01 | Stop reason: HOSPADM

## 2021-01-01 RX ORDER — GABAPENTIN 100 MG/1
200 CAPSULE ORAL 3 TIMES DAILY
Status: DISCONTINUED | OUTPATIENT
Start: 2021-01-01 | End: 2021-01-01 | Stop reason: HOSPADM

## 2021-01-01 RX ORDER — PREDNISONE 2.5 MG
2.5 TABLET ORAL DAILY
Qty: 90 TABLET | Refills: 1 | Status: SHIPPED | OUTPATIENT
Start: 2021-01-01 | End: 2021-01-01

## 2021-01-01 RX ORDER — POTASSIUM CHLORIDE 20 MEQ/1
40 TABLET, EXTENDED RELEASE ORAL ONCE
Status: COMPLETED | OUTPATIENT
Start: 2021-01-01 | End: 2021-01-01

## 2021-01-01 RX ORDER — PREDNISONE 2.5 MG
TABLET ORAL
Qty: 90 TABLET | Refills: 1 | Status: SHIPPED | OUTPATIENT
Start: 2021-01-01

## 2021-01-01 RX ORDER — TRAMADOL HYDROCHLORIDE 50 MG/1
50 TABLET ORAL 2 TIMES DAILY PRN
Qty: 60 TABLET | Refills: 1 | Status: SHIPPED | OUTPATIENT
Start: 2021-01-01 | End: 2021-01-01 | Stop reason: HOSPADM

## 2021-01-01 RX ORDER — GABAPENTIN 100 MG/1
200 CAPSULE ORAL 3 TIMES DAILY
Qty: 540 CAPSULE | Refills: 2 | Status: SHIPPED | OUTPATIENT
Start: 2021-01-01

## 2021-01-01 RX ORDER — OXYCODONE HYDROCHLORIDE 5 MG/1
5 TABLET ORAL EVERY 4 HOURS PRN
Status: DISCONTINUED | OUTPATIENT
Start: 2021-01-01 | End: 2021-01-01

## 2021-01-01 RX ORDER — ESCITALOPRAM OXALATE 10 MG/1
10 TABLET ORAL DAILY
Status: DISCONTINUED | OUTPATIENT
Start: 2021-01-01 | End: 2021-01-01 | Stop reason: HOSPADM

## 2021-01-01 RX ORDER — CLONIDINE HYDROCHLORIDE 0.1 MG/1
0.1 TABLET ORAL 4 TIMES DAILY
Qty: 540 TABLET | Refills: 1 | Status: SHIPPED | OUTPATIENT
Start: 2021-01-01

## 2021-01-01 RX ORDER — PANTOPRAZOLE SODIUM 40 MG/1
40 TABLET, DELAYED RELEASE ORAL
Status: DISCONTINUED | OUTPATIENT
Start: 2021-01-01 | End: 2021-01-01 | Stop reason: HOSPADM

## 2021-01-01 RX ORDER — CEFAZOLIN SODIUM 1 G/50ML
1000 SOLUTION INTRAVENOUS EVERY 8 HOURS
Status: DISCONTINUED | OUTPATIENT
Start: 2021-01-01 | End: 2021-01-01 | Stop reason: DRUGHIGH

## 2021-01-01 RX ORDER — CEFAZOLIN SODIUM 1 G/50ML
1000 SOLUTION INTRAVENOUS EVERY 12 HOURS
Status: DISCONTINUED | OUTPATIENT
Start: 2021-01-01 | End: 2021-01-01

## 2021-01-01 RX ORDER — CARVEDILOL 12.5 MG/1
25 TABLET ORAL 2 TIMES DAILY WITH MEALS
Status: DISCONTINUED | OUTPATIENT
Start: 2021-01-01 | End: 2021-01-01 | Stop reason: HOSPADM

## 2021-01-01 RX ORDER — HYDROMORPHONE HCL/PF 1 MG/ML
0.2 SYRINGE (ML) INJECTION EVERY 4 HOURS PRN
Status: DISCONTINUED | OUTPATIENT
Start: 2021-01-01 | End: 2021-01-01

## 2021-01-01 RX ORDER — GABAPENTIN 100 MG/1
200 CAPSULE ORAL 2 TIMES DAILY
Status: DISCONTINUED | OUTPATIENT
Start: 2021-01-01 | End: 2021-01-01 | Stop reason: HOSPADM

## 2021-01-01 RX ORDER — POTASSIUM CHLORIDE 750 MG/1
10 TABLET, FILM COATED, EXTENDED RELEASE ORAL 3 TIMES DAILY
Qty: 270 TABLET | Refills: 2 | Status: SHIPPED | OUTPATIENT
Start: 2021-01-01

## 2021-01-01 RX ORDER — ATORVASTATIN CALCIUM 40 MG/1
40 TABLET, FILM COATED ORAL
Status: DISCONTINUED | OUTPATIENT
Start: 2021-01-01 | End: 2021-01-01 | Stop reason: HOSPADM

## 2021-01-01 RX ORDER — POTASSIUM CHLORIDE 750 MG/1
10 TABLET, EXTENDED RELEASE ORAL 3 TIMES DAILY
Status: DISCONTINUED | OUTPATIENT
Start: 2021-01-01 | End: 2021-01-01 | Stop reason: HOSPADM

## 2021-01-01 RX ORDER — CLOPIDOGREL BISULFATE 75 MG/1
TABLET ORAL
Qty: 90 TABLET | Refills: 3 | Status: SHIPPED | OUTPATIENT
Start: 2021-01-01

## 2021-01-01 RX ORDER — SACCHAROMYCES BOULARDII 250 MG
250 CAPSULE ORAL 2 TIMES DAILY
COMMUNITY

## 2021-01-01 RX ORDER — ACETAMINOPHEN 325 MG/1
975 TABLET ORAL EVERY 8 HOURS SCHEDULED
Status: DISCONTINUED | OUTPATIENT
Start: 2021-01-01 | End: 2021-01-01 | Stop reason: HOSPADM

## 2021-01-01 RX ORDER — ONDANSETRON 2 MG/ML
4 INJECTION INTRAMUSCULAR; INTRAVENOUS EVERY 4 HOURS PRN
Status: DISCONTINUED | OUTPATIENT
Start: 2021-01-01 | End: 2021-01-01 | Stop reason: HOSPADM

## 2021-01-01 RX ORDER — ASCORBIC ACID 500 MG
1000 TABLET ORAL DAILY
Status: DISCONTINUED | OUTPATIENT
Start: 2021-01-01 | End: 2021-01-01 | Stop reason: HOSPADM

## 2021-01-01 RX ORDER — ASPIRIN 81 MG/1
162 TABLET, CHEWABLE ORAL ONCE
Status: COMPLETED | OUTPATIENT
Start: 2021-01-01 | End: 2021-01-01

## 2021-01-01 RX ORDER — ASPIRIN 81 MG/1
81 TABLET ORAL DAILY
Status: DISCONTINUED | OUTPATIENT
Start: 2021-01-01 | End: 2021-01-01 | Stop reason: HOSPADM

## 2021-01-01 RX ORDER — B-COMPLEX WITH VITAMIN C
1 TABLET ORAL EVERY OTHER DAY
Status: DISCONTINUED | OUTPATIENT
Start: 2021-01-01 | End: 2021-01-01 | Stop reason: HOSPADM

## 2021-01-01 RX ORDER — ACETAMINOPHEN 325 MG/1
650 TABLET ORAL EVERY 8 HOURS PRN
Status: DISCONTINUED | OUTPATIENT
Start: 2021-01-01 | End: 2021-01-01 | Stop reason: HOSPADM

## 2021-01-01 RX ADMIN — AMLODIPINE BESYLATE 5 MG: 5 TABLET ORAL at 22:12

## 2021-01-01 RX ADMIN — POTASSIUM CHLORIDE 10 MEQ: 750 TABLET, EXTENDED RELEASE ORAL at 22:18

## 2021-01-01 RX ADMIN — GABAPENTIN 200 MG: 100 CAPSULE ORAL at 17:39

## 2021-01-01 RX ADMIN — CEFTRIAXONE SODIUM 1000 MG: 10 INJECTION, POWDER, FOR SOLUTION INTRAVENOUS at 08:39

## 2021-01-01 RX ADMIN — OXYCODONE HYDROCHLORIDE AND ACETAMINOPHEN 1000 MG: 500 TABLET ORAL at 08:56

## 2021-01-01 RX ADMIN — HEPARIN SODIUM 5000 UNITS: 5000 INJECTION INTRAVENOUS; SUBCUTANEOUS at 13:52

## 2021-01-01 RX ADMIN — ATORVASTATIN CALCIUM 40 MG: 40 TABLET, FILM COATED ORAL at 17:26

## 2021-01-01 RX ADMIN — AMLODIPINE BESYLATE 5 MG: 5 TABLET ORAL at 22:27

## 2021-01-01 RX ADMIN — PANTOPRAZOLE SODIUM 40 MG: 40 TABLET, DELAYED RELEASE ORAL at 05:16

## 2021-01-01 RX ADMIN — Medication 1000 UNITS: at 09:20

## 2021-01-01 RX ADMIN — CLONIDINE HYDROCHLORIDE 0.1 MG: 0.1 TABLET ORAL at 17:39

## 2021-01-01 RX ADMIN — CYANOCOBALAMIN TAB 500 MCG 1000 MCG: 500 TAB at 08:54

## 2021-01-01 RX ADMIN — HEPARIN SODIUM 5000 UNITS: 5000 INJECTION INTRAVENOUS; SUBCUTANEOUS at 22:28

## 2021-01-01 RX ADMIN — CYANOCOBALAMIN TAB 500 MCG 1000 MCG: 500 TAB at 09:20

## 2021-01-01 RX ADMIN — HEPARIN SODIUM 5000 UNITS: 5000 INJECTION INTRAVENOUS; SUBCUTANEOUS at 05:39

## 2021-01-01 RX ADMIN — INSULIN LISPRO 2 UNITS: 100 INJECTION, SOLUTION INTRAVENOUS; SUBCUTANEOUS at 12:58

## 2021-01-01 RX ADMIN — INSULIN LISPRO 3 UNITS: 100 INJECTION, SOLUTION INTRAVENOUS; SUBCUTANEOUS at 17:26

## 2021-01-01 RX ADMIN — GABAPENTIN 200 MG: 100 CAPSULE ORAL at 22:18

## 2021-01-01 RX ADMIN — HEPARIN SODIUM 5000 UNITS: 5000 INJECTION INTRAVENOUS; SUBCUTANEOUS at 12:58

## 2021-01-01 RX ADMIN — POTASSIUM CHLORIDE 10 MEQ: 750 TABLET, EXTENDED RELEASE ORAL at 22:27

## 2021-01-01 RX ADMIN — POTASSIUM CHLORIDE 10 MEQ: 750 TABLET, EXTENDED RELEASE ORAL at 12:00

## 2021-01-01 RX ADMIN — INSULIN LISPRO 1 UNITS: 100 INJECTION, SOLUTION INTRAVENOUS; SUBCUTANEOUS at 12:11

## 2021-01-01 RX ADMIN — GABAPENTIN 200 MG: 100 CAPSULE ORAL at 17:28

## 2021-01-01 RX ADMIN — DICLOFENAC SODIUM 2 G: 10 GEL TOPICAL at 17:40

## 2021-01-01 RX ADMIN — Medication 1000 UNITS: at 09:19

## 2021-01-01 RX ADMIN — ACETAMINOPHEN 975 MG: 325 TABLET, FILM COATED ORAL at 22:27

## 2021-01-01 RX ADMIN — Medication 1000 UNITS: at 11:57

## 2021-01-01 RX ADMIN — FUROSEMIDE 40 MG: 40 TABLET ORAL at 09:20

## 2021-01-01 RX ADMIN — HEPARIN SODIUM 5000 UNITS: 5000 INJECTION INTRAVENOUS; SUBCUTANEOUS at 05:17

## 2021-01-01 RX ADMIN — HEPARIN SODIUM 5000 UNITS: 5000 INJECTION INTRAVENOUS; SUBCUTANEOUS at 05:38

## 2021-01-01 RX ADMIN — ACETAMINOPHEN 650 MG: 325 TABLET, FILM COATED ORAL at 09:51

## 2021-01-01 RX ADMIN — POTASSIUM CHLORIDE 10 MEQ: 750 TABLET, EXTENDED RELEASE ORAL at 17:28

## 2021-01-01 RX ADMIN — POTASSIUM CHLORIDE 10 MEQ: 750 TABLET, EXTENDED RELEASE ORAL at 21:46

## 2021-01-01 RX ADMIN — NITROGLYCERIN 0.5 INCH: 20 OINTMENT TOPICAL at 02:26

## 2021-01-01 RX ADMIN — DICLOFENAC SODIUM 2 G: 10 GEL TOPICAL at 17:33

## 2021-01-01 RX ADMIN — CLONIDINE HYDROCHLORIDE 0.1 MG: 0.1 TABLET ORAL at 17:26

## 2021-01-01 RX ADMIN — CARVEDILOL 25 MG: 12.5 TABLET, FILM COATED ORAL at 07:29

## 2021-01-01 RX ADMIN — HEPARIN SODIUM 5000 UNITS: 5000 INJECTION INTRAVENOUS; SUBCUTANEOUS at 09:23

## 2021-01-01 RX ADMIN — CLONIDINE HYDROCHLORIDE 0.1 MG: 0.1 TABLET ORAL at 07:29

## 2021-01-01 RX ADMIN — POTASSIUM CHLORIDE 10 MEQ: 750 TABLET, EXTENDED RELEASE ORAL at 09:40

## 2021-01-01 RX ADMIN — CLOPIDOGREL BISULFATE 75 MG: 75 TABLET ORAL at 09:20

## 2021-01-01 RX ADMIN — CARVEDILOL 25 MG: 12.5 TABLET, FILM COATED ORAL at 09:43

## 2021-01-01 RX ADMIN — PANTOPRAZOLE SODIUM 40 MG: 40 TABLET, DELAYED RELEASE ORAL at 11:56

## 2021-01-01 RX ADMIN — ACETAMINOPHEN 975 MG: 325 TABLET, FILM COATED ORAL at 05:37

## 2021-01-01 RX ADMIN — OXYCODONE HYDROCHLORIDE AND ACETAMINOPHEN 1000 MG: 500 TABLET ORAL at 12:04

## 2021-01-01 RX ADMIN — INSULIN LISPRO 3 UNITS: 100 INJECTION, SOLUTION INTRAVENOUS; SUBCUTANEOUS at 12:11

## 2021-01-01 RX ADMIN — OXYCODONE HYDROCHLORIDE AND ACETAMINOPHEN 1000 MG: 500 TABLET ORAL at 09:20

## 2021-01-01 RX ADMIN — INSULIN LISPRO 3 UNITS: 100 INJECTION, SOLUTION INTRAVENOUS; SUBCUTANEOUS at 09:38

## 2021-01-01 RX ADMIN — ESCITALOPRAM OXALATE 10 MG: 10 TABLET ORAL at 09:40

## 2021-01-01 RX ADMIN — HEPARIN SODIUM 5000 UNITS: 5000 INJECTION INTRAVENOUS; SUBCUTANEOUS at 22:12

## 2021-01-01 RX ADMIN — GABAPENTIN 200 MG: 100 CAPSULE ORAL at 09:20

## 2021-01-01 RX ADMIN — POTASSIUM CHLORIDE 10 MEQ: 750 TABLET, EXTENDED RELEASE ORAL at 09:19

## 2021-01-01 RX ADMIN — DICLOFENAC SODIUM 2 G: 10 GEL TOPICAL at 21:52

## 2021-01-01 RX ADMIN — ASPIRIN 81 MG: 81 TABLET, COATED ORAL at 08:56

## 2021-01-01 RX ADMIN — INSULIN GLARGINE 10 UNITS: 100 INJECTION, SOLUTION SUBCUTANEOUS at 22:13

## 2021-01-01 RX ADMIN — ESCITALOPRAM OXALATE 10 MG: 10 TABLET ORAL at 11:59

## 2021-01-01 RX ADMIN — CLONIDINE HYDROCHLORIDE 0.1 MG: 0.1 TABLET ORAL at 08:56

## 2021-01-01 RX ADMIN — HEPARIN SODIUM 5000 UNITS: 5000 INJECTION INTRAVENOUS; SUBCUTANEOUS at 21:46

## 2021-01-01 RX ADMIN — INSULIN LISPRO 1 UNITS: 100 INJECTION, SOLUTION INTRAVENOUS; SUBCUTANEOUS at 12:57

## 2021-01-01 RX ADMIN — INSULIN LISPRO 1 UNITS: 100 INJECTION, SOLUTION INTRAVENOUS; SUBCUTANEOUS at 12:24

## 2021-01-01 RX ADMIN — GABAPENTIN 200 MG: 100 CAPSULE ORAL at 09:19

## 2021-01-01 RX ADMIN — DICLOFENAC SODIUM 2 G: 10 GEL TOPICAL at 12:57

## 2021-01-01 RX ADMIN — ATORVASTATIN CALCIUM 40 MG: 40 TABLET, FILM COATED ORAL at 17:21

## 2021-01-01 RX ADMIN — POTASSIUM CHLORIDE 10 MEQ: 750 TABLET, EXTENDED RELEASE ORAL at 17:09

## 2021-01-01 RX ADMIN — POTASSIUM CHLORIDE 10 MEQ: 750 TABLET, EXTENDED RELEASE ORAL at 17:26

## 2021-01-01 RX ADMIN — TETANUS TOXOID, REDUCED DIPHTHERIA TOXOID AND ACELLULAR PERTUSSIS VACCINE, ADSORBED 0.5 ML: 5; 2.5; 8; 8; 2.5 SUSPENSION INTRAMUSCULAR at 17:29

## 2021-01-01 RX ADMIN — CARVEDILOL 25 MG: 12.5 TABLET, FILM COATED ORAL at 09:29

## 2021-01-01 RX ADMIN — INSULIN LISPRO 1 UNITS: 100 INJECTION, SOLUTION INTRAVENOUS; SUBCUTANEOUS at 17:40

## 2021-01-01 RX ADMIN — Medication 1000 UNITS: at 09:41

## 2021-01-01 RX ADMIN — CLONIDINE HYDROCHLORIDE 0.1 MG: 0.1 TABLET ORAL at 17:21

## 2021-01-01 RX ADMIN — INSULIN LISPRO 1 UNITS: 100 INJECTION, SOLUTION INTRAVENOUS; SUBCUTANEOUS at 22:13

## 2021-01-01 RX ADMIN — GABAPENTIN 200 MG: 100 CAPSULE ORAL at 21:46

## 2021-01-01 RX ADMIN — INSULIN LISPRO 2 UNITS: 100 INJECTION, SOLUTION INTRAVENOUS; SUBCUTANEOUS at 17:21

## 2021-01-01 RX ADMIN — INSULIN LISPRO 3 UNITS: 100 INJECTION, SOLUTION INTRAVENOUS; SUBCUTANEOUS at 12:58

## 2021-01-01 RX ADMIN — Medication 1000 UNITS: at 08:55

## 2021-01-01 RX ADMIN — GABAPENTIN 200 MG: 100 CAPSULE ORAL at 09:41

## 2021-01-01 RX ADMIN — DOCUSATE SODIUM AND SENNOSIDES 1 TABLET: 8.6; 5 TABLET, FILM COATED ORAL at 17:38

## 2021-01-01 RX ADMIN — ESCITALOPRAM OXALATE 10 MG: 10 TABLET ORAL at 09:20

## 2021-01-01 RX ADMIN — CARVEDILOL 25 MG: 12.5 TABLET, FILM COATED ORAL at 09:21

## 2021-01-01 RX ADMIN — ASPIRIN 81 MG: 81 TABLET, COATED ORAL at 09:20

## 2021-01-01 RX ADMIN — CLONIDINE HYDROCHLORIDE 0.1 MG: 0.1 TABLET ORAL at 17:09

## 2021-01-01 RX ADMIN — CARVEDILOL 25 MG: 12.5 TABLET, FILM COATED ORAL at 08:58

## 2021-01-01 RX ADMIN — POTASSIUM CHLORIDE 10 MEQ: 750 TABLET, EXTENDED RELEASE ORAL at 09:20

## 2021-01-01 RX ADMIN — ATORVASTATIN CALCIUM 40 MG: 40 TABLET, FILM COATED ORAL at 17:39

## 2021-01-01 RX ADMIN — POTASSIUM CHLORIDE 10 MEQ: 750 TABLET, EXTENDED RELEASE ORAL at 08:56

## 2021-01-01 RX ADMIN — CLOPIDOGREL BISULFATE 75 MG: 75 TABLET ORAL at 08:54

## 2021-01-01 RX ADMIN — DICLOFENAC SODIUM 2 G: 10 GEL TOPICAL at 08:59

## 2021-01-01 RX ADMIN — ACETAMINOPHEN 975 MG: 325 TABLET, FILM COATED ORAL at 13:00

## 2021-01-01 RX ADMIN — HEPARIN SODIUM 5000 UNITS: 5000 INJECTION INTRAVENOUS; SUBCUTANEOUS at 14:10

## 2021-01-01 RX ADMIN — CLONIDINE HYDROCHLORIDE 0.1 MG: 0.1 TABLET ORAL at 09:20

## 2021-01-01 RX ADMIN — CARVEDILOL 25 MG: 12.5 TABLET, FILM COATED ORAL at 17:26

## 2021-01-01 RX ADMIN — DOCUSATE SODIUM AND SENNOSIDES 1 TABLET: 8.6; 5 TABLET, FILM COATED ORAL at 09:19

## 2021-01-01 RX ADMIN — CLONIDINE HYDROCHLORIDE 0.1 MG: 0.1 TABLET ORAL at 09:21

## 2021-01-01 RX ADMIN — FUROSEMIDE 40 MG: 40 TABLET ORAL at 09:19

## 2021-01-01 RX ADMIN — CLONIDINE HYDROCHLORIDE 0.1 MG: 0.1 TABLET ORAL at 12:29

## 2021-01-01 RX ADMIN — INSULIN GLARGINE 10 UNITS: 100 INJECTION, SOLUTION SUBCUTANEOUS at 21:50

## 2021-01-01 RX ADMIN — INSULIN LISPRO 1 UNITS: 100 INJECTION, SOLUTION INTRAVENOUS; SUBCUTANEOUS at 08:23

## 2021-01-01 RX ADMIN — GABAPENTIN 200 MG: 100 CAPSULE ORAL at 17:08

## 2021-01-01 RX ADMIN — OXYCODONE HYDROCHLORIDE AND ACETAMINOPHEN 1000 MG: 500 TABLET ORAL at 09:41

## 2021-01-01 RX ADMIN — ESCITALOPRAM OXALATE 10 MG: 10 TABLET ORAL at 08:56

## 2021-01-01 RX ADMIN — DOCUSATE SODIUM AND SENNOSIDES 1 TABLET: 8.6; 5 TABLET, FILM COATED ORAL at 17:28

## 2021-01-01 RX ADMIN — ACETAMINOPHEN 975 MG: 325 TABLET, FILM COATED ORAL at 14:10

## 2021-01-01 RX ADMIN — AMLODIPINE BESYLATE 5 MG: 5 TABLET ORAL at 21:48

## 2021-01-01 RX ADMIN — ACETAMINOPHEN 975 MG: 325 TABLET, FILM COATED ORAL at 22:12

## 2021-01-01 RX ADMIN — INSULIN LISPRO 3 UNITS: 100 INJECTION, SOLUTION INTRAVENOUS; SUBCUTANEOUS at 12:24

## 2021-01-01 RX ADMIN — INSULIN LISPRO 3 UNITS: 100 INJECTION, SOLUTION INTRAVENOUS; SUBCUTANEOUS at 17:40

## 2021-01-01 RX ADMIN — CLONIDINE HYDROCHLORIDE 0.1 MG: 0.1 TABLET ORAL at 21:46

## 2021-01-01 RX ADMIN — CLONIDINE HYDROCHLORIDE 0.1 MG: 0.1 TABLET ORAL at 22:12

## 2021-01-01 RX ADMIN — FUROSEMIDE 40 MG: 40 TABLET ORAL at 11:58

## 2021-01-01 RX ADMIN — DICLOFENAC SODIUM 2 G: 10 GEL TOPICAL at 09:22

## 2021-01-01 RX ADMIN — CLONIDINE HYDROCHLORIDE 0.1 MG: 0.1 TABLET ORAL at 22:27

## 2021-01-01 RX ADMIN — ACETAMINOPHEN 975 MG: 325 TABLET, FILM COATED ORAL at 05:16

## 2021-01-01 RX ADMIN — PANTOPRAZOLE SODIUM 40 MG: 40 TABLET, DELAYED RELEASE ORAL at 05:37

## 2021-01-01 RX ADMIN — FERROUS SULFATE TAB 325 MG (65 MG ELEMENTAL FE) 325 MG: 325 (65 FE) TAB at 09:43

## 2021-01-01 RX ADMIN — POTASSIUM CHLORIDE 10 MEQ: 750 TABLET, EXTENDED RELEASE ORAL at 20:52

## 2021-01-01 RX ADMIN — DOCUSATE SODIUM AND SENNOSIDES 1 TABLET: 8.6; 5 TABLET, FILM COATED ORAL at 08:56

## 2021-01-01 RX ADMIN — FERROUS SULFATE TAB 325 MG (65 MG ELEMENTAL FE) 325 MG: 325 (65 FE) TAB at 09:01

## 2021-01-01 RX ADMIN — INSULIN LISPRO 3 UNITS: 100 INJECTION, SOLUTION INTRAVENOUS; SUBCUTANEOUS at 08:59

## 2021-01-01 RX ADMIN — INSULIN LISPRO 3 UNITS: 100 INJECTION, SOLUTION INTRAVENOUS; SUBCUTANEOUS at 17:21

## 2021-01-01 RX ADMIN — ACETAMINOPHEN 975 MG: 325 TABLET, FILM COATED ORAL at 13:52

## 2021-01-01 RX ADMIN — DOCUSATE SODIUM AND SENNOSIDES 1 TABLET: 8.6; 5 TABLET, FILM COATED ORAL at 17:26

## 2021-01-01 RX ADMIN — INSULIN LISPRO 3 UNITS: 100 INJECTION, SOLUTION INTRAVENOUS; SUBCUTANEOUS at 09:40

## 2021-01-01 RX ADMIN — INSULIN GLARGINE 10 UNITS: 100 INJECTION, SOLUTION SUBCUTANEOUS at 22:28

## 2021-01-01 RX ADMIN — CEFAZOLIN SODIUM 1000 MG: 1 SOLUTION INTRAVENOUS at 20:41

## 2021-01-01 RX ADMIN — CEFAZOLIN SODIUM 1000 MG: 1 SOLUTION INTRAVENOUS at 09:32

## 2021-01-01 RX ADMIN — CYANOCOBALAMIN TAB 500 MCG 1000 MCG: 500 TAB at 11:57

## 2021-01-01 RX ADMIN — FUROSEMIDE 40 MG: 40 TABLET ORAL at 09:40

## 2021-01-01 RX ADMIN — INSULIN LISPRO 1 UNITS: 100 INJECTION, SOLUTION INTRAVENOUS; SUBCUTANEOUS at 08:59

## 2021-01-01 RX ADMIN — CLOPIDOGREL BISULFATE 75 MG: 75 TABLET ORAL at 09:19

## 2021-01-01 RX ADMIN — OXYCODONE HYDROCHLORIDE 2.5 MG: 5 TABLET ORAL at 17:39

## 2021-01-01 RX ADMIN — OXYCODONE HYDROCHLORIDE 2.5 MG: 5 TABLET ORAL at 18:47

## 2021-01-01 RX ADMIN — PANTOPRAZOLE SODIUM 40 MG: 40 TABLET, DELAYED RELEASE ORAL at 06:09

## 2021-01-01 RX ADMIN — CLONIDINE HYDROCHLORIDE 0.1 MG: 0.1 TABLET ORAL at 12:58

## 2021-01-01 RX ADMIN — DICLOFENAC SODIUM 2 G: 10 GEL TOPICAL at 22:14

## 2021-01-01 RX ADMIN — POTASSIUM CHLORIDE 40 MEQ: 1500 TABLET, EXTENDED RELEASE ORAL at 08:58

## 2021-01-01 RX ADMIN — DOCUSATE SODIUM AND SENNOSIDES 1 TABLET: 8.6; 5 TABLET, FILM COATED ORAL at 09:41

## 2021-01-01 RX ADMIN — HEPARIN SODIUM 5000 UNITS: 5000 INJECTION INTRAVENOUS; SUBCUTANEOUS at 12:59

## 2021-01-01 RX ADMIN — PANTOPRAZOLE SODIUM 40 MG: 40 TABLET, DELAYED RELEASE ORAL at 09:19

## 2021-01-01 RX ADMIN — GABAPENTIN 200 MG: 100 CAPSULE ORAL at 11:56

## 2021-01-01 RX ADMIN — FERROUS SULFATE TAB 325 MG (65 MG ELEMENTAL FE) 325 MG: 325 (65 FE) TAB at 09:19

## 2021-01-01 RX ADMIN — ESCITALOPRAM OXALATE 10 MG: 10 TABLET ORAL at 09:21

## 2021-01-01 RX ADMIN — CARVEDILOL 25 MG: 12.5 TABLET, FILM COATED ORAL at 17:31

## 2021-01-01 RX ADMIN — ACETAMINOPHEN 975 MG: 325 TABLET, FILM COATED ORAL at 21:45

## 2021-01-01 RX ADMIN — OXYCODONE HYDROCHLORIDE 2.5 MG: 5 TABLET ORAL at 08:55

## 2021-01-01 RX ADMIN — POTASSIUM CHLORIDE 10 MEQ: 750 TABLET, EXTENDED RELEASE ORAL at 17:39

## 2021-01-01 RX ADMIN — INSULIN LISPRO 3 UNITS: 100 INJECTION, SOLUTION INTRAVENOUS; SUBCUTANEOUS at 12:25

## 2021-01-01 RX ADMIN — CLONIDINE HYDROCHLORIDE 0.1 MG: 0.1 TABLET ORAL at 09:41

## 2021-01-01 RX ADMIN — INSULIN LISPRO 1 UNITS: 100 INJECTION, SOLUTION INTRAVENOUS; SUBCUTANEOUS at 17:25

## 2021-01-01 RX ADMIN — DICLOFENAC SODIUM 2 G: 10 GEL TOPICAL at 09:42

## 2021-01-01 RX ADMIN — CARVEDILOL 25 MG: 12.5 TABLET, FILM COATED ORAL at 17:39

## 2021-01-01 RX ADMIN — CARVEDILOL 25 MG: 12.5 TABLET, FILM COATED ORAL at 17:21

## 2021-01-01 RX ADMIN — FUROSEMIDE 40 MG: 40 TABLET ORAL at 08:56

## 2021-01-01 RX ADMIN — CLOPIDOGREL BISULFATE 75 MG: 75 TABLET ORAL at 11:59

## 2021-01-01 RX ADMIN — GABAPENTIN 200 MG: 100 CAPSULE ORAL at 22:27

## 2021-01-01 RX ADMIN — CEFAZOLIN SODIUM 1000 MG: 1 SOLUTION INTRAVENOUS at 08:54

## 2021-01-01 RX ADMIN — FERROUS SULFATE TAB 325 MG (65 MG ELEMENTAL FE) 325 MG: 325 (65 FE) TAB at 09:29

## 2021-01-01 RX ADMIN — OXYCODONE HYDROCHLORIDE 5 MG: 5 TABLET ORAL at 09:47

## 2021-01-01 RX ADMIN — CYANOCOBALAMIN TAB 500 MCG 1000 MCG: 500 TAB at 09:41

## 2021-01-01 RX ADMIN — INSULIN LISPRO 1 UNITS: 100 INJECTION, SOLUTION INTRAVENOUS; SUBCUTANEOUS at 09:22

## 2021-01-01 RX ADMIN — CLONIDINE HYDROCHLORIDE 0.1 MG: 0.1 TABLET ORAL at 12:00

## 2021-01-01 RX ADMIN — GABAPENTIN 200 MG: 100 CAPSULE ORAL at 20:52

## 2021-01-01 RX ADMIN — ASPIRIN 81 MG: 81 TABLET, COATED ORAL at 11:57

## 2021-01-01 RX ADMIN — CLOPIDOGREL BISULFATE 75 MG: 75 TABLET ORAL at 09:41

## 2021-01-01 RX ADMIN — ASPIRIN 81 MG: 81 TABLET, COATED ORAL at 09:41

## 2021-01-01 RX ADMIN — ATORVASTATIN CALCIUM 40 MG: 40 TABLET, FILM COATED ORAL at 17:09

## 2021-01-01 RX ADMIN — GABAPENTIN 200 MG: 100 CAPSULE ORAL at 08:54

## 2021-01-01 RX ADMIN — Medication 1 TABLET: at 09:20

## 2021-01-01 RX ADMIN — ACETAMINOPHEN 975 MG: 325 TABLET, FILM COATED ORAL at 13:55

## 2021-01-01 RX ADMIN — OXYCODONE HYDROCHLORIDE 5 MG: 5 TABLET ORAL at 01:49

## 2021-01-01 RX ADMIN — GABAPENTIN 200 MG: 100 CAPSULE ORAL at 17:25

## 2021-01-01 RX ADMIN — ASPIRIN 81 MG CHEWABLE TABLET 162 MG: 81 TABLET CHEWABLE at 02:26

## 2021-01-01 RX ADMIN — OXYCODONE HYDROCHLORIDE 2.5 MG: 5 TABLET ORAL at 12:57

## 2021-01-06 ENCOUNTER — NURSING HOME VISIT (OUTPATIENT)
Dept: WOUND CARE | Facility: HOSPITAL | Age: 86
End: 2021-01-06
Payer: MEDICARE

## 2021-01-06 DIAGNOSIS — R68.89 SUSPECTED DEEP TISSUE INJURY: Primary | ICD-10-CM

## 2021-01-06 PROCEDURE — 99308 SBSQ NF CARE LOW MDM 20: CPT | Performed by: NURSE PRACTITIONER

## 2021-01-06 NOTE — PROGRESS NOTES
[address] MANAGEMENT   AND HYPERBARIC MEDICINE CENTER       Patient ID: Francisco Swan is a 80 y o  female Date of Birth 4/6/1930     Location of Service: [facility]    Performed wound round with: Nurse Manager /DON      Chief Complaint   Patient presents with    Follow Up Wound Care Visit     bilateral heels       Wound Instructions:  Orders Placed This Encounter   Procedures    Wound cleansing and dressings     Wound:  Bilateral heels  Discontinue previous wound order  Apply moisturizing lotion to BLE  Frequency : daily and prn for soiling  Continue to user prevalon boots when in bed  Offload all wounds  Turn and reposition frequently, maximum of every two hours  Monitor for any sign of infection or worsening, inform PCP or patient's primary physician in your facility immediately  Standing Status:   Future     Standing Expiration Date:   1/6/2022       Allergies  Adhesive [medical tape], Ezetimibe, Fenofibrate, Flecainide, Lovastatin, Sulfa antibiotics, and Thioridazine      Assessment & Plan:  1  Suspected deep tissue injury  Assessment & Plan:  SDTI on both heel - healed  - moisturizing lotion to both legs  - continue to offload heels when in bed  - sign off    Orders:  -     Wound cleansing and dressings; Future           Subjective: This is a follow up of wounds on bilateral heels and buttocks  Received patient in bed, seems comfortable  Patient is not wearing her prevalon boots, reminded patient to wear the boots  No significant issues related to the wound  Patient's care was coordinated with nursing facility staff  Recent vitals, labs and updated medications were reviewed on EMR or chart system of facility   Past Medical, surgical, social, medication and allergy history and patient's previous records were reviewed and updated as appropriate:     Patient Active Problem List   Diagnosis    Paroxysmal atrial fibrillation ([facility] )    Benign essential hypertension    CAD (coronary artery disease)    Familial hypercholesteremia    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HCC)    Carotid artery obstruction    NSTEMI (non-ST elevated myocardial infarction) with known CAD ([facility])    Anemia    NSVT (nonsustained ventricular tachycardia) ([facility])    Acute respiratory failure with hypoxia ([facility])    Recurrent UTI    Ambulatory dysfunction    Hyperglycemia    CKD (chronic kidney disease) stage 3, GFR 30-59 ml/min    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 (CODE) ([facility] )     Primary osteoarthritis of right hip    Medicare annual wellness visit, subsequent    Osteoarthritis of multiple joints    BMI 29 0-29 9,adult    Weakness    Pain in right lower leg    Hypertension    Hyperlipidemia    Pressure injury of left buttock, stage 2 ([facility])    [facility] of right buttock, stage 2 [facility]    Suspected deep tissue injury     Past Medical History:   Diagnosis Date    Anemia     Atrial fibrillation ([place] [address] )     CAD (coronary artery disease)     Cancer ([place] [address] )     Carotid artery obstruction     Coronary artery disease     s/p stent x4     Disease of thyroid gland     TIA    GERD (gastroesophageal reflux disease)     HL (hearing loss)     Wears hearing aids    Hyperlipidemia     Hypertension     Mesenteric ischemia, chronic ([facility])     TIA (transient ischemic attack)      Past Surgical History:   Procedure Laterality Date    ANGIOPLASTY  01/01/2009    x4, stent placed in [facility] Right 01/01/2005    CARDIAC CATHETERIZATION      CATARACT EXTRACTION      CHOLECYSTECTOMY      COLONOSCOPY  02/26/2016    CORONARY ANGIOPLASTY      stent x4 07/11/1996x1 10/09/2009 x3    CORONARY STENT PLACEMENT      HYSTERECTOMY      REPLACEMENT TOTAL KNEE Right      Social History     Socioeconomic History    Marital status:       Spouse name: None    Number of children: None    Years of education: None    Highest education level: None Occupational History    None   Social Needs    Financial resource strain: Not hard at all   [employer] insecurity     Worry: Never true     Inability: Never true   [employer] needs     Medical: No     Non-medical: No   Tobacco Use    Smoking status: Never Smoker    Smokeless tobacco: Never Used   Substance and Sexual Activity    Alcohol use: No    Drug use: No    Sexual activity: None   Lifestyle    Physical activity     Days per week: 0 days     Minutes per session: None    Stress: Not at all   Relationships    Social connections     Talks on phone: None     Gets together: None     Attends Orthodox service: None     Active member of club or organization: None     Attends meetings of clubs or organizations: None     Relationship status: None    Intimate partner violence     Fear of current or ex partner: None     Emotionally abused: None     Physically abused: None     Forced sexual activity: None   Other Topics Concern    None   Social History Narrative    Most recent tobacco use screenin2019      Exercise level:   None      Caffeine intake:   None      Diet:   Regular         Current Outpatient Medications:     acetaminophen (TYLENOL) 325 mg tablet, Take 650 mg by mouth every 8 (eight) hours as needed for mild pain  , Disp: , Rfl:     amLODIPine (NORVASC) 5 mg tablet, TAKE 1 TABLET BY MOUTH AT BEDTIME, Disp: 90 tablet, Rfl: 1    amoxicillin (AMOXIL) 500 mg capsule, TAKE 4 CAPSULES BY MOUTH 1 HOUR BEFORE DENTAL APPOINTMENT, Disp: , Rfl: 3    Ascorbic Acid (VITAMIN C) 1000 MG tablet, Take 1,000 mg by mouth daily, Disp: , Rfl:     aspirin 81 MG tablet, Take 1 tablet (81 mg total) by mouth daily, Disp: 30 tablet, Rfl: 3    atorvastatin (LIPITOR) 40 mg tablet, TAKE ONE TABLET EVERY OTHER DAY WITH DINNER, Disp: 30 tablet, Rfl: 1    Calcium Citrate-Vitamin D (CALCIUM + D PO), Take 600 mg by mouth every other day  , Disp: , Rfl:     carvedilol (COREG) 12 5 mg tablet, TAKE 2 TABLETS (25 MG TOTAL) BY MOUTH 2 (TWO) TIMES A DAY, Disp: 360 tablet, Rfl: 1    Cholecalciferol (VITAMIN D3) 1000 units CAPS, Take by mouth daily  , Disp: , Rfl:     cloNIDine (CATAPRES) 0 1 mg tablet, Take 1 tablet (0 1 mg total) by mouth 4 (four) times a day, Disp: 360 tablet, Rfl: 1    clopidogrel (PLAVIX) 75 mg tablet, Take 1 tablet (75 mg total) by mouth daily, Disp: 90 tablet, Rfl: 3    Cranberry 400 MG CAPS, Take 1 capsule by mouth daily , Disp: , Rfl:     cyanocobalamin 1000 MCG tablet, Take by mouth daily  , Disp: , Rfl:     Diclofenac Sodium (VOLTAREN) 1 %, diclofenac 1 % topical gel, Disp: , Rfl:     escitalopram (LEXAPRO) 10 mg tablet, Take 1 tablet (10 mg total) by mouth daily, Disp: 90 tablet, Rfl: 3    ferrous sulfate 325 (65 FE) MG EC tablet, Take 325 mg by mouth daily, Disp: , Rfl:     furosemide (LASIX) 20 mg tablet, Take 2 tablets (40 mg total) by mouth daily, Disp: 180 tablet, Rfl: 1    gabapentin (NEURONTIN) 100 mg capsule, TAKE 2 CAPSULES (200 MG TOTAL) BY MOUTH 3 (THREE) TIMES A DAY, Disp: 540 capsule, Rfl: 2    glimepiride (AMARYL) 1 mg tablet, Take 0 5 tablets (0 5 mg total) by mouth daily with breakfast, Disp: 45 tablet, Rfl: 3    oxyCODONE (ROXICODONE) 5 mg immediate release tablet, Take 1 tablet (5 mg total) by mouth every 4 (four) hours as needed for severe pain for up to 10 daysMax Daily Amount: 30 mg, Disp: 15 tablet, Rfl: 0    pantoprazole (PROTONIX) 40 mg tablet, Take 1 tablet (40 mg total) by mouth daily, Disp: 90 tablet, Rfl: 3    potassium chloride (Klor-Con) 10 mEq tablet, Take 1 tablet (10 mEq total) by mouth 3 (three) times a day, Disp: 90 tablet, Rfl: 7    predniSONE 2 5 mg tablet, Take 2 5 mg by mouth daily, Disp: , Rfl:     predniSONE 5 mg tablet, Take 1 tablet (5 mg total) by mouth daily, Disp: 90 tablet, Rfl: 1    Review of Systems   Constitutional: Negative  HENT: Negative  Eyes: Negative  Respiratory: Negative  Cardiovascular: Positive for leg swelling  Gastrointestinal: Negative  Endocrine: Negative  Genitourinary: Negative  Musculoskeletal: Positive for gait problem  Complaining of pain on the right lower leg   Skin: Positive for wound  Neurological: Negative for dizziness and headaches  Psychiatric/Behavioral: Negative for behavioral problems  Objective: There were no vitals taken for this visit  Physical Exam  Constitutional:       Appearance: Normal appearance  HENT:      Head: Normocephalic  Nose: No congestion or rhinorrhea  Eyes:      General:         Right eye: No discharge  Left eye: No discharge  Pupils: Pupils are equal, round, and reactive to light  Neck:      Musculoskeletal: Normal range of motion and neck supple  Cardiovascular:      Rate and Rhythm: Normal rate  Pulmonary:      Effort: Pulmonary effort is normal    Chest:      Chest wall: No tenderness  Abdominal:      Palpations: Abdomen is soft  Tenderness: There is no abdominal tenderness  There is no guarding  Musculoskeletal:         General: No tenderness  Right lower leg: No edema  Left lower leg: No edema  Comments: Upper extremity 5/5  LROM on RLE   Skin:     General: Skin is warm  Findings: Lesion present  Comments: Left heel and Right heel wound - healed  BLE skin - dry and flaky     Neurological:      Mental Status: She is alert and oriented to person, place, and time  Gait: Gait abnormal    Psychiatric:         Mood and Affect: Mood normal          Behavior: Behavior normal          Thought Content: Thought content normal               Procedures               Coordination of Care: Nurse manager aware of the treatment plan    Follow up :  No follow-ups on file        RADHA Siddiqui

## 2021-01-06 NOTE — ASSESSMENT & PLAN NOTE
SDTI on both heel - healed  - moisturizing lotion to both legs  - continue to offload heels when in bed  - sign off

## 2021-01-10 ENCOUNTER — TELEPHONE (OUTPATIENT)
Dept: OTHER | Facility: OTHER | Age: 86
End: 2021-01-10

## 2021-01-10 NOTE — TELEPHONE ENCOUNTER
Gloria Issa @ [address] [place] [address] [phone] /PT:  Jayant Michaud/ 1930/ Blood sugar 400

## 2021-01-11 ENCOUNTER — NURSING HOME VISIT (OUTPATIENT)
Dept: GERIATRICS | Facility: OTHER | Age: 86
End: 2021-01-11
Payer: MEDICARE

## 2021-01-11 VITALS
TEMPERATURE: 98 F | SYSTOLIC BLOOD PRESSURE: 122 MMHG | DIASTOLIC BLOOD PRESSURE: 70 MMHG | BODY MASS INDEX: 29.39 KG/M2 | RESPIRATION RATE: 20 BRPM | WEIGHT: 160.7 LBS | HEART RATE: 78 BPM

## 2021-01-11 DIAGNOSIS — E08.622 DIABETES MELLITUS DUE TO UNDERLYING CONDITION WITH OTHER SKIN ULCER (CODE) (HCC): Primary | ICD-10-CM

## 2021-01-11 DIAGNOSIS — E78.5 HYPERLIPIDEMIA, UNSPECIFIED HYPERLIPIDEMIA TYPE: ICD-10-CM

## 2021-01-11 DIAGNOSIS — R26.2 AMBULATORY DYSFUNCTION: ICD-10-CM

## 2021-01-11 DIAGNOSIS — R73.9 HYPERGLYCEMIA: ICD-10-CM

## 2021-01-11 DIAGNOSIS — I10 HYPERTENSION, UNSPECIFIED TYPE: ICD-10-CM

## 2021-01-11 DIAGNOSIS — I25.10 CORONARY ARTERY DISEASE INVOLVING NATIVE CORONARY ARTERY OF NATIVE HEART WITHOUT ANGINA PECTORIS: ICD-10-CM

## 2021-01-11 DIAGNOSIS — N18.30 STAGE 3 CHRONIC KIDNEY DISEASE, UNSPECIFIED WHETHER STAGE 3A OR 3B CKD (HCC): ICD-10-CM

## 2021-01-11 DIAGNOSIS — M15.9 OSTEOARTHRITIS OF MULTIPLE JOINTS, UNSPECIFIED OSTEOARTHRITIS TYPE: ICD-10-CM

## 2021-01-11 DIAGNOSIS — R53.1 WEAKNESS: ICD-10-CM

## 2021-01-11 DIAGNOSIS — M16.11 PRIMARY OSTEOARTHRITIS OF RIGHT HIP: ICD-10-CM

## 2021-01-11 DIAGNOSIS — I48.0 PAROXYSMAL ATRIAL FIBRILLATION (HCC): ICD-10-CM

## 2021-01-11 PROCEDURE — 99309 SBSQ NF CARE MODERATE MDM 30: CPT | Performed by: INTERNAL MEDICINE

## 2021-01-11 NOTE — ASSESSMENT & PLAN NOTE
-history of arthritis, involving multiple joints    -continuing patient on Tylenol and oxycodone for pain management-patient also on prednisone 7 5 mg OD

## 2021-01-11 NOTE — ASSESSMENT & PLAN NOTE
-blood pressure stable; latest reading 122/70  -continuing patient on carvedilol, clonidine 0 1 mg q i d , furosemide 40 mg OD, amlodipine 5 mg OD

## 2021-01-11 NOTE — ASSESSMENT & PLAN NOTE
Lab Results   Component Value Date    HGBA1C 7 5 (H) 12/21/2020     -patient had overnight blood glucose reading of 400; per nurse, this was post patient eating several sugary snacks; remainder of blood glucose levels have been acceptable for her age  -continuing patient on glimepiride 0 5 mg OD

## 2021-01-11 NOTE — PROGRESS NOTES
[address]  [address]   [address] [place] [address] [address] [facility] [address]  [address]     Progress Note  Code 31    Patient 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 (CODE) ([facility] [address] )  - Primary       Lab Results   Component Value Date    HGBA1C 7 5 (H) 12/21/2020     -patient had overnight blood glucose reading of 400; per nurse, this was post patient eating several sugary snacks; remainder of blood glucose levels have been acceptable for her age  -continuing patient on glimepiride 0 5 mg OD            Cardiovascular and Mediastinum    Paroxysmal atrial fibrillation (HCC)     -rate controlled with carvedilol  -anticoagulated with aspirin and clopidogrel         CAD (coronary artery disease)     -stable  -continuing patient on aspirin, Plavix, atorvastatin         Hypertension     -blood pressure stable; latest reading 122/70  -continuing patient on carvedilol, clonidine 0 1 mg q i d , furosemide 40 mg OD, amlodipine 5 mg OD            Musculoskeletal and Integument    Primary osteoarthritis of right hip    Osteoarthritis of multiple joints     -history of arthritis, involving multiple joints    -continuing patient on Tylenol and oxycodone for pain management-patient also on prednisone 7 5 mg OD            Genitourinary    CKD (chronic kidney disease) stage 3, GFR 30-59 ml/min     Lab Results   Component Value Date    EGFR 57 12/26/2020    EGFR 61 12/25/2020    EGFR 26 12/23/2020    CREATININE 0 89 12/26/2020    CREATININE 0 85 12/25/2020 CREATININE 1 69 (H) 12/23/2020       -creatinine reached high of 1 69 on 12/23/2020; has subsequently down with results from 12/30/2020 showing creatinine level of 0 81  -avoid nephrotoxic drugs and hypotension    -continuing patient on maintenance dose of furosemide 40 mg once a day for hypertension            Other    Ambulatory dysfunction     Continue PT OT         Hyperglycemia     -overnight blood glucose reading of 400  -per nurse/overnight staff, reading was taken post patient consuming several sugary high-calorie snacks  -all other blood glucose readings have been well controlled/acceptable for patient's age    -making nochanges to patient's medication regimen at this time         Weakness     -in bilateral lower extremities, left> right; not associated with any paresthesias  - continue PT OT         Hyperlipidemia     -continuing atorvastatin 40 mg OD, every other day                   HPI     Patient is a 80 y o  female with past medical history significant for essential hypertension, PAF, diabetes mellitus type 2 and ambulatory dysfunction  Patient was recently hospitalized after sustaining a fall at home  Imaging studies were negative for fracture  Patient complaint of right lower extremity pain  He was was noted to have severe hip degenerative arthritis on x-ray of hip  Venous Doppler was negative for DVT  Patient was noted to be quite deconditioned and transferred to [facility] for rehab where he is being seen for post hospital admission  At time of my visit, patient is doing well, not in any acute distress  Patient is frustrated, states she is not getting as much physical therapy as she desires  States that she would like to go home  After patient was counseled, ultimately she agreed to stay for 1 more week  Of note, patient is complaining of pain in her right lower extremity  But denying any paresthesias              Review of Systems   Constitutional: Negative for appetite change, fever and unexpected weight change  HENT: Negative for congestion, postnasal drip, rhinorrhea, sinus pain and sore throat  Eyes: Negative for pain and visual disturbance  Respiratory: Negative for cough, chest tightness and shortness of breath  Cardiovascular: Negative for chest pain, palpitations and leg swelling  Gastrointestinal: Negative for abdominal pain, constipation, diarrhea, nausea and vomiting  Genitourinary: Negative for difficulty urinating and dysuria  Musculoskeletal: Negative for back pain, joint swelling and myalgias  Pain in right leg   Neurological: Positive for weakness  Negative for dizziness, numbness and headaches  Psychiatric/Behavioral: Negative for agitation, behavioral problems and confusion         Past Medical History:   Diagnosis Date    Anemia     Atrial fibrillation ([facility] )     CAD (coronary artery disease)     Cancer (HCC)     Carotid artery obstruction     Coronary artery disease     s/p stent x4     Disease of thyroid gland     TIA    GERD (gastroesophageal reflux disease)     HL (hearing loss)     Wears hearing aids    Hyperlipidemia     Hypertension     Mesenteric ischemia, chronic (HCC)     TIA (transient ischemic attack)        Past Surgical History:   Procedure Laterality Date    ANGIOPLASTY  01/01/2009    x4, stent placed in [facility] Right 01/01/2005    CARDIAC CATHETERIZATION      CATARACT EXTRACTION      CHOLECYSTECTOMY      COLONOSCOPY  02/26/2016    CORONARY ANGIOPLASTY      stent x4 07/11/1996x1 10/09/2009 x3    CORONARY STENT PLACEMENT      HYSTERECTOMY      REPLACEMENT TOTAL KNEE Right        Social History     Tobacco Use   Smoking Status Never Smoker   Smokeless Tobacco Never Used       Family History   Problem Relation Age of Onset    Leukemia Father     Hypertension Sister     Heart attack Brother 46    Hypertension Brother     Sudden death Brother 46        scd    Heart failure Maternal Grandmother     Hypertension Maternal Grandmother     Stroke Maternal Grandfather     Hypertension Daughter     Anuerysm Neg Hx     Clotting disorder Neg Hx     Arrhythmia Neg Hx     Hyperlipidemia Neg Hx         Allergies   Allergen Reactions    Adhesive [Medical Tape]      Paper tape okay    Ezetimibe     Fenofibrate     Flecainide     Lovastatin     Sulfa Antibiotics     Thioridazine          Current Outpatient Medications:     acetaminophen (TYLENOL) 325 mg tablet, Take 650 mg by mouth every 8 (eight) hours as needed for mild pain  , Disp: , Rfl:     amLODIPine (NORVASC) 5 mg tablet, TAKE 1 TABLET BY MOUTH AT BEDTIME, Disp: 90 tablet, Rfl: 1    amoxicillin (AMOXIL) 500 mg capsule, TAKE 4 CAPSULES BY MOUTH 1 HOUR BEFORE DENTAL APPOINTMENT, Disp: , Rfl: 3    Ascorbic Acid (VITAMIN C) 1000 MG tablet, Take 1,000 mg by mouth daily, Disp: , Rfl:     aspirin 81 MG tablet, Take 1 tablet (81 mg total) by mouth daily, Disp: 30 tablet, Rfl: 3    atorvastatin (LIPITOR) 40 mg tablet, TAKE ONE TABLET EVERY OTHER DAY WITH DINNER, Disp: 30 tablet, Rfl: 1    Calcium Citrate-Vitamin D (CALCIUM + D PO), Take 600 mg by mouth every other day  , Disp: , Rfl:     carvedilol (COREG) 12 5 mg tablet, TAKE 2 TABLETS (25 MG TOTAL) BY MOUTH 2 (TWO) TIMES A DAY, Disp: 360 tablet, Rfl: 1    Cholecalciferol (VITAMIN D3) 1000 units CAPS, Take by mouth daily  , Disp: , Rfl:     cloNIDine (CATAPRES) 0 1 mg tablet, Take 1 tablet (0 1 mg total) by mouth 4 (four) times a day, Disp: 360 tablet, Rfl: 1    clopidogrel (PLAVIX) 75 mg tablet, Take 1 tablet (75 mg total) by mouth daily, Disp: 90 tablet, Rfl: 3    Cranberry 400 MG CAPS, Take 1 capsule by mouth daily , Disp: , Rfl:     cyanocobalamin 1000 MCG tablet, Take by mouth daily  , Disp: , Rfl:     Diclofenac Sodium (VOLTAREN) 1 %, diclofenac 1 % topical gel, Disp: , Rfl:     escitalopram (LEXAPRO) 10 mg tablet, Take 1 tablet (10 mg total) by mouth daily, Disp: 90 tablet, Rfl: 3    ferrous sulfate 325 (65 FE) MG EC tablet, Take 325 mg by mouth daily, Disp: , Rfl:     furosemide (LASIX) 20 mg tablet, Take 2 tablets (40 mg total) by mouth daily, Disp: 180 tablet, Rfl: 1    gabapentin (NEURONTIN) 100 mg capsule, TAKE 2 CAPSULES (200 MG TOTAL) BY MOUTH 3 (THREE) TIMES A DAY, Disp: 540 capsule, Rfl: 2    glimepiride (AMARYL) 1 mg tablet, Take 0 5 tablets (0 5 mg total) by mouth daily with breakfast, Disp: 45 tablet, Rfl: 3    pantoprazole (PROTONIX) 40 mg tablet, Take 1 tablet (40 mg total) by mouth daily, Disp: 90 tablet, Rfl: 3    potassium chloride (Klor-Con) 10 mEq tablet, Take 1 tablet (10 mEq total) by mouth 3 (three) times a day, Disp: 90 tablet, Rfl: 7    predniSONE 2 5 mg tablet, Take 2 5 mg by mouth daily, Disp: , Rfl:     predniSONE 5 mg tablet, Take 1 tablet (5 mg total) by mouth daily, Disp: 90 tablet, Rfl: 1    Updated list was reviewed in [facility] of facility  Vitals:    01/11/21 1512   BP: 122/70   Pulse: 78   Resp: 20   Temp: 98 °F (36 7 °C)       Physical Exam  Constitutional:       Appearance: Normal appearance  HENT:      Head: Normocephalic and atraumatic  Eyes:      General: No scleral icterus  Right eye: No discharge  Left eye: No discharge  Neck:      Musculoskeletal: Normal range of motion  No muscular tenderness  Pulmonary:      Effort: Pulmonary effort is normal  No respiratory distress  Breath sounds: Normal breath sounds  No wheezing  Abdominal:      General: There is no distension  Palpations: Abdomen is soft  Musculoskeletal: Normal range of motion  General: No swelling or tenderness  Comments: Bilateral lower extremity weakness; left> right   Skin:     General: Skin is warm and dry  Neurological:      General: No focal deficit present  Mental Status: She is alert and oriented to person, place, and time     Psychiatric:         Mood and Affect: Mood normal  Behavior: Behavior normal       Comments: Frustrated, wants to go home         Diagnostic Data:    Recent labs were reviewed

## 2021-01-11 NOTE — ASSESSMENT & PLAN NOTE
-overnight blood glucose reading of 400  -per nurse/overnight staff, reading was taken post patient consuming several sugary high-calorie snacks  -all other blood glucose readings have been well controlled/acceptable for patient's age    -making nochanges to patient's medication regimen at this time

## 2021-01-11 NOTE — ASSESSMENT & PLAN NOTE
-in bilateral lower extremities, left> right; not associated with any paresthesias  - continue PT OT

## 2021-01-11 NOTE — ASSESSMENT & PLAN NOTE
Lab Results   Component Value Date    EGFR 57 12/26/2020    EGFR 61 12/25/2020    EGFR 26 12/23/2020    CREATININE 0 89 12/26/2020    CREATININE 0 85 12/25/2020    CREATININE 1 69 (H) 12/23/2020       -creatinine reached high of 1 69 on 12/23/2020; has subsequently down with results from 12/30/2020 showing creatinine level of 0 81  -avoid nephrotoxic drugs and hypotension    -continuing patient on maintenance dose of furosemide 40 mg once a day for hypertension

## 2021-01-16 ENCOUNTER — TELEPHONE (OUTPATIENT)
Dept: OTHER | Facility: OTHER | Age: 86
End: 2021-01-16

## 2021-01-16 NOTE — TELEPHONE ENCOUNTER
TigerText:    [phone]/ Jose Luis/ Chris Smith/ Pt Cecile Denise KIRTI 1930/ Pt has had diarrhea for the past 2 days   Noe Alvarez is looking to get medication ordered

## 2021-01-26 ENCOUNTER — NURSING HOME VISIT (OUTPATIENT)
Dept: GERIATRICS | Facility: OTHER | Age: 86
End: 2021-01-26
Payer: MEDICARE

## 2021-01-26 VITALS
WEIGHT: 148.2 LBS | DIASTOLIC BLOOD PRESSURE: 72 MMHG | RESPIRATION RATE: 18 BRPM | OXYGEN SATURATION: 90 % | TEMPERATURE: 97.2 F | HEART RATE: 77 BPM | SYSTOLIC BLOOD PRESSURE: 155 MMHG | BODY MASS INDEX: 27.11 KG/M2

## 2021-01-26 DIAGNOSIS — E78.5 HYPERLIPIDEMIA, UNSPECIFIED HYPERLIPIDEMIA TYPE: ICD-10-CM

## 2021-01-26 DIAGNOSIS — I48.0 PAROXYSMAL ATRIAL FIBRILLATION (HCC): Primary | ICD-10-CM

## 2021-01-26 DIAGNOSIS — N18.30 STAGE 3 CHRONIC KIDNEY DISEASE, UNSPECIFIED WHETHER STAGE 3A OR 3B CKD (HCC): ICD-10-CM

## 2021-01-26 DIAGNOSIS — M15.9 OSTEOARTHRITIS OF MULTIPLE JOINTS, UNSPECIFIED OSTEOARTHRITIS TYPE: ICD-10-CM

## 2021-01-26 DIAGNOSIS — I25.10 CORONARY ARTERY DISEASE INVOLVING NATIVE CORONARY ARTERY OF NATIVE HEART WITHOUT ANGINA PECTORIS: ICD-10-CM

## 2021-01-26 DIAGNOSIS — R60.9 EDEMA, UNSPECIFIED TYPE: ICD-10-CM

## 2021-01-26 DIAGNOSIS — R53.1 WEAKNESS: ICD-10-CM

## 2021-01-26 DIAGNOSIS — R26.2 AMBULATORY DYSFUNCTION: ICD-10-CM

## 2021-01-26 DIAGNOSIS — I10 HYPERTENSION, UNSPECIFIED TYPE: ICD-10-CM

## 2021-01-26 DIAGNOSIS — D64.9 ANEMIA, UNSPECIFIED TYPE: ICD-10-CM

## 2021-01-26 DIAGNOSIS — I21.4 NSTEMI (NON-ST ELEVATED MYOCARDIAL INFARCTION) (HCC): ICD-10-CM

## 2021-01-26 PROCEDURE — 99309 SBSQ NF CARE MODERATE MDM 30: CPT | Performed by: INTERNAL MEDICINE

## 2021-01-26 NOTE — ASSESSMENT & PLAN NOTE
Pressure slightly high at 155/72  Will continue to monitor    Continue clonidine 0 1 mg 4 times a day and amlodipine 5 mg daily

## 2021-01-27 NOTE — ASSESSMENT & PLAN NOTE
Involving bilateral lower extremities  Recent Doppler was negative for DVT  Edema appears to be more pronounced on today's exam   Patient has been on Lasix 40 mg once daily  Will increase to b i d  And follow    Follow-up BMP on 01/29/2021

## 2021-01-27 NOTE — ASSESSMENT & PLAN NOTE
Lab Results   Component Value Date    EGFR 57 12/26/2020    EGFR 61 12/25/2020    EGFR 26 12/23/2020    CREATININE 0 89 12/26/2020    CREATININE 0 85 12/25/2020    CREATININE 1 69 (H) 12/23/2020   Creatinine was stable at 0 72 on 01/22/2021  Will continue to monitor    Avoid hypotension and nephrotoxins

## 2021-01-27 NOTE — ASSESSMENT & PLAN NOTE
Lab Results   Component Value Date    HGBA1C 7 5 (H) 12/21/2020   Blood sugars remained stable  Fasting blood sugar was 145 this morning  Postprandial readings were between 122-174    Continue glimepiride 0 5 mg daily

## 2021-01-27 NOTE — PROGRESS NOTES
[address]  [address]   [address] [facility] [address] [facility] David Damian U  49     Progress Note  Code SNF 31    Patient Location     [facility]     Reason for visit     F/U DM2, PAF, HTN, CAD, OA hip, Anemia, HLD    Patients care was coordinated with nursing facility staff  Recent vitals, labs and updated medications [facility]  Past Medical, surgical, social, medication and allergy history and patients previous records reviewed  Problem List Items Addressed This Visit        Cardiovascular and Mediastinum    Paroxysmal atrial fibrillation ([facility] [address] ) - Primary     Heart rate stable on carvedilol  Patient remains on aspirin and Plavix         CAD (coronary artery disease)     Stable  No angina symptoms  Continue aspirin Plavix atorvastatin and carvedilol         NSTEMI (non-ST elevated myocardial infarction) with known CAD ([facility] [address] )     History of PCI  Patient remains on dual anti platelet therapy aspirin Plavix  Follow-up with cardiology service         Hypertension     Pressure slightly high at 155/72  Will continue to monitor  Continue clonidine 0 1 mg 4 times a day and amlodipine 5 mg daily            Musculoskeletal and Integument    Osteoarthritis of multiple joints     Patient remains on p r n  Tylenol and prednisone 7 5 mg daily on this is            Genitourinary    CKD (chronic kidney disease) stage 3, GFR 30-59 ml/min     Lab Results   Component Value Date    EGFR 57 12/26/2020    EGFR 61 12/25/2020    EGFR 26 12/23/2020    CREATININE 0 89 12/26/2020    CREATININE 0 85 12/25/2020    CREATININE 1 69 (H) 12/23/2020   Creatinine was stable at 0 72 on 01/22/2021  Will continue to monitor  Avoid hypotension and nephrotoxins            Other    Anemia     Hemoglobin was stable at 10 on 01/18/2021    Continue to monitor periodically         Ambulatory dysfunction     Continue PT OT         Weakness     Continue PT OT Hyperlipidemia     Continue atorvastatin         Edema     Involving bilateral lower extremities  Recent Doppler was negative for DVT  Edema appears to be more pronounced on today's exam   Patient has been on Lasix 40 mg once daily  Will increase to b i d  And follow  Follow-up BMP on 01/29/2021                   HPI     Patient is being seen for follow-up visit today  She was transferred to [facility] several weeks ago after being hospitalized post fall  Imaging studies were negative for fracture  Patient complained of right lower extremity pain  X-ray of hip revealed severe DJD and arthritis  Venous Doppler was negative for DVT  At the time of my evaluation today patient appears comfortable in no distress  Reports occasional right hip and lower extremity pain  She has not had any fever chills chest congestion or dyspnea  Patient is noted to have increased edema of lower extremities appears to be more pronounced than before  Review of Systems   Constitutional: Negative for chills, fatigue and fever  HENT: Negative for nosebleeds and rhinorrhea  Eyes: Negative for discharge and redness  Respiratory: Negative for cough, chest tightness, shortness of breath, wheezing and stridor  Cardiovascular: Negative for chest pain and leg swelling  Gastrointestinal: Negative for abdominal distention, abdominal pain, diarrhea and vomiting  Genitourinary: Negative for dysuria, flank pain and hematuria  Musculoskeletal: Positive for arthralgias (right hip pain) and gait problem  Skin: Negative for pallor  Neurological: Positive for weakness (Generalized)  Negative for tremors, seizures, syncope and headaches  Psychiatric/Behavioral: Negative for agitation, behavioral problems and confusion         Past Medical History:   Diagnosis Date    Anemia     Atrial fibrillation ([facility] [address] )     CAD (coronary artery disease)     Cancer (HCC)     Carotid artery obstruction     Coronary artery disease     s/p stent x4     Disease of thyroid gland     TIA    GERD (gastroesophageal reflux disease)     HL (hearing loss)     Wears hearing aids    Hyperlipidemia     Hypertension     Mesenteric ischemia, chronic (HCC)     TIA (transient ischemic attack)        Past Surgical History:   Procedure Laterality Date    ANGIOPLASTY  01/01/2009    x4, stent placed in [address] Right 01/01/2005    CARDIAC CATHETERIZATION      CATARACT EXTRACTION      CHOLECYSTECTOMY      COLONOSCOPY  02/26/2016    CORONARY ANGIOPLASTY      stent x4 07/11/1996x1 10/09/2009 x3    CORONARY STENT PLACEMENT      HYSTERECTOMY      REPLACEMENT TOTAL KNEE Right        Social History     Tobacco Use   Smoking Status Never Smoker   Smokeless Tobacco Never Used       Family History   Problem Relation Age of Onset    Leukemia Father     Hypertension Sister     Heart attack Brother 46    Hypertension Brother     Sudden death Brother 46        scd    Heart failure Maternal Grandmother     Hypertension Maternal Grandmother     Stroke Maternal Grandfather     Hypertension Daughter     Anuerysm Neg Hx     Clotting disorder Neg Hx     Arrhythmia Neg Hx     Hyperlipidemia Neg Hx         Allergies   Allergen Reactions    Adhesive [Medical Tape]      Paper tape okay    Ezetimibe     Fenofibrate     Flecainide     Lovastatin     Sulfa Antibiotics     Thioridazine          Current Outpatient Medications:     acetaminophen (TYLENOL) 325 mg tablet, Take 650 mg by mouth every 8 (eight) hours as needed for mild pain  , Disp: , Rfl:     amLODIPine (NORVASC) 5 mg tablet, TAKE 1 TABLET BY MOUTH AT BEDTIME, Disp: 90 tablet, Rfl: 1    amoxicillin (AMOXIL) 500 mg capsule, TAKE 4 CAPSULES BY MOUTH 1 HOUR BEFORE DENTAL APPOINTMENT, Disp: , Rfl: 3    Ascorbic Acid (VITAMIN C) 1000 MG tablet, Take 1,000 mg by mouth daily, Disp: , Rfl:     aspirin 81 MG tablet, Take 1 tablet (81 mg total) by mouth daily, Disp: 30 tablet, Rfl: 3    atorvastatin (LIPITOR) 40 mg tablet, TAKE ONE TABLET EVERY OTHER DAY WITH DINNER, Disp: 30 tablet, Rfl: 1    Calcium Citrate-Vitamin D (CALCIUM + D PO), Take 600 mg by mouth every other day  , Disp: , Rfl:     carvedilol (COREG) 12 5 mg tablet, TAKE 2 TABLETS (25 MG TOTAL) BY MOUTH 2 (TWO) TIMES A DAY, Disp: 360 tablet, Rfl: 1    Cholecalciferol (VITAMIN D3) 1000 units CAPS, Take by mouth daily  , Disp: , Rfl:     cloNIDine (CATAPRES) 0 1 mg tablet, Take 1 tablet (0 1 mg total) by mouth 4 (four) times a day, Disp: 360 tablet, Rfl: 1    clopidogrel (PLAVIX) 75 mg tablet, Take 1 tablet (75 mg total) by mouth daily, Disp: 90 tablet, Rfl: 3    Cranberry 400 MG CAPS, Take 1 capsule by mouth daily , Disp: , Rfl:     cyanocobalamin 1000 MCG tablet, Take by mouth daily  , Disp: , Rfl:     Diclofenac Sodium (VOLTAREN) 1 %, diclofenac 1 % topical gel, Disp: , Rfl:     escitalopram (LEXAPRO) 10 mg tablet, Take 1 tablet (10 mg total) by mouth daily, Disp: 90 tablet, Rfl: 3    ferrous sulfate 325 (65 FE) MG EC tablet, Take 325 mg by mouth daily, Disp: , Rfl:     furosemide (LASIX) 20 mg tablet, Take 2 tablets (40 mg total) by mouth daily, Disp: 180 tablet, Rfl: 1    gabapentin (NEURONTIN) 100 mg capsule, TAKE 2 CAPSULES (200 MG TOTAL) BY MOUTH 3 (THREE) TIMES A DAY, Disp: 540 capsule, Rfl: 2    glimepiride (AMARYL) 1 mg tablet, Take 0 5 tablets (0 5 mg total) by mouth daily with breakfast, Disp: 45 tablet, Rfl: 3    pantoprazole (PROTONIX) 40 mg tablet, Take 1 tablet (40 mg total) by mouth daily, Disp: 90 tablet, Rfl: 3    potassium chloride (Klor-Con) 10 mEq tablet, Take 1 tablet (10 mEq total) by mouth 3 (three) times a day, Disp: 90 tablet, Rfl: 7    predniSONE 2 5 mg tablet, Take 2 5 mg by mouth daily, Disp: , Rfl:     predniSONE 5 mg tablet, Take 1 tablet (5 mg total) by mouth daily, Disp: 90 tablet, Rfl: 1    Updated list was reviewed in [facility] of facility  Vitals:    01/26/21 1112   BP: 155/72   Pulse: 77   Resp: 18   Temp: (!) 97 2 °F (36 2 °C)   SpO2: 90%       Physical Exam  Constitutional:       General: She is not in acute distress  Appearance: She is well-developed  She is not diaphoretic  HENT:      Head: Normocephalic and atraumatic  Eyes:      General: No scleral icterus  Right eye: No discharge  Left eye: No discharge  Neck:      Musculoskeletal: Neck supple  Cardiovascular:      Rate and Rhythm: Regular rhythm  Pulmonary:      Effort: No respiratory distress  Breath sounds: No stridor  No wheezing  Abdominal:      General: There is no distension  Tenderness: There is no abdominal tenderness  Musculoskeletal:      Right lower leg: Edema present  Left lower leg: Edema present  Skin:     Coloration: Skin is not jaundiced or pale  Neurological:      General: No focal deficit present  Mental Status: She is alert and oriented to person, place, and time  Psychiatric:         Mood and Affect: Mood normal          Behavior: Behavior normal          Diagnostic Data:  Labs from 01/22/2021 revealed BUN of 14, creatinine 0 72, sodium 142, potassium 3 2  On 01/18/2021 hemoglobin level was 10, WBC count 7 2, platelet count 453    Recent labs were reviewed  Additional Notes:   Increase Lasix to 40 mg b i d    Continue K supplements  Follow-up BMP scheduled for 01/29/2021    This note was electronically signed by Dr Ami Kam

## 2021-01-27 NOTE — ASSESSMENT & PLAN NOTE
History of PCI  Patient remains on dual anti platelet therapy aspirin Plavix    Follow-up with cardiology service

## 2021-02-17 DIAGNOSIS — I21.9 ACUTE MI (HCC): ICD-10-CM

## 2021-02-17 DIAGNOSIS — E78.01 FAMILIAL HYPERCHOLESTEREMIA: ICD-10-CM

## 2021-02-17 RX ORDER — ATORVASTATIN CALCIUM 40 MG/1
TABLET, FILM COATED ORAL
Qty: 30 TABLET | Refills: 1 | Status: SHIPPED | OUTPATIENT
Start: 2021-02-17 | End: 2021-01-01

## 2021-04-28 NOTE — PROGRESS NOTES
Virtual Regular Visit      Assessment/Plan:  Overall things have been stable from a cardiac standpoint  Coronary disease stable no complaints of angina  Blood pressures been controlled  Lower extremity edema has been significantly improved  Unfortunately she has now essentially bed-bound due to the significant pain in her lower extremities and joints  Continue conservative management  Can take an extra Lasix if there is edema  Problem List Items Addressed This Visit     None               Reason for visit is No chief complaint on file  Encounter provider Rosa Kathleen DO    Provider located at [address]      Recent Visits  No visits were found meeting these conditions  Showing recent visits within past 7 days and meeting all other requirements     Today's Visits  Date Type Provider Dept   04/28/21 Telemedicine Rosa Kathleen DO [address] today's visits and meeting all other requirements     Future Appointments  No visits were found meeting these conditions  Showing future appointments within next 150 days and meeting all other requirements        The patient was identified by name and date of birth  Phu Sheikh was informed that this is a telemedicine visit and that the visit is being conducted through [facility] and patient was informed that this is a secure, HIPAA-compliant platform  She agrees to proceed     My office door was closed  No one else was in the room  She acknowledged consent and understanding of privacy and security of the video platform  The patient has agreed to participate and understands they can discontinue the visit at any time  Patient is aware this is a billable service       Subjective  Phu Sheikh is a 80 y o  female   With a history of coronary disease, hypertension, diastolic heart failure, hyperlipidemia presents for follow-up visit   This is done via video due the fact that she is now bed-bound from a hip injury  She has been doing well she denies any anginal sounding chest pain  Heart failure is been stable no lower extremity edema  Weights have been stable  She is taking Lasix 40 mg daily with good results  Unfortunately the orthopedic issues have continue to advance  [place] [facility]       HPI     Past Medical History:   Diagnosis Date    Anemia     Atrial fibrillation ([address] )     CAD (coronary artery disease)     Cancer ([facility] [address] )     Carotid artery obstruction     Coronary artery disease     s/p stent x4     Disease of thyroid gland     TIA    GERD (gastroesophageal reflux disease)     HL (hearing loss)     Wears hearing aids    Hyperlipidemia     Hypertension     Mesenteric ischemia, chronic (HCC)     TIA (transient ischemic attack)        Past Surgical History:   Procedure Laterality Date    ANGIOPLASTY  01/01/2009    x4, stent placed in [facility] Right 01/01/2005    CARDIAC CATHETERIZATION      CATARACT EXTRACTION      CHOLECYSTECTOMY      COLONOSCOPY  02/26/2016    CORONARY ANGIOPLASTY      stent x4 07/11/1996x1 10/09/2009 x3    CORONARY STENT PLACEMENT      HYSTERECTOMY      REPLACEMENT TOTAL KNEE Right        Current Outpatient Medications   Medication Sig Dispense Refill    acetaminophen (TYLENOL) 325 mg tablet Take 650 mg by mouth every 8 (eight) hours as needed for mild pain        amLODIPine (NORVASC) 5 mg tablet TAKE 1 TABLET BY MOUTH AT BEDTIME 90 tablet 1    amoxicillin (AMOXIL) 500 mg capsule TAKE 4 CAPSULES BY MOUTH 1 HOUR BEFORE DENTAL APPOINTMENT  3    Ascorbic Acid (VITAMIN C) 1000 MG tablet Take 1,000 mg by mouth daily      aspirin 81 MG tablet Take 1 tablet (81 mg total) by mouth daily 30 tablet 3    atorvastatin (LIPITOR) 40 mg tablet TAKE ONE TABLET EVERY OTHER DAY WITH DINNER 30 tablet 1    Calcium Citrate-Vitamin D (CALCIUM + D PO) Take 600 mg by mouth every other day        carvedilol (COREG) 12 5 mg tablet Take 2 tablets (25 mg total) by mouth 2 (two) times a day 360 tablet 1    Cholecalciferol (VITAMIN D3) 1000 units CAPS Take by mouth daily        cloNIDine (CATAPRES) 0 1 mg tablet TAKE 1 TABLET (0 1 MG TOTAL) BY MOUTH 4 (FOUR) TIMES A  tablet 1    clopidogrel (PLAVIX) 75 mg tablet Take 1 tablet (75 mg total) by mouth daily 90 tablet 3    Cranberry 400 MG CAPS Take 1 capsule by mouth daily       cyanocobalamin 1000 MCG tablet Take by mouth daily        Diclofenac Sodium (VOLTAREN) 1 % diclofenac 1 % topical gel      escitalopram (LEXAPRO) 10 mg tablet Take 1 tablet (10 mg total) by mouth daily 90 tablet 3    ferrous sulfate 325 (65 FE) MG EC tablet Take 325 mg by mouth daily      furosemide (LASIX) 20 mg tablet Take 2 tablets (40 mg total) by mouth daily 180 tablet 1    gabapentin (NEURONTIN) 100 mg capsule TAKE 2 CAPSULES (200 MG TOTAL) BY MOUTH 3 (THREE) TIMES A  capsule 2    glimepiride (AMARYL) 1 mg tablet Take 0 5 tablets (0 5 mg total) by mouth daily with breakfast 45 tablet 3    pantoprazole (PROTONIX) 40 mg tablet Take 1 tablet (40 mg total) by mouth daily 90 tablet 3    potassium chloride (Klor-Con) 10 mEq tablet Take 1 tablet (10 mEq total) by mouth 3 (three) times a day 90 tablet 7    predniSONE 2 5 mg tablet Take 1 tablet (2 5 mg total) by mouth daily 90 tablet 1    traMADol (ULTRAM) 50 mg tablet Take 1 tablet (50 mg total) by mouth 2 (two) times a day as needed for moderate pain 60 tablet 1     No current facility-administered medications for this visit           Allergies   Allergen Reactions    Adhesive [Medical Tape]      Paper tape okay    Ezetimibe     Fenofibrate     Flecainide     Lovastatin     Sulfa Antibiotics     Thioridazine        Review of Systems    Video Exam    Vitals:    04/28/21 1309   BP: 144/66   Pulse: 67       Physical Exam     I spent 20 minutes directly with the patient during this visit      VIRTUAL VISIT DISCLAIMER    Guzmaniveth Ulyssesconrado acknowledges that she has consented to an online visit or consultation  She understands that the online visit is based solely on information provided by her, and that, in the absence of a face-to-face physical evaluation by the physician, the diagnosis she receives is both limited and provisional in terms of accuracy and completeness  This is not intended to replace a full medical face-to-face evaluation by the physician  Louis Crockett understands and accepts these terms

## 2021-05-06 PROBLEM — R19.7 DIARRHEA: Status: ACTIVE | Noted: 2021-01-01

## 2021-05-06 NOTE — PROGRESS NOTES
Virtual Regular Visit      Assessment/Plan:    Problem List Items Addressed This Visit        Musculoskeletal and Integument    Primary osteoarthritis of right hip       Severe  It makes it harder for her to move or turn around in the regular bed  She will be greatly benefited by hospital bed/ electric  Relevant Orders    Bed    Pressure injury of right buttock, stage 2 (HCC)     Has severe muscle weakness, difficult to turn around in bed  At high risk of worsening pressure sores    Patient will be greatly benefited from an electric hospital bed         Relevant Orders    Bed       Other    Diarrhea - Primary     Better with immodium  ?due to food           Other Visit Diagnoses     Chronic systolic congestive heart failure ([facility] [address] )        Relevant Orders    Bed               Reason for visit is   Chief Complaint   Patient presents with    Virtual Regular Visit        Encounter provider Traci Thompson MD    Provider located at [address] [place] [id number] [phone]      Recent Visits  No visits were found meeting these conditions  Showing recent visits within past 7 days and meeting all other requirements     Today's Visits  Date Type Provider Dept   05/06/21 Telemedicine Shabnam Neri MD [facility] [place]   05/06/21 Telephone Shabnam Neri MD [facility] [id number] [facility] today's visits and meeting all other requirements     Future Appointments  No visits were found meeting these conditions  Showing future appointments within next 150 days and meeting all other requirements        The patient was identified by name and date of birth  Mayte Villatoro was informed that this is a telemedicine visit and that the visit is being conducted through [address] [place] [address] and patient was informed that this is not a secure, HIPAA-compliant platform  She agrees to proceed     My office door was closed   No one else was in the room  She acknowledged consent and understanding of privacy and security of the video platform  The patient has agreed to participate and understands they can discontinue the visit at any time  Patient is aware this is a billable service  Subjective  Ramona Alvarenga is a 80 y o  female    1  Diarrhea  For past couple of days daughter has noticed that patient has incontinence with liquidy brown stool 2-3 times  No mucus or blood  No abdominal pain  No fever or chills  No respiratory symptoms  No anorexia  No nausea or vomiting  She is not sure what caused this  She was given 2 Imodium pill  She it has gotten better now  2   Stage II sacral ulcers,  Mild nourishment with weakness,  Severe arthritis of multiple joints including hip     All these conditions make it difficult for patient to turn around or move in the regular bed  She is at higher risk of developing or worsening sacral wounds  She will be greatly benefited by an electric hospital bed  Family is using skin barriers and special treatment for her wound         Past Medical History:   Diagnosis Date    Anemia     Atrial fibrillation ([facility] [address] )     CAD (coronary artery disease)     Cancer (HCC)     Carotid artery obstruction     Coronary artery disease     s/p stent x4     Disease of thyroid gland     TIA    GERD (gastroesophageal reflux disease)     HL (hearing loss)     Wears hearing aids    Hyperlipidemia     Hypertension     Mesenteric ischemia, chronic (HCC)     TIA (transient ischemic attack)        Past Surgical History:   Procedure Laterality Date    ANGIOPLASTY  01/01/2009    x4, stent placed in [facility] Right 01/01/2005    CARDIAC CATHETERIZATION      CATARACT EXTRACTION      CHOLECYSTECTOMY      COLONOSCOPY  02/26/2016    CORONARY ANGIOPLASTY      stent x4 07/11/1996x1 10/09/2009 x3    CORONARY STENT PLACEMENT      HYSTERECTOMY      REPLACEMENT TOTAL KNEE Right Current Outpatient Medications   Medication Sig Dispense Refill    acetaminophen (TYLENOL) 325 mg tablet Take 650 mg by mouth every 8 (eight) hours as needed for mild pain        amLODIPine (NORVASC) 5 mg tablet TAKE 1 TABLET BY MOUTH AT BEDTIME 90 tablet 1    amoxicillin (AMOXIL) 500 mg capsule TAKE 4 CAPSULES BY MOUTH 1 HOUR BEFORE DENTAL APPOINTMENT  3    Ascorbic Acid (VITAMIN C) 1000 MG tablet Take 1,000 mg by mouth daily      aspirin 81 MG tablet Take 1 tablet (81 mg total) by mouth daily 30 tablet 3    atorvastatin (LIPITOR) 40 mg tablet TAKE ONE TABLET EVERY OTHER DAY WITH DINNER 30 tablet 1    Calcium Citrate-Vitamin D (CALCIUM + D PO) Take 600 mg by mouth every other day        carvedilol (COREG) 12 5 mg tablet Take 2 tablets (25 mg total) by mouth 2 (two) times a day 360 tablet 1    Cholecalciferol (VITAMIN D3) 1000 units CAPS Take by mouth daily        cloNIDine (CATAPRES) 0 1 mg tablet TAKE 1 TABLET (0 1 MG TOTAL) BY MOUTH 4 (FOUR) TIMES A  tablet 1    clopidogrel (PLAVIX) 75 mg tablet Take 1 tablet (75 mg total) by mouth daily 90 tablet 3    Cranberry 400 MG CAPS Take 1 capsule by mouth daily       cyanocobalamin 1000 MCG tablet Take by mouth daily        Diclofenac Sodium (VOLTAREN) 1 % diclofenac 1 % topical gel      escitalopram (LEXAPRO) 10 mg tablet Take 1 tablet (10 mg total) by mouth daily 90 tablet 3    ferrous sulfate 325 (65 FE) MG EC tablet Take 325 mg by mouth daily      furosemide (LASIX) 20 mg tablet Take 2 tablets (40 mg total) by mouth daily 180 tablet 1    gabapentin (NEURONTIN) 100 mg capsule TAKE 2 CAPSULES (200 MG TOTAL) BY MOUTH 3 (THREE) TIMES A  capsule 2    glimepiride (AMARYL) 1 mg tablet Take 0 5 tablets (0 5 mg total) by mouth daily with breakfast 45 tablet 3    pantoprazole (PROTONIX) 40 mg tablet Take 1 tablet (40 mg total) by mouth daily 90 tablet 3    potassium chloride (Klor-Con) 10 mEq tablet Take 1 tablet (10 mEq total) by mouth 3 (three) times a day 90 tablet 7    predniSONE 2 5 mg tablet Take 1 tablet (2 5 mg total) by mouth daily 90 tablet 1    traMADol (ULTRAM) 50 mg tablet Take 1 tablet (50 mg total) by mouth 2 (two) times a day as needed for moderate pain 60 tablet 1     No current facility-administered medications for this visit  Allergies   Allergen Reactions    Adhesive [Medical Tape]      Paper tape okay    Ezetimibe     Fenofibrate     Flecainide     Lovastatin     Sulfa Antibiotics     Thioridazine        Review of Systems   Constitutional: Negative for appetite change, chills, diaphoresis, fatigue and fever  HENT: Negative for congestion, drooling and sinus pain  Eyes: Negative for discharge and itching  Respiratory: Positive for shortness of breath  Negative for cough and chest tightness  Cardiovascular: Positive for leg swelling  Negative for chest pain and palpitations  Gastrointestinal: Positive for diarrhea  Endocrine: Negative for polyphagia and polyuria  Genitourinary: Negative for dysuria, frequency and urgency  Musculoskeletal: Positive for arthralgias and gait problem  Skin: Positive for wound  Allergic/Immunologic: Negative for food allergies  Neurological: Negative for dizziness, seizures, speech difficulty, light-headedness, numbness and headaches  Hematological: Negative for adenopathy  Does not bruise/bleed easily  Psychiatric/Behavioral: Positive for decreased concentration  Negative for agitation, confusion, dysphoric mood and suicidal ideas  Video Exam    There were no vitals filed for this visit  Physical Exam  Constitutional:       Appearance: Normal appearance  She is well-developed  She is not ill-appearing  HENT:      Head: Atraumatic  Eyes:      General:         Right eye: No discharge  Left eye: No discharge  Neck:      Thyroid: No thyromegaly     Pulmonary:      Effort: Pulmonary effort is normal       Breath sounds: Normal breath sounds  No wheezing  Chest:      Chest wall: No tenderness  Abdominal:      General: Bowel sounds are normal       Palpations: Abdomen is soft  Tenderness: There is no abdominal tenderness  Lymphadenopathy:      Cervical: No cervical adenopathy  Neurological:      Mental Status: She is alert  Mental status is at baseline  Psychiatric:         Mood and Affect: Mood normal          Behavior: Behavior normal           I spent 21 minutes directly with the patient during this visit      VIRTUAL VISIT [address] [place] [address] acknowledges that she has consented to an online visit or consultation  She understands that the online visit is based solely on information provided by her, and that, in the absence of a face-to-face physical evaluation by the physician, the diagnosis she receives is both limited and provisional in terms of accuracy and completeness  This is not intended to replace a full medical face-to-face evaluation by the physician  Chester Herrera understands and accepts these terms

## 2021-05-06 NOTE — ASSESSMENT & PLAN NOTE
Has severe muscle weakness, difficult to turn around in bed  At high risk of worsening pressure sores    Patient will be greatly benefited from an electric hospital bed  Due to  Degenerative joint disease of multiple joints including her hips and chronic muscle weakness,  Patient requires positioning off the body in the ways  not feasible with an ordinary bed  she also has incontinence of the urine and bowel

## 2021-05-06 NOTE — TELEPHONE ENCOUNTER
Mirta Solorzano called the patient had diarrhea yesterday she gave her two imodium   Today she had diarrhea again and she gave her two more imodium   It is hard to get her out of the house for appointments  Is there anything else you can recommend  Also she would like to discuss the possibility of getting a hospital bed for the patient  Thank you

## 2021-05-06 NOTE — ASSESSMENT & PLAN NOTE
Severe  It makes it harder for her to move or turn around in the regular bed  She will be greatly benefited by hospital bed/ electric

## 2021-06-17 NOTE — TELEPHONE ENCOUNTER
pt's dtr called and stated that they did get a bed from [facility] however, she stated that the bed is to high and does not go up and down  She stated that [facility] stated that you would need to write a script for a HIGH /LOW BED and refax it  To  [phone]

## 2021-06-25 PROBLEM — S00.83XA: Status: ACTIVE | Noted: 2021-01-01
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
## 2021-06-25 PROBLEM — S40.811A: Status: ACTIVE | Noted: 2021-01-01

## 2021-06-25 NOTE — ASSESSMENT & PLAN NOTE
- Right arm abrasion near the antecubital fossa with surrounding ecchymosis and mild tenderness   - Local wound care as indicated  - Analgesia as needed

## 2021-06-25 NOTE — ASSESSMENT & PLAN NOTE
- Chronic history of primary osteoarthritis of the right hip   - No evidence of acute traumatic injury  - Patient with chronic pain  Pain is baseline level with no acute changes  - Analgesia as needed  - PT and OT evaluation and treatment as indicated  - Outpatient follow-up with PCP

## 2021-06-25 NOTE — ASSESSMENT & PLAN NOTE
Patient with pain in multiple joints, right hip seems to be the worse  She is taking steroid low does for years, also taking Tylenol and tramadol daily  I would recommend tapering down dose of tramadol especially that she had multiple falls recently  Consider lidocaine patches right hip and lower back  Continue Tylenol 975 mg p o  T i d  I would recommend increasing dose of gabapentin to 400 mg p o  B i d - switch to b i d  Due to decreased renal function  Consider working on tapering down prednisone as outpatient    Continue physical therapy, continue fall precautions

## 2021-06-25 NOTE — ASSESSMENT & PLAN NOTE
Lab Results   Component Value Date    HGBA1C 7 5 (H) 12/21/2020       No results for input(s): POCGLU in the last 72 hours      Blood Sugar Average: Last 72 hrs:

## 2021-06-25 NOTE — ASSESSMENT & PLAN NOTE
Lab Results   Component Value Date    HGBA1C 7 5 (H) 12/21/2020     - Chronic history of type 2 diabetes mellitus  - Initiate subcutaneous insulin regimen on admission   - Further care/management per Internal Medicine   - Resume home medication therapy on discharge  - Outpatient follow-up with PCP

## 2021-06-25 NOTE — CONSULTS
[facility] 4/6/1930, 80 y o  female MRN: [id number]  Unit/Bed#: S -01 Encounter: [id number]  Primary Care Provider: Keith Chavarria MD   Date and time admitted to hospital: 6/25/2021  6:32 AM    Inpatient consult to Internal Medicine  Consult performed by: Leeroy Shaikh MD  Consult ordered by: Sebastian Gomez PA-C          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 (CODE) [facility])   Assessment & Plan  Lab Results   Component Value Date    HGBA1C 7 5 (H) 12/21/2020       No results for input(s): POCGLU in the last 72 hours  Blood Sugar Average: Last 72 hrs:      CKD (chronic kidney disease) stage 3, GFR 30-59 ml/min [facility])  Assessment & Plan  Lab Results   Component Value Date    EGFR 49 06/25/2021    EGFR 57 12/26/2020    EGFR 61 12/25/2020    CREATININE 1 01 06/25/2021    CREATININE 0 89 12/26/2020    CREATININE 0 85 12/25/2020   creatine is close to baseline       Ambulatory dysfunction  Assessment & Plan  PT OT    CAD (coronary artery disease)  Assessment & Plan  Continue clopidogrel   No chest pain  Paroxysmal atrial fibrillation [facility])  Assessment & Plan  Not on [facility]   Rate controlled  Given recent fall/presyncope would not initiate AC at this point   * Recurrent UTI  Assessment & Plan  Agree with rocephin for now   Follow up on cultures  Prior urine culture now growth  VTE Prophylaxis: Heparin  / sequential compression device     Recommendations for Discharge:  · None at present  Counseling / Coordination of Care Time: 45 minutes  Greater than 50% of total time spent on patient counseling and coordination of care  Collaboration of Care: Were Recommendations Directly Discussed with Primary Treatment Team? - Yes     History of Present Illness:    Mickey Iglesias is a 80 y o  female who is originally admitted to the trauma service due to fall   We are consulted for medical management of chronic medical conditions,     Family at bedside and are assisting with history  Patient woke up this morning and as she was sitting down on toilet she fell forward instead  She lives with her daughter who helps her with most ADLs and assists her with ambulation , along with a walker  Patient has a background of afib, HTN and T2DM  Currently we agree with treating UTI while cultures are pending  Review of Systems:    Review of Systems   Constitutional: Negative  Negative for activity change, appetite change, chills, diaphoresis, fatigue, fever and unexpected weight change  HENT: Negative  Eyes: Negative  Respiratory: Negative  Cardiovascular: Negative for chest pain, palpitations and leg swelling  Gastrointestinal: Negative  Negative for abdominal distention, abdominal pain, anal bleeding, blood in stool, constipation, diarrhea, nausea, rectal pain and vomiting  Endocrine: Negative  Genitourinary: Positive for frequency  Musculoskeletal: Negative  Skin: Negative  Neurological: Positive for weakness  Negative for dizziness, seizures, facial asymmetry, light-headedness, numbness and headaches  Hematological: Negative  Psychiatric/Behavioral: Negative          Past Medical and Surgical History:     Past Medical History:   Diagnosis Date    Anemia     Atrial fibrillation ([facility] [address] )     CAD (coronary artery disease)     Cancer ([facility] [address] )     Carotid artery obstruction     Coronary artery disease     s/p stent x4     Disease of thyroid gland     TIA    GERD (gastroesophageal reflux disease)     HL (hearing loss)     Wears hearing aids    Hyperlipidemia     Hypertension     Mesenteric ischemia, chronic (HCC)     TIA (transient ischemic attack)        Past Surgical History:   Procedure Laterality Date    ANGIOPLASTY  01/01/2009    x4, stent placed in [facility] Right 01/01/2005    CARDIAC CATHETERIZATION      CATARACT EXTRACTION      CHOLECYSTECTOMY      COLONOSCOPY  02/26/2016    CORONARY ANGIOPLASTY      stent x4 07/11/1996x1 10/09/2009 x3    CORONARY STENT PLACEMENT      HYSTERECTOMY      REPLACEMENT TOTAL KNEE Right        Meds/Allergies:    all medications and allergies reviewed    Allergies: Allergies   Allergen Reactions    Adhesive [Medical Tape]      Paper tape okay    Ezetimibe     Fenofibrate     Flecainide     Levaquin [Levofloxacin] Other (See Comments)     Muscle weakness    Lovastatin     Sulfa Antibiotics     Thioridazine        Social History:     Marital Status:     Substance Use History:   Social History     Substance and Sexual Activity   Alcohol Use No     Social History     Tobacco Use   Smoking Status Never Smoker   Smokeless Tobacco Never Used     Social History     Substance and Sexual Activity   Drug Use No       Family History:    Family History   Problem Relation Age of Onset    Leukemia Father     Hypertension Sister     Heart attack Brother 46    Hypertension Brother     Sudden death Brother 46        scd    Heart failure Maternal Grandmother     Hypertension Maternal Grandmother     Stroke Maternal Grandfather     Hypertension Daughter     Anuerysm Neg Hx     Clotting disorder Neg Hx     Arrhythmia Neg Hx     Hyperlipidemia Neg Hx        Physical Exam:     Vitals:   Blood Pressure: 151/66 (06/25/21 1101)  Pulse: 77 (06/25/21 1101)  Temperature: 98 2 °F (36 8 °C) (06/25/21 1101)  Temp Source: Oral (06/25/21 1101)  Respirations: 16 (06/25/21 1101)  Height: 5' 2" (157 5 cm) (06/25/21 1101)  Weight - Scale: 70 6 kg (155 lb 10 3 oz) (06/25/21 1101)  SpO2: 90 % (06/25/21 1101)    Physical Exam  Constitutional:       Comments: Elderly, Frail  HENT:      Mouth/Throat:      Mouth: Mucous membranes are moist       Pharynx: No oropharyngeal exudate or posterior oropharyngeal erythema  Eyes:      General: No scleral icterus  Right eye: No discharge  Left eye: No discharge        Pupils: Pupils are equal, round, and reactive to light  Cardiovascular:      Rate and Rhythm: Normal rate  Heart sounds: Murmur heard  No gallop  Pulmonary:      Effort: No respiratory distress  Breath sounds: No stridor  No wheezing, rhonchi or rales  Chest:      Chest wall: No tenderness  Abdominal:      General: Abdomen is flat  There is no distension  Palpations: There is no mass  Tenderness: There is no abdominal tenderness  There is no right CVA tenderness, left CVA tenderness, guarding or rebound  Hernia: No hernia is present  Musculoskeletal:         General: No swelling or deformity  Right lower leg: No edema  Skin:     Capillary Refill: Capillary refill takes less than 2 seconds  Coloration: Skin is pale  Skin is not jaundiced  Findings: No bruising or lesion  Neurological:      General: No focal deficit present  Cranial Nerves: No cranial nerve deficit  Sensory: No sensory deficit  Motor: No weakness  Coordination: Coordination normal       Gait: Gait normal       Deep Tendon Reflexes: Reflexes normal    Psychiatric:         Mood and Affect: Mood normal              Additional Data:     Lab Results: I have personally reviewed pertinent reports        Results from last 7 days   Lab Units 06/25/21  0632   WBC Thousand/uL 11 36*   HEMOGLOBIN g/dL 12 4   HEMATOCRIT % 38 1   PLATELETS Thousands/uL 266   NEUTROS PCT % 64   LYMPHS PCT % 26   MONOS PCT % 7   EOS PCT % 2     Results from last 7 days   Lab Units 06/25/21  0632   SODIUM mmol/L 140   POTASSIUM mmol/L 3 5   CHLORIDE mmol/L 103   CO2 mmol/L 27   BUN mg/dL 22   CREATININE mg/dL 1 01   ANION GAP mmol/L 10   CALCIUM mg/dL 9 0   GLUCOSE RANDOM mg/dL 144*         Results from last 7 days   Lab Units 06/25/21  0632   TROPONIN I ng/mL <0 02     Lab Results   Component Value Date/Time    HGBA1C 7 5 (H) 12/21/2020 07:17 AM    HGBA1C 7 1 (H) 06/12/2020 09:47 AM    HGBA1C 7 2 (H) 03/02/2020 09:26 AM HGBA1C 5 8 (H) 02/27/2018 09:33 AM     Results from last 7 days   Lab Units 06/25/21  1111   POC GLUCOSE mg/dl 163*           Imaging: I have personally reviewed pertinent reports  TRAUMA - CT head wo contrast   Final Result by Filiberto Brown DO (06/25 0302)   Stable cerebral atrophy with chronic small vessel ischemic white matter disease  No acute intracranial abnormality  Workstation performed: [id number]         TRAUMA - CT spine cervical wo contrast   Final Result by Filiberto Brown DO (06/25 0256)   Degenerative changes of the cervical spine  No acute cervical spine fracture or traumatic malalignment  Thyroid nodules as described above  Workstation performed: [id number]         XR Trauma chest portable   Final Result by Willie Maya MD (06/25 8735)      No acute cardiopulmonary disease  Workstation performed: [id number]             EKG, Pathology, and Other Studies Reviewed on Admission:   · EKG: no EKG on chart  ** Please Note: This note has been constructed using a voice recognition system   **

## 2021-06-25 NOTE — ED PROVIDER NOTES
Emergency Department Trauma Note  Jonathon Fonseca 80 y o  female MRN: [id number]  Unit/Bed#: S /S -01 Encounter: [id number]      Trauma Alert: Trauma Acuity: C  Model of Arrival: Mode of Arrival: BLS via    Trauma Team: Current Providers  Attending Provider: Felicia Hunter MD  Attending Provider: Miranda Barker MD  Attending Provider: Miriam Upton DO  Attending Provider: Orquidea Simpson MD  Attending Provider: David Holden DO  ED Technician: Eileen Ramos  Registered Nurse: Alisson Pagan RN  Consulting Physician: Orquidea Simpson MD  Patient Care Assistant: Kenna Berry  Registered Nurse: Landy Schneider RN  Consulting Physician: Sarath Singh PA-C  Consulting Physician: Orquidea Simpson MD  Care Manager: Sanjay Warner [facility]  Patient Care Assistant: Delana Cabot  Unit Clerk: Clyde Briones  Registered Nurse: Joseph Porter RN  [facility]   Registered Nurse: Isela Augustin RN  Consulting Physician: Diane Mcleod MD  Nursing Student: Vasu Pandey  Nursing Student: Vasu Pandey  Consultants: None      History of Present Illness     Chief Complaint:   Chief Complaint   Patient presents with   Rosalia Garcia     Arrives via EMS s/p fall w/ head strike  (+) ASA daily - Level C trauma activated upon arrival       HPI:  Jonathon Fonseca is a 80 y o  female who presents with a fall  She slipped and hit her chin  No laceration  + bruising to chin  Notes this was a mechanical fall  Patient notes feeling weak after the fall with difficulty getting up  Trauma Secondary exam performed (must be performed and documented on all traumas): GCS 15, full ROM of bilateral upper and lower extremities  Airway intact, bilateral breath sounds, palpable pulses  No active bleeding  No bony point tenderness in extremities, chest, abdomen or c/t,l spine  No crepitus, abdomen soft/non tender  Chest wall soft non tender with no deformities  Pelvis stable          Stat portable CXR prior to going to CT chest? Yes        Document C-spine clearance after CT c-spine came back normal - cspineclearsmartlist: Of note, C-spine clear after CT scans resulted  Medical decision making:  A 35-year-old female, will image given fall  Mechanism:           HPI  Review of Systems   All other systems reviewed and are negative        Historical Information     Immunizations:   Immunization History   Administered Date(s) Administered    Influenza, high dose seasonal 0 7 mL 10/07/2020    Pneumococcal Conjugate 13-Valent 09/29/2015    Pneumococcal Polysaccharide PPV23 09/30/2006    Td (adult), Unspecified 05/06/2009    Tdap 06/25/2021       Past Medical History:   Diagnosis Date    Anemia     [facility]rial fibrillation [facility] [address] [facility]     CAD (coronary artery disease)     Cancer ([facility] [address] [facility]     Carotid artery obstruction     Coronary artery disease     s/p stent x4     Disease of thyroid gland     TIA    GERD (gastroesophageal reflux disease)     HL (hearing loss)     Wears hearing aids    Hyperlipidemia     Hypertension     Mesenteric ischemia, chronic (HCC)     TIA (transient ischemic attack)        Family History   Problem Relation Age of Onset    Leukemia Father     Hypertension Sister     Heart attack Brother 46    Hypertension Brother     Sudden death Brother 46        scd    Heart failure Maternal Grandmother     Hypertension Maternal Grandmother     Stroke Maternal Grandfather     Hypertension Daughter     Anuerysm Neg Hx     Clotting disorder Neg Hx     Arrhythmia Neg Hx     Hyperlipidemia Neg Hx      Past Surgical History:   Procedure Laterality Date    ANGIOPLASTY  01/01/2009    x4, stent placed in [facility] Right 01/01/2005    CARDIAC CATHETERIZATION      CATARACT EXTRACTION      CHOLECYSTECTOMY      COLONOSCOPY  02/26/2016    CORONARY ANGIOPLASTY      stent x4 07/11/1996x1 10/09/2009 x3    CORONARY STENT PLACEMENT      HYSTERECTOMY      REPLACEMENT TOTAL KNEE Right      Social History     Tobacco Use    Smoking status: Never Smoker    Smokeless tobacco: Never Used   Vaping Use    Vaping Use: Never used   Substance Use Topics    Alcohol use: No    Drug use: No     E-Cigarette/Vaping    E-Cigarette Use Never User      E-Cigarette/Vaping Substances    Nicotine No     THC No     CBD No     Flavoring No     Other No     Unknown No        Family History: non-contributory    Meds/Allergies   Prior to Admission Medications   Prescriptions Last Dose Informant Patient Reported? Taking?    Ascorbic Acid (VITAMIN C) 1000 MG tablet  Family Member Yes No   Sig: Take 1,000 mg by mouth daily   Calcium Citrate-Vitamin D (CALCIUM + D PO)  Family Member Yes No   Sig: Take 600 mg by mouth every other day     Cholecalciferol (VITAMIN D3) 1000 units CAPS  Family Member Yes No   Sig: Take by mouth daily     Cranberry 400 MG CAPS  Family Member Yes No   Sig: Take 1 capsule by mouth daily    Diclofenac Sodium (VOLTAREN) 1 %   Yes No   Sig: diclofenac 1 % topical gel   acetaminophen (TYLENOL) 325 mg tablet  Family Member Yes No   Sig: Take 650 mg by mouth every 8 (eight) hours as needed for mild pain     amLODIPine (NORVASC) 5 mg tablet   No No   Sig: TAKE 1 TABLET BY MOUTH EVERYDAY AT BEDTIME   amoxicillin (AMOXIL) 500 mg capsule  Family Member Yes No   Sig: TAKE 4 CAPSULES BY MOUTH 1 HOUR BEFORE DENTAL APPOINTMENT   aspirin 81 MG tablet  Family Member No No   Sig: Take 1 tablet (81 mg total) by mouth daily   atorvastatin (LIPITOR) 40 mg tablet   No No   Sig: TAKE ONE TABLET EVERY OTHER DAY WITH DINNER   carvedilol (COREG) 12 5 mg tablet   No No   Sig: Take 2 tablets (25 mg total) by mouth 2 (two) times a day   cloNIDine (CATAPRES) 0 1 mg tablet   No No   Sig: TAKE 1 TABLET (0 1 MG TOTAL) BY MOUTH 4 (FOUR) TIMES A DAY   clopidogrel (PLAVIX) 75 mg tablet   No No   Sig: TAKE 1 TABLET BY MOUTH EVERY DAY   cyanocobalamin 1000 MCG tablet  Family Member Yes No   Sig: Take by mouth daily     escitalopram (LEXAPRO) 10 mg tablet   No No   Sig: Take 1 tablet (10 mg total) by mouth daily   ferrous sulfate 325 (65 FE) MG EC tablet  Family Member Yes No   Sig: Take 325 mg by mouth daily   furosemide (LASIX) 20 mg tablet   No No   Sig: TAKE 2 TABLETS BY MOUTH EVERY DAY   gabapentin (NEURONTIN) 100 mg capsule   No No   Sig: Take 2 capsules (200 mg total) by mouth 3 (three) times a day   glimepiride (AMARYL) 1 mg tablet   No No   Sig: Take 0 5 tablets (0 5 mg total) by mouth daily with breakfast   pantoprazole (PROTONIX) 40 mg tablet   No No   Sig: Take 1 tablet (40 mg total) by mouth daily   potassium chloride (Klor-Con) 10 mEq tablet   No No   Sig: TAKE 1 TABLET (10 MEQ TOTAL) BY MOUTH 3 (THREE) TIMES A DAY   traMADol (ULTRAM) 50 mg tablet   No No   Sig: Take 1 tablet (50 mg total) by mouth 2 (two) times a day as needed for moderate pain      Facility-Administered Medications: None       Allergies   Allergen Reactions    Adhesive [Medical Tape]      Paper tape okay    Ezetimibe     Fenofibrate     Flecainide     Levaquin [Levofloxacin] Other (See Comments)     Muscle weakness    Lovastatin     Sulfa Antibiotics     Thioridazine        PHYSICAL EXAM       Objective   Vitals:   First set: Temperature: 98 5 °F (36 9 °C) (06/25/21 0640)  Pulse: 82 (06/25/21 0632)  Respirations: 16 (06/25/21 6855)  Blood Pressure: (!) 220/95 (06/25/21 0632)  SpO2: 95 % (06/25/21 7075)    Primary Survey:   (A) Airway: intact  (B) Breathing: +breath sounds bilaterally  (C) Circulation: Pulses:   normal  (D) Disabliity:  GCS Total:  15  (E) Expose:  Completed    Secondary Survey: (Click on Physical Exam tab above)  Physical Exam  Vitals and nursing note reviewed  Constitutional:       Appearance: She is well-developed  HENT:      Head: Normocephalic and atraumatic        Right Ear: External ear normal       Left Ear: External ear normal    Eyes:      Conjunctiva/sclera: Conjunctivae normal    Neck: Vascular: No JVD  Trachea: No tracheal deviation  Cardiovascular:      Rate and Rhythm: Normal rate and regular rhythm  Heart sounds: Normal heart sounds  Pulmonary:      Effort: Pulmonary effort is normal  No respiratory distress  Breath sounds: No wheezing or rales  Abdominal:      Palpations: Abdomen is soft  Tenderness: There is no abdominal tenderness  There is no guarding or rebound  Musculoskeletal:         General: No tenderness  Cervical back: Normal range of motion and neck supple  Skin:     General: Skin is warm and dry  Findings: Bruising present  No erythema or rash  Neurological:      General: No focal deficit present  Mental Status: She is alert and oriented to person, place, and time  Motor: No weakness  Psychiatric:         Behavior: Behavior normal          Thought Content: Thought content normal          Cervical spine cleared by clinical criteria? Yes     Invasive Devices     Peripheral Intravenous Line            Peripheral IV 06/25/21 Left Antecubital <1 day          Line            Arterial Sheath 6 Fr   Right Radial 648 days                Lab Results:   Results Reviewed     Procedure Component Value Units Date/Time    Urine Microscopic [[id number]]  (Abnormal) Collected: 06/25/21 0701    Lab Status: Final result Specimen: Urine, Straight Cath Updated: 06/25/21 0856     RBC, UA None Seen /hpf      WBC, UA 2-4 /hpf      Epithelial Cells None Seen /hpf      Bacteria, UA Moderate /hpf     UA w Reflex to Microscopic w Reflex to Culture [[id number]]  (Abnormal) Collected: 06/25/21 0701    Lab Status: Final result Specimen: Urine, Straight Cath Updated: 06/25/21 0738     Color, UA Light Yellow     Clarity, UA Clear     Specific Gravity, UA 1 015     pH, UA 5 5     Leukocytes, UA Trace     Nitrite, UA Positive     Protein, UA Negative mg/dl      Glucose, UA Negative mg/dl      Ketones, UA Negative mg/dl      Urobilinogen, UA 0 2 E U /dl Bilirubin, UA Negative     Blood, UA Negative    Troponin I [[id number]]  (Normal) Collected: 06/25/21 0632    Lab Status: Final result Specimen: Blood from Arm, Left Updated: 06/25/21 0700     Troponin I <0 02 ng/mL     Basic metabolic panel [[id number]]  (Abnormal) Collected: 06/25/21 0632    Lab Status: Final result Specimen: Blood from Arm, Left Updated: 06/25/21 7828     Sodium 140 mmol/L      Potassium 3 5 mmol/L      Chloride 103 mmol/L      CO2 27 mmol/L      ANION GAP 10 mmol/L      BUN 22 mg/dL      Creatinine 1 01 mg/dL      Glucose 144 mg/dL      Calcium 9 0 mg/dL      eGFR 49 ml/min/1 73sq m     Narrative:      [place] guidelines for Chronic Kidney Disease (CKD):     Stage 1 with normal or high GFR (GFR > 90 mL/min/1 73 square meters)    Stage 2 Mild CKD (GFR = 60-89 mL/min/1 73 square meters)    Stage 3A Moderate CKD (GFR = 45-59 mL/min/1 73 square meters)    Stage 3B Moderate CKD (GFR = 30-44 mL/min/1 73 square meters)    Stage 4 Severe CKD (GFR = 15-29 mL/min/1 73 square meters)    Stage 5 End Stage CKD (GFR <15 mL/min/1 73 square meters)  Note: GFR calculation is accurate only with a steady state creatinine    CBC and differential [[id number]]  (Abnormal) Collected: 06/25/21 0632    Lab Status: Final result Specimen: Blood from Arm, Left Updated: 06/25/21 0639     WBC 11 36 Thousand/uL      RBC 3 92 Million/uL      Hemoglobin 12 4 g/dL      Hematocrit 38 1 %      MCV 97 fL      MCH 31 6 pg      MCHC 32 5 g/dL      RDW 13 8 %      MPV 8 4 fL      Platelets 403 Thousands/uL      nRBC 0 /100 WBCs      Neutrophils Relative 64 %      Immat GRANS % 1 %      Lymphocytes Relative 26 %      Monocytes Relative 7 %      Eosinophils Relative 2 %      Basophils Relative 0 %      Neutrophils Absolute 7 26 Thousands/µL      Immature Grans Absolute 0 14 Thousand/uL      Lymphocytes Absolute 2 91 Thousands/µL      Monocytes Absolute 0 80 Thousand/µL      Eosinophils Absolute 0 20 Thousand/µL      Basophils Absolute 0 05 Thousands/µL                  Imaging Studies:   Direct to CT: No  TRAUMA - CT head wo contrast   Final Result by Padilla Sargent DO (06/25 1659)   Stable cerebral atrophy with chronic small vessel ischemic white matter disease  No acute intracranial abnormality  Workstation performed: [id number]         TRAUMA - CT spine cervical wo contrast   Final Result by Padilla Sargent DO (06/25 2768)   Degenerative changes of the cervical spine  No acute cervical spine fracture or traumatic malalignment  Thyroid nodules as described above  Workstation performed: [id number]         XR Trauma chest portable   Final Result by Hank Brito MD (06/25 1180)      No acute cardiopulmonary disease  Workstation performed: [id number]               Procedures  Procedures         ED Course  ED Course as of Jun 25 2358 Fri Jun 25, 2021   1470 SO - fall, uti, trauma admit for failure to ambulate?               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 (CODE) ([facility] [address] )    Hypertension, unspecified type   Pressure injury of right buttock, stage 2 ([facility] [address] )   Fall, initial encounter     Time reflects when diagnosis was documented in both MDM as applicable and the Disposition within this note     Time User Action Codes Description Comment    6/25/2021 10:10 AM Lenette Falling Add [L89 322] Pressure injury of left buttock, stage 2 ([facility] [address] )     6/25/2021 10:10 AM Kacey Dover Add [N18 30] Stage 3 chronic kidney disease, unspecified whether stage 3a or 3b CKD ([facility] [address] )     6/25/2021 10:10 AM Kacey Escobar Add [N39 0] Recurrent UTI     6/25/2021 10:11 AM Stanislaw Richardelia Garcia Add [I25 10] Coronary artery disease involving native coronary artery of native heart without angina pectoris     6/25/2021 10:11 AM Gerline Law Add [R26 2] Ambulatory dysfunction     6/25/2021 10:11 AM Tunde Dover Add [E08 622] 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 (CODE) ([facility]      6/25/2021 10:11 AM Tunde Dover Add [I10] Hypertension, unspecified type     6/25/2021 10:11 AM Gerline Law Add [Y36 256] Pressure injury of right buttock, stage 2 ([facility] [address] )     6/25/2021 10:13 AM Gerline Law Add Ian Thompson, initial encounter       ED Disposition     ED Disposition Condition Date/Time Comment    Admit Stable Fri Jun 25, 2021 10:03 AM Case was discussed with Roxana Soriano and the patient's admission status was agreed to be Admission Status: observation status to the service of Dr Cholo Ann   Follow-up Information     Follow up With Specialties Details Why Contact Info    Lore Christy MD Internal Medicine Schedule an appointment as soon as possible for a visit in 2 week(s) Please call for appointment in the next 2 weeks to review your hospital encounter and incidental findings   [address] [place] [address]          Current Discharge Medication List      CONTINUE these medications which have NOT CHANGED    Details   acetaminophen (TYLENOL) 325 mg tablet Take 650 mg by mouth every 8 (eight) hours as needed for mild pain        amLODIPine (NORVASC) 5 mg tablet TAKE 1 TABLET BY MOUTH EVERYDAY AT BEDTIME  Qty: 90 tablet, Refills: 1    Associated Diagnoses: Essential hypertension      amoxicillin (AMOXIL) 500 mg capsule TAKE 4 CAPSULES BY MOUTH 1 HOUR BEFORE DENTAL APPOINTMENT  Refills: 3      Ascorbic Acid (VITAMIN C) 1000 MG tablet Take 1,000 mg by mouth daily      aspirin 81 MG tablet Take 1 tablet (81 mg total) by mouth daily  Qty: 30 tablet, Refills: 3    Associated Diagnoses: S/P drug eluting coronary stent placement      atorvastatin (LIPITOR) 40 mg tablet TAKE ONE TABLET EVERY OTHER DAY WITH DINNER  Qty: 30 tablet, Refills: 1    Associated Diagnoses: Acute MI ([address] ); Familial hypercholesteremia      Calcium Citrate-Vitamin D (CALCIUM + D PO) Take 600 mg by mouth every other day        carvedilol (COREG) 12 5 mg tablet Take 2 tablets (25 mg total) by mouth 2 (two) times a day  Qty: 360 tablet, Refills: 1    Comments: DX Code Needed    Associated Diagnoses: Essential hypertension      Cholecalciferol (VITAMIN D3) 1000 units CAPS Take by mouth daily        cloNIDine (CATAPRES) 0 1 mg tablet TAKE 1 TABLET (0 1 MG TOTAL) BY MOUTH 4 (FOUR) TIMES A DAY  Qty: 540 tablet, Refills: 1    Comments: DX Code Needed      Associated Diagnoses: Anxiety      clopidogrel (PLAVIX) 75 mg tablet TAKE 1 TABLET BY MOUTH EVERY DAY  Qty: 90 tablet, Refills: 3    Associated Diagnoses: Acute MI (HCC)      Cranberry 400 MG CAPS Take 1 capsule by mouth daily       cyanocobalamin 1000 MCG tablet Take by mouth daily        Diclofenac Sodium (VOLTAREN) 1 % diclofenac 1 % topical gel      escitalopram (LEXAPRO) 10 mg tablet Take 1 tablet (10 mg total) by mouth daily  Qty: 90 tablet, Refills: 3    Associated Diagnoses: Anxiety      ferrous sulfate 325 (65 FE) MG EC tablet Take 325 mg by mouth daily      furosemide (LASIX) 20 mg tablet TAKE 2 TABLETS BY MOUTH EVERY DAY  Qty: 180 tablet, Refills: 1    Associated Diagnoses: Benign essential hypertension      gabapentin (NEURONTIN) 100 mg capsule Take 2 capsules (200 mg total) by mouth 3 (three) times a day  Qty: 540 capsule, Refills: 2    Associated Diagnoses: Numbness of fingers of both hands      glimepiride (AMARYL) 1 mg tablet Take 0 5 tablets (0 5 mg total) by mouth daily with breakfast  Qty: 45 tablet, Refills: 3    Associated Diagnoses: Hyperglycemia      pantoprazole (PROTONIX) 40 mg tablet Take 1 tablet (40 mg total) by mouth daily  Qty: 90 tablet, Refills: 3    Associated Diagnoses: Benign essential hypertension      potassium chloride (Klor-Con) 10 mEq tablet TAKE 1 TABLET (10 MEQ TOTAL) BY MOUTH 3 (THREE) TIMES A DAY  Qty: 270 tablet, Refills: 2    Associated Diagnoses: Benign essential hypertension      traMADol (ULTRAM) 50 mg tablet Take 1 tablet (50 mg total) by mouth 2 (two) times a day as needed for moderate pain  Qty: 60 tablet, Refills: 1    Associated Diagnoses: Primary osteoarthritis involving multiple joints           No discharge procedures on file      PDMP Review       Value Time User    PDMP Reviewed  Yes 4/28/2021 11:30 AM Luis Saenz MD          ED Provider  Electronically Signed by         Pako Odonnell MD  06/25/21 6971

## 2021-06-25 NOTE — ASSESSMENT & PLAN NOTE
- Continue current medication regimen  - Management per Internal Medicine   - Outpatient follow-up with PCP

## 2021-06-25 NOTE — ASSESSMENT & PLAN NOTE
Lab Results   Component Value Date    EGFR 49 06/25/2021    EGFR 57 12/26/2020    EGFR 61 12/25/2020    CREATININE 1 01 06/25/2021    CREATININE 0 89 12/26/2020    CREATININE 0 85 12/25/2020   creatine is close to baseline

## 2021-06-25 NOTE — ASSESSMENT & PLAN NOTE
Not on [facility]   Rate controlled  Given recent fall/presyncope would not initiate AC at this point

## 2021-06-25 NOTE — ED NOTES
Pt ambulated with Rolling walker, Pt only able to take few steps and became fatigued and was unable to ambulate further, pt was then assisted back to bed       Lynne Schaefer  06/25/21 8480

## 2021-06-25 NOTE — ED NOTES
Pt transported to [address] non tele, Receiving RN TT of pt arrival     [facility]  06/25/21 8934

## 2021-06-25 NOTE — ASSESSMENT & PLAN NOTE
Patient with ambulatory dysfunction at baseline  She is using walker for ambulation, and she is only able to walk short distances, mainly moving around her room  Continue physical therapy  Consider monitoring orthostatic vital signs, avoid hypotension

## 2021-06-25 NOTE — ASSESSMENT & PLAN NOTE
- Patient with ambulatory dysfunction at baseline; uses walker and assistance of family  - Fall precautions   - PT and OT evaluation and treatment as indicated  - Case Management consultation for disposition planning

## 2021-06-25 NOTE — H&P
[facility]  H&P- Tai Landrum 4/6/1930, 80 y o  female MRN: [id number]  Unit/Bed#: ED 08 Encounter: [id number]  Primary Care Provider: Randy Clemons MD   Date and time admitted to hospital: 6/25/2021  6:32 AM    Fall  Assessment & Plan  - Status post fall with the below noted injuries  - Fall precautions  - Geriatric Medicine consultation for evaluation, medication review and recommendations   - PT and OT evaluation and treatment as indicated  - Case Management consultation for disposition planning  Ambulatory dysfunction  Assessment & Plan  - Patient with ambulatory dysfunction at baseline; uses walker and assistance of family  - Fall precautions   - PT and OT evaluation and treatment as indicated  - Case Management consultation for disposition planning  Chin contusion, initial encounter  Assessment & Plan  - Chin contusion, present on admission, with intact overlying skin  - Due to head strike with fall, CT scans of the head and cervical spine obtained and were negative for acute intracranial injury as well as for any acute traumatic injury to the cervical spine   - Analgesia as needed  - Activity as tolerated  - May continue home medication regimen   - No further workup or intervention necessary  Abrasion of right arm, initial encounter  Assessment & Plan  - Right arm abrasion near the antecubital fossa with surrounding ecchymosis and mild tenderness   - Local wound care as indicated  - Analgesia as needed  * Recurrent UTI  Assessment & Plan  - Patient with history of recurrent UTI and UA suggestive of UTI at this time  Patient also has mild leukocytosis on presentation   - Initiate antibiotic therapy for UTI  - Monitor temperature trend and CBC   - Further management per Internal Medicine      Pressure injury of left buttock, stage 2 (HCC)  Assessment & Plan  - 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, chronic and present on admission   - Consult placed to the inpatient wound care service for evaluation and recommendations   - Recommend offloading therapy with repositioning every 2 hours  - Apply Allevyn dressing  Pressure injury of right buttock, stage 2 (HCC)  Assessment & Plan  - 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, chronic and present on admission   - Consult placed to the inpatient wound care service for evaluation and recommendations   - Recommend offloading therapy with repositioning every 2 hours  - Apply Allevyn dressing  CAD (coronary artery disease)  Assessment & Plan  - Continue current medication regimen  - Management per Internal Medicine   - Outpatient follow-up with PCP  Paroxysmal atrial fibrillation ([facility])  Assessment & Plan  - Continue current medication regimen  - Management per Internal Medicine   - Outpatient follow-up with PCP  CKD (chronic kidney disease) stage 3, GFR 30-59 ml/min [facility])  Assessment & Plan  Lab Results   Component Value Date    EGFR 49 06/25/2021    EGFR 57 12/26/2020    EGFR 61 12/25/2020    CREATININE 1 01 06/25/2021    CREATININE 0 89 12/26/2020    CREATININE 0 85 12/25/2020     - Stage III CKD at baseline  No evidence of acute kidney injury  - Continue current medication regimen  - Avoid nephrotoxic medications and hypotension   - Management per Internal Medicine   - Outpatient follow-up with PCP  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 (CODE) [facility])   Assessment & Plan    Lab Results   Component Value Date    HGBA1C 7 5 (H) 12/21/2020     - Chronic history of type 2 diabetes mellitus  - Initiate subcutaneous insulin regimen on admission   - Further care/management per Internal Medicine   - Resume home medication therapy on discharge  - Outpatient follow-up with PCP      Primary osteoarthritis of right hip  Assessment & Plan  - Chronic history of primary osteoarthritis of the right hip   - No evidence of acute traumatic injury  - Patient with chronic pain  Pain is baseline level with no acute changes  - Analgesia as needed  - PT and OT evaluation and treatment as indicated  - Outpatient follow-up with PCP  H&P Exam - Trauma   Jose Miguel Adan 80 y o  female MRN: [id number]  Unit/Bed#: ED 08 Encounter: [id number]    Assessment/Plan   Trauma Alert: Evaluation  Model of Arrival: Self  Trauma Team: Attending Dr Geni Sherwood and TOMEKA Sr PCDaniloC  Consultants: SLIM; contacted 6/25/2021 at 09:46 AM via tiger connect  Trauma Active Problems and Trauma Plan: See above  Chief Complaint:  I fell      History of Present Illness   HPI:  Jose Miguel Adan is a 80 y o  female who presents with a bump on her chin following fall this morning  The patient states that she is mostly bed bound and rests in a hospital bed at home at baseline  She is able to get up and move around a little bit with the help of her family and a rolling walker  This morning, she was getting out of bed to use the commode with her daughter and while walking from the bed to the commode, she fell and struck her chin on the end of the bed  She is unsure exactly when she fell, but denied any preceding symptoms such as lightheadedness or dizziness, visual changes, headache, palpitations or chest pain  She denied losing consciousness  Her daughter was able to help her immediately  Since the fall, she has had some mild discomfort at the site which she struck her chin  She offers no other acute complaints  She does acknowledge chronic pain from osteoarthritis and rotator cuff injuries to her bilateral shoulders and right hip  Mechanism:Fall    Review of Systems   Constitutional: Negative  Negative for activity change, appetite change, chills, diaphoresis, fatigue and fever  HENT: Negative  Negative for congestion, facial swelling and sore throat  Eyes: Negative    Negative for photophobia, pain and visual disturbance  Respiratory: Negative  Negative for cough, chest tightness, shortness of breath and wheezing  Cardiovascular: Negative  Negative for chest pain and leg swelling  Gastrointestinal: Negative  Negative for abdominal distention, abdominal pain, constipation, diarrhea, nausea and vomiting  Endocrine: Negative  Genitourinary: Negative  Negative for difficulty urinating, dysuria, flank pain, frequency and hematuria  Musculoskeletal: Positive for arthralgias (Chronic bilateral shoulder and right hip pain, at baseline with no acute component)  Negative for back pain, myalgias and neck pain  Skin: Positive for color change (Multiple areas of bruising, mostly from old bumps  New bruising on right arm ) and wound (Wounds on bilateral buttocks and left heel are chronic  New wounds on chin, right arm and bilateral knees following fall today)  Negative for pallor and rash  Allergic/Immunologic: Negative  Neurological: Negative  Negative for dizziness, syncope, weakness, light-headedness and headaches  Hematological: Negative  Psychiatric/Behavioral: Negative  Negative for agitation, confusion, self-injury and sleep disturbance  The patient is not nervous/anxious  12-point, complete review of systems was reviewed and negative except as stated above         Historical Information   Efforts to obtain history included the following sources: family member, other medical personnel, obtained from other records    Past Medical History:   Diagnosis Date    Anemia     [facility]     CAD (coronary artery disease)     Cancer [facility]     Carotid artery obstruction     Coronary artery disease     s/p stent x4     Disease of thyroid gland     TIA    GERD (gastroesophageal reflux disease)     HL (hearing loss)     Wears hearing aids    Hyperlipidemia     Hypertension     Mesent[facility]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, chronic [facility]     TIA (transient ischemic attack) Past Surgical History:   Procedure Laterality Date    ANGIOPLASTY  01/01/2009    x4, stent placed in [address] Right 01/01/2005    CARDIAC CATHETERIZATION      CATARACT EXTRACTION      CHOLECYSTECTOMY      COLONOSCOPY  02/26/2016    CORONARY ANGIOPLASTY      stent x4 07/11/1996x1 10/09/2009 x3    CORONARY STENT PLACEMENT      HYSTERECTOMY      REPLACEMENT TOTAL KNEE Right      Social History   Social History     Substance and Sexual Activity   Alcohol Use No     Social History     Substance and Sexual Activity   Drug Use No     Social History     Tobacco Use   Smoking Status Never Smoker   Smokeless Tobacco Never Used     E-Cigarette/Vaping    E-Cigarette Use Never User      E-Cigarette/Vaping Substances    Nicotine No     THC No     CBD No     Flavoring No     Other No     Unknown No      Immunization History   Administered Date(s) Administered    Influenza, high dose seasonal 0 7 mL 10/07/2020    Pneumococcal Conjugate 13-Valent 09/29/2015    Pneumococcal Polysaccharide PPV23 09/30/2006    Td (adult), Unspecified 05/06/2009     Last Tetanus: Unknown  Family History: Non-contributory  Unable to obtain/limited by N/A      Meds/Allergies   all current active meds have been reviewed and current meds:   Current Facility-Administered Medications   Medication Dose Route Frequency    carvedilol (COREG) tablet 25 mg  25 mg Oral BID With Meals       Allergies   Allergen Reactions    Adhesive [Medical Tape]      Paper tape okay    Ezetimibe     Fenofibrate     Flecainide     Levaquin [Levofloxacin] Other (See Comments)     Muscle weakness    Lovastatin     Sulfa Antibiotics     Thioridazine          PHYSICAL EXAM    PE limited by: N/A    Objective   Vitals:   First set: Temperature: 98 5 °F (36 9 °C) (06/25/21 0640)  Pulse: 82 (06/25/21 0632)  Respirations: 16 (06/25/21 0960)  Blood Pressure: (!) 220/95 (06/25/21 9710)    Primary Survey:   (A) Airway:  Patent and intact  (B) Breathing:  Clear and equal bilaterally  (C) Circulation: Pulses:   normal, carotid  2/4, pedal  2/4, radial  2/4 and femoral  2/4  (D) Disabliity:  GCS Total:  15, Eye Opening:   Spontaneous = 4, Motor Response: Obeys commands = 6 and Verbal Response:  Oriented = 5  (E) Expose:  Completed    Secondary Survey: (Click on Physical Exam tab above)  Physical Exam  Vitals and nursing note reviewed  Constitutional:       General: She is awake  She is not in acute distress  Appearance: Normal appearance  She is obese  She is not ill-appearing, toxic-appearing or diaphoretic  Interventions: She is not intubated  HENT:      Head: Normocephalic  No abrasion, contusion (Contusion with mild ecchymosis on the underside of her chin with minimal tenderness) or laceration  Right Ear: Hearing and external ear normal       Left Ear: Hearing and external ear normal       Nose: Nose normal  No nasal deformity, septal deviation, signs of injury, laceration or nasal tenderness  Mouth/Throat:      Mouth: Mucous membranes are moist       Dentition: Normal dentition  Pharynx: Oropharynx is clear  Eyes:      Extraocular Movements: Extraocular movements intact  Right eye: Normal extraocular motion  Left eye: Normal extraocular motion  Conjunctiva/sclera: Conjunctivae normal       Comments: Pupils are reactive bilaterally  The right pupil is round and 2 mm  The left pupil is irregularly shaped and approximately 3 mm in the largest diameter  Vision intact and at baseline  No obvious acute trauma to the left eye  Neck:      Trachea: Trachea normal    Cardiovascular:      Rate and Rhythm: Normal rate and regular rhythm  Pulses:           Carotid pulses are 2+ on the right side and 2+ on the left side  Radial pulses are 2+ on the right side and 2+ on the left side  Femoral pulses are 2+ on the right side and 2+ on the left side         Dorsalis pedis pulses are 2+ on the right side and 2+ on the left side  Heart sounds: Normal heart sounds  No murmur heard  No friction rub  No gallop  Pulmonary:      Effort: Pulmonary effort is normal  No tachypnea, bradypnea, accessory muscle usage, prolonged expiration or respiratory distress  She is not intubated  Breath sounds: Normal breath sounds and air entry  No stridor or decreased air movement  No decreased breath sounds, wheezing, rhonchi or rales  Chest:      Chest wall: No lacerations, deformity, swelling, tenderness or crepitus  Abdominal:      General: Abdomen is flat  Bowel sounds are normal  There is no distension  Palpations: Abdomen is soft  Tenderness: There is no abdominal tenderness  There is no guarding or rebound  Musculoskeletal:         General: No swelling or deformity  Right elbow: Laceration (Superficial abrasion without obvious laceration or significant skin tear of the right antecubital fossa with surrounding ecchymosis and mild tenderness) present  No deformity  Normal range of motion  Tenderness (Mild tenderness over abrasion with no other bony tenderness) present  Cervical back: Normal range of motion and neck supple  No deformity, signs of trauma or tenderness  No pain with movement, spinous process tenderness or muscular tenderness  Normal range of motion  Thoracic back: Normal  No swelling, deformity, signs of trauma or tenderness  Lumbar back: Normal  No swelling, deformity, signs of trauma or tenderness  Right hip: No deformity or tenderness  Decreased range of motion (Chronic decreased range of motion with pain  Exam is at baseline per patient)  Left hip: Normal  No tenderness  Normal range of motion  Right upper leg: Normal       Left upper leg: Normal       Right knee: No swelling or deformity  Normal range of motion  No tenderness  Left knee: No swelling or deformity  Normal range of motion  No tenderness        Right lower leg: No edema  Left lower leg: No edema  Left foot: Normal range of motion  Tenderness present  Skin:     General: Skin is warm  Coloration: Skin is not jaundiced or pale  Findings: Bruising (Multiple areas of bruising on the upper and lower extremities in various stages of healing) and erythema (Erythema over the sacrum and medial bilateral buttocks at site of known chronic pressure wounds; mild erythema over the left heel at site of known chronic pressure wound ) present  No lesion or rash  Neurological:      General: No focal deficit present  Mental Status: She is alert and oriented to person, place, and time  Mental status is at baseline  GCS: GCS eye subscore is 4  GCS verbal subscore is 5  GCS motor subscore is 6  Sensory: Sensation is intact  No sensory deficit  Motor: Motor function is intact  No weakness  Psychiatric:         Mood and Affect: Mood normal          Behavior: Behavior is cooperative  Invasive Devices     Peripheral Intravenous Line            Peripheral IV 06/25/21 Left Antecubital <1 day          Line            Arterial Sheath 6 Fr   Right Radial 647 days                Lab Results:   Results: I have personally reviewed pertinent reports   , BMP/CMP:   Lab Results   Component Value Date    SODIUM 140 06/25/2021    K 3 5 06/25/2021     06/25/2021    CO2 27 06/25/2021    BUN 22 06/25/2021    CREATININE 1 01 06/25/2021    CALCIUM 9 0 06/25/2021    EGFR 49 06/25/2021   , CBC:   Lab Results   Component Value Date    WBC 11 36 (H) 06/25/2021    HGB 12 4 06/25/2021    HCT 38 1 06/25/2021    MCV 97 06/25/2021     06/25/2021    MCH 31 6 06/25/2021    MCHC 32 5 06/25/2021    RDW 13 8 06/25/2021    MPV 8 4 (L) 06/25/2021    NRBC 0 06/25/2021   , Coagulation: No results found for: PT, INR, APTT, Urinalysis:   Lab Results   Component Value Date    COLORU Light Yellow 06/25/2021    CLARITYU Clear 06/25/2021    SPECGRAV 1 015 06/25/2021 PHUR 5 5 06/25/2021    LEUKOCYTESUR Trace (A) 06/25/2021    NITRITE Positive (A) 06/25/2021    GLUCOSEU Negative 06/25/2021    KETONESU Negative 06/25/2021    BILIRUBINUR Negative 06/25/2021    BLOODU Negative 06/25/2021    and Troponin:   Lab Results   Component Value Date    TROPONINI <0 02 06/25/2021     Imaging/EKG Studies: Results: I have personally reviewed pertinent reports     and I have personally reviewed pertinent films in PACS  Other Studies: N/A    Code Status: Prior  Advance Directive and Living Will:      Power of :    POLST:

## 2021-06-25 NOTE — ASSESSMENT & PLAN NOTE
Encouraged out of bed as tolerated, reposition frequently  Encourage protein supplements    Continue local care, wound team to follow

## 2021-06-25 NOTE — CASE MANAGEMENT
LOS: 1 DAY  PATIENT IS NOT A BUNDLE  PATIENT IS NOT A READMISSION  UNPLANNED RISK OF READMISSION: N/A     CM called and spoke to patient's daughter Billie Doss via mobile phone to introduce self, explain role, complete CM assessment, and discuss DC planning as patient has baseline confusion  Lives where: [facility] with: daughter Billie Doss and Maritza's spouse  Supports: daughters Billie Doss and Sudarshan Perry (who also lives locally)  Also has an aide that comes for 2 hrs, 3x/week to help shower  Home Type & Entry: 2 story home, patient has 2nd floor set up  Stair lift to second floor  DME: hospital bed, RW, BSC, transfer bench in shower  ADLs: patient is dependent for all ADLs  VNA history:  hx with [facility]; not current with services  STR history: admitted to [facility] in December [facility] for 5 weeks  Pharmacy Preference & Coverage: [facility] on [address]  Has Rx plan and no issues affording or obtaining medications  Mental Health/Drug/ETOH history: none  POA/AD/LW: patient's daughter Sudarshan Perry is POA; also has AD  CM requested copy for her records  Income/Employment: SSI  Transportation: family  Used to provide transport but patient has not left the home since returning from rehab in January 2021  PCP: Dr Leigh Urban; last followed up via zoom  Transport Home: TBD     DCP: Billie Doss aware that patient is pending PT/OT evals at this time  She reports her preference is to take patient home but she has to be able to stand and walk 6 or 7' or she'd request she goes to rehab again  CM Dept will continue to follow and will follow up with family post therapy evals  CM reviewed discharge planning process including the following: identifying caregivers at home, preference for d/c planning needs, vailability of treatment team to discuss questions or concerns patient and/or family may have regarding diagnosis, plan of care, old or new medications and discharge planning   CM will continue to follow for care coordination and update assessment as appropriate

## 2021-06-25 NOTE — ASSESSMENT & PLAN NOTE
- Chin contusion, present on admission, with intact overlying skin  - Due to head strike with fall, CT scans of the head and cervical spine obtained and were negative for acute intracranial injury as well as for any acute traumatic injury to the cervical spine   - Analgesia as needed  - Activity as tolerated  - May continue home medication regimen   - No further workup or intervention necessary

## 2021-06-25 NOTE — ASSESSMENT & PLAN NOTE
- Patient with history of recurrent UTI and UA suggestive of UTI at this time  Patient also has mild leukocytosis on presentation   - Initiate antibiotic therapy for UTI  - Monitor temperature trend and CBC   - Further management per Internal Medicine

## 2021-06-25 NOTE — ASSESSMENT & PLAN NOTE
- 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, chronic and present on admission   - Consult placed to the inpatient wound care service for evaluation and recommendations   - Recommend offloading therapy with repositioning every 2 hours  - Apply Allevyn dressing

## 2021-06-25 NOTE — INCIDENTAL FINDINGS
The following findings require follow up:  Radiographic finding   Findings and Follow up required:       Asymmetric enlargement of the left lobe of the thyroid gland  The thyroid gland is heterogeneous in CT density  Multiple solid and cystic nodules  Largest nodule measures at least 2 6 cm in the midpole left lobe  Incidental discovery of one or more thyroid nodule(s) measuring more than 1 5 cm and without suspicious features is noted in this patient who is above 28years old; according to guidelines published in the February 2015 white paper on incidental thyroid nodules in the Journal of the [employer] of Radiology Petra Frank), further characterization with thyroid ultrasound may be considered  However, the majority of incidental thyroid nodules are benign and because small incidental thyroid malignancies typically demonstrate indolent behavior  Recommend follow-up with primary care provider in the next 2 weeks to review this finding and discuss further workup/imaging if indicated         Follow up should be done within 2 week(s)    Please notify the following clinician to assist with the follow up:   Dr Jovana Jama

## 2021-06-25 NOTE — ASSESSMENT & PLAN NOTE
Will continue antibiotics per as per primary team   Urine culture pending will follow-up with results

## 2021-06-25 NOTE — ASSESSMENT & PLAN NOTE
Lab Results   Component Value Date    EGFR 49 06/25/2021    EGFR 57 12/26/2020    EGFR 61 12/25/2020    CREATININE 1 01 06/25/2021    CREATININE 0 89 12/26/2020    CREATININE 0 85 12/25/2020     - Stage III CKD at baseline  No evidence of acute kidney injury  - Continue current medication regimen  - Avoid nephrotoxic medications and hypotension   - Management per Internal Medicine   - Outpatient follow-up with PCP

## 2021-06-25 NOTE — ASSESSMENT & PLAN NOTE
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

## 2021-06-25 NOTE — CONSULTS
Consultation - Geriatric Medicine   Germainemarvin Jon 80 y o  female MRN: [id number]  Unit/Bed#: S -01 Encounter: [id number]      Assessment/Plan     Chin contusion, initial encounter  Assessment & Plan  Status post mechanical fall  She denies pain at the site currently  Reviewed imaging studies  Continue Tylenol 975 mg p o  T i d   Consider tapering down tramadol due to decreased renal function, and high risk of confusion in elderly  I would recommend starting low-dose oxycodone, may use it as p r n  As well        Fall  Assessment & Plan  Patient with ambulatory dysfunction at baseline  She is using walker for ambulation, and she is only able to walk short distances, mainly moving around her room  Continue physical therapy  Consider monitoring orthostatic vital signs, avoid hypotension  Pressure injury of right buttock, stage 2 ([address] )  Assessment & Plan  Encouraged out of bed as tolerated, reposition frequently  Encourage protein supplements  Continue local care, wound team to follow    Osteoarthritis of multiple joints  Assessment & Plan  Patient with pain in multiple joints, right hip seems to be the worse  She is taking steroid low does for years, also taking Tylenol and tramadol daily  I would recommend tapering down dose of tramadol especially that she had multiple falls recently  Consider lidocaine patches right hip and lower back  Continue Tylenol 975 mg p o  T i d  I would recommend increasing dose of gabapentin to 400 mg p o  B i d - switch to b i d  Due to decreased renal function  Consider working on tapering down prednisone as outpatient    Continue physical therapy, continue fall precautions    * Recurrent UTI  Assessment & Plan  Will continue antibiotics per as per primary team   Urine culture pending will follow-up with results           History of Present Illness   Physician Requesting Consult: Princess Daniel DO  Reason for Consult / Principal Problem: fall , chin  GERD (gastroesophageal reflux disease)     HL (hearing loss)     Wears hearing aids    Hyperlipidemia     Hypertension     Mesenteric ischemia, chronic (HCC)     TIA (transient ischemic attack)      Past Surgical History:   Procedure Laterality Date    ANGIOPLASTY  01/01/2009    x4, stent placed in [address] Right 01/01/2005    CARDIAC CATHETERIZATION      CATARACT EXTRACTION      CHOLECYSTECTOMY      COLONOSCOPY  02/26/2016    CORONARY ANGIOPLASTY      stent x4 07/11/1996x1 10/09/2009 x3    CORONARY STENT PLACEMENT      HYSTERECTOMY      REPLACEMENT TOTAL KNEE Right      Social History   Social History     Substance and Sexual Activity   Alcohol Use No     Social History     Substance and Sexual Activity   Drug Use No     Social History     Tobacco Use   Smoking Status Never Smoker   Smokeless Tobacco Never Used     Family History:   Family History   Problem Relation Age of Onset    Leukemia Father     Hypertension Sister     Heart attack Brother 46    Hypertension Brother     Sudden death Brother 46        scd    Heart failure Maternal Grandmother     Hypertension Maternal Grandmother     Stroke Maternal Grandfather     Hypertension Daughter     Anuerysm Neg Hx     Clotting disorder Neg Hx     Arrhythmia Neg Hx     Hyperlipidemia Neg Hx        Meds/Allergies   Amlodipine 5 mg daily  Aspirin 81 mg daily  Lipitor 40 mg daily  Carvedilol 25 mg b i d  Clonidine 0 1 mg q i d   Gabapentin 200 mg t i d   Lasix 40 mg daily  Glimepiride 0 5 mg daily  Potassium chloride 10 mg daily  Protonix 40 mg daily  Prednisone 7 5 mg daily  Tramadol 50 mg b i d    Iron 45 mg daily  Clopidogrel 75 mg daily  Probiotic  Vitamin C  Vitamin B12  Vitamin D3  Calcium 600 mg BID  Lexapro 10 mg daily   cranberry supplements 400 mg daily    Allergies   Allergen Reactions    Adhesive [Medical Tape]      Paper tape okmai    Ezetimibe     Fenofibrate     Flecainide     Levaquin [Levofloxacin] Other (See Comments)     Muscle weakness    Lovastatin     Sulfa Antibiotics     Thioridazine        Objective     Intake/Output Summary (Last 24 hours) at 6/25/2021 1522  Last data filed at 6/25/2021 1401  Gross per 24 hour   Intake 230 ml   Output 1050 ml   Net -820 ml     Invasive Devices     Peripheral Intravenous Line            Peripheral IV 06/25/21 Left Antecubital <1 day          Line            Arterial Sheath 6 Fr  Right Radial 648 days                Physical Exam  Vitals and nursing note reviewed  Constitutional:       General: She is not in acute distress  Appearance: She is well-developed  HENT:      Head: Normocephalic and atraumatic  Ears:      Comments: Ione  Eyes:      Conjunctiva/sclera: Conjunctivae normal    Cardiovascular:      Rate and Rhythm: Normal rate and regular rhythm  Heart sounds: No murmur heard  Pulmonary:      Effort: Pulmonary effort is normal  No respiratory distress  Breath sounds: Normal breath sounds  Abdominal:      Palpations: Abdomen is soft  Tenderness: There is no abdominal tenderness  Musculoskeletal:         General: Tenderness present  Cervical back: Neck supple  Right lower leg: Edema (trace) present  Left lower leg: Edema (trace) present  Skin:     General: Skin is warm and dry  Comments: Left heel b/l buttocks pressure ulcers  Abrasion right arm   Neurological:      Mental Status: She is alert  Mental status is at baseline        Comments: AAOx2   Psychiatric:         Behavior: Behavior normal          Lab Results:   I have personally reviewed pertinent lab results including the following:  - CBC, CMP, UA    I have personally reviewed the following imaging study reports in PACS:  - X ray, CT head            VTE Prophylaxis: Heparin    Code Status: DNR  Advance Directive and Living Will:    yes   Power of :  Elis Padilla    Family and Social Support:     Goals of Care: return home with her daughter

## 2021-06-26 NOTE — ASSESSMENT & PLAN NOTE
Lab Results   Component Value Date    EGFR 41 06/26/2021    EGFR 49 06/25/2021    EGFR 57 12/26/2020    CREATININE 1 17 06/26/2021    CREATININE 1 01 06/25/2021    CREATININE 0 89 12/26/2020     - Stage III CKD at baseline  No evidence of acute kidney injury  - Continue current medication regimen  - Avoid nephrotoxic medications and hypotension   - Management per Internal Medicine   - Outpatient follow-up with PCP

## 2021-06-26 NOTE — ASSESSMENT & PLAN NOTE
- Patient with history of recurrent UTI and UA suggestive of UTI at this time  Patient also has mild leukocytosis on presentation   - Continue IV antibiotics for UTI present on admission    - Remains afebrile without leukocytosis at this time  - Further management per Internal Medicine

## 2021-06-26 NOTE — ASSESSMENT & PLAN NOTE
· Not on AC   · Rate controlled  · Given recent fall/presyncope would not initiate AC at this point

## 2021-06-26 NOTE — PROGRESS NOTES
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 (CODE) [facility])   Assessment & Plan  Lab Results   Component Value Date    HGBA1C 7 5 (H) 12/21/2020       Recent Labs     06/25/21  1521 06/25/21  2149 06/26/21  0736 06/26/21  1101   POCGLU 215* 143* 142* 167*       Blood Sugar Average: Last 72 hrs:  (P) 166   · BG well controlled   · Continue w/ home insulin; SSI for correction and QID accuchecks    CKD (chronic kidney disease) stage 3, GFR 30-59 ml/min [facility])  Assessment & Plan  Lab Results   Component Value Date    EGFR 41 06/26/2021    EGFR 49 06/25/2021    EGFR 57 12/26/2020    CREATININE 1 17 06/26/2021    CREATININE 1 01 06/25/2021    CREATININE 0 89 12/26/2020   · creatine is close to baseline       Ambulatory dysfunction  Assessment & Plan  · Admitted after fall at home  · Trauma is primary  · PT OT-- recommending STR-- family agreeable-- dispo planning ongoing    Paroxysmal atrial fibrillation ([facility] [address] )  Assessment & Plan  · Not on AC   · Rate controlled  · Given recent fall/presyncope would not initiate AC at this point   VTE Pharmacologic Prophylaxis:   Moderate Risk (Score 3-4) - Pharmacological DVT Prophylaxis Ordered: heparin  Patient Centered Rounds: I performed bedside rounds with nursing staff today  Discussions with Specialists or Other Care Team Provider: RN, Trauma    Education and Discussions with Family / Patient: family updated by primary service  Time Spent for Care: 30 minutes   More than 50% of total time spent on counseling and coordination of care as described above  Current Length of Stay: 0 day(s)  Current Patient Status: Observation   Certification Statement: The patient, admitted on an observation basis, will now require > 2 midnight hospital stay due to ongoing tx of UTI, safe dispo planning  Discharge Plan: [facility] is following this patient on consult  They are medically stable for discharge when deemed appropriate by primary service  Code Status: Level 1 - Full Code    Subjective:   Patient feels well  Denies any CP, SOB, abdominal pain, N/V  Objective:     Vitals:   Temp (24hrs), Av 8 °F (36 6 °C), Min:97 5 °F (36 4 °C), Max:97 9 °F (36 6 °C)    Temp:  [97 5 °F (36 4 °C)-97 9 °F (36 6 °C)] 97 9 °F (36 6 °C)  HR:  [67-71] 67  Resp:  [16] 16  BP: (125-147)/(56-67) 134/60  SpO2:  [91 %] 91 %  Body mass index is 28 47 kg/m²  Input and Output Summary (last 24 hours): Intake/Output Summary (Last 24 hours) at 2021 1407  Last data filed at 2021 0700  Gross per 24 hour   Intake 340 ml   Output 400 ml   Net -60 ml       Physical Exam:   Physical Exam  Constitutional:       Appearance: Normal appearance  She is obese  She is not ill-appearing  HENT:      Head: Normocephalic  Eyes:      Pupils: Pupils are equal, round, and reactive to light  Cardiovascular:      Rate and Rhythm: Normal rate and regular rhythm  Heart sounds: No murmur heard  No friction rub  No gallop  Pulmonary:      Effort: Pulmonary effort is normal       Breath sounds: Normal breath sounds  No wheezing  Abdominal:      General: Abdomen is flat  Bowel sounds are normal       Palpations: Abdomen is soft  Tenderness: There is no abdominal tenderness  Musculoskeletal:      Right lower leg: No edema  Left lower leg: No edema  Skin:     General: Skin is warm and dry  Neurological:      General: No focal deficit present  Mental Status: She is alert  Mental status is at baseline  Psychiatric:         Mood and Affect: Mood normal          Additional Data:     Labs:  Results from last 7 days   Lab Units 06/26/21  0510 06/25/21  0632   WBC Thousand/uL 10 36* 11 36*   HEMOGLOBIN g/dL 10 7* 12 4   HEMATOCRIT % 33 8* 38 1   PLATELETS Thousands/uL 249 266   NEUTROS PCT %  --  64   LYMPHS PCT %  --  26   MONOS PCT %  --  7   EOS PCT %  --  2     Results from last 7 days   Lab Units 06/26/21  0510   SODIUM mmol/L 143   POTASSIUM mmol/L 3 5   CHLORIDE mmol/L 103   CO2 mmol/L 28   BUN mg/dL 24   CREATININE mg/dL 1 17   ANION GAP mmol/L 12   CALCIUM mg/dL 8 3   GLUCOSE RANDOM mg/dL 134         Results from last 7 days   Lab Units 06/26/21  1101 06/26/21  0736 06/25/21  2149 06/25/21  1521 06/25/21  1111   POC GLUCOSE mg/dl 167* 142* 143* 215* 163*               Lines/Drains:  Invasive Devices     Peripheral Intravenous Line            Peripheral IV 06/26/21 Left Antecubital <1 day          Line            Arterial Sheath 6 Fr   Right Radial 649 days                      Imaging: Reviewed radiology reports from this admission including: xray(s)    Recent Cultures (last 7 days):         Last 24 Hours Medication List:   Current Facility-Administered Medications   Medication Dose Route Frequency Provider Last Rate    acetaminophen  975 mg Oral Q8H [facility] Max Dover PA-C      amLODIPine  5 mg Oral HS aMx Dover PA-C      vitamin C  1,000 mg Oral Daily Max Dover PA-C      aspirin  81 mg Oral Daily Max Dover PA-C      atorvastatin  40 mg Oral Daily With NADIA Dill      carvedilol  25 mg Oral BID With Meals Janes Herrmann PA-C      cefazolin  1,000 mg Intravenous Q12H  Quant, CRNP 1,000 mg (06/26/21 0932)    cholecalciferol  1,000 Units Oral Daily Max Dover PA-C      cloNIDine  0 1 mg Oral 4x Daily Max Dover PA-C      clopidogrel  75 mg Oral Daily Max Dover PA-C      cyanocobalamin  1,000 mcg Oral Daily Max Dover PA-C      Diclofenac Sodium  2 g Topical 4x Daily Nerissa Francois PA-C      escitalopram  10 mg Oral Daily Max Dover PA-C      ferrous sulfate  325 mg Oral Daily With Breakfast Nerissa Francois PA-C      furosemide  40 mg Oral Daily Max Dover PA-C      gabapentin  200 mg Oral TID Nerissa Francois PA-C      heparin (porcine)  5,000 Units Subcutaneous [facility] Sushil Mooreamento      insulin glargine  10 Units Subcutaneous HS Max Dover PA-C      insulin lispro  1-5 Units Subcutaneous HS Max Dover PA-C      insulin lispro  1-5 Units Subcutaneous TID AC Max Dover PA-C      insulin lispro  3 Units Subcutaneous Daily With Breakfast Max Dover PA-C      insulin lispro  3 Units Subcutaneous Daily With Lunch Max Dover PA-C      insulin lispro  3 Units Subcutaneous Daily With Dinner Nerissa Francois PA-C      naloxone  0 04 mg Intravenous Q1MIN PRN Nerissa Francois PA-C      ondansetron  4 mg Intravenous Q4H PRN Nerissa Francois PA-C      oxyCODONE  2 5 mg Oral Q4H PRN Nerissa Francois PA-C      pantoprazole  40 mg Oral Daily Before Breakfast Max Dover PA-C      potassium chloride  10 mEq Oral TID Multaninadine Francois PA-C      senna-docusate sodium  1 tablet Oral BID Nerissa Francois PA-C          Today, Patient Was Seen By: Jose Villar PA-C    **Please Note: This note may have been constructed using a voice recognition system  **

## 2021-06-26 NOTE — ASSESSMENT & PLAN NOTE
- Status post fall with the below noted injuries  - Fall precautions  - Geriatric Medicine consultation for evaluation, medication review and recommendations   - PT and OT evaluation and treatment as indicated  Recommending discharge to inpatient rehab  - Case Management consultation for disposition planning

## 2021-06-26 NOTE — ASSESSMENT & PLAN NOTE
- Continue current medication regimen  On ASA and coreg    - Management per Internal Medicine   - Outpatient follow-up with PCP

## 2021-06-26 NOTE — ASSESSMENT & PLAN NOTE
Lab Results   Component Value Date    EGFR 41 06/26/2021    EGFR 49 06/25/2021    EGFR 57 12/26/2020    CREATININE 1 17 06/26/2021    CREATININE 1 01 06/25/2021    CREATININE 0 89 12/26/2020   · creatine is close to baseline

## 2021-06-26 NOTE — PROGRESS NOTES
[facility]  Progress Note - Sandi Lopez 4/6/1930, 80 y o  female MRN: [id number]  Unit/Bed#: S -01 Encounter: [id number]  Primary Care Provider: Queta Lopez MD   Date and time admitted to hospital: 6/25/2021  6:32 AM    CAD (coronary artery disease)  Assessment & Plan  - Continue current medication regimen  - Management per Internal Medicine   - Outpatient follow-up with PCP  Paroxysmal atrial fibrillation (HCC)  Assessment & Plan  - Continue current medication regimen  On ASA and coreg    - Management per Internal Medicine   - Outpatient follow-up with PCP  Fall  Assessment & Plan  - Status post fall with the below noted injuries  - Fall precautions  - Geriatric Medicine consultation for evaluation, medication review and recommendations   - PT and OT evaluation and treatment as indicated  Recommending discharge to inpatient rehab  - Case Management consultation for disposition planning  * Recurrent UTI  Assessment & Plan  - Patient with history of recurrent UTI and UA suggestive of UTI at this time  Patient also has mild leukocytosis on presentation   - Continue IV antibiotics for UTI present on admission    - Remains afebrile without leukocytosis at this time  - Further management per Internal Medicine  Abrasion of right arm, initial encounter  Assessment & Plan  - Right arm abrasion near the antecubital fossa with surrounding ecchymosis and mild tenderness   - Local wound care as indicated  - Analgesia as needed  Chin contusion, initial encounter  Assessment & Plan  - Chin contusion, present on admission, with intact overlying skin  - Due to head strike with fall, CT scans of the head and cervical spine obtained and were negative for acute intracranial injury as well as for any acute traumatic injury to the cervical spine   - Analgesia as needed  - Activity as tolerated    - May continue home medication regimen   - No further workup or intervention necessary  Pressure injury of right buttock, stage 2 (HCC)  Assessment & Plan  - 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, chronic and present on admission   - Consult placed to the inpatient wound care service for evaluation and recommendations   - Recommend offloading therapy with repositioning every 2 hours  - Apply Allevyn dressing  Pressure injury of left buttock, stage 2 (HCC)  Assessment & Plan  - 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, chronic and present on admission   - Consult placed to the inpatient wound care service for evaluation and recommendations   - Recommend offloading therapy with repositioning every 2 hours  - Apply Allevyn dressing  Primary osteoarthritis of right hip  Assessment & Plan  - Chronic history of primary osteoarthritis of the right hip   - No evidence of acute traumatic injury  - Patient with chronic pain  Pain is baseline level with no acute changes  - Analgesia as needed  - PT and OT evaluation and treatment as indicated  - Outpatient follow-up with PCP  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 (CODE) [facility])   Assessment & Plan    Lab Results   Component Value Date    HGBA1C 7 5 (H) 12/21/2020     - Chronic history of type 2 diabetes mellitus  - Initiate subcutaneous insulin regimen on admission   - Further care/management per Internal Medicine   - Resume home medication therapy on discharge  - Outpatient follow-up with PCP  CKD (chronic kidney disease) stage 3, GFR 30-59 ml/min [facility])  Assessment & Plan  Lab Results   Component Value Date    EGFR 41 06/26/2021    EGFR 49 06/25/2021    EGFR 57 12/26/2020    CREATININE 1 17 06/26/2021    CREATININE 1 01 06/25/2021    CREATININE 0 89 12/26/2020     - Stage III CKD at baseline  No evidence of acute kidney injury  - Continue current medication regimen    - Avoid nephrotoxic medications and hypotension   - Management per Internal Medicine   - Outpatient follow-up with PCP  Ambulatory dysfunction  Assessment & Plan  - Patient with ambulatory dysfunction at baseline; uses walker and assistance of family  - Fall precautions   - PT and OT evaluation and treatment as indicated  Recommending inpatient rehab upon discharge  - Case Management consultation for disposition planning  TERTIARY TRAUMA SURVEY NOTE    Prophylaxis: Sequential compression device (Venodyne) , SQH    Disposition: continue med-surg status, PT/OT recommending inpatient rehab  Code status:  Level 1 - Full Code    Consultants: Geriatrics    Is the patient 72 years or older?: YES:    1  Before the illness or injury that brought you to the Emergency, did you need someone to help you on a regular basis? 1=Yes   2  Since the illness or injury that brought you to the Emergency, have you needed more help than usual to take care of yourself? 1=Yes   3  Have you been hospitalized for one or more nights during the past 6 months (excluding a stay in the Emergency Department)? 0=Yes   4  In general, do you see well? 0=No   5  In general, do you have serious problems with your memory? 0=No   6  Do you take more than three different medications everyday? 1=Yes   TOTAL   5     Did you order a geriatric consult if the score was 2 or greater?: Geriatrics has already seen patient          SUBJECTIVE:     Transfer from: N/A  Outside Films Received: not applicable  Tertiary Exam Due on: 6/26/21    Mechanism of Injury:Fall    Chief Complaint:   I am feeling well    HPI:  Patient states she is feeling well today  She has not moved around much yet but at rest she is not having much pain  She is motivated to get up with physical therapy today    She has no new complaints on tertiary exam       Active medications:           Current Facility-Administered Medications:     acetaminophen (TYLENOL) tablet 975 mg, 975 mg, Oral, Q8H [address], 975 mg at 06/26/21 0516    amLODIPine (NORVASC) tablet 5 mg, 5 mg, Oral, HS, 5 mg at 06/25/21 2148    ascorbic acid (VITAMIN C) tablet 1,000 mg, 1,000 mg, Oral, Daily, 1,000 mg at 06/26/21 0920    aspirin (ECOTRIN LOW STRENGTH) EC tablet 81 mg, 81 mg, Oral, Daily, 81 mg at 06/26/21 0920    atorvastatin (LIPITOR) tablet 40 mg, 40 mg, Oral, Daily With Dinner, 40 mg at 06/25/21 1721    carvedilol (COREG) tablet 25 mg, 25 mg, Oral, BID With Meals, 25 mg at 06/26/21 0929    ceFAZolin (ANCEF) IVPB (premix in dextrose) 1,000 mg 50 mL, 1,000 mg, Intravenous, Q12H, 1,000 mg at 06/26/21 0932    cholecalciferol (VITAMIN D3) tablet 1,000 Units, 1,000 Units, Oral, Daily, 1,000 Units at 06/26/21 0919    cloNIDine (CATAPRES) tablet 0 1 mg, 0 1 mg, Oral, 4x Daily, 0 1 mg at 06/26/21 0920    clopidogrel (PLAVIX) tablet 75 mg, 75 mg, Oral, Daily, 75 mg at 06/26/21 0920    cyanocobalamin (VITAMIN B-12) tablet 1,000 mcg, 1,000 mcg, Oral, Daily, 1,000 mcg at 06/26/21 0920    Diclofenac Sodium (VOLTAREN) 1 % topical gel 2 g, 2 g, Topical, 4x Daily, 2 g at 06/25/21 2152    escitalopram (LEXAPRO) tablet 10 mg, 10 mg, Oral, Daily, 10 mg at 06/26/21 0920    ferrous sulfate tablet 325 mg, 325 mg, Oral, Daily With Breakfast, 325 mg at 06/26/21 0929    furosemide (LASIX) tablet 40 mg, 40 mg, Oral, Daily, 40 mg at 06/26/21 0920    gabapentin (NEURONTIN) capsule 200 mg, 200 mg, Oral, TID, 200 mg at 06/26/21 0920    heparin (porcine) subcutaneous injection 5,000 Units, 5,000 Units, Subcutaneous, Q8H [address], 5,000 Units at 06/26/21 0517 **AND** [CANCELED] Platelet count, , , Once    insulin glargine (LANTUS) subcutaneous injection 10 Units 0 1 mL, 10 Units, Subcutaneous, HS, 10 Units at 06/25/21 2150    insulin lispro (HumaLOG) 100 units/mL subcutaneous injection 1-5 Units, 1-5 Units, Subcutaneous, HS    insulin lispro (HumaLOG) 100 units/mL subcutaneous injection 1-5 Units, 1-5 Units, Subcutaneous, TID AC, 2 Units at 06/25/21 1721 **AND** Fingerstick Glucose (POCT), , , TID AC    insulin lispro (HumaLOG) 100 units/mL subcutaneous injection 3 Units, 3 Units, Subcutaneous, Daily With Breakfast, 3 Units at 06/26/21 0940    insulin lispro (HumaLOG) 100 units/mL subcutaneous injection 3 Units, 3 Units, Subcutaneous, Daily With Lunch, 3 Units at 06/25/21 1211    insulin lispro (HumaLOG) 100 units/mL subcutaneous injection 3 Units, 3 Units, Subcutaneous, Daily With Dinner, 3 Units at 06/25/21 1721    naloxone (NARCAN) 0 04 mg/mL syringe 0 04 mg, 0 04 mg, Intravenous, Q1MIN PRN    ondansetron (ZOFRAN) injection 4 mg, 4 mg, Intravenous, Q4H PRN    oxyCODONE (ROXICODONE) IR tablet 2 5 mg, 2 5 mg, Oral, Q4H PRN, 2 5 mg at 06/25/21 1257    pantoprazole (PROTONIX) EC tablet 40 mg, 40 mg, Oral, Daily Before Breakfast, 40 mg at 06/26/21 0516    potassium chloride (K-DUR,KLOR-CON) CR tablet 10 mEq, 10 mEq, Oral, TID, 10 mEq at 06/26/21 0919    senna-docusate sodium (SENOKOT S) 8 6-50 mg per tablet 1 tablet, 1 tablet, Oral, BID, 1 tablet at 06/26/21 0919      OBJECTIVE:     Vitals:   Vitals:    06/26/21 0733   BP: 134/60   Pulse: 67   Resp: 16   Temp: 97 9 °F (36 6 °C)   SpO2: 91%       Physical Exam:   GENERAL APPEARANCE:  No acute distress  NEURO:  GCS 15, nonfocal exam  HEENT:  Normocephalic, atraumatic  CV:  +irregularly irregular, normal rate, no murmurs gallops or rubs  LUNGS:  Clear to auscultation bilaterally  GI:  Soft, nontender, nondistended  :  Voiding  MSK:  No edema, contusions or deformities  SKIN:  Pink, warm, dry    I/O:   I/O       06/24 0701 - 06/25 0700 06/25 0701 - 06/26 0700 06/26 0701 - 06/27 0700    P  O   520     IV Piggyback  50     Total Intake(mL/kg)  570 (8 1)     Urine (mL/kg/hr) 800 650 (0 4)     Total Output 800 650     Net -800 -80                  Invasive Devices:    Invasive Devices     Peripheral Intravenous Line            Peripheral IV 06/25/21 Left Antecubital 1 day          Line            Arterial Sheath 6 Fr  Right Radial 648 days                  Imaging:   XR Trauma chest portable    Result Date: 6/25/2021  Impression: No acute cardiopulmonary disease  Workstation performed: [id number]     TRAUMA - CT head wo contrast    Result Date: 6/25/2021  Impression: Stable cerebral atrophy with chronic small vessel ischemic white matter disease  No acute intracranial abnormality  Workstation performed: [id number]     TRAUMA - CT spine cervical wo contrast    Result Date: 6/25/2021  Impression: Degenerative changes of the cervical spine  No acute cervical spine fracture or traumatic malalignment  Thyroid nodules as described above    Workstation performed: [id number]       Labs:   CBC:   Lab Results   Component Value Date    WBC 10 36 (H) 06/26/2021    HGB 10 7 (L) 06/26/2021    HCT 33 8 (L) 06/26/2021    MCV 99 (H) 06/26/2021     06/26/2021    MCH 31 4 06/26/2021    MCHC 31 7 06/26/2021    RDW 14 0 06/26/2021    MPV 9 1 06/26/2021     CMP:   Lab Results   Component Value Date     06/26/2021    CO2 28 06/26/2021    BUN 24 06/26/2021    CREATININE 1 17 06/26/2021    CALCIUM 8 3 06/26/2021    EGFR 41 06/26/2021

## 2021-06-26 NOTE — PLAN OF CARE
Problem: PHYSICAL THERAPY ADULT  Goal: Performs mobility at highest level of function for planned discharge setting  See evaluation for individualized goals  Description: Treatment/Interventions: Functional transfer training, LE strengthening/ROM, Therapeutic exercise, Patient/family training, Equipment eval/education, Bed mobility, Gait training, Spoke to nursing          See flowsheet documentation for full assessment, interventions and recommendations  Note: Prognosis: Fair  Problem List: Decreased strength, Decreased range of motion, Decreased endurance, Impaired balance, Decreased mobility, Impaired hearing, Impaired tone, Decreased skin integrity, Pain     Barriers to Discharge: Decreased caregiver support  Barriers to Discharge Comments: Patient currently non ambulatory and is Max A, may require more assist at home than family can provide      PT Discharge Recommendation: Post acute rehabilitation services     PT - OK to Discharge: Yes    See flowsheet documentation for full assessment       Kalee Guthrie PT

## 2021-06-26 NOTE — PLAN OF CARE
Problem: OCCUPATIONAL THERAPY ADULT  Goal: Performs self-care activities at highest level of function for planned discharge setting  See evaluation for individualized goals  Description: Treatment Interventions: ADL retraining, Functional transfer training, UE strengthening/ROM, Endurance training, Patient/family training, Neuromuscular reeducation, Compensatory technique education, Continued evaluation, Activityengagement, Energy conservation          See flowsheet documentation for full assessment, interventions and recommendations  Note: Limitation: Decreased ADL status, Decreased UE strength, Decreased Safe judgement during ADL, Decreased endurance, Decreased self-care trans, Decreased high-level ADLs  Prognosis: Fair  Assessment: Patient evaluated by Occupational Therapy  Patient admitted with Recurrent UTI  Pt is S/P fall  The patients occupational profile, medical and therapy history includes a expanded review of medical and/or therapy records and additional review of physical, cognitive, or psychosocial history related to current functional performance  Comorbidities affecting functional mobility and ADLS include: Afib, anemia, CAD, cancer, hypertension and hyperlipidemia  Prior to admission, patient was requiring assist for functional mobility with RW and WC, requiring assist for ADLS, requiring assist for IADLS and living with daughter in a 2 level home with 0 steps to enter  Patient performed grooming and UB bathing at [address], UB dressing Mod , bed mobility Max A  The evaluation identifies the following performance deficits: weakness, decreased ROM, impaired balance, decreased endurance, decreased coordination, increased fall risk, new onset of impairment of functional mobility, decreased ADLS, decreased IADLS, pain, decreased activity tolerance and decreased safety awareness, that result in activity limitations and/or participation restrictions   This evaluation requires clinical decision making of high complexity, because the patient presents with comorbidites that affect occupational performance and required significant modification of tasks or assistance with consideration of multiple treatment options  The Barthel Index was used as a functional outcome tool presenting with a score of 15, i The patient's raw score on the -PAC Daily Activity inpatient short form is 12, standardized score is 30 6, less than 39 4  Patients at this level are likely to benefit from DC to post-acute rehabilitation services  Please refer to the recommendation of the Occupational Therapist for safe DC planning  Patient will benefit from skilled Occupational Therapy services to address above deficits and facilitate a safe return to prior level of function       OT Discharge Recommendation: Post acute rehabilitation services

## 2021-06-26 NOTE — ASSESSMENT & PLAN NOTE
Lab Results   Component Value Date    HGBA1C 7 5 (H) 12/21/2020       Recent Labs     06/25/21  1521 06/25/21  2149 06/26/21  0736 06/26/21  1101   POCGLU 215* 143* 142* 167*       Blood Sugar Average: Last 72 hrs:  (P) 166   · BG well controlled   · Continue w/ home insulin; SSI for correction and QID accuchecks

## 2021-06-26 NOTE — ASSESSMENT & PLAN NOTE
- Patient with ambulatory dysfunction at baseline; uses walker and assistance of family  - Fall precautions   - PT and OT evaluation and treatment as indicated  Recommending inpatient rehab upon discharge  - Case Management consultation for disposition planning

## 2021-06-26 NOTE — OCCUPATIONAL THERAPY NOTE
Occupational Therapy Evaluation     Patient Name: Verona Harris  THRLQ'E Date: 6/26/2021  Problem List  Principal Problem:    Recurrent UTI  Active Problems:    Paroxysmal atrial fibrillation ([facility])    CAD (coronary artery disease)    Ambulatory dysfunction    CKD (chronic kidney disease) stage 3, GFR 30-59 ml/min ([facility])    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 (CODE) ([facility] )     Primary osteoarthritis of right hip    Osteoarthritis of multiple joints    Pressure injury of left buttock, stage 2 ([facility])    Pressure injury of right buttock, stage 2 ([facility] )    Fall    Chin contusion, initial encounter    Abrasion of right arm, initial encounter    Past Medical History  Past Medical History:   Diagnosis Date    Anemia     Atrial fibrillation ([facility] )     CAD (coronary artery disease)     Cancer ([place] [address] )     Carotid artery obstruction     Coronary artery disease     s/p stent x4     Disease of thyroid gland     TIA    GERD (gastroesophageal reflux disease)     HL (hearing loss)     Wears hearing aids    Hyperlipidemia     Hypertension     Mesenteric ischemia, chronic ([facility])     TIA (transient ischemic attack)      Past Surgical History  Past Surgical History:   Procedure Laterality Date    ANGIOPLASTY  01/01/2009    x4, stent placed in [facility] Right 01/01/2005    CARDIAC CATHETERIZATION      CATARACT EXTRACTION      CHOLECYSTECTOMY      COLONOSCOPY  02/26/2016    CORONARY ANGIOPLASTY      stent x4 07/11/1996x1 10/09/2009 x3    CORONARY STENT PLACEMENT      HYSTERECTOMY      REPLACEMENT TOTAL KNEE Right              06/26/21 1230   OT Last Visit   OT Visit Date 06/26/21   Note Type   Note type Evaluation   Restrictions/Precautions   Weight Bearing Precautions Per Order No   Other Precautions Chair Alarm; Bed Alarm;Hard of hearing; Fall Risk;Multiple lines   Pain Assessment   Pain Assessment Tool 0-10   Pain Score 5   Pain Location/Orientation Location: Hip   Home Living   Type of [facility] Two level   Bathroom Shower/Tub Tub/shower unit   Bathroom Toilet Standard   Bathroom Equipment Shower chair;Commode;Tub transfer [place]; Wheelchair-manual;Stair glide; Hospital bed   Additional Comments Per thania patient stays on 2nd level  She gets assistance with getting in and out of bed and is able to transfer to recliner with assistance from daughter  Patient is also amble to transfer to coomode with assistance from daughter  Patient has HHA 3X week that assist with bathing and assists patient into the shower with a shower transfer bench  Patient uses a WC to ambulate to bathrrom   Prior Function   Level of [place] Needs assistance with IADLs; Needs assistance with ADLs and functional mobility   Lives With Daughter   Receives Help From Family;Personal care attendant   ADL Assistance Needs assistance   IADLs Needs assistance   Falls in the last 6 months 1 to 4   Comments Pt daughter assists with  all ADL and IADL at baseline  She stated she is able is able to part of the ADL but needs assistance due to limited UE ROM  Patient daughter stated she has a stairlift but does not use it and stay on 2nd floor   ADL   Eating Assistance 5  Supervision/Setup   Grooming Assistance 4  Minimal Assistance   Grooming Deficit Increased time to complete   UB Bathing Assistance 4  Minimal Assistance   UB Bathing Deficit Right arm;Left arm   LB Bathing Assistance Unable to assess   UB Dressing Assistance 3  Moderate Assistance   UB Dressing Deficit Pull around back    [address] Unable to assess   Toileting Assistance  Unable to assess   Bed Mobility   Rolling R 2  Maximal assistance   Additional items Assist x 1; Increased time required;Verbal cues   Supine to Sit 2  Maximal assistance   Additional items Assist x 1; Increased time required;LE management;Verbal cues   Sit to Supine 2  Maximal assistance   Additional items Assist x 1;Increased time required;Verbal cues;LE management   Additional Comments Pt was limited due to pain in hip and decrease BUE   Transfers   Sit to Stand Unable to assess   Functional Mobility   Additional Comments unable to assess   Balance   Static Sitting Poor -   Dynamic Sitting Poor -   Activity Tolerance   Activity Tolerance Patient limited by pain   Nurse Made Aware LOPEZ KAPLAN Assessment   RUE Assessment X  (rotater cuff)   RUE Overall AROM   R Shoulder Flexion limited 90    R Shoulder ABduction limited 90    R Shoulder ADduction limited 90    LUE Assessment   LUE Assessment X  (rotater cuff)   LUE Overall AROM   L Shoulder Flexion limited 90    L Shoulder ABduction limited 90    L Shoulder ADduction limited 90    Cognition   Orientation Level Oriented X4   Following Commands Follows one step commands with increased time or repetition   Comments Pt ID by wristband name and    Assessment   Limitation Decreased ADL status; Decreased UE strength;Decreased Safe judgement during ADL;Decreased endurance;Decreased self-care trans;Decreased high-level ADLs   Prognosis Fair   Assessment Patient evaluated by Occupational Therapy  Patient admitted with Recurrent UTI  Pt is S/P fall  The patients occupational profile, medical and therapy history includes a expanded review of medical and/or therapy records and additional review of physical, cognitive, or psychosocial history related to current functional performance  Comorbidities affecting functional mobility and ADLS include: Afib, anemia, CAD, cancer, hypertension and hyperlipidemia  Prior to admission, patient was requiring assist for functional mobility with RW and WC, requiring assist for ADLS, requiring assist for IADLS and living with daughter in a 2 level home with 0 steps to enter  Patient performed grooming and UB bathing at [address], UB dressing Mod , bed mobility Max A    The evaluation identifies the following performance deficits: weakness, decreased ROM, impaired balance, decreased endurance, decreased coordination, increased fall risk, new onset of impairment of functional mobility, decreased ADLS, decreased IADLS, pain, decreased activity tolerance and decreased safety awareness, that result in activity limitations and/or participation restrictions  This evaluation requires clinical decision making of high complexity, because the patient presents with comorbidites that affect occupational performance and required significant modification of tasks or assistance with consideration of multiple treatment options  The Barthel Index was used as a functional outcome tool presenting with a score of 15, i The patient's raw score on the -PAC Daily Activity inpatient short form is 12, standardized score is 30 6, less than 39 4  Patients at this level are likely to benefit from DC to post-acute rehabilitation services  Please refer to the recommendation of the Occupational Therapist for safe DC planning  Patient will benefit from skilled Occupational Therapy services to address above deficits and facilitate a safe return to prior level of function  Goals   Patient Goals " get better"   LTG Time Frame   (8-14)   Long Term Goal #1 Patient will increase standing tolerance to 5 minutes during ADL task to decrease assistance level and decrease fall risk; Patient will increase bed mobility to min assist in preparation for ADLS and transfers;  Patient will increase functional mobility to and from bathroom with rolling walker with min assist to increase performance with ADLS and to use a toilet; Patient will tolerate 10 minutes of UE ROM/strengthening to increase general activity tolerance and performance in ADLS/IADLS; Patient will improve functional activity tolerance to 10 minutes of sustained functional tasks to increase participation in basic self-care and decrease assistance level;   Patient will increase dynamic standing balance to fair to improve postural stability and decrease fall risk during standing ADLS and transfers  Functional Transfer Goals   Pt Will Transfer To Bedside Commode With min assist   Pt Will Transfer To Toilet With min assist   Pt Will Transfer To Shower With min assist   ADL Goals   Pt Will Perform Eating With mod indep   Pt Will Perform Grooming With stand by assist   Pt Will Perform Bathing With stand by assist   Pt Will Perform UE Dressing With stand by assist   Pt Will Perform LE Dressing With min assist   Pt Will Perform Toileting With min assist   Plan   Treatment Interventions ADL retraining;Functional transfer training;UE strengthening/ROM; Endurance training;Patient/family training;Neuromuscular reeducation; Compensatory technique education;Continued evaluation; Activityengagement; Energy conservation   Goal Expiration Date 07/10/21   OT Frequency 3-5x/wk   Recommendation   OT Discharge Recommendation Post acute rehabilitation services   AM-PAC Daily Activity Inpatient   Lower Body Dressing 1   Bathing 2   Toileting 2   Upper Body Dressing 2   Grooming 2   Eating 3   Daily Activity Raw Score 12   Daily Activity Standardized Score (Calc for Raw Score >=11) 30 6   Turning Head Towards Sound 4   Follow Simple Instructions 3   Low Function Daily Activity Raw Score 19   Low Function Daily Activity Standardized Score 31 34   AM-PAC Applied Cognition Inpatient   Following a Speech/Presentation 4   Understanding Ordinary Conversation 4   Taking Medications 2   Remembering Where Things Are Placed or Put Away 2   Remembering List of 4-5 Errands 2   Taking Care of Complicated Tasks 2   Applied Cognition Raw Score 16   Applied Cognition Standardized Score 35 03   Barthel Index   Feeding 5   Bathing 0   Grooming Score 0   Dressing Score 0   Bladder Score 5   Bowels Score 5   Toilet Use Score 0   Transfers (Bed/Chair) Score 0   Mobility (Level Surface) Score 0   Stairs Score 0   Barthel Index Score 15   Melville Lesch, OT

## 2021-06-26 NOTE — CASE MANAGEMENT
CM met with patient's daughters Brad Ritchie and Allan Sherman; they provided copy of POA and AD for patient's records  CM asked patient's daughters if they would like CM to reach out to [facility] directly to inquire as to bed availability, as patient has been there before  They said they're not opposed to [facility] but would like to see what options are available  A post acute care recommendation was made by your care team for STR  Discussed Freedom of Choice with caregiver  List of facilities given to caregiver via in person  caregiver aware the list is custom filtered for them by zip code location and that [facility] post acute providers are designated  [place] referral sent to all facilities within 15 mile radius   Dept to follow up re: responses  They are aware of anticipated medical readiness for dc tomorrow  Family also let CM know that patient did receive her 1st COVID vaccine at the end of January while at [facility] but was unable to return to the center to get her second dose  CM Dept to follow up with family re: STR options

## 2021-06-26 NOTE — CASE MANAGEMENT
CM informed by trauma team that patient should be medically cleared for discharge tomorrow  PT evaluated patient this morning and is recommending STR upon discharge  CM went to patient room to meet with family to discuss recommendation but there is no one at bedside at this time  CM contacted patient's daughter Farhad Hoover  She stated she's actually on her way to the hospital right now and will be pulling in any moment  She has a copy of patient's POA paperwork with her for CM  Patient's daughter aware of the recommendation for STR and stated she was actually expecting it  She will discuss with CM further upon her arrival  CM will continue to follow

## 2021-06-26 NOTE — ASSESSMENT & PLAN NOTE
· Admitted after fall at home  · Trauma is primary  · PT OT-- recommending STR-- family agreeable-- dispo planning ongoing

## 2021-06-26 NOTE — PLAN OF CARE
Problem: MOBILITY - ADULT  Goal: Maintain or return to baseline ADL function  Description: INTERVENTIONS:  -  Assess patient's ability to carry out ADLs; assess patient's baseline for ADL function and identify physical deficits which impact ability to perform ADLs (bathing, care of mouth/teeth, toileting, grooming, dressing, etc )  - Assess/evaluate cause of self-care deficits   - Assess range of motion  - Assess patient's mobility; develop plan if impaired  - Assess patient's need for assistive devices and provide as appropriate  - Encourage maximum independence but intervene and supervise when necessary  - Involve family in performance of ADLs  - Assess for home care needs following discharge   - Consider OT consult to assist with ADL evaluation and planning for discharge  - Provide patient education as appropriate  Outcome: Progressing  Goal: Maintains/Returns to pre admission functional level  Description: INTERVENTIONS:  - Perform BMAT or MOVE assessment daily    - Set and communicate daily mobility goal to care team and patient/family/caregiver  - Collaborate with rehabilitation services on mobility goals if consulted  - Perform Range of Motion  times a day  - Reposition patient every  hours    - Dangle patient  times a day  - Stand patient  times a day  - Ambulate patient  times a day  - Out of bed to chair  times a day   - Out of bed for meals  times a day  - Out of bed for toileting  - Record patient progress and toleration of activity level   Outcome: Progressing     Problem: Potential for Falls  Goal: Patient will remain free of falls  Description: INTERVENTIONS:  - Educate patient/family on patient safety including physical limitations  - Instruct patient to call for assistance with activity   - Consult OT/PT to assist with strengthening/mobility   - Keep Call bell within reach  - Keep bed low and locked with side rails adjusted as appropriate  - Keep care items and personal belongings within reach  - Initiate and maintain comfort rounds  - Make Fall Risk Sign visible to staff  - Offer Toileting every  Hours, in advance of need  - Initiate/Maintain alarm  - Obtain necessary fall risk management equipment:  - Apply yellow socks and bracelet for high fall risk patients  - Consider moving patient to room near nurses station  Outcome: Progressing     Problem: Prexisting or High Potential for Compromised Skin Integrity  Goal: Skin integrity is maintained or improved  Description: INTERVENTIONS:  - Identify patients at risk for skin breakdown  - Assess and monitor skin integrity  - Assess and monitor nutrition and hydration status  - Monitor labs   - Assess for incontinence   - Turn and reposition patient  - Assist with mobility/ambulation  - Relieve pressure over bony prominences  - Avoid friction and shearing  - Provide appropriate hygiene as needed including keeping skin clean and dry  - Evaluate need for skin moisturizer/barrier cream  - Collaborate with interdisciplinary team   - Patient/family teaching  - Consider wound care consult   Outcome: Progressing

## 2021-06-27 NOTE — ASSESSMENT & PLAN NOTE
- Status post fall with the below noted injuries  - Fall precautions  - Geriatric Medicine consultation for evaluation, medication review and recommendations   - PT and OT evaluation and treatment as indicated  Recommending discharge to inpatient rehab  - Case Management consultation for disposition planning  Referrals made yesterday, will f/u status with CM today

## 2021-06-27 NOTE — ASSESSMENT & PLAN NOTE
Lab Results   Component Value Date    EGFR 42 06/27/2021    EGFR 41 06/26/2021    EGFR 49 06/25/2021    CREATININE 1 15 06/27/2021    CREATININE 1 17 06/26/2021    CREATININE 1 01 06/25/2021     - Stage III CKD at baseline  No evidence of acute kidney injury  - Continue current medication regimen  - Avoid nephrotoxic medications and hypotension   - Management per Internal Medicine   - Outpatient follow-up with PCP

## 2021-06-27 NOTE — ASSESSMENT & PLAN NOTE
Lab Results   Component Value Date    HGBA1C 7 5 (H) 12/21/2020       Recent Labs     06/26/21  1523 06/26/21  2106 06/27/21  0737 06/27/21  1123   POCGLU 168* 190* 163* 213*       Blood Sugar Average: Last 72 hrs:  (P) 173 [id number]   · BG well controlled   · Continue w/ home insulin; SSI for correction and QID accuchecks

## 2021-06-27 NOTE — CASE MANAGEMENT
AVI MATTHEW t/c to pt's daughter Patricia Reece to update re: SNF referrals  Pt's daughter updated re: accepting facilities: [facility], [facility], [place], [facility] [address] advised that family preference is to wait for additional responses so that pt may be placed at facility closer to home [facility]  NADIA Vo re: same

## 2021-06-27 NOTE — PROGRESS NOTES
[facility]  Progress Note - Denise Hathaway 4/6/1930, 80 y o  female MRN: [id number]  Unit/Bed#: S -01 Encounter: [id number]  Primary Care Provider: Wu Vivas MD   Date and time admitted to hospital: 6/25/2021  6:32 AM    Fall  Assessment & Plan  - Status post fall with the below noted injuries  - Fall precautions  - Geriatric Medicine consultation for evaluation, medication review and recommendations   - PT and OT evaluation and treatment as indicated  Recommending discharge to inpatient rehab  - Case Management consultation for disposition planning  Referrals made yesterday, will f/u status with CM today  * Recurrent UTI  Assessment & Plan  - Patient with history of recurrent UTI and UA suggestive of UTI at this time  Patient also has mild leukocytosis on presentation   - Continue IV antibiotics for UTI present on admission  Day #3/3 of IV Ancef  Patient may transition to PO Keflex for discharge to SNF if bed available today at rehab  - Remains afebrile without leukocytosis at this time  - Further management per Internal Medicine  Abrasion of right arm, initial encounter  Assessment & Plan  - Right arm abrasion near the antecubital fossa with surrounding ecchymosis and mild tenderness   - Local wound care as indicated  - Analgesia as needed  Paroxysmal atrial fibrillation (HCC)  Assessment & Plan  - Continue current medication regimen  On ASA and coreg    - Management per Internal Medicine   - Outpatient follow-up with PCP  CAD (coronary artery disease)  Assessment & Plan  - Continue current medication regimen  - Management per Internal Medicine   - Outpatient follow-up with PCP  Chin contusion, initial encounter  Assessment & Plan  - Chin contusion, present on admission, with intact overlying skin    - Due to head strike with fall, CT scans of the head and cervical spine obtained and were negative for acute intracranial injury as well as for any acute traumatic injury to the cervical spine   - Analgesia as needed  - Activity as tolerated  - May continue home medication regimen   - No further workup or intervention necessary  Pressure injury of right buttock, stage 2 (HCC)  Assessment & Plan  - 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, chronic and present on admission   - Consult placed to the inpatient wound care service for evaluation and recommendations   - Recommend offloading therapy with repositioning every 2 hours  - Apply Allevyn dressing  Pressure injury of left buttock, stage 2 (HCC)  Assessment & Plan  - 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, chronic and present on admission   - Consult placed to the inpatient wound care service for evaluation and recommendations   - Recommend offloading therapy with repositioning every 2 hours  - Apply Allevyn dressing  Primary osteoarthritis of right hip  Assessment & Plan  - Chronic history of primary osteoarthritis of the right hip   - No evidence of acute traumatic injury  - Patient with chronic pain  Pain is baseline level with no acute changes  - Analgesia as needed  - PT and OT evaluation and treatment as indicated  - Outpatient follow-up with PCP  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 (CODE) [facility])   Assessment & Plan    Lab Results   Component Value Date    HGBA1C 7 5 (H) 12/21/2020     - Chronic history of type 2 diabetes mellitus  - Initiate subcutaneous insulin regimen on admission   - Further care/management per Internal Medicine   - Resume home medication therapy on discharge  - Outpatient follow-up with PCP      CKD (chronic kidney disease) stage 3, GFR 30-59 ml/min [facility])  Assessment & Plan  Lab Results   Component Value Date    EGFR 42 06/27/2021    EGFR 41 06/26/2021    EGFR 49 06/25/2021    CREATININE 1 15 06/27/2021    CREATININE 1 17 06/26/2021    CREATININE 1 01 06/25/2021     - Stage III CKD at baseline  No evidence of acute kidney injury  - Continue current medication regimen  - Avoid nephrotoxic medications and hypotension   - Management per Internal Medicine   - Outpatient follow-up with PCP  Ambulatory dysfunction  Assessment & Plan  - Patient with ambulatory dysfunction at baseline; uses walker and assistance of family  - Fall precautions   - PT and OT evaluation and treatment as indicated  Recommending inpatient rehab upon discharge  - Case Management consultation for disposition planning  Hypokalemia- Replete today  Recheck BMP in AM        Disposition: continue med-surg status, placement pending at [facility]  Medically stable for d/c at any time  SUBJECTIVE:  Chief Complaint: "I'm doing okay"    Subjective:  Patient states she is feeling well overall  She does have some mild discomfort in her hands from neuropathy which she is on her home gabapentin for  She does state that the pain medications do help when she receives them  She slept well last night  She feels hungry for breakfast   She is motivated to go to rehab as well whenever possible  She has no new complaints otherwise this morning        OBJECTIVE:     Meds/Allergies     Current Facility-Administered Medications:     acetaminophen (TYLENOL) tablet 975 mg, 975 mg, Oral, Q8H [address], Max Dover PA-C, 975 mg at 06/27/21 0537    amLODIPine (NORVASC) tablet 5 mg, 5 mg, Oral, HS, Max Dover PA-C, 5 mg at 06/26/21 2227    ascorbic acid (VITAMIN C) tablet 1,000 mg, 1,000 mg, Oral, Daily, Max Dover PA-C, 1,000 mg at 06/26/21 0920    aspirin (ECOTRIN LOW STRENGTH) EC tablet 81 mg, 81 mg, Oral, Daily, Max Dover PA-C, 81 mg at 06/26/21 0920    atorvastatin (LIPITOR) tablet 40 mg, 40 mg, Oral, Daily With Dinner, Max Dover PA-C, 40 mg at 06/26/21 1709    carvedilol (COREG) tablet 25 mg, 25 mg, Oral, BID With Meals, Donal Mendoza PA-C, 25 mg at 06/26/21 1575   ceFAZolin (ANCEF) IVPB (premix in dextrose) 1,000 mg 50 mL, 1,000 mg, Intravenous, Q12H, Tasia Vo PA-C    cholecalciferol (VITAMIN D3) tablet 1,000 Units, 1,000 Units, Oral, Daily, Nick North PA-C, 1,000 Units at 06/26/21 0919    cloNIDine (CATAPRES) tablet 0 1 mg, 0 1 mg, Oral, 4x Daily, Nick North PA-C, 0 1 mg at 06/26/21 2227    clopidogrel (PLAVIX) tablet 75 mg, 75 mg, Oral, Daily, Max Dover PA-C, 75 mg at 06/26/21 0920    cyanocobalamin (VITAMIN B-12) tablet 1,000 mcg, 1,000 mcg, Oral, Daily, Nick North PA-C, 1,000 mcg at 06/26/21 0920    Diclofenac Sodium (VOLTAREN) 1 % topical gel 2 g, 2 g, Topical, 4x Daily, Max Dover PA-C, 2 g at 06/25/21 2152    escitalopram (LEXAPRO) tablet 10 mg, 10 mg, Oral, Daily, Max Dover PA-C, 10 mg at 06/26/21 0920    ferrous sulfate tablet 325 mg, 325 mg, Oral, Daily With Breakfast, Nick North PA-C, 325 mg at 06/26/21 6315    furosemide (LASIX) tablet 40 mg, 40 mg, Oral, Daily, Max Dover PA-C, 40 mg at 06/26/21 0920    gabapentin (NEURONTIN) capsule 200 mg, 200 mg, Oral, TID, Nick North PA-C, 200 mg at 06/26/21 2227    heparin (porcine) subcutaneous injection 5,000 Units, 5,000 Units, Subcutaneous, Q8H [facility], 5,000 Units at 06/27/21 0539 **AND** [CANCELED] Platelet count, , , Once, Max Dover PA-C    insulin glargine (LANTUS) subcutaneous injection 10 Units 0 1 mL, 10 Units, Subcutaneous, HS, Max Dover PA-C, 10 Units at 06/26/21 2228    insulin lispro (HumaLOG) 100 units/mL subcutaneous injection 1-5 Units, 1-5 Units, Subcutaneous, HS, Max Dover PA-C    insulin lispro (HumaLOG) 100 units/mL subcutaneous injection 1-5 Units, 1-5 Units, Subcutaneous, TID AC, 1 Units at 06/26/21 1224 **AND** Fingerstick Glucose (POCT), , , TID AC, Max Dover PA-C    insulin lispro (HumaLOG) 100 units/mL subcutaneous injection 3 Units, 3 Units, Subcutaneous, Daily With Breakfast, Nick North PA-C, 3 Units at 06/26/21 0940    insulin lispro (HumaLOG) 100 units/mL subcutaneous injection 3 Units, 3 Units, Subcutaneous, Daily With Lunch, Max NADIA Dover, 3 Units at 06/26/21 1225    insulin lispro (HumaLOG) 100 units/mL subcutaneous injection 3 Units, 3 Units, Subcutaneous, Daily With Maria Eugenia Mendes PA-C, 3 Units at 06/26/21 1224    naloxone (NARCAN) 0 04 mg/mL syringe 0 04 mg, 0 04 mg, Intravenous, Q1MIN PRN, Brad Bustillos PA-C    ondansetron (ZOFRAN) injection 4 mg, 4 mg, Intravenous, Q4H PRN, Brad Bustillos PA-C    oxyCODONE (ROXICODONE) IR tablet 2 5 mg, 2 5 mg, Oral, Q4H PRN, Brad Bustillos PA-C, 2 5 mg at 06/26/21 1847    pantoprazole (PROTONIX) EC tablet 40 mg, 40 mg, Oral, Daily Before Breakfast, Max Dover PA-C, 40 mg at 06/27/21 0609    potassium chloride (K-DUR,KLOR-CON) CR tablet 10 mEq, 10 mEq, Oral, TID, Brad Bustillos PA-C, 10 mEq at 06/26/21 2227    potassium chloride (K-DUR,KLOR-CON) CR tablet 40 mEq, 40 mEq, Oral, Once, [employer]elon CorporationNADIA    senna-docusate sodium (SENOKOT S) 8 6-50 mg per tablet 1 tablet, 1 tablet, Oral, BID, Brad Bustillos PA-C, 1 tablet at 06/26/21 0919     Vitals:   Vitals:    06/27/21 0735   BP: 162/60   Pulse: 77   Resp: 16   Temp: 98 3 °F (36 8 °C)   SpO2: 91%       Intake/Output:  I/O       06/25 0701 - 06/26 0700 06/26 0701 - 06/27 0700 06/27 0701 - 06/28 0700    P  O  520 480     IV Piggyback 50 60     Total Intake(mL/kg) 570 (8 1) 540 (7 6)     Urine (mL/kg/hr) 650 (0 4) 450 (0 3)     Total Output 650 450     Net -80 +90            Unmeasured Stool Occurrence  0 x            Nutrition/GI Proph/Bowel Reg:  Regular    Physical Exam:   GENERAL APPEARANCE:  No acute distress  NEURO:  GCS 15, nonfocal exam  HEENT:  Normocephalic, atraumatic  CV:  +irregularly irregular, no murmurs gallops or rubs  LUNGS:  Clear to auscultation bilaterally  GI:  Soft, nontender, nondistended  :  Voiding  MSK:  + right forearm contusion, left ankle weakness (chronic)  No other edema, contusions or deformity     SKIN: Pink, warm, dry    Invasive Devices     Peripheral Intravenous Line            Peripheral IV 06/26/21 Left Antecubital <1 day          Line            Arterial Sheath 6 Fr  Right Radial 649 days                 Lab Results:   Results: I have personally reviewed pertinent reports   , BMP/CMP:   Lab Results   Component Value Date    SODIUM 141 06/27/2021    K 3 4 (L) 06/27/2021     06/27/2021    CO2 27 06/27/2021    BUN 25 06/27/2021    CREATININE 1 15 06/27/2021    CALCIUM 8 8 06/27/2021    EGFR 42 06/27/2021    and CBC:   Lab Results   Component Value Date    WBC 8 89 06/27/2021    HGB 10 8 (L) 06/27/2021    HCT 33 6 (L) 06/27/2021    MCV 98 06/27/2021     06/27/2021    MCH 31 4 06/27/2021    MCHC 32 1 06/27/2021    RDW 14 1 06/27/2021    MPV 9 1 06/27/2021    NRBC 0 06/27/2021     Imaging/EKG Studies: Results: I have personally reviewed pertinent reports      Other Studies:  No new  VTE Prophylaxis: Sequential compression device (Venodyne)  and Heparin

## 2021-06-27 NOTE — ASSESSMENT & PLAN NOTE
Lab Results   Component Value Date    EGFR 42 06/27/2021    EGFR 41 06/26/2021    EGFR 49 06/25/2021    CREATININE 1 15 06/27/2021    CREATININE 1 17 06/26/2021    CREATININE 1 01 06/25/2021   · creatine is close to baseline

## 2021-06-27 NOTE — PROGRESS NOTES
30 minutes  More than 50% of total time spent on counseling and coordination of care as described above  Current Length of Stay: 1 day(s)  Current Patient Status: Inpatient   Certification Statement: The patient will continue to require additional inpatient hospital stay due to dispo planning per primary team  Discharge Plan: [facility]omen[facility] Burkitt is following this patient on consult  They are medically stable for discharge when deemed appropriate by primary service  Code Status: Level 1 - Full Code    Subjective:   Patient feels unwell  Had large BM  No other issues at this time  Family is still concerned about patient going STR  Objective:     Vitals:   Temp (24hrs), Av 1 °F (36 7 °C), Min:97 9 °F (36 6 °C), Max:98 3 °F (36 8 °C)    Temp:  [97 9 °F (36 6 °C)-98 3 °F (36 8 °C)] 98 3 °F (36 8 °C)  HR:  [76-80] 77  Resp:  [16-18] 16  BP: (138-162)/(60-63) 162/60  SpO2:  [91 %-92 %] 91 %  Body mass index is 28 47 kg/m²  Input and Output Summary (last 24 hours): Intake/Output Summary (Last 24 hours) at 2021 1227  Last data filed at 2021 0456  Gross per 24 hour   Intake 540 ml   Output 450 ml   Net 90 ml       Physical Exam:   Physical Exam  Constitutional:       Appearance: Normal appearance  She is not ill-appearing  HENT:      Head: Normocephalic  Eyes:      Pupils: Pupils are equal, round, and reactive to light  Cardiovascular:      Rate and Rhythm: Normal rate and regular rhythm  Heart sounds: No murmur heard  No friction rub  No gallop  Pulmonary:      Effort: Pulmonary effort is normal       Breath sounds: Normal breath sounds  No wheezing  Abdominal:      General: Abdomen is flat  Bowel sounds are normal       Palpations: Abdomen is soft  Tenderness: There is no abdominal tenderness  Musculoskeletal:      Right lower leg: No edema  Left lower leg: No edema  Skin:     General: Skin is warm and dry  Neurological:      General: No focal deficit present        Mental Status: She is alert  Mental status is at baseline  Psychiatric:         Mood and Affect: Mood normal          Additional Data:     Labs:  Results from last 7 days   Lab Units 06/27/21  0452   WBC Thousand/uL 8 89   HEMOGLOBIN g/dL 10 8*   HEMATOCRIT % 33 6*   PLATELETS Thousands/uL 234   NEUTROS PCT % 51   LYMPHS PCT % 35   MONOS PCT % 9   EOS PCT % 3     Results from last 7 days   Lab Units 06/27/21  0452   SODIUM mmol/L 141   POTASSIUM mmol/L 3 4*   CHLORIDE mmol/L 103   CO2 mmol/L 27   BUN mg/dL 25   CREATININE mg/dL 1 15   ANION GAP mmol/L 11   CALCIUM mg/dL 8 8   GLUCOSE RANDOM mg/dL 160*         Results from last 7 days   Lab Units 06/27/21  1123 06/27/21  0737 06/26/21  2106 06/26/21  1523 06/26/21  1101 06/26/21  0736 06/25/21  2149 06/25/21  1521 06/25/21  1111   POC GLUCOSE mg/dl 213* 163* 190* 168* 167* 142* 143* 215* 163*               Lines/Drains:  Invasive Devices     Peripheral Intravenous Line            Peripheral IV 06/26/21 Left Antecubital <1 day          Line            Arterial Sheath 6 Fr   Right Radial 650 days                      Imaging: Reviewed radiology reports from this admission including: xray(s)    Recent Cultures (last 7 days):         Last 24 Hours Medication List:   Current Facility-Administered Medications   Medication Dose Route Frequency Provider Last Rate    acetaminophen  975 mg Oral Q8H [address] Max Dover PA-C      amLODIPine  5 mg Oral HS Max Dover PA-C      vitamin C  1,000 mg Oral Daily Max Dover PA-C      aspirin  81 mg Oral Daily Max Dover PA-C      atorvastatin  40 mg Oral Daily With NADIA Dill      carvedilol  25 mg Oral BID With Meals Sarath Singh PA-C      cholecalciferol  1,000 Units Oral Daily Max Dover PA-C      cloNIDine  0 1 mg Oral 4x Daily Max Dover PA-C      clopidogrel  75 mg Oral Daily Max Dover PA-C      cyanocobalamin  1,000 mcg Oral Daily Max Dover PA-C      Diclofenac Sodium  2 g Topical 4x Daily Max NADIA Dover      escitalopram  10 mg Oral Daily Max Dover PA-C      ferrous sulfate  325 mg Oral Daily With Breakfast Janeane Poet, NADIA      furosemide  40 mg Oral Daily Max Dover PA-C      gabapentin  200 mg Oral TID Janeane Poet, NADIA      heparin (porcine)  5,000 Units Subcutaneous [facility] Max Dover [facility]      insulin glargine  10 Units Subcutaneous HS Max Dover PA-C      insulin lispro  1-5 Units Subcutaneous HS Max oDver PA-C      insulin lispro  1-5 Units Subcutaneous TID AC Max Dover PA-C      insulin lispro  3 Units Subcutaneous Daily With Breakfast Max Dover PA-C      insulin lispro  3 Units Subcutaneous Daily With Lunch Max Dover PA-C      insulin lispro  3 Units Subcutaneous Daily With Dinner Janeane Podemario, NADIA      naloxone  0 04 mg Intravenous Q1MIN PRN Janeane Poet, NADIA      ondansetron  4 mg Intravenous Q4H PRN Janeane Poet, NADIA      oxyCODONE  2 5 mg Oral Q4H PRN Janeane Poet, NADIA      pantoprazole  40 mg Oral Daily Before Breakfast Max Dover PA-C      potassium chloride  10 mEq Oral TID Janeane Poet, NADIA      senna-docusate sodium  1 tablet Oral BID Janeane Poet, NADIA          Today, Patient Was Seen By: Shonna Treadwell PA-C    **Please Note: This note may have been constructed using a voice recognition system  **

## 2021-06-27 NOTE — PLAN OF CARE
Problem: Potential for Falls  Goal: Patient will remain free of falls  Description: INTERVENTIONS:  - Educate patient/family on patient safety including physical limitations  - Instruct patient to call for assistance with activity   - Consult OT/PT to assist with strengthening/mobility   - Keep Call bell within reach  - Keep bed low and locked with side rails adjusted as appropriate  - Keep care items and personal belongings within reach  - Initiate and maintain comfort rounds  - Make Fall Risk Sign visible to staff  - Offer Toileting every  Hours, in advance of need  - Initiate/Maintain alarm  - Obtain necessary fall risk management equipment:   - Apply yellow socks and bracelet for high fall risk patients  - Consider moving patient to room near nurses station  6/26/2021 2143 by Jeff Cardoso, RN  Outcome: Progressing  6/26/2021 2143 by Jeff Cardoso, RN  Outcome: Progressing

## 2021-06-27 NOTE — ASSESSMENT & PLAN NOTE
- Patient with history of recurrent UTI and UA suggestive of UTI at this time  Patient also has mild leukocytosis on presentation   - Continue IV antibiotics for UTI present on admission  Day #3/3 of IV Ancef  Patient may transition to PO Keflex for discharge to SNF if bed available today at rehab  - Remains afebrile without leukocytosis at this time  - Further management per Internal Medicine

## 2021-06-27 NOTE — PLAN OF CARE
Problem: MOBILITY - ADULT  Goal: Maintain or return to baseline ADL function  Description: INTERVENTIONS:  -  Assess patient's ability to carry out ADLs; assess patient's baseline for ADL function and identify physical deficits which impact ability to perform ADLs (bathing, care of mouth/teeth, toileting, grooming, dressing, etc )  - Assess/evaluate cause of self-care deficits   - Assess range of motion  - Assess patient's mobility; develop plan if impaired  - Assess patient's need for assistive devices and provide as appropriate  - Encourage maximum independence but intervene and supervise when necessary  - Involve family in performance of ADLs  - Assess for home care needs following discharge   - Consider OT consult to assist with ADL evaluation and planning for discharge  - Provide patient education as appropriate  Outcome: Progressing  Goal: Maintains/Returns to pre admission functional level  Description: INTERVENTIONS:  - Perform BMAT or MOVE assessment daily    - Set and communicate daily mobility goal to care team and patient/family/caregiver  - Collaborate with rehabilitation services on mobility goals if consulted  - Perform Range of Motion  times a day  - Reposition patient every  hours    - Dangle patient  times a day  - Stand patient  times a day  - Ambulate patient  times a day  - Out of bed to chair  times a day   - Out of bed for meals  times a day  - Out of bed for toileting  - Record patient progress and toleration of activity level   Outcome: Progressing     Problem: Potential for Falls  Goal: Patient will remain free of falls  Description: INTERVENTIONS:  - Educate patient/family on patient safety including physical limitations  - Instruct patient to call for assistance with activity   - Consult OT/PT to assist with strengthening/mobility   - Keep Call bell within reach  - Keep bed low and locked with side rails adjusted as appropriate  - Keep care items and personal belongings within reach  - Initiate and maintain comfort rounds  - Make Fall Risk Sign visible to staff  - Offer Toileting every  Hours, in advance of need  - Initiate/Maintain alarm  - Obtain necessary fall risk management equipment: Apply yellow socks and bracelet for high fall risk patients  - Consider moving patient to room near nurses station  Outcome: Progressing     Problem: Prexisting or High Potential for Compromised Skin Integrity  Goal: Skin integrity is maintained or improved  Description: INTERVENTIONS:  - Identify patients at risk for skin breakdown  - Assess and monitor skin integrity  - Assess and monitor nutrition and hydration status  - Monitor labs   - Assess for incontinence   - Turn and reposition patient  - Assist with mobility/ambulation  - Relieve pressure over bony prominences  - Avoid friction and shearing  - Provide appropriate hygiene as needed including keeping skin clean and dry  - Evaluate need for skin moisturizer/barrier cream  - Collaborate with interdisciplinary team   - Patient/family teaching  - Consider wound care consult   Outcome: Progressing     Problem: Nutrition/Hydration-ADULT  Goal: Nutrient/Hydration intake appropriate for improving, restoring or maintaining nutritional needs  Description: Monitor and assess patient's nutrition/hydration status for malnutrition  Collaborate with interdisciplinary team and initiate plan and interventions as ordered  Monitor patient's weight and dietary intake as ordered or per policy  Utilize nutrition screening tool and intervene as necessary  Determine patient's food preferences and provide high-protein, high-caloric foods as appropriate       INTERVENTIONS:  - Monitor oral intake, urinary output, labs, and treatment plans  - Assess nutrition and hydration status and recommend course of action  - Evaluate amount of meals eaten  - Assist patient with eating if necessary   - Allow adequate time for meals  - Recommend/ encourage appropriate diets, oral nutritional supplements, and vitamin/mineral supplements  - Order, calculate, and assess calorie counts as needed  - Recommend, monitor, and adjust tube feedings and TPN/PPN based on assessed needs  - Assess need for intravenous fluids  - Provide specific nutrition/hydration education as appropriate  - Include patient/family/caregiver in decisions related to nutrition  Outcome: Progressing

## 2021-06-28 PROBLEM — R05.9 COUGH IN ADULT: Status: ACTIVE | Noted: 2021-01-01

## 2021-06-28 NOTE — PROGRESS NOTES
[facility]  Progress Note - Nelyilia Fieldshalle 4/6/1930, 80 y o  female MRN: [id number]  Unit/Bed#: S -01 Encounter: [id number]  Primary Care Provider: Elie Perez MD   Date and time admitted to hospital: 6/25/2021  6:32 AM  Addendum 1:49 p m  noted that patient was seen by speech therapy and was cleared for routine diet and her chest x-ray was clear  No need for additional intervention at this time, patient is medically stable for discharge and has a bed available at rehab today  I reviewed the case with speech therapy, case management, and primary service which is trauma  Cough in adult  Assessment & Plan  · Patient with cough while drinking liquids per RN  Noted that patient did have 1 time elevated temperature yesterday of 100 8  O2 saturations appear to be borderline 88-92% RA  No current fever sepsis  · Will ask for speech therapy evaluation  · Will ask for repeat chest x-ray    * Recurrent UTI  Assessment & Plan  · UA w/ nitrite and leuks; hx of UTI  · Completed 3 days of ancef recently  · Culture negative    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 (CODE) [facility])   Assessment & Plan  Lab Results   Component Value Date    HGBA1C 7 5 (H) 12/21/2020       Recent Labs     06/27/21  1123 06/27/21  1548 06/27/21  2058 06/28/21  0708   POCGLU 213* 154* 157* 173*       Blood Sugar Average: Last 72 hrs:  (P) 241 [id number]   · BG well controlled   · Overdue for repeat A1c but given advanced age, could be more conservative and hold off  · Continue w/ home insulin; SSI for correction and QID accuchecks    CAD (coronary artery disease)  Assessment & Plan  · Continue clopidogrel/BB  · No chest pain       CKD (chronic kidney disease) stage 3, GFR 30-59 ml/min [facility])  Assessment & Plan  Lab Results   Component Value Date    EGFR 40 06/28/2021    EGFR 42 06/27/2021    EGFR 41 06/26/2021    CREATININE 1 18 06/28/2021    CREATININE 1 15 06/27/2021    CREATININE 1 17 2021   · stable      Paroxysmal atrial fibrillation (HCC)  Assessment & Plan  · Continue BB  Not on AC   · Rate controlled  · Given recent fall/presyncope would not initiate AC at this point   Ambulatory dysfunction  Assessment & Plan  · Admitted after fall at home  · Trauma is primary  · PT OT-- recommending STR-- family agreeable-- dispo planning ongoing    VTE Pharmacologic Prophylaxis:   Pharmacologic: Heparin  Mechanical VTE Prophylaxis in Place: No    Patient Centered Rounds: I have performed bedside rounds with nursing staff today  Discussions with Specialists or Other Care Team Provider: speech    Education and Discussions with Family / Patient:     Time Spent for Care: 25 min  More than 50% of total time spent on counseling and coordination of care as described above  Current Length of Stay: 2 day(s)    Current Patient Status: Inpatient   Certification Statement: per trauma    Discharge Plan: SR planning, would try to get CXR and speech eval in am first    Code Status: Level 1 - Full Code    Subjective:   Patient admits to some cough  She seems to indicate this has been going on for quite some time  Nursing reports they have noted her cough with drinking  She is not reporting any shortness of breath and was not aware that she had a fever yesterday  Objective:     Vitals:   Temp (24hrs), Av 1 °F (37 3 °C), Min:98 3 °F (36 8 °C), Max:100 8 °F (38 2 °C)    Temp:  [98 3 °F (36 8 °C)-100 8 °F (38 2 °C)] 98 3 °F (36 8 °C)  HR:  [74-89] 79  Resp:  [16] 16  BP: (123-144)/(56-67) 144/67  SpO2:  [88 %-90 %] 90 %  Body mass index is 28 47 kg/m²  Input and Output Summary (last 24 hours): Intake/Output Summary (Last 24 hours) at 2021 1022  Last data filed at 2021 0900  Gross per 24 hour   Intake 480 ml   Output 1150 ml   Net -670 ml       Physical Exam:     Physical Exam  Vitals reviewed  Constitutional:       General: She is not in acute distress       Appearance: She is not ill-appearing, toxic-appearing or diaphoretic  Comments: Elderly female seen in bed   Eyes:      Conjunctiva/sclera: Conjunctivae normal    Cardiovascular:      Rate and Rhythm: Normal rate and regular rhythm  Heart sounds: No murmur heard  Pulmonary:      Effort: No respiratory distress  Breath sounds: No stridor  Rhonchi (slight basilar rhonchi) present  No wheezing  Comments: Moist cough  No dyspnea  Abdominal:      General: There is no distension  Tenderness: There is no guarding  Musculoskeletal:      Right lower leg: No edema  Left lower leg: No edema  Skin:     General: Skin is warm and dry  Coloration: Skin is not jaundiced or pale  Findings: No erythema or rash  Neurological:      Mental Status: She is alert  Comments: No confusion, good historian   Psychiatric:         Mood and Affect: Mood normal            Additional Data:     Labs:    Results from last 7 days   Lab Units 06/27/21  0452   WBC Thousand/uL 8 89   HEMOGLOBIN g/dL 10 8*   HEMATOCRIT % 33 6*   PLATELETS Thousands/uL 234   NEUTROS PCT % 51   LYMPHS PCT % 35   MONOS PCT % 9   EOS PCT % 3     Results from last 7 days   Lab Units 06/28/21  0600   SODIUM mmol/L 139   POTASSIUM mmol/L 3 7   CHLORIDE mmol/L 103   CO2 mmol/L 26   BUN mg/dL 24   CREATININE mg/dL 1 18   ANION GAP mmol/L 10   CALCIUM mg/dL 8 7   GLUCOSE RANDOM mg/dL 150*         Results from last 7 days   Lab Units 06/28/21  0708 06/27/21  2058 06/27/21  1548 06/27/21  1123 06/27/21  0737 06/26/21  2106 06/26/21  1523 06/26/21  1101 06/26/21  0736 06/25/21  2149 06/25/21  1521 06/25/21  1111   POC GLUCOSE mg/dl 173* 157* 154* 213* 163* 190* 168* 167* 142* 143* 215* 163*               * I Have Reviewed All Lab Data Listed Above  * Additional Pertinent Lab Tests Reviewed:  All Labs For Current Hospital Admission Reviewed    Imaging:    Imaging Reports Reviewed Today Include:   Imaging Personally Reviewed by Myself Includes: Recent Cultures (last 7 days):     Results from last 7 days   Lab Units 06/26/21  1834   URINE CULTURE  No Growth <1000 cfu/mL       Last 24 Hours Medication List:   Current Facility-Administered Medications   Medication Dose Route Frequency Provider Last Rate    acetaminophen  975 mg Oral [facility] Max Dover PA-C      amLODIPine  5 mg Oral HS Nerissa Francois PA-C      vitamin C  1,000 mg Oral Daily Max Dover PA-C      aspirin  81 mg Oral Daily Max Dover PA-C      atorvastatin  40 mg Oral Daily With MirNADIA roberts      carvedilol  25 mg Oral BID With Meals Nerissa Francois PA-C      cholecalciferol  1,000 Units Oral Daily Max Dover PA-C      cloNIDine  0 1 mg Oral 4x Daily Max Dover PA-C      clopidogrel  75 mg Oral Daily Max Dover PA-C      cyanocobalamin  1,000 mcg Oral Daily Max Dover PA-C      Diclofenac Sodium  2 g Topical 4x Daily Max Dover PA-C      escitalopram  10 mg Oral Daily Max Dover PA-C      ferrous sulfate  325 mg Oral Daily With Breakfast Nerissa Francois PA-C      furosemide  40 mg Oral Daily Max Dover PA-C      gabapentin  200 mg Oral TID Nerissa Francois PA-C      heparin (porcine)  5,000 Units Subcutaneous [facility] Max Dover PA-C      insulin glargine  10 Units Subcutaneous HS Max Dover PA-C      insulin lispro  1-5 Units Subcutaneous HS Max Dover PA-C      insulin lispro  1-5 Units Subcutaneous TID AC Max Dover PA-C      insulin lispro  3 Units Subcutaneous Daily With Breakfast Max Dover PA-C      insulin lispro  3 Units Subcutaneous Daily With Lunch Max Dover PA-C      insulin lispro  3 Units Subcutaneous Daily With Dinner Max Dover PA-C      naloxone  0 04 mg Intravenous Q1MIN PRN Nerissa Francois PA-C      ondansetron  4 mg Intravenous Q4H PRN Nerissa Francois PA-C      oxyCODONE  2 5 mg Oral Q4H PRN Nerissa Francois PA-C      pantoprazole  40 mg Oral Daily Before Breakfast Max Dover PA-C      potassium chloride  10 mEq Oral TID Mariaelena Franco PA-C      senna-docusate sodium  1 tablet Oral BID Mariaelena Franco PA-C          Today, Patient Was Seen By: Ross June PA-C    ** Please Note: Dictation voice to text software may have been used in the creation of this document   **

## 2021-06-28 NOTE — ASSESSMENT & PLAN NOTE
- Patient with history of recurrent UTI and UA suggestive of UTI at this time  Patient also has mild leukocytosis on presentation   - Continue IV antibiotics for UTI present on admission  Day #3/3 of IV Ancef completed  - Remains afebrile without leukocytosis at this time  - Further management per Internal Medicine

## 2021-06-28 NOTE — SPEECH THERAPY NOTE
Speech-Language Pathology Bedside Swallow Evaluation        Patient Name: Sarah Reis    BBMJF'L Date: 6/28/2021     Problem List  Principal Problem:    Recurrent UTI  Active Problems:    Paroxysmal atrial fibrillation ([facility])    CAD (coronary artery disease)    Ambulatory dysfunction    CKD (chronic kidney disease) stage 3, GFR 30-59 ml/min ([facility])    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 (CODE) ([place] [address] )     Primary osteoarthritis of right hip    Pressure injury of left buttock, stage 2 ([facility])    Press[facility] of right [facility]    Fall    Chin contusion, initial encounter    Abrasion of right arm, initial encounter    Cough in adult         Past Medical History  Past Medical History:   Diagnosis Date    Anemia     Atrial fibrillation ([place] [address] )     CAD (coronary artery disease)     Cancer ([place] [address] )     Carotid artery obstruction     Coronary artery disease     s/p stent x4     Disease of thyroid gland     TIA    GERD (gastroesophageal reflux disease)     HL (hearing loss)     Wears hearing aids    Hyperlipidemia     Hypertension     Mesenteric ischemia, chronic ([facility])     TIA (transient ischemic attack)        Past Surgical History  Past Surgical History:   Procedure Laterality Date    ANGIOPLASTY  01/01/2009    x4, stent placed in [facility] Right 01/01/2005    CARDIAC CATHETERIZATION      CATARACT EXTRACTION      CHOLECYSTECTOMY      COLONOSCOPY  02/26/2016    CORONARY ANGIOPLASTY      stent x4 07/11/1996x1 10/09/2009 x3    CORONARY STENT PLACEMENT      HYSTERECTOMY      REPLACEMENT TOTAL KNEE Right        Summary    Oral and pharyngeal stages of small within normal limits    No overt signs symptoms of aspiration were noted with lunch meal      Recommendations:   Diet: regular diet and thin liquids   Meds: whole with liquid   Frequent Oral care: 2x/day  Aspiration precautions  Other Recommendations/ considerations: no follow up tx needed  Current Medical Status  Pt is a 80 y o  female who presented to [address] [place] [address]  with recurrent UTI  Pt  presents with a bump on her chin following fall this morning  The patient states that she is mostly bed bound and rests in a hospital bed at home at baseline  She is able to get up and move around a little bit with the help of her family and a rolling walker  This morning, she was getting out of bed to use the commode with her daughter and while walking from the bed to the commode, she fell and struck her chin on the end of the bed  She is unsure exactly when she fell, but denied any preceding symptoms such as lightheadedness or dizziness, visual changes, headache, palpitations or chest pain  She denied losing consciousness  Her daughter was able to help her immediately  Past medical history:   Please see H&P for details    Special Studies:  CXR: 6/28/21 No acute cardiopulmonary disease    Social/Education/Vocational Hx:  Pt lives with family    Swallow Information   Current Risks for Dysphagia & Aspiration: generalized weakness  Current Symptoms/Concerns: coughing with po/liquids this am  Current Diet: regular diet and thin liquids   Baseline Diet: regular diet and thin liquids  Takes pills- whole w/ water     Baseline Assessment   Behavior/Cognition: alert  Speech/Language Status: able to participate in conversation and able to follow commands  Patient Positioning: upright in bed     Swallow Mechanism Exam   Facial: symmetrical  Labial: WFL  Lingual: WFL  Velum: unable to visualize  Mandible: adequate ROM  Dentition: adequate  Vocal quality:clear/adequate   Volitional Cough: strong/productive   Respiratory: RA    Consistencies Assessed and Performance   Consistencies Administered: pt was seen at lunch w/ 1/4 grilled cheese, strawberries, ice cream, thin liquids by straw and whole pills 1 at a time w/ water  Oral Stage:  Patient will to bite the grilled cheese    During thin liquids from straw with good oral control  Mastication manipulation of the grilled cheese and strawberries appeared within normal limits  Patient stated she was not that hungry and did not eat much of the grilled cheese  Patient took pills 1 at a time with sips of water by straw  Pharyngeal Stage:  Swallows were timely and complete  No coughing, throat clearing or wet voice noted  Patient denied any difficulty swallowing the pills        Esophageal Concerns: none reported      Results Reviewed with: patient, RN and PA   Dysphagia Goals: none at this time      Mary Reese [facility]-SLP  Speech Pathologist  [place] license # [facility] [id number]  [place] license # [id number]  Available via [employer]

## 2021-06-28 NOTE — WOUND OSTOMY CARE
Consult Note - Wound   Manpreet Rangel 80 y o  female MRN: [id number]  Unit/Bed#: S -01 Encounter: [id number]        History and Present Illness:  Wound care consulted for patient admitted with recurrent UTI  Patient history significant for anemia, a-fib, CAD, cancer, hearing loss, CKD 3, diabetes, TIA, thyroid disease and HTN  Additional medical history in problem list   Patient was seen by wound care for pressure injury to the right buttock and left medial heel  Patient is awake, alert and cooperative with assessment  Patient's daughter was at the bedside during assessment  RN assisted with turning patient, she was a min assist to turn onto her side  Patient has a Karen Riggers in place for urinary management, is not documented as incontinent of stool  Patient complains of leg pain while in bed  Sent tiger text to Dr Conrad Narayan requesting podiatry/wound center referral on discharge  Patient is to be discharged this evening to S    Assessment Findings:   1  POA-Left medial heel unstageable pressure injury  Slough in wound bed is unroofing, maceration noted to the periwound, wound bed is 100% yellow and white loose slough, with brown necrotic tissue to the edges, small amount of  milky yellow drainage noted when dressing removed, unknown depth of wound due to necrotic tissue overlying the wound bed  Suspect stage 3 or stage 4 pressure injury   2  POA-right buttock stage 2 pressure injury, beefy red wound bed with scant bloody drainage when cleansed, periwound blanchable, wound bed non-blanchable  Noted scarring to left buttock and sacrum from previous pressure injuries  3  Abdominal and breast folds intact  4  Right heel intact    Skin and Wound Care Plan:   1  Ehob offloading cushion to chair when OOB  2  Elevate right heel off the bed with pillows   3  Turn and reposition every two hours  4  Skin nourishing cream daily  5   Cleanse stage 2 pressure injury to the right buttock with soap and water, pat dry, if bloody drainage place Maxorb on wound bed and top with Allevyn life silicone bordered dressing, tia with T, date, change every other day or PRN with soilage or dislodgement  6  Gently wash left medial heel with soap and water, pat dry, place Maxob Ag on wound bed, cover with dry dressing, top with ABD pad for protection and wrap with julien, change daily and PRN with soilage or dislodgement  Prevalon boot to left heel while in bed or in the recliner, remove for ambulation  7  Allevyn life heel foam to right heel, tai with P, date, and peel back daily for skin assessment, change every 3 days or with soilage or dislodgement  Wounds:  Wound 12/28/20 Pressure Injury Heel Left (Active)   Wound Image   06/28/21 1558   Wound Description Brown;Drainage;Fragile; Necrotic;Slough; White;Yellow 06/28/21 1558   Pressure Injury Stage U 06/28/21 1558   Pao-wound Assessment Intact 06/28/21 1558   Wound Length (cm) 3 cm 06/28/21 1558   Wound Width (cm) 3 4 cm 06/28/21 1558   Wound Surface Area (cm^2) 10 2 cm^2 06/28/21 1558   Wound Site Closure Open to air 06/27/21 0900   Drainage Amount Small 06/28/21 1558   Drainage Description Milky; Yellow 06/28/21 1558   Treatments Cleansed 06/28/21 1558   Dressing ABD;Calcium Alginate with Silver;Dry dressing;Gauze 06/28/21 1558   Wound packed? No 06/27/21 0900   Dressing Changed Changed 06/28/21 1558   Patient Tolerance Tolerated well 06/28/21 1558   Dressing Status Clean;Dry; Intact 06/27/21 0900       Wound 12/28/20 Pressure Injury Heel Right (Active)       Wound 12/28/20 Pressure Injury Buttocks Right (Active)   Wound Image   06/28/21 1558   Wound Description Beefy red 06/28/21 1558   Pressure Injury Stage 2 06/28/21 1558   Pao-wound Assessment Erythema 06/28/21 1558   Wound Length (cm) 0 4 cm 06/28/21 1558   Wound Width (cm) 0 3 cm 06/28/21 1558   Wound Depth (cm) 0 1 cm 06/28/21 1558   Wound Surface Area (cm^2) 0 12 cm^2 06/28/21 1558   Wound Volume (cm^3) 0 012 cm^3 06/28/21 1558 Calculated Wound Volume (cm^3) 0 01 cm^3 06/28/21 1558   Change in Wound Size % 75 06/28/21 1558   Drainage Amount Small 06/28/21 1558   Drainage Description Bloody 06/28/21 1558   Treatments Cleansed 06/28/21 1558   Dressing Calcium Alginate; Foam, Silicon (eg  Allevyn, etc) 06/28/21 1558   Patient Tolerance Tolerated well 06/28/21 1558   Dressing Status Dry; Intact; Clean 06/26/21 1500     Reviewed plan of care with primary RN Radha  Recommendations written as orders  Wound care team to follow weekly while admitted  Questions or concerns [phone] Nurse    Glory Escobedo BSN, RN, [facility], [facility]

## 2021-06-28 NOTE — DISCHARGE INSTR - OTHER ORDERS
Skin and Wound Care Plan:   1  Ehob offloading cushion to chair when OOB  2  Elevate right heel off the bed with pillows   3  Turn and reposition every two hours  4  Skin nourishing cream daily  5  Cleanse stage 2 pressure injury to the right buttock with soap and water, pat dry, if bloody drainage place Maxorb on wound bed and top with Allevyn life silicone bordered dressing, tia with T, date, change every other day or PRN with soilage or dislodgement  6  Gently wash left medial heel with soap and water, pat dry, place Maxob Ag on wound bed, cover with dry dressing, top with ABD pad for protection and wrap with julien, change daily and PRN with soilage or dislodgement  Prevalon boot to left heel while in bed or in the recliner, remove for ambulation  7  Allevyn life heel foam to right heel, tia with P, date, and peel back daily for skin assessment, change every 3 days or with soilage or dislodgement

## 2021-06-28 NOTE — CASE MANAGEMENT
CM received call from patient's daughter Reji Virk; she confirmed that family is interested in [facility] or [address]  She said the deciding factor will be their visitation policies  CM called and LMOVM for both Yessenia at [facility] admissions and [place] at [facility] and also sent [facility] messages asking them to reach out to family or this CM re: visitation policy  CM will follow up with daughter Reji Virk once information is obtained

## 2021-06-28 NOTE — CASE MANAGEMENT
CM received message from Fareed Cervantes in admissions at [facility] reporting they no longer have a bed for an unvaccinated admission  CM received call from Prasad Florez [phone] at [facility]; she is aware that patient is not vaccinated and they are still able to offer a bed under the Medicare waiver  She spoke with daughter Loraine Tyler re: visitation policy (2 hrs total per day, will become more liberal on 7/1)  CM spoke with Loraine Tyler and she has accepted the bed at [facility]  As per Trauma team and SLIM, patient's chest xray is clear and she passed ST eval  Patient is cleared for discharge  CM let Prasad Florez 81Matt at [facility] know she will work on transportation arrangements  BLS request sent to [facility] via [facility]  CMN completed and placed on chart  As per Guerline at [facility], [address] [place] arranged with [facility] at 9:30 pm  CM called and LMOVM with Yessenia at [facility] and also sent [facility] message with  time  Parvzaid Gautam 81Matt called back and confirmed they can accommodate 9:30 pm discharge and just asked that RN fax over AVS as soon as it's available so they can get the medications sorted with their pharmacy  CM sent transport update to Trauma AP, MARIAH, and RN Radha  RN is aware of request re: AVS and will send when available  CM called and spoke with patient's daughter Loraine Tyler  She is ok with the transport time as well and is glad patient can wake up and get started early at [facility] tomorrow  She will follow up with them tomorrow re: visitation  No other CM needs identified at this time

## 2021-06-28 NOTE — ASSESSMENT & PLAN NOTE
Lab Results   Component Value Date    EGFR 40 06/28/2021    EGFR 42 06/27/2021    EGFR 41 06/26/2021    CREATININE 1 18 06/28/2021    CREATININE 1 15 06/27/2021    CREATININE 1 17 06/26/2021     - Stage III CKD at baseline  No evidence of acute kidney injury  - Continue current medication regimen  - Avoid nephrotoxic medications and hypotension   - Management per Internal Medicine   - Outpatient follow-up with PCP

## 2021-06-28 NOTE — ASSESSMENT & PLAN NOTE
Lab Results   Component Value Date    HGBA1C 7 5 (H) 12/21/2020       Recent Labs     06/27/21  1123 06/27/21  1548 06/27/21 2058 06/28/21  0708   POCGLU 213* 154* 157* 173*       Blood Sugar Average: Last 72 hrs:  (P) 543 [id number]   · BG well controlled   · Overdue for repeat A1c but given advanced age, could be more conservative and hold off  · Continue w/ home insulin; SSI for correction and QID accuchecks

## 2021-06-28 NOTE — ASSESSMENT & PLAN NOTE
- Status post fall with the below noted injuries  - Fall precautions  - Geriatric Medicine consultation for evaluation, medication review and recommendations   - PT and OT evaluation and treatment as indicated  Recommending discharge to inpatient rehab  - Case Management consultation for disposition planning  Follow up status with [facility] today

## 2021-06-28 NOTE — ASSESSMENT & PLAN NOTE
· Continue BB  Not on AC   · Rate controlled  · Given recent fall/presyncope would not initiate AC at this point

## 2021-06-28 NOTE — DISCHARGE SUMMARY
Discharge Summary - Trauma Service   Dois Iha 80 y o  female MRN: [id number]  Unit/Bed#: S -01 Encounter: [id number]    Admission Date: 6/25/2021     Discharge Date: 6/29/2021    Admitting Diagnosis: Recurrent UTI [N39 0]  Ambulatory dysfunction [R26 2]  Coronary artery disease involving native coronary artery of native heart without angina pectoris [I25 10]  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 (CODE) ([place] [address] ) [E08 622]  Hypertension, unspecified type [I10]  Pressure injury of left buttock, stage 2 (HCC) [L89 322]  Pressure injury of right buttock, stage 2 (HCC) [L89 312]  Stage 3 chronic kidney disease, unspecified whether stage 3a or 3b CKD ([place] [address] ) [N18 30]    Discharge Diagnosis:    Abrasion of right arm, initial encounter  Assessment & Plan  - Right arm abrasion near the antecubital fossa with surrounding ecchymosis and mild tenderness   - Local wound care as indicated  - Analgesia as needed      Chin contusion, initial encounter  Assessment & Plan  - Chin contusion, present on admission, with intact overlying skin  - Due to head strike with fall, CT scans of the head and cervical spine obtained and were negative for acute intracranial injury as well as for any acute traumatic injury to the cervical spine   - Analgesia as needed  - Activity as tolerated  - May continue home medication regimen   - No further workup or intervention necessary      Fall  Assessment & Plan  - Status post fall with the below noted injuries  - Fall precautions  - Geriatric Medicine consultation for evaluation, medication review and recommendations   - PT and OT evaluation and treatment as indicated  Recommending discharge to inpatient rehab  - Case Management consultation for disposition planning  Follow up status with CM today          Pressure injury of right buttock, stage 2 (HCC)  Assessment & Plan  - Stage II pressure injury of the right buttock with pressure injury extending over Plan  - Patient with ambulatory dysfunction at baseline; uses walker and assistance of family  - Fall precautions   - PT and OT evaluation and treatment as indicated  Recommending inpatient rehab upon discharge  - Case Management consultation for disposition planning      CAD (coronary artery disease)  Assessment & Plan  - Continue current medication regimen  - Management per Internal Medicine   - Outpatient follow-up with PCP      Paroxysmal atrial fibrillation ([facility] [address] )  Assessment & Plan  - Continue current medication regimen  On ASA and coreg    - Management per Internal Medicine   - Outpatient follow-up with PCP      * Recurrent UTI  Assessment & Plan  - Patient with history of recurrent UTI and UA suggestive of UTI at this time  Patient also has mild leukocytosis on presentation   - Continue IV antibiotics for UTI present on admission  Day #3/3 of IV Ancef completed  - Remains afebrile without leukocytosis at this time  - Further management per Internal Medicine        Attending and Service: Dr Faith Ayala, Acute Care Surgical Services  Consulting Physician(s):   MYLENE Internal Medicine  SL Geriatrics    Imaging and Procedures Performed: No orders of the defined types were placed in this encounter  XR chest portable    Result Date: 6/28/2021  Impression: No acute cardiopulmonary disease  Findings are stable Workstation performed: [id number]     XR Trauma chest portable    Result Date: 6/25/2021  Impression: No acute cardiopulmonary disease  Workstation performed: [id number]     TRAUMA - CT head wo contrast    Result Date: 6/25/2021  Impression: Stable cerebral atrophy with chronic small vessel ischemic white matter disease  No acute intracranial abnormality  Workstation performed: [id number]     TRAUMA - CT spine cervical wo contrast    Result Date: 6/25/2021  Impression: Degenerative changes of the cervical spine  No acute cervical spine fracture or traumatic malalignment   Thyroid nodules as described above   Workstation performed: [id number]       Hospital Course: Nicholas Spear is a 59-year-old F with history or gait dysfunction, stage 2 bilateral buttock pressure injury, stage 1-2 left heel pressure injury, recurrent UTIs presented to ED for evaluation after falling the am of arrival   Patient is mostly bed bound and rests in a hospital bed at baseline  This am she was getting up to use the cammode and fell striking her chin on the bed  No LOC  She was admitted to the trauma service  CT of head and cervical spine negative for acute injury  Wound care followed her chronic wounds  She had a skin tear on her right arm which required local wound care  She was started on antibiotics for presumed UTI at admission and completed the course while hospitalized  PT and OT recommended inpatient rehab  Case management made arrangements for her to go to [facility]    On discharge, the patient is instructed to follow-up with the patient's primary care provider to review the events of the patient's recent hospitalization and for evaluation of incidental findings  The patient is instructed to follow-up in the Trauma Clinic as needed  The patient should follow the provided discharge instructions  Condition at Discharge: fair     Discharge instructions/Information to patient and family:   See after visit summary for information provided to patient and family  Provisions for Follow-Up Care:  See after visit summary for information related to follow-up care and any pertinent home health orders  Disposition: Short-term rehab at [facility]    Planned Readmission: No    Discharge Statement   I spent 30 minutes discharging the patient  This time was spent on the day of discharge  I had direct contact with the patient on the day of discharge  Additional documentation is required if more than 30 minutes were spent on discharge       Discharge Medications:  See after visit summary for reconciled discharge medications provided to patient and family        Ping Meyers PA-C  6/28/2021  3:19 PM

## 2021-06-28 NOTE — CASE MANAGEMENT
CM received [facility] alert from [facility] stating transport is moved up to 8:15 pm  RN aware and will inform facility

## 2021-06-28 NOTE — ASSESSMENT & PLAN NOTE
· Patient with cough while drinking liquids per RN  Noted that patient did have 1 time elevated temperature yesterday of 100 8  O2 saturations appear to be borderline 88-92% RA   No current fever sepsis  · Will ask for speech therapy evaluation  · Will ask for repeat chest x-ray

## 2021-06-28 NOTE — CASE MANAGEMENT
As per Trauma team, patient is medically stable for dc to STR today  As per chart review, family is hoping for patient to be placed at a location closer to their home in [facility]  CM sent messages to all facilities that are still pending a response in ECIN to see if they are able to offer a bed  CM to follow up with family later this morning re: available STR options

## 2021-06-28 NOTE — CASE MANAGEMENT
COVID test has been ordered by Trauma team for admission to SNF today  CM called patient's daughter Chiara Alcaraz to review all available SNF options as per ECIN responses  Chiara Alcaraz stated that she would prefer [facility] or [address] but she wants to talk to her sister and patient first before committing  She's on her way to [facility] momentarily and will let CM know which facility they decide on  She also wonders if patient will prefer [facility] as she's been there before  CM will await follow up from daughter and will arrange transportation once a SNF facility is confirmed  RN did inform CM that CXR and ST evals have been ordered as patient has a cough today  CM will continue to follow

## 2021-06-28 NOTE — ASSESSMENT & PLAN NOTE
Lab Results   Component Value Date    EGFR 40 06/28/2021    EGFR 42 06/27/2021    EGFR 41 06/26/2021    CREATININE 1 18 06/28/2021    CREATININE 1 15 06/27/2021    CREATININE 1 17 06/26/2021   · stable

## 2021-06-28 NOTE — PROGRESS NOTES
[facility]  Progress Note - Stephane Abdul 4/6/1930, 80 y o  female MRN: [id number]  Unit/Bed#: S -01 Encounter: [id number]  Primary Care Provider: Darius Sanchez MD   Date and time admitted to hospital: 6/25/2021  6:32 AM    Abrasion of right arm, initial encounter  Assessment & Plan  - Right arm abrasion near the antecubital fossa with surrounding ecchymosis and mild tenderness   - Local wound care as indicated  - Analgesia as needed  Chin contusion, initial encounter  Assessment & Plan  - Chin contusion, present on admission, with intact overlying skin  - Due to head strike with fall, CT scans of the head and cervical spine obtained and were negative for acute intracranial injury as well as for any acute traumatic injury to the cervical spine   - Analgesia as needed  - Activity as tolerated  - May continue home medication regimen   - No further workup or intervention necessary  Fall  Assessment & Plan  - Status post fall with the below noted injuries  - Fall precautions  - Geriatric Medicine consultation for evaluation, medication review and recommendations   - PT and OT evaluation and treatment as indicated  Recommending discharge to inpatient rehab  - Case Management consultation for disposition planning  Follow up status with CM today  Pressure injury of right buttock, stage 2 (HCC)  Assessment & Plan  - 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, chronic and present on admission   - Consult placed to the inpatient wound care service for evaluation and recommendations   - Recommend offloading therapy with repositioning every 2 hours  - Apply Allevyn dressing      Pressure injury of left buttock, stage 2 (HCC)  Assessment & Plan  - 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, chronic and present on admission   - Consult placed to the inpatient wound care service for evaluation and recommendations   - Recommend offloading therapy with repositioning every 2 hours  - Apply Allevyn dressing  Primary osteoarthritis of right hip  Assessment & Plan  - Chronic history of primary osteoarthritis of the right hip   - No evidence of acute traumatic injury  - Patient with chronic pain  Pain is baseline level with no acute changes  - Analgesia as needed  - PT and OT evaluation and treatment as indicated  - Outpatient follow-up with PCP  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 (CODE) [facility])   Assessment & Plan    Lab Results   Component Value Date    HGBA1C 7 5 (H) 12/21/2020     - Chronic history of type 2 diabetes mellitus  - Initiate subcutaneous insulin regimen on admission   - Further care/management per Internal Medicine   - Resume home medication therapy on discharge  - Outpatient follow-up with PCP  CKD (chronic kidney disease) stage 3, GFR 30-59 ml/min [facility])  Assessment & Plan  Lab Results   Component Value Date    EGFR 40 06/28/2021    EGFR 42 06/27/2021    EGFR 41 06/26/2021    CREATININE 1 18 06/28/2021    CREATININE 1 15 06/27/2021    CREATININE 1 17 06/26/2021     - Stage III CKD at baseline  No evidence of acute kidney injury  - Continue current medication regimen  - Avoid nephrotoxic medications and hypotension   - Management per Internal Medicine   - Outpatient follow-up with PCP  Ambulatory dysfunction  Assessment & Plan  - Patient with ambulatory dysfunction at baseline; uses walker and assistance of family  - Fall precautions   - PT and OT evaluation and treatment as indicated  Recommending inpatient rehab upon discharge  - Case Management consultation for disposition planning  CAD (coronary artery disease)  Assessment & Plan  - Continue current medication regimen  - Management per Internal Medicine   - Outpatient follow-up with PCP      Paroxysmal atrial fibrillation [facility])  Assessment & Plan  - Continue current medication regimen  On ASA and coreg    - Management per Internal Medicine   - Outpatient follow-up with PCP  * Recurrent UTI  Assessment & Plan  - Patient with history of recurrent UTI and UA suggestive of UTI at this time  Patient also has mild leukocytosis on presentation   - Continue IV antibiotics for UTI present on admission  Day #3/3 of IV Ancef completed  - Remains afebrile without leukocytosis at this time  - Further management per Internal Medicine  Disposition: short term rehab when bed available  Patient is medically clear        SUBJECTIVE:  Chief Complaint:      Subjective:  No complaints  Patient is a prior she slept through dinner for the last 3 nights  Tolerating diet otherwise      OBJECTIVE:     Meds/Allergies     Current Facility-Administered Medications:     acetaminophen (TYLENOL) tablet 975 mg, 975 mg, Oral, Q8H [address], VALORIE Moore-C, 975 mg at 06/27/21 2212    amLODIPine (NORVASC) tablet 5 mg, 5 mg, Oral, HS, VALORIE Moore-C, 5 mg at 06/27/21 2212    ascorbic acid (VITAMIN C) tablet 1,000 mg, 1,000 mg, Oral, Daily, VALORIE Moore-C, 1,000 mg at 06/27/21 0856    aspirin (ECOTRIN LOW STRENGTH) EC tablet 81 mg, 81 mg, Oral, Daily, VALORIE Moore-C, 81 mg at 06/27/21 0856    atorvastatin (LIPITOR) tablet 40 mg, 40 mg, Oral, Daily With Dinner, VALORIE Moore-C, 40 mg at 06/27/21 1739    carvedilol (COREG) tablet 25 mg, 25 mg, Oral, BID With Meals, Jose Armando Zepeda PA-C, 25 mg at 06/27/21 1739    cholecalciferol (VITAMIN D3) tablet 1,000 Units, 1,000 Units, Oral, Daily, VALORIE Carlson-C, 1,000 Units at 06/27/21 0855    cloNIDine (CATAPRES) tablet 0 1 mg, 0 1 mg, Oral, 4x Daily, VALORIE Moore-C, 0 1 mg at 06/27/21 2212    clopidogrel (PLAVIX) tablet 75 mg, 75 mg, Oral, Daily, Max Dover PA-C, 75 mg at 06/27/21 0854    cyanocobalamin (VITAMIN B-12) tablet 1,000 mcg, 1,000 mcg, Oral, Daily, Jose Armando Zepeda PA-C, 1,000 mcg at 06/27/21 0854    Diclofenac Sodium (VOLTAREN) 1 % topical gel 2 g, 2 g, Topical, 4x Daily, Max Dover PA-C, 2 g at 06/27/21 2214    escitalopram (LEXAPRO) tablet 10 mg, 10 mg, Oral, Daily, Max Dover PA-C, 10 mg at 06/27/21 7997    ferrous sulfate tablet 325 mg, 325 mg, Oral, Daily With Breakfast, Sae Coleman PA-C, 325 mg at 06/27/21 0901    furosemide (LASIX) tablet 40 mg, 40 mg, Oral, Daily, Max Dover PA-C, 40 mg at 06/27/21 0856    gabapentin (NEURONTIN) capsule 200 mg, 200 mg, Oral, TID, Sae Coleman PA-C, 200 mg at 06/27/21 2218    heparin (porcine) subcutaneous injection 5,000 Units, 5,000 Units, Subcutaneous, Q8H [address], 5,000 Units at 06/28/21 0538 **AND** [CANCELED] Platelet count, , , Once, Max Dover PA-C    insulin glargine (LANTUS) subcutaneous injection 10 Units 0 1 mL, 10 Units, Subcutaneous, HS, Max Dover PA-C, 10 Units at 06/27/21 2213    insulin lispro (HumaLOG) 100 units/mL subcutaneous injection 1-5 Units, 1-5 Units, Subcutaneous, HS, Max Dover PA-C, 1 Units at 06/27/21 2213    insulin lispro (HumaLOG) 100 units/mL subcutaneous injection 1-5 Units, 1-5 Units, Subcutaneous, TID AC, 1 Units at 06/27/21 1740 **AND** Fingerstick Glucose (POCT), , , TID AC, Max Dover PA-C    insulin lispro (HumaLOG) 100 units/mL subcutaneous injection 3 Units, 3 Units, Subcutaneous, Daily With Breakfast, Sae Coleman PA-C, 3 Units at 06/27/21 0859    insulin lispro (HumaLOG) 100 units/mL subcutaneous injection 3 Units, 3 Units, Subcutaneous, Daily With Lunch, Max Dover PA-C, 3 Units at 06/27/21 1258    insulin lispro (HumaLOG) 100 units/mL subcutaneous injection 3 Units, 3 Units, Subcutaneous, Daily With Janine NADIA Ford, 3 Units at 06/27/21 1740    naloxone (NARCAN) 0 04 mg/mL syringe 0 04 mg, 0 04 mg, Intravenous, Q1MIN PRN, Sae Coleman PA-C    ondansetron (ZOFRAN) injection 4 mg, 4 mg, Intravenous, Q4H PRN, Sae Coleman PA-C    oxyCODONE (ROXICODONE) IR tablet 2 5 mg, 2 5 mg, Oral, Q4H PRN, Max Dover PA-C, 2 5 mg at 06/27/21 1739    pantoprazole (PROTONIX) EC tablet 40 mg, 40 mg, Oral, Daily Before Breakfast, Max DoverNADIA, 40 mg at 06/28/21 0537    potassium chloride (K-DUR,KLOR-CON) CR tablet 10 mEq, 10 mEq, Oral, TID, [place] PA-C, 10 mEq at 06/27/21 2218    senna-docusate sodium (SENOKOT S) 8 6-50 mg per tablet 1 tablet, 1 tablet, Oral, BID, [place] PA-C, 1 tablet at 06/27/21 1738     Vitals:   Vitals:    06/28/21 0705   BP: 144/67   Pulse: 79   Resp: 16   Temp: 98 3 °F (36 8 °C)   SpO2: 90%       Intake/Output:  I/O       06/26 0701 - 06/27 0700 06/27 0701 - 06/28 0700 06/28 0701 - 06/29 0700    P  O  480 120 240    IV Piggyback 60      Total Intake(mL/kg) 540 (7 6) 120 (1 7) 240 (3 4)    Urine (mL/kg/hr) 450 (0 3) 1150 (0 7) 0 (0)    Total Output 450 1150 0    Net +90 -1030 +240           Unmeasured Stool Occurrence 0 x             Nutrition/GI Proph/Bowel Reg: Regular/senna/colace    Physical Exam:   GENERAL APPEARANCE:  NAD  NEURO:  GCS 15  HEENT:  Normocephalic  1 cm submental chin contusion  CV:  RRR  LUNGS:  CTAB  GI:  Obese, soft and nontender  :  Voiding well  MSK:  Moves all extremities and neurovascularly intact  SKIN:  Warm and dry    Invasive Devices     Peripheral Intravenous Line            Peripheral IV 06/26/21 Left Antecubital 1 day          Line            Arterial Sheath 6 Fr  Right Radial 650 days                 Lab Results:   BMP/CMP:   Lab Results   Component Value Date    SODIUM 139 06/28/2021    K 3 7 06/28/2021     06/28/2021    CO2 26 06/28/2021    BUN 24 06/28/2021    CREATININE 1 18 06/28/2021    CALCIUM 8 7 06/28/2021    EGFR 40 06/28/2021    and CBC: No results found for: WBC, HGB, HCT, MCV, PLT, ADJUSTEDWBC, MCH, MCHC, RDW, MPV, NRBC  Imaging/EKG Studies: Results: I have personally reviewed pertinent reports      Other Studies:    VTE Prophylaxis: Sequential compression device (Venodyne)  and Heparin

## 2021-06-29 PROBLEM — L89.620 PRESSURE INJURY OF LEFT HEEL, UNSTAGEABLE (HCC): Status: ACTIVE | Noted: 2021-01-01

## 2021-06-29 PROBLEM — S00.83XA: Status: RESOLVED | Noted: 2021-01-01 | Resolved: 2021-01-01

## 2021-06-29 PROBLEM — E04.2 MULTIPLE THYROID NODULES: Status: ACTIVE | Noted: 2021-01-01

## 2021-06-29 PROBLEM — R41.89 COGNITIVE IMPAIRMENT: Status: ACTIVE | Noted: 2021-01-01

## 2021-06-29 NOTE — PROGRESS NOTES
[address]  [phone]    [facility]  HISTORY & PHYSICAL        NAME: Eva Oneill  AGE: 80 y o  SEX: female  : 1930  DATE OF ENCOUNTER: 21  POS: 31 (SNF)  PCP: Anamaria Reilly MD    Chief Complaint     Seen and examined today for admission to short term rehabilitation unit at [facility]  History of Present Illness       49-year-old female with underlying paroxysmal atrial fibrillation, benign essential hypertension, CAD with history of NSTEMI, familial hypercholesterolemia, popliteal artery aneurysm, and SVT, ambulatory dysfunction, CKD stage 3, osteoarthritis of multiple joints, stage II pressure injury of bilateral buttocks, stage 1-2 left heel pressure injury, both present on admission, recurrent UTI, diabetes type 2, admitted to [facility] for subacute rehab services following hospitalization  Patient was originally admitted to  [facility] 2021-2021 after she presented to the ED status post fall that resulted in inferior chin contusion, right arm abrasion  Initial evaluation in the ED included a CT of the head that showed no acute intracranial abnormality, but showed stable cerebral atrophy with chronic small-vessel ischemic white matter disease  CT of the C-spine showed degenerative changes of the cervical spine without acute cervical spine fracture or traumatic malalignment  Thyroid nodules seen  Initial workup also included a UA was grossly abnormal positive for nitrates and leukocytes, associated with elevated white count  She completed a 3 day course of Ancef during this hospitalization  Patient was evaluated by PT during hospitalization who found her to have decreased strength, decreased range of motion, endurance, impaired balance, decreased mobility, impaired hearing, decreased skin integrity and chronic pain    Recommendation was made for subacute rehab services  Patient with left medial heel unstageable pressure injury, right buttock stage II pressure injury previous and scarring to left buttock and sacrum from previous pressure injuries, all present on hospital admission  She was evaluated by wound care team during this admission, recommendations made for E hob offloading cushion to chair when out of bed, elevation of right heel off the bed with pillows and frequent repositioning  They recommended skilled nursing cream daily  For the right buttock wound they recommended clean sing with soap and water, pat dry and Maxorb if bloody drainage present, top with Halaven silicone border dressing to be changed every other day  For the left medial heel they recommended cleansing with soap and water, pat dry and Maxorb Ag on wound bed to be covered with dressing in ABD pad  Prevalon boots were recommended  Once considered medically stable, patient was discharged to [facility] for subacute rehab  Today, patient seen and examined for admission to short-term rehab unit  Pt very sleepy and unable to fully open eyes or verbally respond to questions  She would shake her head to answer yes/no questions  Unable to obtain ROS at this time  Discussed with patient's daughter, Abigail Aquino who reports her mother has shown some cognitive decline in the past year or before that  She has noticed more forgetful with intermittent episodes of confusion  States she was admitted to the hospital and followed by rehab in January 2021 and states her stay away from home was really tough and she was a lot more confused  States she bounced back after a couple of weeks  She reports her mother is basically confined to her room and ambulates minimally, about 7 feet to her commode and back to bed or the recliner  States her ambulation has been declining for quite some time but worse in the last 9 months or so             Review of Systems     Review of Systems   Reason unable to perform ROS: Patient appearing very sleepy with minimal verbal responses  History     Allergies   Allergen Reactions    Adhesive [Medical Tape]      Paper tape okay    Ezetimibe     Fenofibrate     Flecainide     Levaquin [Levofloxacin] Other (See Comments)     Muscle weakness    Lovastatin     Sulfa Antibiotics     Thioridazine        Past Medical History:   Diagnosis Date    Anemia     Atrial fibrillation (HCC)     CAD (coronary artery disease)     Cancer (HCC)     Carotid artery obstruction     Coronary artery disease     s/p stent x4     Disease of thyroid gland     TIA    GERD (gastroesophageal reflux disease)     HL (hearing loss)     Wears hearing aids    Hyperlipidemia     Hypertension     Mesenteric ischemia, chronic (HCC)     TIA (transient ischemic attack)      Past Surgical History:   Procedure Laterality Date    ANGIOPLASTY  01/01/2009    x4, stent placed in [facility] Right 01/01/2005    CARDIAC CATHETERIZATION      CATARACT EXTRACTION      CHOLECYSTECTOMY      COLONOSCOPY  02/26/2016    CORONARY ANGIOPLASTY      stent x4 07/11/1996x1 10/09/2009 x3    CORONARY STENT PLACEMENT      HYSTERECTOMY      REPLACEMENT TOTAL KNEE Right        Family History: non-contributory  Social History     Tobacco Use    Smoking status: Never Smoker    Smokeless tobacco: Never Used   Vaping Use    Vaping Use: Never used   Substance Use Topics    Alcohol use: No    Drug use: No         She lives  Lives with her daughter Vito Obrien and variance   At baseline patient with underlying ambulatory dysfunction, ambulates very short distances with a rolling walker and assistance  Her daughter Vito Obrien is a primary caregiver  Patient also has an aide that comes in for 2 hours 3 times per week to help shower  The live in the 2 story home, patient has a 2nd floor setup with stair lift to 2nd floor      Objective   Vital Signs   BP: 147/62 HR:79    T:97 8F    RR:18    O2Sat:96%      W:145 6 lbs    Physical Exam  Constitutional:       Appearance: She is ill-appearing  Comments: Patient appearing very sleepy, not able to fully open eyes with minimal verbal responses to questions  Mostly head nodding for responses  HENT:      Head:      Comments: Chin contusion  Eyes:      General: No scleral icterus  Pupils: Pupils are equal, round, and reactive to light  Cardiovascular:      Rate and Rhythm: Normal rate and regular rhythm  Heart sounds: Normal heart sounds  No murmur heard  No gallop  Pulmonary:      Effort: Pulmonary effort is normal  No respiratory distress  Breath sounds: Normal breath sounds  No wheezing or rales  Abdominal:      General: Bowel sounds are normal  There is distension (mild  )  Palpations: Abdomen is soft  Tenderness: There is no abdominal tenderness  Musculoskeletal:      Right lower leg: No edema  Left lower leg: No edema  Skin:     General: Skin is warm and dry  Comments: Left heel pressure ulcer not visualized at this time  Covered by dressing and elevated off chair foot rest     Neurological:      Comments: Oriented only to self  Able to follow a few one step commands with minimal effort and slightly delayed reaction times  Pertinent Laboratory/Diagnostic Studies:     Lab Results   Component Value Date    WBC 8 89 06/27/2021    HGB 10 8 (L) 06/27/2021    HCT 33 6 (L) 06/27/2021    MCV 98 06/27/2021     06/27/2021     Lab Results   Component Value Date    CALCIUM 8 7 06/28/2021     01/09/2018    K 3 7 06/28/2021    CO2 26 06/28/2021     06/28/2021    BUN 24 06/28/2021    CREATININE 1 18 06/28/2021     Lab Results   Component Value Date    YMC4ZBBFBRXB 2 839 12/23/2020     Lab Results   Component Value Date    HGBA1C 7 5 (H) 12/21/2020         Current Medications       All medications reviewed and updated in EMR/Chart      Assessment and Plan Fall  · S/p fall at home that resulted in inferior chin contusion and right arm abrasion  · Ambulatory dysfunction at baseline and uses rolling walker with assistance to ambulate  · Fall precautions  Chin contusion  · S/p fall at home that resulted in chin contusion and right arm abrasion  · Ambulatory dysfunction at baseline and uses rolling walker with assistance  · CT of head and cervical spine were negative for acute intracranial injury as well as for any acute traumatic injury to cervical spine  · No further workup or intervention necessary at this time  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 (CODE) ([facility] )     Lab Results   Component Value Date    HGBA1C 7 5 (H) 12/21/2020     · Continue with home insulin, SSI for correction and accuchecks ACHS  · Discontinue Glimepiride due to advanced age and increased risk of hypoglycemia  · Hypoglycemia precautions  Recurrent UTI  · UA with nitrites and leukocytes at recent hospitalization  · History of recurrent UTI  · Completed 3 days of Ancef at hospital   · Culture negative  · Continue to monitor for signs and symptoms of UTI  CKD (chronic kidney disease) stage 3, GFR 30-59 ml/min [facility])  Lab Results   Component Value Date    EGFR 40 06/28/2021    EGFR 42 06/27/2021    EGFR 41 06/26/2021    CREATININE 1 18 06/28/2021    CREATININE 1 15 06/27/2021    CREATININE 1 17 06/26/2021     · Stable, continue to monitor BMP  · Avoid hypotension, nephrotoxic agents as indicated  Paroxysmal atrial fibrillation (HCC)  · Continue BB  · Not on AC, recommended not to initiate given recent fall/presyncope  · Rate controlled  Benign essential hypertension  · Continue Carvedilol, Amlodipine, Clonidine, Lasix  Pressure injury of right buttock, stage 2 ([facility] )  · Wound care consulted and evaluated the patient with findings of right buttock stage 2 pressure Injury and left heel unstageable pressure injury     · Stage II pressure injury of right buttock with injury extending over sacral area, beefy red wound bed with scant bloody drainage when cleansed  periwound blanchable, wound bed non blanchable  Noted scarring to left buttock and sacrum from previous pressure injuries  · Offloading therapy recommended with frequent repositioning  · Continue wound care as appropriate  Pressure injury of left heel, unstageable ([address] )  · Recent hospitalization due to fall at home  · Wound care was consulted and evaluated the patient with findings of Left medial heel unstageable pressure injusry and right buttock stage 2 pressure injury  · Left medial heel unstageable pressure injury  SLough in wound bed is unroofing  Maceration noted to the periwound  Small amount of milky yellow discharge noted  Unknown depth of wound due to necrotic tissue overlying wound bed  Suspect stage 3 or 4 pressure injury  · Continue offloading therapy, elevate left heel off bed with pillow, frequent repositioning, skin nourishing cream daily  · Prevalon boot while in bed or in recliner, remove when ambulating  Osteoarthritis of multiple joints  · R hip seems to be the worst   · Discontinue prn tylenol and begin scheduled tylenol 650 mg PO every 8 hours for pain  Multiple thyroid nodules  · Incidental finding of 1 or more thyroid nodules measuring more than 1 5 cm in without suspicious features  · Discussed with daughter Ho Anne and over the phone, she is aware of the incidental finding and wishes for no further workup  Cognitive impairment  · Vs dementia  · Per daughter, patient with hx of increased forgetfulness and episodes of confusion noticed in the last year or so  · No formal diagnosis of dementia  · Will request PT/OT/ST to evaluate and treat as appropriate  Discussed with daughter Arianne Flores over the phone       Billy Price MD  Geriatric Medicine  06/29/21    Please note:  Voice-recognition software may have been used in the preparation of this document  Occasional wrong word or "sound-alike" substitutions may have occurred due to the inherent limitations of voice recognition software  Interpretation should be guided by context

## 2021-06-29 NOTE — ASSESSMENT & PLAN NOTE
Lab Results   Component Value Date    HGBA1C 7 5 (H) 12/21/2020     · Continue with home insulin, SSI for correction and accuchecks ACHS  · Discontinue Glimepiride due to advanced age and increased risk of hypoglycemia  · Hypoglycemia precautions

## 2021-06-29 NOTE — ASSESSMENT & PLAN NOTE
Lab Results   Component Value Date    EGFR 40 06/28/2021    EGFR 42 06/27/2021    EGFR 41 06/26/2021    CREATININE 1 18 06/28/2021    CREATININE 1 15 06/27/2021    CREATININE 1 17 06/26/2021     · Stable, continue to monitor BMP  · Avoid hypotension, nephrotoxic agents as indicated

## 2021-06-29 NOTE — ASSESSMENT & PLAN NOTE
· Continue BB  · Not on AC, recommended not to initiate given recent fall/presyncope  · Rate controlled

## 2021-06-29 NOTE — ASSESSMENT & PLAN NOTE
· R hip seems to be the worst   · Discontinue prn tylenol and begin scheduled tylenol 650 mg PO every 8 hours for pain

## 2021-06-29 NOTE — ASSESSMENT & PLAN NOTE
· Wound care consulted and evaluated the patient with findings of right buttock stage 2 pressure Injury and left heel unstageable pressure injury  · Stage II pressure injury of right buttock with injury extending over sacral area, beefy red wound bed with scant bloody drainage when cleansed  periwound blanchable, wound bed non blanchable  Noted scarring to left buttock and sacrum from previous pressure injuries  · Offloading therapy recommended with frequent repositioning  · Continue wound care as appropriate

## 2021-06-29 NOTE — ASSESSMENT & PLAN NOTE
· Incidental finding of 1 or more thyroid nodules measuring more than 1 5 cm in without suspicious features  · Discussed with daughter Luis Mendez and over the phone, she is aware of the incidental finding and wishes for no further workup

## 2021-06-29 NOTE — ASSESSMENT & PLAN NOTE
· Vs dementia  · Per daughter, patient with hx of increased forgetfulness and episodes of confusion noticed in the last year or so  · No formal diagnosis of dementia  · Will request PT/OT/ST to evaluate and treat as appropriate

## 2021-06-29 NOTE — ASSESSMENT & PLAN NOTE
· S/p fall at home that resulted in chin contusion and right arm abrasion  · Ambulatory dysfunction at baseline and uses rolling walker with assistance  · CT of head and cervical spine were negative for acute intracranial injury as well as for any acute traumatic injury to cervical spine  · No further workup or intervention necessary at this time

## 2021-06-29 NOTE — ASSESSMENT & PLAN NOTE
· S/p fall at home that resulted in inferior chin contusion and right arm abrasion  · Ambulatory dysfunction at baseline and uses rolling walker with assistance to ambulate  · Fall precautions

## 2021-06-29 NOTE — ASSESSMENT & PLAN NOTE
· Recent hospitalization due to fall at home  · Wound care was consulted and evaluated the patient with findings of Left medial heel unstageable pressure injusry and right buttock stage 2 pressure injury  · Left medial heel unstageable pressure injury  SLough in wound bed is unroofing  Maceration noted to the periwound  Small amount of milky yellow discharge noted  Unknown depth of wound due to necrotic tissue overlying wound bed  Suspect stage 3 or 4 pressure injury  · Continue offloading therapy, elevate left heel off bed with pillow, frequent repositioning, skin nourishing cream daily  · Prevalon boot while in bed or in recliner, remove when ambulating

## 2021-06-29 NOTE — ASSESSMENT & PLAN NOTE
· UA with nitrites and leukocytes at recent hospitalization  · History of recurrent UTI  · Completed 3 days of Ancef at hospital   · Culture negative  · Continue to monitor for signs and symptoms of UTI

## 2021-06-29 NOTE — TELEPHONE ENCOUNTER
[employer] member would like a call regarding Shaun brandt [facility] new admit orders     Paged out to Dr Samira Larsen

## 2021-07-09 NOTE — ASSESSMENT & PLAN NOTE
· Wound care consulted and evaluated the patient with findings of right buttock stage 2 pressure Injury and left heel unstageable pressure injury  · Stage II pressure injury of right buttock with injury extending over sacral area, beefy red wound bed with scant bloody drainage when cleansed  periwound blanchable, wound bed non blanchable  Noted scarring to left buttock and sacrum from previous pressure injuries  · Offloading therapy recommended with frequent repositioning  Patient has been sitting in recliner for long periods of time, will request that staff transfers back to bed to lay on her side to offload bottom after meals  · Continue wound care as appropriate

## 2021-07-09 NOTE — ASSESSMENT & PLAN NOTE
·   Continue  Carvedilol 25 mg daily, clonidine 0 1 mg q i d , amlodipine 5 mg daily, Lasix 40 mg p o  daily

## 2021-07-09 NOTE — ASSESSMENT & PLAN NOTE
Lab Results   Component Value Date    EGFR 40 06/28/2021    EGFR 42 06/27/2021    EGFR 41 06/26/2021    CREATININE 1 18 06/28/2021    CREATININE 1 15 06/27/2021    CREATININE 1 17 06/26/2021     · Stable, continue to monitor BMP  · Avoid hypotension and nephrotoxic agents as indicated

## 2021-07-09 NOTE — ASSESSMENT & PLAN NOTE
· Continue current renal 12 5 mg tablets, 2 tablets p o  b i d   · not on anticoagulation  · Rate controlled

## 2021-07-09 NOTE — ASSESSMENT & PLAN NOTE
· Status post fall at home  · Evaluation in the ED included CT of head and cervical spine that were negative for acute intracranial injury as well as for any acute traumatic injury to cervical spine  · No further workup or intervention was necessary at that time

## 2021-07-09 NOTE — ASSESSMENT & PLAN NOTE
· Status post fall home resulted in inferior chin contusion and right arm abrasion  · Ambulatory dysfunction at baseline uses rolling walker with assistance to ambulate  · Patient receiving PT Services at [facility]  · Fall precautions

## 2021-07-09 NOTE — PROGRESS NOTES
[facility]  [address] [facility]  [address] [facility]  [phone]     [facility]  PROGRESS NOTE        NAME: Bishop Sandifer  AGE: 80 y o  SEX: female  :  1930  DATE OF ENCOUNTER:  2021  POS: 31 (SNF)    Assessment and Plan     Fall  · Status post fall home resulted in inferior chin contusion and right arm abrasion  · Ambulatory dysfunction at baseline uses rolling walker with assistance to ambulate  · Patient receiving PT Services at [facility]  · Fall precautions  Chin contusion  · Status post fall at home  · Evaluation in the ED included CT of head and cervical spine that were negative for acute intracranial injury as well as for any acute traumatic injury to cervical spine  · No further workup or intervention was necessary at that time  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 (CODE) ([facility] )     Lab Results   Component Value Date    HGBA1C 7 5 (H) 2020     · Continue with home insulin, sliding scale insulin for correction in her Accu-Checks a c h s  · Glimepiride Esaw Maxwell was discontinued due to advanced age and increased dose for hyperglycemia  · Hypoglycemia precautions  ·     CKD (chronic kidney disease) stage 3, GFR 30-59 ml/min [facility])  Lab Results   Component Value Date    EGFR 40 2021    EGFR 42 2021    EGFR 41 2021    CREATININE 1 18 2021    CREATININE 1 15 2021    CREATININE 1 17 2021     · Stable, continue to monitor BMP  · Avoid hypotension and nephrotoxic agents as indicated  Paroxysmal atrial fibrillation (HCC)  · Continue current renal 12 5 mg tablets, 2 tablets p o  b i d   · not on anticoagulation  · Rate controlled  Benign essential hypertension  ·   Continue  Carvedilol 25 mg daily, clonidine 0 1 mg q i d , amlodipine 5 mg daily, Lasix 40 mg p o  daily      Pressure injury of right buttock, stage 2 ([facility] )  · Wound care consulted and evaluated the patient with findings of right buttock stage 2 pressure Injury and left heel unstageable pressure injury  · Stage II pressure injury of right buttock with injury extending over sacral area, beefy red wound bed with scant bloody drainage when cleansed  periwound blanchable, wound bed non blanchable  Noted scarring to left buttock and sacrum from previous pressure injuries  · Offloading therapy recommended with frequent repositioning  Patient has been sitting in recliner for long periods of time, will request that staff transfers back to bed to lay on her side to offload bottom after meals  · Continue wound care as appropriate  Pressure injury of left heel, unstageable ([address] )  · Recent hospitalization due to fall at home  · Wound care was consulted and evaluated the patient with findings of Left medial heel unstageable pressure injusry and right buttock stage 2 pressure injury  · Left medial heel unstageable pressure injury  SLough in wound bed is unroofing  Maceration noted to the periwound  Small amount of milky yellow discharge noted  Unknown depth of wound due to necrotic tissue overlying wound bed  Suspect stage 3 or 4 pressure injury  · Continue offloading therapy, elevate left heel off bed with pillow, frequent repositioning, skin nourishing cream daily  · Prevalon boot while in bed or in recliner, remove when ambulating  Osteoarthritis of multiple joints  · Right hip seems to be the worst     · Scheduled Tylenol 650 mg p o  every 8 hours for pain    Cognitive impairment  · Per daughter, patient with history of increased forgetfulness and episodes of confusion noticed in the last year or so  · No formal diagnosis of dementia  · Supportive care    Primary osteoarthritis of right hip  · Patient complaining of increased right hip pain at  Night  · Will order  Oxycodone 2 5 mg p o  q h lexus Baker MD  Geriatric Medicine  07/09/2021      Chief Complaint   Patient seen and examined for follow up on chronic conditions  History of Present Illness      19-year-old female with underlying paroxysmal atrial fibrillation, benign essential hypertension, CAD with history of NSTEMI, familial hypercholesterolemia, popliteal artery aneurysm, and SVT, ambulatory dysfunction, CKD stage 3, osteoarthritis of multiple joints, stage II pressure injury of bilateral buttocks, stage 1-2 left heel pressure injury, both present on admission, recurrent UTI, diabetes type 2, admitted to [facility] for subacute rehab services following hospitalization      Patient was originally admitted to  [facility] 06/25/2021-06/28/2021 after she presented to the ED status post fall that resulted in inferior chin contusion, right arm abrasion         Initial evaluation in the ED included a CT of the head that showed no acute intracranial abnormality, but showed stable cerebral atrophy with chronic small-vessel ischemic white matter disease  CT of the C-spine showed degenerative changes of the cervical spine without acute cervical spine fracture or traumatic malalignment  Thyroid nodules seen      Initial workup also included a UA was grossly abnormal positive for nitrates and leukocytes, associated with elevated white count  She completed a 3 day course of Ancef during this hospitalization      Patient was evaluated by PT during hospitalization who found her to have decreased strength, decreased range of motion, endurance, impaired balance, decreased mobility, impaired hearing, decreased skin integrity and chronic pain  Recommendation was made for subacute rehab services      Patient with left medial heel unstageable pressure injury, right buttock stage II pressure injury previous and scarring to left buttock and sacrum from previous pressure injuries, all present on hospital admission    She was evaluated by wound care team during this admission, recommendations made for E hob offloading cushion to chair when out of bed, elevation of right heel off the bed with pillows and frequent repositioning  They recommended skilled nursing cream daily  For the right buttock wound they recommended clean sing with soap and water, pat dry and Maxorb if bloody drainage present, top with Halaven silicone border dressing to be changed every other day  For the left medial heel they recommended cleansing with soap and water, pat dry and Maxorb Ag on wound bed to be covered with dressing in ABD pad  Prevalon boots were recommended      Once considered medically stable, patient was discharged to [facility] for subacute rehab      Patient has been at [facility] participating in therapy  Patient seen and examined today at [facility] short-term rehab unit for follow-up  Per PT, patient is mod assist of 1-2 for transfers  She can ambulate with RW 5 steps with mod assist of two  Patient is mod assist for upper and lower body dressing and dep for toileting  Patient needs set up for feeds  Patient has been tolerating regular diet with thin liquids  Patient has been completing 25-50% of her meals and having regular bowel movements almost every day  Patient has been complaining of bilateral hip pain that bothers her throughout the day and is made worse by weight bearing and at night, limiting her ability to ambulate and her ability to sleep at night  She states her appetite is good, Denies any other pain at this time  Denies any fever/chills, chest pain/shortness of breath, abdominal discomfort, nausea/vomiting, diarrhea/constipation or dysuria  The following portions of the patient's history were reviewed and updated as appropriate: allergies, current medications, past family history, past medical history, past social history, past surgical history and problem list     Review of Systems      A complete review of systems was performed  All negative, except as per HPI      History     Past Medical History:   Diagnosis Date    Anemia     Atrial fibrillation (HCC)     CAD (coronary artery disease)     Cancer (HCC)     Carotid artery obstruction     Coronary artery disease     s/p stent x4     Disease of thyroid gland     TIA    GERD (gastroesophageal reflux disease)     HL (hearing loss)     Wears hearing aids    Hyperlipidemia     Hypertension     Mesenteric ischemia, chronic (HCC)     TIA (transient ischemic attack)      Allergies   Allergen Reactions    Adhesive [Medical Tape]      Paper tape okay    Ezetimibe     Fenofibrate     Flecainide     Levaquin [Levofloxacin] Other (See Comments)     Muscle weakness    Lovastatin     Sulfa Antibiotics     Thioridazine        Objective     Vital Signs  BP:  152/52      HR: 64 T: 97 6° F    RR: 20 O2Sat: 94% on room air W: 143 8 lb    Physical Exam  GEN: NAD  Non-toxic appearing  Looks frail and deconditioned  Pale and tired appearing  Appears chronically ill  HEENT: NC/AT  FERNANDA  EIOMI  Oropharynx moist and clear  No oral lesions  CV: S1, S2   RRR  No murmur appreciated  PULM: Non-labored respirations  CTA bilaterally  ABD: Soft, NT/ND  BSx4  EXT: No peripheral edema  NEURO:  Awake, alert and oriented x 3  Pertinent Laboratory/Diagnostic Studies     72/57/5109:WJZNE METABOLIC PNL  GLUCOSE 732 mg/dL 65-99 H Final  BUN 21 mg/dL 7-25 Final  CREATININE 0 88 mg/dL 0 40-1 10 Final  SODIUM 140 mmol/L 135-145 Final  POTASSIUM 4 0 mmol/L 3 5-5 2 Final  CHLORIDE 102 mmol/L 100-109 Final  CARBON DIOXIDE 24 mmol/L 23-31 Final  CALCIUM 9 9 mg/dL 8 5-10 1 Final  ANION GAP 14 3-11 H Final  eGFR, NON  AM 58 >60 L Final  eGFR,  AMER 67 >60 Final    Current Medications     Current Medications Reviewed and updated in EMR  Monthly orders reviewed and signed in chart  Please note:  Voice-recognition software may have been used in the preparation of this document    Occasional wrong word or "sound-alike" substitutions may have occurred due to the inherent limitations of voice recognition software  Interpretation should be guided by context

## 2021-07-09 NOTE — ASSESSMENT & PLAN NOTE
· Patient complaining of increased right hip pain at  Night  · Will order  Oxycodone 2 5 mg p o  q h s

## 2021-07-09 NOTE — ASSESSMENT & PLAN NOTE
· Per daughter, patient with history of increased forgetfulness and episodes of confusion noticed in the last year or so  · No formal diagnosis of dementia      · Supportive care

## 2021-07-09 NOTE — ASSESSMENT & PLAN NOTE
Lab Results   Component Value Date    HGBA1C 7 5 (H) 12/21/2020     · Continue with home insulin, sliding scale insulin for correction in her Accu-Checks a c h s  · Glimepiride Wandra Levy was discontinued due to advanced age and increased dose for hyperglycemia  · Hypoglycemia precautions    ·

## 2021-07-11 NOTE — TELEPHONE ENCOUNTER
Tiger connect sent to on call provider, Stephanie Hollis, regarding patient's elevated blood sugar of 476

## 2021-07-20 PROBLEM — H61.23 HEARING LOSS DUE TO CERUMEN IMPACTION, BILATERAL: Status: ACTIVE | Noted: 2021-01-01

## 2021-07-20 NOTE — PROGRESS NOTES
Ear cerumen removal    Date/Time: 7/20/2021 3:09 PM  Performed by: RADHA Martinez  Authorized by: RADHA Martinez   Universal Protocol:  Consent: Verbal consent obtained  Written consent not obtained  Risks and benefits: risks, benefits and alternatives were discussed  Consent given by: patient  Patient understanding: patient states understanding of the procedure being performed  Patient consent: the patient's understanding of the procedure matches consent given  Patient identity confirmed: verbally with patient      Patient location:  Bedside  Indications / Diagnosis:  Bilateral hearing loss due to cerumen impaction  Procedure details:     Local anesthetic:  None    Location:  L ear and R ear    Procedure type: irrigation with instrumentation      Instrumentation: curette      Approach:  Internal and natural orifice    Equipment used:  Ear irrigaton syringe, curette  Post-procedure details:     Complication:  None    Hearing quality:  Improved    Patient tolerance of procedure:   Tolerated well, no immediate complications

## 2021-07-20 NOTE — PROGRESS NOTES
[facility]  [address] [facility]  [address] [facility]  [phone]) [address] [facility]   Progress Note  POS 31      NAME: Bishop Sandifer  AGE: 80 y o  SEX: female  :  1930  DATE OF ENCOUNTER: 2021    Chief Complaint   Patient seen and examined for follow up on chronic conditions  History of Present Illness     51-year-old female with underlying paroxysmal atrial fibrillation, benign essential hypertension, CAD with history of non STEMI, familial hypercholesterolemia, popliteal artery aneurysm, SVT, ambulatory dysfunction, CKD stage 3, osteoarthritis of multiple joints, stage II pressure injury of bilateral buttocks, stage 1-2 left heel pressure injury  Present on admission, recurrent UTI, diabetes type 2,seen and examined today to follow-up on acute and chronic medical conditions in subacute rehab  Patient is status post hospitalization from 2021-2021 after suffering a fall that resulted in inferior chin contusion and right arm abrasion  She was found to have a UTI during her hospital stay and treated with a 3 day course of IV Ancef  She was discharged to [facility] for sub-acute rehab services  Patient is ambulating with roller walker 12 ft with Min assist with close wheelchair to follow  She does complain of pain in her right lower extremity and rated as a 3/10  She is a mod assist for upper and lower body ADLs, dependent for toileting  Her Hernando score on 2021 is 13/30  Patient's PT/ OT services are complete  She is continuing subacute rehab services for wound care  She has a history of left medial heel unstageable pressure injury, right buttock stage II pressure injury and scarring to left buttock and sacrum from previous pressure injuries all present on hospital admission  Upon examination, patient is sitting in her chair, calm, cooperative and in no acute distress    She complains today of hearing loss, she states that she is unable to put her hearing aids in her ears due to increased wax buildup  She is requesting that her bilateral ears be cleaned out today  She states she had an appointment for an ear lavage prior to her hospitalization  She denies chest pain, shortness of breath, abdominal pain, nausea, vomiting, diarrhea, constipation with myalgia, arthralgia, headache, dizziness, visual disturbance, fever, chills  The following portions of the patient's history were reviewed and updated as appropriate: allergies, current medications, past family history, past medical history, past social history, past surgical history and problem list     Review of Systems     A review of systems was performed  All negative, except as per HPI      History     Past Medical History:   Diagnosis Date    Anemia     Atrial fibrillation ([address] )     CAD (coronary artery disease)     Cancer (HCC)     Carotid artery obstruction     Coronary artery disease     s/p stent x4     Disease of thyroid gland     TIA    GERD (gastroesophageal reflux disease)     HL (hearing loss)     Wears hearing aids    Hyperlipidemia     Hypertension     Mesenteric ischemia, chronic (HCC)     TIA (transient ischemic attack)      Past Surgical History:   Procedure Laterality Date    ANGIOPLASTY  01/01/2009    x4, stent placed in [address] [facility] Right 01/01/2005    CARDIAC CATHETERIZATION      CATARACT EXTRACTION      CHOLECYSTECTOMY      COLONOSCOPY  02/26/2016    CORONARY ANGIOPLASTY      stent x4 07/11/1996x1 10/09/2009 x3    CORONARY STENT PLACEMENT      HYSTERECTOMY      REPLACEMENT TOTAL KNEE Right      Family History   Problem Relation Age of Onset    Leukemia Father     Hypertension Sister     Heart attack Brother 46    Hypertension Brother     Sudden death Brother 46        scd    Heart failure Maternal Grandmother     Hypertension Maternal Grandmother     Stroke Maternal Grandfather     Hypertension Daughter     Anuerysm Neg Hx     Clotting disorder Neg Hx     Arrhythmia Neg Hx     Hyperlipidemia Neg Hx      Social History     Socioeconomic History    Marital status:      Spouse name: Not on file    Number of children: Not on file    Years of education: Not on file    Highest education level: Not on file   Occupational History    Not on file   Tobacco Use    Smoking status: Never Smoker    Smokeless tobacco: Never Used   Vaping Use    Vaping Use: Never used   Substance and Sexual Activity    Alcohol use: No    Drug use: No    Sexual activity: Not on file   Other Topics Concern    Not on file   Social History Narrative    Most recent tobacco use screenin2019      Exercise level:   None      Caffeine intake:   None      Diet:   Regular      Social Determinants of Health     Financial Resource Strain: Low Risk     Difficulty of Paying Living Expenses: Not hard at all   Food Insecurity: No Food Insecurity    Worried About Running Out of Food in the Last Year: Never true    Tess of Food in the Last Year: Never true   Transportation Needs: No Transportation Needs    Lack of Transportation (Medical): No    Lack of Transportation (Non-Medical): No   Physical Activity: Unknown    Days of Exercise per Week: 0 days    Minutes of Exercise per Session: Not on file   Stress: No Stress Concern Present    Feeling of Stress : Not at all   Social Connections:     Frequency of Communication with Friends and Family:     Frequency of Social Gatherings with Friends and Family:     Attends Scient[employer] Services:     Active Member of Clubs or Organizations:     Attends Club or Organization Meetings:     Marital Status:    Intimate Partner Violence:     Fear of Current or Ex-Partner:     Emotionally Abused:     Physically Abused:     Sexually Abused:       Allergies   Allergen Reactions    Adhesive [Medical Tape]      Paper tape okay    Ezetimibe     Fenofibrate     Flecainide     Levaquin [Levofloxacin] Other (See Comments)     Muscle weakness    Lovastatin     Sulfa Antibiotics     Thioridazine        Objective     Vital Signs  BP: 146/48      HR: 74  T: 97 8     RR: 18  O2Sat: 94% RA W: 151 2 lbs  General: NAD, Non-toxic appearing, well developed, well nourished  Ears: Bilateral cerumen impaction present  CV: S1, S2, normal rate, regular rhythm, no murmur appreciated  Pulmonary: Lung sounds clear to air, no wheezing, rhonchi, rales  Abdominal:BS + x4 in all quadrants, soft, no mass, no tenderness  Extremities: 2+ LLE edema, +ROM, +Strength, + Kyphosis  Skin: Warm, Dry, no lesions, no rash, no erythema present, no ecchymosis present  Neurological: CN 2-12 intact, PERRLA  Psych: Alert and oriented times 3, no mood, no affect, good judgement    Pertinent Laboratory/Diagnostic Studies:    07/13/2021; glucose 147, BUN 19, creatinine 0 77, potassium 3 5, sodium 141, chloride 103, CO2 26, EGFR 68, hemoglobin 10 1, hematocrit 29 7, platelet count 414, WBC 10 0      Current Medications     Current Medications Reviewed and updated in Nursing Home EMR  Assessment and Plan     Bilateral hearing loss due to cerumen impaction  ·   Bilateral ear irrigation performed today with no complication and improvement in hearing  · Continue supportive care    Benign essential hypertension  · BP trend reviewed and is stable  ·  continue carvedilol 25 mg daily, clonidine 0 1 mg q i d , amlodipine 5 mg daily and Lasix 40 mg daily  · Will monitor electrolytes and renal function    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 (CODE) ([facility] )     Lab Results   Component Value Date    HGBA1C 7 5 (H) 12/21/2020   · Blood sugar log reviewed with sugars frequently in the 200s  · Continue SSI and Metformin daily  · Continue lifestyle management with a health diet and exercise as tolerated    Cognitive impairment  · Daughter reports patient has experienced increased confusion and forgetfulness in the past year    · MO CA on 06/30/2021 is 13/30  ·  continue supportive care  · Recommend cognitive, physical, and social activities as tolerated      [address] [facility]  7/20/2021

## 2021-07-21 NOTE — ASSESSMENT & PLAN NOTE
·   Bilateral ear irrigation performed today with no complication and improvement in hearing    · Continue supportive care

## 2021-07-21 NOTE — ASSESSMENT & PLAN NOTE
· Daughter reports patient has experienced increased confusion and forgetfulness in the past year    · MO CA on 06/30/2021 is 13/30  ·  continue supportive care  · Recommend cognitive, physical, and social activities as tolerated

## 2021-07-21 NOTE — ASSESSMENT & PLAN NOTE
· BP trend reviewed and is stable  ·  continue carvedilol 25 mg daily, clonidine 0 1 mg q i d , amlodipine 5 mg daily and Lasix 40 mg daily  · Will monitor electrolytes and renal function

## 2021-07-21 NOTE — ASSESSMENT & PLAN NOTE
Lab Results   Component Value Date    HGBA1C 7 5 (H) 12/21/2020   · Blood sugar log reviewed with sugars frequently in the 200s  · Continue SSI and Metformin daily  · Continue lifestyle management with a health diet and exercise as tolerated

## 2021-07-28 NOTE — PROGRESS NOTES
[facility]  [address] [facility]  [address] [facility]  [phone]) 45 [facility]   Progress Note  POS 31      NAME: Tomsa Adames  AGE: 80 y o  SEX: female  :  1930  DATE OF ENCOUNTER: 2021    Chief Complaint   Patient seen and examined for follow up on chronic conditions  History of Present Illness     44-year-old female with underlying paroxysmal atrial fibrillation, benign essential hypertension, CAD with history of non STEMI, familial hypercholesterolemia, popliteal artery aneurysm, SVT, ambulatory dysfunction, CKD stage 3, osteoarthritis of multiple joints, stage II pressure injury of bilateral buttocks, stage 1-2 left heel pressure injury  Present on admission, recurrent UTI, diabetes type 2,seen and examined today to follow-up on acute and chronic medical conditions in subacute rehab  Patient is status post hospitalization from 2021-2021 after suffering a fall that resulted in inferior chin contusion and right arm abrasion  She was found to have a UTI during her hospital stay and treated with a 3 day course of IV Ancef  She was discharged to [facility] for sub-acute rehab services  Patient is ambulating with roller walker 12 ft with Min assist with close wheelchair to follow  She is a mod assist for upper and lower body ADLs, dependent for toileting  Her [place] score on 2021 is 13/30  Patient's PT/ OT services are complete  She is continuing subacute rehab services for wound care  She has a history of left medial heel unstageable pressure injury, right buttock stage II pressure injury and scarring to left buttock and sacrum from previous pressure injuries all present on hospital admission  Upon examination, patient is calm, cooperative and in no acute distress  She complains of chronic pain, worse in her right leg and left shoulder  She states that the pain in her right leg is worse when she transfers into bed at night   It is relieved with Oxycodone and Tylenol  The following portions of the patient's history were reviewed and updated as appropriate: allergies, current medications, past family history, past medical history, past social history, past surgical history and problem list     Review of Systems     A review of systems was performed  All negative, except as per HPI  History     Past Medical History:   Diagnosis Date    Anemia     Atrial fibrillation ([facility] [address] )     CAD (coronary artery disease)     Cancer (HCC)     Carotid artery obstruction     Coronary artery disease     s/p stent x4     Disease of thyroid gland     TIA    GERD (gastroesophageal reflux disease)     HL (hearing loss)     Wears hearing aids    Hyperlipidemia     Hypertension     Mesenteric ischemia, chronic (HCC)     TIA (transient ischemic attack)      Past Surgical History:   Procedure Laterality Date    ANGIOPLASTY  01/01/2009    x4, stent placed in [address] [facility] Right 01/01/2005    CARDIAC CATHETERIZATION      CATARACT EXTRACTION      CHOLECYSTECTOMY      COLONOSCOPY  02/26/2016    CORONARY ANGIOPLASTY      stent x4 07/11/1996x1 10/09/2009 x3    CORONARY STENT PLACEMENT      HYSTERECTOMY      REPLACEMENT TOTAL KNEE Right      Family History   Problem Relation Age of Onset    Leukemia Father     Hypertension Sister     Heart attack Brother 46    Hypertension Brother     Sudden death Brother 46        scd    Heart failure Maternal Grandmother     Hypertension Maternal Grandmother     Stroke Maternal Grandfather     Hypertension Daughter     Anuerysm Neg Hx     Clotting disorder Neg Hx     Arrhythmia Neg Hx     Hyperlipidemia Neg Hx      Social History     Socioeconomic History    Marital status:       Spouse name: Not on file    Number of children: Not on file    Years of education: Not on file    Highest education level: Not on file   Occupational History    Not on file   Tobacco Use    Smoking status: Never Smoker    Smokeless tobacco: Never Used   Vaping Use    Vaping Use: Never used   Substance and Sexual Activity    Alcohol use: No    Drug use: No    Sexual activity: Not on file   Other Topics Concern    Not on file   Social History Narrative    Most recent tobacco use screenin2019      Exercise level:   None      Caffeine intake:   None      Diet:   Regular      Social Determinants of Health     Financial Resource Strain: Low Risk     Difficulty of Paying Living Expenses: Not hard at all   Food Insecurity: No Food Insecurity    Worried About Running Out of Food in the Last Year: Never true    Tess of Food in the Last Year: Never true   Transportation Needs: No Transportation Needs    Lack of Transportation (Medical): No    Lack of Transportation (Non-Medical): No   Physical Activity: Unknown    Days of Exercise per Week: 0 days    Minutes of Exercise per Session: Not on file   Stress: No Stress Concern Present    Feeling of Stress : Not at all   Social Connections:     Frequency of Communication with Friends and Family:     Frequency of Social Gatherings with Friends and Family:     Attends Confucianist Services:     Active Member of Clubs or Organizations:     Attends Club or Organization Meetings:     Marital Status:    Intimate Partner Violence:     Fear of Current or Ex-Partner:     Emotionally Abused:     Physically Abused:     Sexually Abused:       Allergies   Allergen Reactions    Adhesive [Medical Tape]      Paper tape okay    Ezetimibe     Fenofibrate     Flecainide     Levaquin [Levofloxacin] Other (See Comments)     Muscle weakness    Lovastatin     Sulfa Antibiotics     Thioridazine        Objective     Vital Signs  BP: 132/80     HR: 73  T: 97 8     RR: 20  O2Sat: 95% RA W: 137 4 lbs  General: NAD, Well Nourished, Well Developed  Oral: Oropharynx Moist and Clear  Neck: Supple, +ROM  CV: S1, S2, normal rate, regular rhythm, no murmur appreciated  Pulmonary: Lung sounds clear to air, no wheezing, rhonchi, rales  Abdominal:BS + x4 in all quadrants, soft, no mass, no tenderness  Extremities: 2+ LLE edema, +ROM, +Strength + Kyphosis  Skin: Warm, Dry, no lesions, no rash, no erythema present, no ecchymosis present  Neurological: CN 2-12 intact, PERRLA  Psych: Alert and oriented times 3, no mood, no affect, good judgement    Pertinent Laboratory/Diagnostic Studies:   07/13/2021; glucose 147, BUN 19, creatinine 0 77, potassium 3 5, sodium 141, chloride 103, CO2 26, EGFR 68, hemoglobin 10 1, hematocrit 29 7, platelet count 401, WBC 10 0            Current Medications     Current Medications Reviewed and updated in Nursing Home EMR  Assessment and Plan     Osteoarthritis of multiple joints  · Pain reported in right hip, bilateral knees and left shoulder  · Patient using Voltaren gel to bilateral knees  Will order same dose of Voltaren gel to be applied to left shoulder  · Continue scheduled tylenol and prn Oxycodone  · Recommend nonpharmacologic interventions as tolerated       Hypertension  · BP currently stable and controlled at 132/80  · Continue current antihypertensive medication regime  · Will monitor electrolytes and renal function    Pressure injury of left heel, unstageable (HCC)  · Left medical heel unstageable pressure injury and right buttock stage 2 pressure injury   Patient needs  · Continue frequent dressing changes   · Follow-up with wound team  · Continue offloading and pain management    Ambulatory dysfunction  · Patient status post PT services  · Continue assistance with RW for ambulation and transfers  · Maintain fall precautions      [address] [facility]  07/28/2021

## 2021-07-30 NOTE — ASSESSMENT & PLAN NOTE
· Patient status post PT services  · Continue assistance with RW for ambulation and transfers  · Maintain fall precautions

## 2021-07-30 NOTE — ASSESSMENT & PLAN NOTE
· Pain reported in right hip, bilateral knees and left shoulder  · Patient using Voltaren gel to bilateral knees  Will order same dose of Voltaren gel to be applied to left shoulder    · Continue scheduled tylenol and prn Oxycodone  · Recommend nonpharmacologic interventions as tolerated

## 2021-07-30 NOTE — ASSESSMENT & PLAN NOTE
· Left medical heel unstageable pressure injury and right buttock stage 2 pressure injury   Patient needs  · Continue frequent dressing changes   · Follow-up with wound team  · Continue offloading and pain management

## 2021-07-30 NOTE — ASSESSMENT & PLAN NOTE
· BP currently stable and controlled at 132/80  · Continue current antihypertensive medication regime  · Will monitor electrolytes and renal function

## 2021-08-06 NOTE — PROGRESS NOTES
[facility]  [address] [facility]  [address] [facility]  [phone]) 59 [facility]   Progress Note  POS 31        NAME: Lyndsay Larsen   AGE: 80 y o  SEX: female  :  1930  DATE OF ENCOUNTER:  2021    Chief Complaint  Patient seen and examined for follow up on chronic conditions  History of Present Illness     35-year-old female with underlying paroxysmal atrial fibrillation, benign essential hypertension, CAD with history of non STEMI, familial hypercholesterolemia, popliteal artery aneurysm, SVT, ambulatory dysfunction, CKD stage 3, osteoarthritis of multiple joints, stage II pressure injury of bilateral buttocks, stage 1-2 left heel pressure injury  Present on admission, recurrent UTI, diabetes type 2,seen and examined today to follow-up on acute and chronic medical conditions in subacute rehab  Patient is status post hospitalization from 2021-2021 after suffering a fall that resulted in inferior chin contusion and right arm abrasion  She was found to have a UTI during her hospital stay and treated with a 3 day course of IV Ancef  She was discharged to [facility] for sub-acute rehab services  Patient is ambulating with roller walker 12 ft with Min assist with close wheelchair to follow  She is a mod assist for upper and lower body ADLs, dependent for toileting  Her [place] score on 2021 is 13/30  Patient's PT/ OT services are complete  She is continuing subacute rehab services for wound care  She has a history of left medial heel unstageable pressure injury, right buttock stage II pressure injury and scarring to left buttock and sacrum from previous pressure injuries all present on hospital admission  Nursing reports that the patient experienced an episode of vomiting and stomach discomfort this morning that subsided after lunch  She state that she has a "belly-ache" across her mid abdomen  She experienced one episode of vomiting and one loose stool today  She states that she had a little bit of soup for lunch today and now feels better  She denies fever, chills, chest pain, sob, headache, dizziness or visual disturbance  Upon examination at bedside, patient is calm, cooperative and in no acute distress  The following portions of the patient's history were reviewed and updated as appropriate: allergies, current medications, past family history, past medical history, past social history, past surgical history and problem list      Review of Systems     A review of systems was performed  All negative, except as per HPI       History     Medical History  Past Medical History:  Diagnosis Date   Anemia     Atrial fibrillation ([address] )     CAD (coronary artery disease)     Cancer (HCC)     Carotid artery obstruction     Coronary artery disease      s/p stent x4    Disease of thyroid gland      TIA   GERD (gastroesophageal reflux disease)     HL (hearing loss)      Wears hearing aids   Hyperlipidemia     Hypertension     Mesenteric ischemia, chronic (HCC)     TIA (transient ischemic attack)         Surgical History  Past Surgical History:  Procedure Laterality Date   ANGIOPLASTY   01/01/2009    x4, stent placed in [address] Right 01/01/2005   CARDIAC CATHETERIZATION       CATARACT EXTRACTION       CHOLECYSTECTOMY       COLONOSCOPY   02/26/2016   CORONARY ANGIOPLASTY        stent x4 07/11/1996x1 10/09/2009 x3   CORONARY STENT PLACEMENT       HYSTERECTOMY       REPLACEMENT TOTAL KNEE Right         Family History  Problem Relation Age of Onset   Leukemia Father     Hypertension Sister     Heart attack Brother 46   Hypertension Brother     Sudden death Brother 46        scd   Heart failure Maternal Grandmother     Hypertension Maternal Grandmother     Stroke Maternal Grandfather     Hypertension Daughter     Anuerysm Neg Hx     Clotting disorder Neg Hx     Arrhythmia Neg Hx     Hyperlipidemia Neg Hx Social History       Socioeconomic History   Marital status:       Spouse name: Not on file   Number of children: Not on file   Years of education: Not on file   Highest education level: Not on file  Occupational History   Not on file  Tobacco Use   Smoking status: Never Smoker   Smokeless tobacco: Never Used  Vaping Use   Vaping Use: Never used  Substance and Sexual Activity   Alcohol use: No   Drug use: No   Sexual activity: Not on file  Other Topics Concern   Not on file  Social History Narrative    Most recent tobacco use screenin2019      Exercise level:   None      Caffeine intake:   None      Diet:   Regular        Social Determinants of Health       Financial Resource Strain: Low Risk    Difficulty of Paying Living Expenses: Not hard at all  Food Insecurity: No Food Insecurity   Worried About Running Out of Food in the Last Year: Never true   Tess of Food in the Last Year: Never true  Transportation Needs: No Transportation Needs   Lack of Transportation (Medical): No   Lack of Transportation (Non-Medical): No  Physical Activity: Unknown   Days of Exercise per Week: 0 days   Minutes of Exercise per Session: Not on file  Stress: No Stress Concern Present   Feeling of Stress : Not at all  Social Connections:    Frequency of Communication with Friends and Family:    Frequency of Social Gatherings with Friends and Family:    Attends Cheondoism Services:    Active Member of Clubs or Organizations:    Attends Club or Organization Meetings:    Marital Status:   Intimate Partner Violence:    Fear of Current or Ex-Partner:    Emotionally Abused:    Physically Abused:    Sexually Abused:         Allergies  Allergen Reactions   Adhesive Medical Tape        Paper tape okay   Ezetimibe     Fenofibrate     Flecainide     Levaquin Levofloxacin Other (See Comments)      Muscle weakness   Lovastatin     Sulfa Antibiotics     Thioridazine          Objective Vital Signs  BP: 131/57  HR:   78 T:  97 5              RR:  18       O2Sat:  94% RA       W:   139 9 lbs  General: NAD, Well Nourished, Well Developed  Oral: Oropharynx Moist and Clear  Neck: Supple, +ROM  CV: S1, S2, normal rate, regular rhythm, no murmur appreciated  Pulmonary: Lung sounds clear to air, no wheezing, rhonchi, rales  Abdominal:BS + x4 in all quadrants, soft, no mass, no tenderness  Extremities: 2+ LLE edema, +ROM, +Strength + Kyphosis  Skin: Warm, Dry, no lesions, no rash, no erythema present, no ecchymosis present  Neurological: CN 2-12 intact, PERRLA  Psych: Alert and oriented times 3, no mood, no affect, good judgement     Pertinent Laboratory/Diagnostic Studies:   07/13/2021; glucose 147, BUN 19, creatinine 0 77, potassium 3 5, sodium 141, chloride 103, CO2 26, EGFR 68, hemoglobin 10 1, hematocrit 29 7, platelet count 879, WBC 10 0              Current Medications     Current Medications Reviewed and updated in Nursing Home EMR  Assessment and Plan    Stomach Discomfort  · Vomiting and loose stool x 1 reported today  · Improved this afternoon, no fever or chills reported  · Will continue to monitor closely  · Encourage bland foods until discomfort subsides  · Encourage adequate hydration  · Will order CMP and CBC with diff     Osteoarthritis of multiple joints  · Pain reported in right hip, bilateral knees and left shoulder  · Patient using Voltaren gel to bilateral knees  Will order same dose of Voltaren gel to be applied to left shoulder  · Continue scheduled tylenol and prn Oxycodone  · Recommend nonpharmacologic interventions as tolerated         Hypertension  · BP currently stable and controlled at 132/80  · Continue current antihypertensive medication regime  · Will monitor electrolytes and renal function     Pressure injury of left heel, unstageable (HCC)  · Left medical heel unstageable pressure injury and right buttock stage 2 pressure injury   Patient needs  · Continue frequent dressing changes   · Follow-up with wound team  · Continue offloading and pain management     Ambulatory dysfunction  · Patient status post PT services  · Continue assistance with RW for ambulation and transfers  · Maintain fall precautions        [address] [facility]  08/06/2021

## 2021-08-08 PROBLEM — R10.9 STOMACH DISCOMFORT: Status: ACTIVE | Noted: 2021-01-01

## 2021-08-10 PROBLEM — R07.9 CHEST PAIN: Status: ACTIVE | Noted: 2021-01-01

## 2021-08-10 PROBLEM — G62.9 NEUROPATHY: Status: ACTIVE | Noted: 2021-01-01

## 2021-08-10 NOTE — ASSESSMENT & PLAN NOTE
Affecting left hand  · Could consider increasing gabapentin, but will hold off for now since patient is symptom free

## 2021-08-10 NOTE — TELEPHONE ENCOUNTER
[phone][facility]  Pt is complaining of chest pain  Svetlana Ly was paged at [phone]    Please allow 20-30 mins for the provider to call you back  If you do not here from them within that timeframe, please call us back

## 2021-08-10 NOTE — ASSESSMENT & PLAN NOTE
Lab Results   Component Value Date    HGBA1C 7 5 (H) 12/21/2020       No results for input(s): POCGLU in the last 72 hours  Blood Sugar Average: Last 72 hrs:       · Hold metformin while inpatient  · Accu-checks AC and HS  · SSI for coverage  · Hypoglycemia protocol

## 2021-08-10 NOTE — ED PROVIDER NOTES
History  Chief Complaint   Patient presents with    Arm Pain     Pt arrives EMS from [facility] stating she was woken up by aching L arm pain  Kept dozing off back to sleep because she didn't want to wake her roomate up  Radiating up the arm, unsure if felt pain in chest  EKG being done at this time  Patient is a 80year old female with left arm pain with radiation to chest tonight  No sob  No cough  No fever  No N/V  Denies smoking  Was last seen in this ED on 6/25/21 for recurrent UTI  [facility] website checked on this patient and last Rx filled was on 7/20/21 for oxycodone for 10 day supply  No trauma  History provided by:  Patient, EMS personnel, relative and nursing home (daughter)   used: No    Arm Pain  Associated symptoms: chest pain and myalgias    Associated symptoms: no cough, no fever, no nausea, no shortness of breath and no vomiting        Prior to Admission Medications   Prescriptions Last Dose Informant Patient Reported? Taking?    Ascorbic Acid (VITAMIN C) 1000 MG tablet  Family Member Yes No   Sig: Take 1,000 mg by mouth daily   Calcium Citrate-Vitamin D (CALCIUM + D PO)  Family Member Yes No   Sig: Take 600 mg by mouth every other day     Cholecalciferol (VITAMIN D3) 1000 units CAPS  Family Member Yes No   Sig: Take by mouth daily     Cranberry 400 MG CAPS  Family Member Yes No   Sig: Take 1 capsule by mouth daily    Diclofenac Sodium (VOLTAREN) 1 %   Yes No   Sig: diclofenac 1 % topical gel   acetaminophen (TYLENOL) 325 mg tablet  Family Member Yes No   Sig: Take 650 mg by mouth every 8 (eight) hours as needed for mild pain     amLODIPine (NORVASC) 5 mg tablet   No No   Sig: TAKE 1 TABLET BY MOUTH EVERYDAY AT BEDTIME   amoxicillin (AMOXIL) 500 mg capsule  Family Member Yes No   Sig: TAKE 4 CAPSULES BY MOUTH 1 HOUR BEFORE DENTAL APPOINTMENT   aspirin 81 MG tablet  Family Member No No   Sig: Take 1 tablet (81 mg total) by mouth daily   atorvastatin (LIPITOR) 40 mg tablet   No No   Sig: TAKE ONE TABLET EVERY OTHER DAY WITH DINNER   carvedilol (COREG) 12 5 mg tablet   No No   Sig: Take 2 tablets (25 mg total) by mouth 2 (two) times a day   cloNIDine (CATAPRES) 0 1 mg tablet   No No   Sig: TAKE 1 TABLET (0 1 MG TOTAL) BY MOUTH 4 (FOUR) TIMES A DAY   clopidogrel (PLAVIX) 75 mg tablet   No No   Sig: TAKE 1 TABLET BY MOUTH EVERY DAY   cyanocobalamin 1000 MCG tablet  Family Member Yes No   Sig: Take by mouth daily     escitalopram (LEXAPRO) 10 mg tablet   No No   Sig: Take 1 tablet (10 mg total) by mouth daily   ferrous sulfate 325 (65 FE) MG EC tablet  Family Member Yes No   Sig: Take 325 mg by mouth daily   furosemide (LASIX) 20 mg tablet   No No   Sig: TAKE 2 TABLETS BY MOUTH EVERY DAY   gabapentin (NEURONTIN) 100 mg capsule   No No   Sig: Take 2 capsules (200 mg total) by mouth 3 (three) times a day   metFORMIN (GLUCOPHAGE) 500 mg tablet   Yes No   Sig: Take 500 mg by mouth daily with breakfast    pantoprazole (PROTONIX) 40 mg tablet   No No   Sig: Take 1 tablet (40 mg total) by mouth daily   potassium chloride (Klor-Con) 10 mEq tablet   No No   Sig: TAKE 1 TABLET (10 MEQ TOTAL) BY MOUTH 3 (THREE) TIMES A DAY      Facility-Administered Medications: None       Past Medical History:   Diagnosis Date    Anemia     Atrial fibrillation (HCC)     CAD (coronary artery disease)     Cancer (HCC)     Carotid artery obstruction     Coronary artery disease     s/p stent x4     Disease of thyroid gland     TIA    GERD (gastroesophageal reflux disease)     HL (hearing loss)     Wears hearing aids    Hyperlipidemia     Hypertension     Mesenteric ischemia, chronic (HCC)     TIA (transient ischemic attack)        Past Surgical History:   Procedure Laterality Date    ANGIOPLASTY  01/01/2009    x4, stent placed in [facility] Right 01/01/2005    CARDIAC CATHETERIZATION      CATARACT EXTRACTION      CHOLECYSTECTOMY      COLONOSCOPY  02/26/2016    CORONARY ANGIOPLASTY      stent x4 07/11/1996x1 10/09/2009 x3    CORONARY STENT PLACEMENT      HYSTERECTOMY      REPLACEMENT TOTAL KNEE Right        Family History   Problem Relation Age of Onset    Leukemia Father     Hypertension Sister     Heart attack Brother 46    Hypertension Brother     Sudden death Brother 46        scd    Heart failure Maternal Grandmother     Hypertension Maternal Grandmother     Stroke Maternal Grandfather     Hypertension Daughter     Anuerysm Neg Hx     Clotting disorder Neg Hx     Arrhythmia Neg Hx     Hyperlipidemia Neg Hx      I have reviewed and agree with the history as documented  E-Cigarette/Vaping    E-Cigarette Use Never User      E-Cigarette/Vaping Substances    Nicotine No     THC No     CBD No     Flavoring No     Other No     Unknown No      Social History     Tobacco Use    Smoking status: Never Smoker    Smokeless tobacco: Never Used   Vaping Use    Vaping Use: Never used   Substance Use Topics    Alcohol use: No    Drug use: No       Review of Systems   Constitutional: Negative for fever  Respiratory: Negative for cough and shortness of breath  Cardiovascular: Positive for chest pain  Gastrointestinal: Negative for nausea and vomiting  Musculoskeletal: Positive for myalgias  All other systems reviewed and are negative  Physical Exam  Physical Exam  Vitals and nursing note reviewed  Constitutional:       General: She is in acute distress (moderate)  HENT:      Head: Normocephalic and atraumatic  Mouth/Throat:      Mouth: Mucous membranes are moist       Pharynx: No posterior oropharyngeal erythema  Eyes:      General: No scleral icterus  Cardiovascular:      Rate and Rhythm: Normal rate and regular rhythm  Pulses: Normal pulses  Heart sounds: Normal heart sounds  No murmur heard  Pulmonary:      Effort: Pulmonary effort is normal  No respiratory distress  Breath sounds: Normal breath sounds   No stridor  No wheezing, rhonchi or rales  Chest:      Chest wall: No tenderness  Abdominal:      General: Bowel sounds are normal       Palpations: Abdomen is soft  Tenderness: There is no abdominal tenderness  Musculoskeletal:         General: No tenderness (no left arm tenderness) or deformity  Cervical back: Normal range of motion and neck supple  Right lower leg: No edema  Left lower leg: No edema  Skin:     General: Skin is warm and dry  Findings: No erythema or rash  Neurological:      General: No focal deficit present  Mental Status: She is alert and oriented to person, place, and time     Psychiatric:         Mood and Affect: Mood normal          Vital Signs  ED Triage Vitals [08/10/21 0203]   Temperature Pulse Respirations Blood Pressure SpO2   97 6 °F (36 4 °C) 65 20 155/65 94 %      Temp Source Heart Rate Source Patient Position - Orthostatic VS BP Location FiO2 (%)   Oral Monitor Sitting Right arm --      Pain Score       --           Vitals:    08/10/21 0203 08/10/21 0321   BP: 155/65 160/65   Pulse: 65 67   Patient Position - Orthostatic VS: Sitting Sitting         Visual Acuity      ED Medications  Medications   nitroglycerin (NITRO-BID) 2 % TD ointment 0 5 inch (0 5 inches Topical Given 8/10/21 0226)   aspirin chewable tablet 162 mg (162 mg Oral Given 8/10/21 0226)       Diagnostic Studies  Results Reviewed     Procedure Component Value Units Date/Time    Troponin I [[id number]]  (Normal) Collected: 08/10/21 0226    Lab Status: Final result Specimen: Blood from Arm, Right Updated: 08/10/21 0304     Troponin I <0 02 ng/mL     CK Total with Reflex CKMB [[id number]]  (Normal) Collected: 08/10/21 0226    Lab Status: Final result Specimen: Blood from Arm, Right Updated: 08/10/21 0303     Total CK 59 U/L     Lipase [[id number]]  (Normal) Collected: 08/10/21 0226    Lab Status: Final result Specimen: Blood from Arm, Right Updated: 08/10/21 0303     Lipase 159 u/L Comprehensive metabolic panel [[id number]]  (Abnormal) Collected: 08/10/21 0226    Lab Status: Final result Specimen: Blood from Arm, Right Updated: 08/10/21 0303     Sodium 138 mmol/L      Potassium 4 1 mmol/L      Chloride 102 mmol/L      CO2 25 mmol/L      ANION GAP 11 mmol/L      BUN 24 mg/dL      Creatinine 1 27 mg/dL      Glucose 178 mg/dL      Calcium 8 0 mg/dL      Corrected Calcium 8 7 mg/dL      AST 10 U/L      ALT 16 U/L      Alkaline Phosphatase 72 U/L      Total Protein 6 2 g/dL      Albumin 3 1 g/dL      Total Bilirubin 0 34 mg/dL      eGFR 37 ml/min/1 73sq m     Narrative:      National Kidney Disease Foundation guidelines for Chronic Kidney Disease (CKD):     Stage 1 with normal or high GFR (GFR > 90 mL/min/1 73 square meters)    Stage 2 Mild CKD (GFR = 60-89 mL/min/1 73 square meters)    Stage 3A Moderate CKD (GFR = 45-59 mL/min/1 73 square meters)    Stage 3B Moderate CKD (GFR = 30-44 mL/min/1 73 square meters)    Stage 4 Severe CKD (GFR = 15-29 mL/min/1 73 square meters)    Stage 5 End Stage CKD (GFR <15 mL/min/1 73 square meters)  Note: GFR calculation is accurate only with a steady state creatinine    Protime-INR [[id number]]  (Normal) Collected: 08/10/21 0226    Lab Status: Final result Specimen: Blood from Arm, Right Updated: 08/10/21 0256     Protime 13 2 seconds      INR 0 99    APTT [[id number]]  (Normal) Collected: 08/10/21 0226    Lab Status: Final result Specimen: Blood from Arm, Right Updated: 08/10/21 0256     PTT 23 seconds     CBC and differential [[id number]]  (Abnormal) Collected: 08/10/21 0226    Lab Status: Final result Specimen: Blood from Arm, Right Updated: 08/10/21 0250     WBC 7 96 Thousand/uL      RBC 3 15 Million/uL      Hemoglobin 10 0 g/dL      Hematocrit 30 9 %      MCV 98 fL      MCH 31 7 pg      MCHC 32 4 g/dL      RDW 13 2 %      MPV 9 3 fL      Platelets 657 Thousands/uL      nRBC 0 /100 WBCs      Neutrophils Relative 63 %      Immat GRANS % 1 %      Lymphocytes Relative 27 %      Monocytes Relative 8 %      Eosinophils Relative 1 %      Basophils Relative 0 %      Neutrophils Absolute 4 96 Thousands/µL      Immature Grans Absolute 0 07 Thousand/uL      Lymphocytes Absolute 2 17 Thousands/µL      Monocytes Absolute 0 64 Thousand/µL      Eosinophils Absolute 0 10 Thousand/µL      Basophils Absolute 0 02 Thousands/µL                  XR chest 1 view portable   ED Interpretation by Beth Ocampo MD (08/10 5730)   No acute disease read by me  Procedures  ECG 12 Lead Documentation Only    Date/Time: 8/10/2021 2:08 AM  Performed by: Beth Ocampo MD  Authorized by: Beth Ocampo MD     Indications / Diagnosis:  Chest pain, left arm pain  ECG reviewed by me, the ED Provider: yes    Rate:     ECG rate:  61    ECG rate assessment: normal    Rhythm:     Rhythm: sinus rhythm    Ectopy:     Ectopy: PAC    Conduction:     Conduction: abnormal      Abnormal conduction: complete RBBB    ST segments:     ST segments:  Normal  T waves:     T waves: normal    Q waves:     Q waves:  II, III and aVF             ED Course  ED Course as of Aug 10 0342   Tue Aug 10, 2021   0226 EKG d/w patient and daughters with patient's permission  0335 Labs and CXR d/w patient and daughters  Pain improved                   HEART Risk Score      Most Recent Value   Heart Score Risk Calculator   History  2 Filed at: 08/10/2021 0332   ECG  1 Filed at: 08/10/2021 0332   Age  2 Filed at: 08/10/2021 0332   Risk Factors  2 Filed at: 08/10/2021 0332   Troponin  0 Filed at: 08/10/2021 0332   HEART Score  7 Filed at: 08/10/2021 0332                        FREDA Risk Score      Most Recent Value   Age >= 72  1 Filed at: 08/10/2021 7547   Known CAD (stenosis >= 50%)  0 Filed at: 08/10/2021 3853   Recent (<=24 hrs) Service Angina  0 Filed at: 08/10/2021 0332   ST Deviation >= 0 5 mm  0 Filed at: 08/10/2021 7639   3+ CAD Risk Factors (FHx, HTN, HLP, DM, Smoker)  1 Filed at: 08/10/2021 2865   Aspirin Use Past 7 Days  1 Filed at: 08/10/2021 9307   Elevated Cardiac Markers  0 Filed at: 08/10/2021 0332   FREDA Risk Score (Calculated)  3 Filed at: 08/10/2021 4751                  [facility]  Number of Diagnoses or Management Options  Diagnosis management comments: DDX including but not limited to: ACS, MI, doubt PE, PTX, pneumonia, doubt dissection, pleurisy, pericarditis, myocarditis, rhabdomyolysis, GI etiology; doubt fx  Amount and/or Complexity of Data Reviewed  Clinical lab tests: ordered and reviewed  Tests in the radiology section of CPT®: reviewed and ordered  Decide to obtain previous medical records or to obtain history from someone other than the patient: yes  Obtain history from someone other than the patient: yes  Review and summarize past medical records: yes  Discuss the patient with other providers: yes  Independent visualization of images, tracings, or specimens: yes        Disposition  Final diagnoses:   Chest pain   Left arm pain     Time reflects when diagnosis was documented in both MDM as applicable and the Disposition within this note     Time User Action Codes Description Comment    8/10/2021  3:35 AM Yokasta Hoang Add [R07 9] Chest pain     8/10/2021  3:35 AM Yokasta Hoang Add [V26 [id number]] Left arm pain       ED Disposition     ED Disposition Condition Date/Time Comment    Admit Stable Tue Aug 10, 2021  3:41 AM Case was discussed with [facility] and the patient's admission status was agreed to be Admission Status: observation status to the service of Dr Azra Jacobo         Follow-up Information    None         Patient's Medications   Discharge Prescriptions    No medications on file     No discharge procedures on file      PDMP Review       Value Time User    PDMP Reviewed  Yes 8/10/2021  2:20 Heri Blue MD          ED Provider  Electronically Signed by           Mamie Schaumann, MD  08/10/21 9128

## 2021-08-10 NOTE — CONSULTS
Cardiology   Nicholas Spear 80 y o  female MRN: [id number]  Unit/Bed#: [facility] Encounter: [id number]      Reason for Consult / Principal Problem:    Physician Requesting Consult:  Perry Alarcon MD    Cardiologist: Dr Claire Schwarz  1  Left thumb/forearm pain - suspect musculoskeletal etiology - no evidence of ACS  -No current c/o CP  -12 lead ECG 8/10; NSR/SA, w/occasional PAC's, RBBB  -Troponin x2; <0 02    2  CAD s/p PCI / JESUSITA x 2 mid RCA (9/2019), prior mid LAD and OM 1 stent  -[facility] 9/2019; 20% mid LAD stenosis at the site of a prior stent, 0% stenosis at the site of a prior stent in OM1, 95% mid RCA stenosis   -On DAPT - with aspirin 81 mg times daily/clopidogrel 75 mg daily, on atorvastatin 40 mg daily, and carvedilol 12 5 mg b i d  3  Chronic diastolic CHF  -Euvolemic on PE   -Maintained on furosemide 40 mg daily + K-Dur 10 mEq t i d     4  Paroxysmal atrial fibrillation  -Maintaining NSR  -Previously on no systemic AC  -On carvedilol 12 5 mg b i d     5  Essential hypertension  -BP mildly elevated last recorded 150/64, HR 58   -Outpatient BP regimen:  Amlodipine 5 mg daily, carvedilol 12 5 mg b i d , clonidine 0 1 mg q i d, and furosemide 40 mg daily  6  Hypertriglyceridemia  -Lipid profile 8/10/2021; cholesterol 186, triglycerides 358, HDL 42, LDL 72      Plan  Patient's presenting symptoms of left thumb/left forearm pain do not appear to be cardiac related  She is currently asymptomatic from a cardiac perspective  Cardiac workup with ECG and troponin detection have been unremarkable  She also appears to be stable from a CHF standpoint with no evidence of acute decompensation  Would continue current medical therapies as listed above  No further inpatient workup necessitated  Cardiology follow-up as an outpatient      HPI: Nicholas Spear 80y o  year old female with a medical history of CAD s/p prior stenting to the mid RCA (in 9/2019), and prior stenting to the mid LAD and OM1 (2009), chronic diastolic CHF, paroxysmal atrial fibrillation- not on ADC, essential hypertension, PAD, and carotid artery stenosis  She routinely follows with Dr Missy Gooden with the [facility] service  She presented to the [facility] ED on 8/10/2021 left hand/left arm pain with radiation up to the left shoulder/left chest wall which woke the patient up multiple times from sleep at her nursing home facility  Further workup in the ED  Hemodynamics on admission  Temp 97 6° F, HR 65, RR 20, /65, sat 94% on RA  Laboratory data on admission  NA +138, K +4 1, chloride 102, CO2 25, anion gap 11, BUN 24, creatinine 1 27, glucose 178, calcium 8 0, normal LFTs, albumin 3 1, lipase 159, WBC 7 9, HGB 10 0, platelet count 330  Imaging  Chest x-ray -pending final imaging results    Initial 12 lead ECG demonstrated NSR with RBBB  Given patient's cardiac history with known CAD/prior stenting, and now presenting with atypical chest pains cardiology was consulted for further treatment recommendations/management      Family History:   Family History   Problem Relation Age of Onset   Mati Zoe Leukemia Father     Hypertension Sister     Heart attack Brother 46    Hypertension Brother     Sudden death Brother 46        scd    Heart failure Maternal Grandmother     Hypertension Maternal Grandmother     Stroke Maternal Grandfather     Hypertension Daughter     Anuerysm Neg Hx     Clotting disorder Neg Hx     Arrhythmia Neg Hx     Hyperlipidemia Neg Hx      Historical Information   Past Medical History:   Diagnosis Date    Anemia     Atrial fibrillation ([address] )     CAD (coronary artery disease)     Cancer (HCC)     Carotid artery obstruction     Coronary artery disease     s/p stent x4     Disease of thyroid gland     TIA    GERD (gastroesophageal reflux disease)     HL (hearing loss)     Wears hearing aids    Hyperlipidemia     Hypertension     Mesenteric ischemia, chronic (HCC)     TIA (transient ischemic attack)      Past Surgical History:   Procedure Laterality Date    ANGIOPLASTY  01/01/2009    x4, stent placed in [address] Right 01/01/2005    CARDIAC CATHETERIZATION      CATARACT EXTRACTION      CHOLECYSTECTOMY      COLONOSCOPY  02/26/2016    CORONARY ANGIOPLASTY      stent x4 07/11/1996x1 10/09/2009 x3    CORONARY STENT PLACEMENT      HYSTERECTOMY      REPLACEMENT TOTAL KNEE Right      Social History   Social History     Substance and Sexual Activity   Alcohol Use No     Social History     Substance and Sexual Activity   Drug Use No     Social History     Tobacco Use   Smoking Status Never Smoker   Smokeless Tobacco Never Used     Family History:   Family History   Problem Relation Age of Onset    Leukemia Father     Hypertension Sister     Heart attack Brother 46    Hypertension Brother     Sudden death Brother 46        scd    Heart failure Maternal Grandmother     Hypertension Maternal Grandmother     Stroke Maternal Grandfather     Hypertension Daughter     Anuerysm Neg Hx     Clotting disorder Neg Hx     Arrhythmia Neg Hx     Hyperlipidemia Neg Hx        Review of Systems:  Review of Systems   Constitutional: Negative for chills, fatigue and fever  Respiratory: Negative for cough, chest tightness and shortness of breath  Cardiovascular: Negative for chest pain, palpitations and leg swelling  Gastrointestinal: Negative for abdominal pain  Neurological: Negative for dizziness, light-headedness and headaches  All other systems reviewed and are negative            Scheduled Meds:  Current Facility-Administered Medications   Medication Dose Route Frequency Provider Last Rate    acetaminophen  650 mg Oral Q8H PRN Chey Blinks, CRNP      amLODIPine  5 mg Oral HS Chey Blinks, CRNP      vitamin C  1,000 mg Oral Daily Chey Blinks, [address]      [START ON 8/11/2021] aspirin  81 mg Oral Daily Chey Blinks, CRNP      atorvastatin  40 mg Oral Daily With [employer] Romayne Cozier, CRNP      calcium carbonate-vitamin D  1 tablet Oral Every Other Day Romayne Cozier, CRNP      carvedilol  25 mg Oral BID With Meals Romayne Cozier, CRNP      cholecalciferol  1,000 Units Oral Daily Romayne Cozier, [address]      cloNIDine  0 1 mg Oral 4x Daily Romayne Cozier, RADHA      clopidogrel  75 mg Oral Daily Romayne Cozier, [address]      cyanocobalamin  1,000 mcg Oral Daily Romayne Cozier, [address]      Diclofenac Sodium  2 g Topical 4x Daily Romayne Cozier, CRNP      escitalopram  10 mg Oral Daily Romayne Cozier, RADHA      ferrous sulfate  325 mg Oral Daily With Breakfast Romayne Cozier, CRNP      furosemide  40 mg Oral Daily Romayne Cozier, RADHA      gabapentin  200 mg Oral BID Romayne Cozier, CRNP      heparin (porcine)  5,000 Units Subcutaneous [facility] Romayne Cozier, [address]      insulin lispro  1-5 Units Subcutaneous TID [facility] Romayne Cozier, CRNP      insulin lispro  1-5 Units Subcutaneous HS Romayne Cozier, CRNP      pantoprazole  40 mg Oral Early Morning Romayne Cozier, CRNP      potassium chloride  10 mEq Oral TID Romayne Cozier, CRNP       Continuous Infusions:   PRN Meds:   acetaminophen  all current active meds have been reviewed and current meds:   Current Facility-Administered Medications   Medication Dose Route Frequency    acetaminophen (TYLENOL) tablet 650 mg  650 mg Oral Q8H PRN    amLODIPine (NORVASC) tablet 5 mg  5 mg Oral HS    ascorbic acid (VITAMIN C) tablet 1,000 mg  1,000 mg Oral Daily    [START ON 8/11/2021] aspirin (ECOTRIN LOW STRENGTH) EC tablet 81 mg  81 mg Oral Daily    atorvastatin (LIPITOR) tablet 40 mg  40 mg Oral Daily With Dinner    calcium carbonate-vitamin D (OSCAL-D) 500 mg-200 units per tablet 1 tablet  1 tablet Oral Every Other Day    carvedilol (COREG) tablet 25 mg  25 mg Oral BID With Meals    cholecalciferol (VITAMIN D3) tablet 1,000 Units  1,000 Units Oral Daily    cloNIDine (CATAPRES) tablet 0 1 mg  0 1 mg Oral 4x Daily    clopidogrel (PLAVIX) tablet 75 mg  75 mg Oral Daily    cyanocobalamin (VITAMIN B-12) tablet 1,000 mcg  1,000 mcg Oral Daily    Diclofenac Sodium (VOLTAREN) 1 % topical gel 2 g  2 g Topical 4x Daily    escitalopram (LEXAPRO) tablet 10 mg  10 mg Oral Daily    ferrous sulfate tablet 325 mg  325 mg Oral Daily With Breakfast    furosemide (LASIX) tablet 40 mg  40 mg Oral Daily    gabapentin (NEURONTIN) capsule 200 mg  200 mg Oral BID    heparin (porcine) subcutaneous injection 5,000 Units  5,000 Units Subcutaneous Q8H [address]    insulin lispro (HumaLOG) 100 units/mL subcutaneous injection 1-5 Units  1-5 Units Subcutaneous TID AC    insulin lispro (HumaLOG) 100 units/mL subcutaneous injection 1-5 Units  1-5 Units Subcutaneous HS    pantoprazole (PROTONIX) EC tablet 40 mg  40 mg Oral Early Morning    potassium chloride (K-DUR,KLOR-CON) CR tablet 10 mEq  10 mEq Oral TID       Allergies   Allergen Reactions    Adhesive [Medical Tape]      Paper tape okay    Ezetimibe     Fenofibrate     Flecainide     Levaquin [Levofloxacin] Other (See Comments)     Muscle weakness    Lovastatin     Sulfa Antibiotics     Thioridazine        Objective   Vitals: Blood pressure 150/64, pulse 58, temperature 97 6 °F (36 4 °C), temperature source Oral, resp  rate 18, height 5' 2" (1 575 m), weight 65 8 kg (145 lb), SpO2 96 %  , Body mass index is 26 52 kg/m² ,   Orthostatic Blood Pressures      Most Recent Value   Blood Pressure  150/64 filed at 08/10/2021 0418   Patient Position - Orthostatic VS  Lying filed at 08/10/2021 0418          No intake or output data in the 24 hours ending 08/10/21 0811    Invasive Devices     Peripheral Intravenous Line            Peripheral IV 08/10/21 Right Antecubital <1 day          Line            Arterial Sheath 6 Fr  Right Radial 693 days              Physical Exam  Physical Exam  Vitals and nursing note reviewed  Constitutional:       General: She is not in acute distress  Appearance: Normal appearance  She is not ill-appearing or diaphoretic     HENT:      Head: Normocephalic and atraumatic  Mouth/Throat:      Mouth: Mucous membranes are moist    Eyes:      General: No scleral icterus  Cardiovascular:      Rate and Rhythm: Normal rate and regular rhythm  Pulses: Normal pulses  Heart sounds: Normal heart sounds  No murmur heard  Pulmonary:      Effort: Pulmonary effort is normal       Breath sounds: Normal breath sounds  No wheezing or rales  Abdominal:      General: Bowel sounds are normal       Palpations: Abdomen is soft  Tenderness: There is no abdominal tenderness  Musculoskeletal:      Cervical back: Neck supple  Right lower leg: No edema  Left lower leg: No edema  Skin:     General: Skin is warm and dry  Capillary Refill: Capillary refill takes less than 2 seconds  Neurological:      General: No focal deficit present  Mental Status: She is alert and oriented to person, place, and time     Psychiatric:         Mood and Affect: Mood normal        Lab Results:   Recent Results (from the past 24 hour(s))   CBC and differential    Collection Time: 08/10/21  2:26 AM   Result Value Ref Range    WBC 7 96 4 31 - 10 16 Thousand/uL    RBC 3 15 (L) 3 81 - 5 12 Million/uL    Hemoglobin 10 0 (L) 11 5 - 15 4 g/dL    Hematocrit 30 9 (L) 34 8 - 46 1 %    MCV 98 82 - 98 fL    MCH 31 7 26 8 - 34 3 pg    MCHC 32 4 31 4 - 37 4 g/dL    RDW 13 2 11 6 - 15 1 %    MPV 9 3 8 9 - 12 7 fL    Platelets 492 338 - 890 Thousands/uL    nRBC 0 /100 WBCs    Neutrophils Relative 63 43 - 75 %    Immat GRANS % 1 0 - 2 %    Lymphocytes Relative 27 14 - 44 %    Monocytes Relative 8 4 - 12 %    Eosinophils Relative 1 0 - 6 %    Basophils Relative 0 0 - 1 %    Neutrophils Absolute 4 96 1 85 - 7 62 Thousands/µL    Immature Grans Absolute 0 07 0 00 - 0 20 Thousand/uL    Lymphocytes Absolute 2 17 0 60 - 4 47 Thousands/µL    Monocytes Absolute 0 64 0 17 - 1 22 Thousand/µL    Eosinophils Absolute 0 10 0 00 - 0 61 Thousand/µL    Basophils Absolute 0 02 0 00 - 0 10 Thousands/µL   Protime-INR    Collection Time: 08/10/21  2:26 AM   Result Value Ref Range    Protime 13 2 11 6 - 14 5 seconds    INR 0 99 0 84 - 1 19   APTT    Collection Time: 08/10/21  2:26 AM   Result Value Ref Range    PTT 23 23 - 37 seconds   Comprehensive metabolic panel    Collection Time: 08/10/21  2:26 AM   Result Value Ref Range    Sodium 138 136 - 145 mmol/L    Potassium 4 1 3 5 - 5 3 mmol/L    Chloride 102 100 - 108 mmol/L    CO2 25 21 - 32 mmol/L    ANION GAP 11 4 - 13 mmol/L    BUN 24 5 - 25 mg/dL    Creatinine 1 27 0 60 - 1 30 mg/dL    Glucose 178 (H) 65 - 140 mg/dL    Calcium 8 0 (L) 8 3 - 10 1 mg/dL    Corrected Calcium 8 7 8 3 - 10 1 mg/dL    AST 10 5 - 45 U/L    ALT 16 12 - 78 U/L    Alkaline Phosphatase 72 46 - 116 U/L    Total Protein 6 2 (L) 6 4 - 8 2 g/dL    Albumin 3 1 (L) 3 5 - 5 0 g/dL    Total Bilirubin 0 34 0 20 - 1 00 mg/dL    eGFR 37 ml/min/1 73sq m   CK Total with Reflex CKMB    Collection Time: 08/10/21  2:26 AM   Result Value Ref Range    Total CK 59 26 - 192 U/L   Troponin I    Collection Time: 08/10/21  2:26 AM   Result Value Ref Range    Troponin I <0 02 <=0 04 ng/mL   Lipase    Collection Time: 08/10/21  2:26 AM   Result Value Ref Range    Lipase 159 73 - 393 u/L   Basic metabolic panel    Collection Time: 08/10/21  5:41 AM   Result Value Ref Range    Sodium 139 136 - 145 mmol/L    Potassium 3 9 3 5 - 5 3 mmol/L    Chloride 104 100 - 108 mmol/L    CO2 25 21 - 32 mmol/L    ANION GAP 10 4 - 13 mmol/L    BUN 22 5 - 25 mg/dL    Creatinine 1 13 0 60 - 1 30 mg/dL    Glucose 153 (H) 65 - 140 mg/dL    Calcium 8 1 (L) 8 3 - 10 1 mg/dL    eGFR 43 ml/min/1 73sq m   CBC (With Platelets)    Collection Time: 08/10/21  5:41 AM   Result Value Ref Range    WBC 8 25 4 31 - 10 16 Thousand/uL    RBC 2 96 (L) 3 81 - 5 12 Million/uL    Hemoglobin 9 6 (L) 11 5 - 15 4 g/dL    Hematocrit 29 1 (L) 34 8 - 46 1 %    MCV 98 82 - 98 fL    MCH 32 4 26 8 - 34 3 pg    MCHC 33 0 31 4 - 37 4 g/dL    RDW 13 2 11 6 - 15 1 %    Platelets 365 226 - 451 Thousands/uL    MPV 9 1 8 9 - 12 7 fL   Magnesium    Collection Time: 08/10/21  5:41 AM   Result Value Ref Range    Magnesium 1 5 (L) 1 6 - 2 6 mg/dL   Lipid panel    Collection Time: 08/10/21  5:41 AM   Result Value Ref Range    Cholesterol 186 50 - 200 mg/dL    Triglycerides 358 (H) <=150 mg/dL    HDL, Direct 42 >=40 mg/dL    LDL Calculated 72 0 - 100 mg/dL    Non-HDL-Chol (CHOL-HDL) 144 mg/dl   Troponin I    Collection Time: 08/10/21  5:41 AM   Result Value Ref Range    Troponin I <0 02 <=0 04 ng/mL     Imaging: I have personally reviewed pertinent reports  and I have personally reviewed pertinent films in PACS    Code Status: LVL 1 Full Code    Epic/ Allscripts/Care Everywhere records reviewed: Yes    * Please Note: Fluency DirectDictation voice to text software may have been used in the creation of this document   **

## 2021-08-10 NOTE — DISCHARGE INSTR - AVS FIRST PAGE
Dear Javier Lawler,     It was our pleasure to care for you here at [facility], [facility]  It is our hope that we were always able to exceed the expected standards for your care during your stay  You were hospitalized due to thumb pain  You were cared for on the ED floor under the service of Ginny Garg MD with the [address] [place] [address] [facility] who covers for your primary care physician (PCP), Darlene Perez MD, while you were hospitalized  If you have any questions or concerns related to this hospitalization, you may contact us at [phone]  For follow up as well as medication refills, we recommend that you follow up with your primary care physician  A registered nurse will reach out to you by phone within a few days after your discharge to answer any additional questions that you may have after going home  However, at this time we provide for you here, the most important instructions / recommendations at discharge:     · Notable Medication Adjustments -   · No changes to medications  · Continue to use voltaren gel as needed  · Testing Required after Discharge -   ·   · Important follow up information -   ·   · Other Instructions -   · None   · Please review this entire after visit summary as additional general instructions including medication list, appointments, activity, diet, any pertinent wound care, and other additional recommendations from your care team that may be provided for you        Sincerely,     Ginny Garg MD

## 2021-08-10 NOTE — ASSESSMENT & PLAN NOTE
Lab Results   Component Value Date    EGFR 37 08/10/2021    EGFR 40 06/28/2021    EGFR 42 06/27/2021    CREATININE 1 27 08/10/2021    CREATININE 1 18 06/28/2021    CREATININE 1 15 06/27/2021     · Cr stable; continue to monitor

## 2021-08-10 NOTE — H&P
[facility]  H&P- Sarah Reis 4/6/1930, 80 y o  female MRN: [id number]  Unit/Bed#: Sherice Hernandez Encounter: [id number]  Primary Care Provider: Guille Smith MD   Date and time admitted to hospital: 8/10/2021  1:54 AM    * Chest pain  Assessment & Plan  Presented with left thumb pain with radiation to left shoulder and left chest pain  Not reproducible  Has neuropathy to left hand  R/o ACS given h/o CAD  FREDA score: 3  EKG: NSR, RBBB  Troponin: <0 02  CXR: negative  Continue to trend troponin  Monitor on telemetry  Obtain lipid panel, a1c  Continue BB, Plavix and statin  Cardiology consult  Neuropathy  Assessment & Plan  Affecting left hand  · Could consider increasing gabapentin, but will hold off for now since patient is symptom free  Hyperlipidemia  Assessment & Plan  · Continue statin  Hypertension  Assessment & Plan  BP stable  · Continue home regimen     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 (CODE) ([facility] [address] )   Assessment & Plan  Lab Results   Component Value Date    HGBA1C 7 5 (H) 12/21/2020       No results for input(s): POCGLU in the last 72 hours  Blood Sugar Average: Last 72 hrs:       · Hold metformin while inpatient  · Accu-checks AC and HS  · SSI for coverage  · Hypoglycemia protocol  CKD (chronic kidney disease) stage 3, GFR 30-59 ml/min [facility])  Assessment & Plan  Lab Results   Component Value Date    EGFR 37 08/10/2021    EGFR 40 06/28/2021    EGFR 42 06/27/2021    CREATININE 1 27 08/10/2021    CREATININE 1 18 06/28/2021    CREATININE 1 15 06/27/2021     · Cr stable; continue to monitor  CAD (coronary artery disease)  Assessment & Plan  Prior stentint  · Continue plavix and beta blocker     Paroxysmal atrial fibrillation (HCC)  Assessment & Plan  · Continue carvedilol  · Not on AC  VTE Pharmacologic Prophylaxis: VTE Score: 5 High Risk (Score >/= 5) - Pharmacological DVT Prophylaxis Ordered: heparin  Sequential Compression Devices Ordered  Code Status: Prior level 3 DNR DNI  Discussion with family: Updated  (daughter) at bedside  Anticipated Length of Stay: Patient will be admitted on an observation basis with an anticipated length of stay of less than 2 midnights secondary to chest pain obs  Total Time for Visit, including Counseling / Coordination of Care: 70 minutes Greater than 50% of this total time spent on direct patient counseling and coordination of care  Chief Complaint: left thumb pain with radiation to left shoulder and left chest     History of Present Illness:  Cassandra Ramsay is a 80 y o  female with a PMH of paf not on a/c, HTN, type 2 DM, CKD, CAD s/p prior stenting, SVT, ambulatory dysfunction, OA, stage 2 pressure injury POA who presents with left thumb pain with radiation to left shoulder and left side of chest   She reports pain occurred while sleeping  She reports falling asleep and being woken up multiple times throughout the night due to the pain  Denies recent injuries  Currently is symptom free  She receive nitro paste and ASA in ED  She does have neuropathy affecting left hand  Otherwise, patient is without complaints  Patient is currently at [facility] for rehab  She was recently evaluated by team there for what sounds like gastroenteritis with generalized abdominal pain, vomiting, diarrhea  Currently reports these symptoms have subsided  Patient is admitted as observation  She does request to see Dr Valere Eisenmenger  Review of Systems:  Review of Systems   Cardiovascular: Positive for chest pain (resolved )  Negative for palpitations and leg swelling  Musculoskeletal:        Shoulder pain, resolved now   Neurological:        Neuropathy left hand   All other systems reviewed and are negative        Past Medical and Surgical History:   Past Medical History:   Diagnosis Date    Anemia     Atrial fibrillation ([facility] [address] )     CAD (coronary artery disease)     Cancer ([facility] [address] )     Carotid artery obstruction     Coronary artery disease     s/p stent x4     Disease of thyroid gland     TIA    GERD (gastroesophageal reflux disease)     HL (hearing loss)     Wears hearing aids    Hyperlipidemia     Hypertension     Mesenteric ischemia, chronic (HCC)     TIA (transient ischemic attack)        Past Surgical History:   Procedure Laterality Date    ANGIOPLASTY  01/01/2009    x4, stent placed in [address] Right 01/01/2005    CARDIAC CATHETERIZATION      CATARACT EXTRACTION      CHOLECYSTECTOMY      COLONOSCOPY  02/26/2016    CORONARY ANGIOPLASTY      stent x4 07/11/1996x1 10/09/2009 x3    CORONARY STENT PLACEMENT      HYSTERECTOMY      REPLACEMENT TOTAL KNEE Right        Meds/Allergies:  Prior to Admission medications    Medication Sig Start Date End Date Taking?  Authorizing Provider   acetaminophen (TYLENOL) 325 mg tablet Take 650 mg by mouth every 8 (eight) hours as needed for mild pain      Historical Provider, MD   amLODIPine (NORVASC) 5 mg tablet TAKE 1 TABLET BY MOUTH EVERYDAY AT BEDTIME 5/10/21   Darlene Lazo MD   amoxicillin (AMOXIL) 500 mg capsule TAKE 4 CAPSULES BY MOUTH 1 HOUR BEFORE DENTAL APPOINTMENT 9/1/19   Historical Provider, MD   Ascorbic Acid (VITAMIN C) 1000 MG tablet Take 1,000 mg by mouth daily    Historical Provider, MD   aspirin 81 MG tablet Take 1 tablet (81 mg total) by mouth daily 9/26/19   RADHA Coto   atorvastatin (LIPITOR) 40 mg tablet TAKE ONE TABLET EVERY OTHER DAY WITH DINNER 3/14/21   Andrew Hull DO   Calcium Citrate-Vitamin D (CALCIUM + D PO) Take 600 mg by mouth every other day      Historical Provider, MD   carvedilol (COREG) 12 5 mg tablet Take 2 tablets (25 mg total) by mouth 2 (two) times a day 4/13/21   Darlene Lazo MD   Cholecalciferol (VITAMIN D3) 1000 units CAPS Take by mouth daily      Historical Provider, MD   cloNIDine (CATAPRES) 0 1 mg tablet TAKE 1 TABLET (0 1 MG TOTAL) BY MOUTH 4 (FOUR) TIMES A DAY 4/7/21   Kaela Reyez MD   clopidogrel (PLAVIX) 75 mg tablet TAKE 1 TABLET BY MOUTH EVERY DAY 5/23/21   Andrew Hull DO   Cranberry 400 MG CAPS Take 1 capsule by mouth daily     Historical Provider, MD   cyanocobalamin 1000 MCG tablet Take by mouth daily      Historical Provider, MD   Diclofenac Sodium (VOLTAREN) 1 % diclofenac 1 % topical gel    Historical Provider, MD   escitalopram (LEXAPRO) 10 mg tablet Take 1 tablet (10 mg total) by mouth daily 8/18/20   Kaela Reyez MD   ferrous sulfate 325 (65 FE) MG EC tablet Take 325 mg by mouth daily    Historical Provider, MD   furosemide (LASIX) 20 mg tablet TAKE 2 TABLETS BY MOUTH EVERY DAY 5/11/21   Andrew Hull DO   gabapentin (NEURONTIN) 100 mg capsule Take 2 capsules (200 mg total) by mouth 3 (three) times a day 6/23/21   Anne Marie Jacobson MD   metFORMIN (GLUCOPHAGE) 500 mg tablet Take 500 mg by mouth daily with breakfast     Historical Provider, MD   pantoprazole (PROTONIX) 40 mg tablet Take 1 tablet (40 mg total) by mouth daily 11/30/20   Anne Marie Jacobson MD   potassium chloride (Klor-Con) 10 mEq tablet TAKE 1 TABLET (10 MEQ TOTAL) BY MOUTH 3 (THREE) TIMES A DAY 5/24/21   Anne Marie Jacobson MD   clopidogrel (PLAVIX) 75 mg tablet Take 1 tablet (75 mg total) by mouth daily 4/13/20   Halima Salcedo,      I have reviewed home medications with a medical source (PCP, Pharmacy, other)  Allergies: Allergies   Allergen Reactions    Adhesive [Medical Tape]      Paper tape okay    Ezetimibe     Fenofibrate     Flecainide     Levaquin [Levofloxacin] Other (See Comments)     Muscle weakness    Lovastatin     Sulfa Antibiotics     Thioridazine        Social History:  Marital Status:     Occupation:   Patient Pre-hospital Living Situation: [facility]: [facility]  Patient Pre-hospital Level of Mobility: walks with walker  Patient Pre-hospital Diet Restrictions:   Substance Use History:   Social History     Substance and Sexual Activity   Alcohol Use No     Social History     Tobacco Use   Smoking Status Never Smoker   Smokeless Tobacco Never Used     Social History     Substance and Sexual Activity   Drug Use No       Family History:  Family History   Problem Relation Age of Onset    Leukemia Father     Hypertension Sister     Heart attack Brother 46    Hypertension Brother     Sudden death Brother 46        scd    Heart failure Maternal Grandmother     Hypertension Maternal Grandmother     Stroke Maternal Grandfather     Hypertension Daughter     Anuerysm Neg Hx     Clotting disorder Neg Hx     Arrhythmia Neg Hx     Hyperlipidemia Neg Hx        Physical Exam:     Vitals:   Blood Pressure: 150/64 (08/10/21 0418)  Pulse: 58 (08/10/21 0418)  Temperature: 97 6 °F (36 4 °C) (08/10/21 0203)  Temp Source: Oral (08/10/21 0203)  Respirations: 18 (08/10/21 0418)  Height: 5' 2" (157 5 cm) (08/10/21 0203)  Weight - Scale: 65 8 kg (145 lb) (08/10/21 0203)  SpO2: 96 % (08/10/21 0418)    Physical Exam  Constitutional:       General: She is not in acute distress  Appearance: She is not ill-appearing  HENT:      Head: Normocephalic and atraumatic  Right Ear: External ear normal       Left Ear: External ear normal       Nose: Nose normal       Mouth/Throat:      Mouth: Mucous membranes are moist       Pharynx: Oropharynx is clear  Eyes:      General: No scleral icterus  Extraocular Movements: Extraocular movements intact  Conjunctiva/sclera: Conjunctivae normal    Cardiovascular:      Rate and Rhythm: Normal rate and regular rhythm  Pulses: Normal pulses  Heart sounds: Normal heart sounds  Pulmonary:      Effort: Pulmonary effort is normal       Breath sounds: Normal breath sounds  Abdominal:      General: Bowel sounds are normal       Palpations: Abdomen is soft     Musculoskeletal: Cervical back: Normal range of motion  No rigidity  Right lower leg: No edema  Left lower leg: No edema  Comments: Limited rom at baselined due to OA   Skin:     General: Skin is warm and dry  Capillary Refill: Capillary refill takes less than 2 seconds  Comments: Stage 2 pressure ulcer per prior documentation  Unable to evaluate as patient is in hallway  Neurological:      General: No focal deficit present  Mental Status: She is alert and oriented to person, place, and time  Psychiatric:         Mood and Affect: Mood normal          Behavior: Behavior normal           Additional Data:     Lab Results:  Results from last 7 days   Lab Units 08/10/21  0226   WBC Thousand/uL 7 96   HEMOGLOBIN g/dL 10 0*   HEMATOCRIT % 30 9*   PLATELETS Thousands/uL 253   NEUTROS PCT % 63   LYMPHS PCT % 27   MONOS PCT % 8   EOS PCT % 1     Results from last 7 days   Lab Units 08/10/21  0226   SODIUM mmol/L 138   POTASSIUM mmol/L 4 1   CHLORIDE mmol/L 102   CO2 mmol/L 25   BUN mg/dL 24   CREATININE mg/dL 1 27   ANION GAP mmol/L 11   CALCIUM mg/dL 8 0*   ALBUMIN g/dL 3 1*   TOTAL BILIRUBIN mg/dL 0 34   ALK PHOS U/L 72   ALT U/L 16   AST U/L 10   GLUCOSE RANDOM mg/dL 178*     Results from last 7 days   Lab Units 08/10/21  0226   INR  0 99                   Imaging: Reviewed radiology reports from this admission including: chest xray  XR chest 1 view portable   ED Interpretation by Samina Lee MD (08/10 4051)   No acute disease read by me  EKG and Other Studies Reviewed on Admission:   · EKG: NSR  HR 61  rbbb  ** Please Note: This note has been constructed using a voice recognition system   **

## 2021-08-10 NOTE — DISCHARGE SUMMARY
[facility]  Discharge- Nicholas Spear 4/6/1930, 80 y o  female MRN: [id number]  Unit/Bed#: FT 02 Encounter: [id number]  Primary Care Provider: Felipe James MD   Date and time admitted to hospital: 8/10/2021  1:54 AM    * Chest pain  Assessment & Plan  Presented with left thumb pain with radiation to left shoulder and left chest pain  Not reproducible  Has neuropathy to left hand  R/o ACS given h/o CAD  FREDA score: 3  EKG: NSR, RBBB  Troponin: <0 02 x3   Pt seen and evaluated by cardiology who did feel any additional inpatient work-up was warranted  Follow-up routine as outpt     Paroxysmal atrial fibrillation (HCC)  Assessment & Plan  · Continue carvedilol  · Not on AC  CKD (chronic kidney disease) stage 3, GFR 30-59 ml/min [facility])  Assessment & Plan  Lab Results   Component Value Date    EGFR 37 08/10/2021    EGFR 40 06/28/2021    EGFR 42 06/27/2021    CREATININE 1 27 08/10/2021    CREATININE 1 18 06/28/2021    CREATININE 1 15 06/27/2021     · Cr stable; continue to monitor  CAD (coronary artery disease)  Assessment & Plan  Prior stentint  · Continue plavix and beta blocker     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 (CODE) ([facility] )   Assessment & Plan  Lab Results   Component Value Date    HGBA1C 7 5 (H) 12/21/2020       No results for input(s): POCGLU in the last 72 hours  Blood Sugar Average: Last 72 hrs:       · Hold metformin while inpatient  · Accu-checks AC and HS  · SSI for coverage  · Hypoglycemia protocol  Neuropathy  Assessment & Plan  Affecting left hand  · Could consider increasing gabapentin, but will hold off for now since patient is symptom free  Hypertension  Assessment & Plan  BP stable  · Continue home regimen     Hyperlipidemia  Assessment & Plan  · Continue statin        Discharging Physician / Practitioner: Perry Alarcon MD  PCP: Felipe James MD  Admission Date:   Admission Orders (From admission, onward)     Ordered        08/10/21 0341  Place in Observation  Once                   Discharge Date: 08/10/21    Medical Problems     Resolved Problems  Date Reviewed: 8/10/2021    None                Consultations During Hospital Stay:  · Cardiology     Procedures Performed:   ·     Significant Findings / Test Results:   · Troponin negative x3     Incidental Findings:   ·      Test Results Pending at Discharge (will require follow up): · None      Outpatient Tests Requested:  · None     Complications:  None     Reason for Admission:  Left thumb pain     Hospital Course:     Darin Webb is a 80 y o  female patient who originally presented to the hospital on 8/10/2021 due to  left thumb and arm pain  Patient's pain awoke her from sleep  She denied chest pain or pressure but nevertheless was admitted for further cardiac workup  Troponins were trended and were negative x3  EKGs did not show any signs of ischemia  Cardiology was consulted and agreed with no further inpatient testing  Patient was recommended continue using her Voltaren gel on her left thumb as needed  Please see above list of diagnoses and related plan for additional information  Condition at Discharge: fair     Discharge Day Visit / Exam:     Subjective:  Feels well  No current pain or discomfort  Denies SOB  Vitals: Blood Pressure: 141/60 (08/10/21 1019)  Pulse: 68 (08/10/21 1019)  Temperature: 97 6 °F (36 4 °C) (08/10/21 0203)  Temp Source: Oral (08/10/21 0203)  Respirations: 16 (08/10/21 1019)  Height: 5' 2" (157 5 cm) (08/10/21 0203)  Weight - Scale: 65 8 kg (145 lb) (08/10/21 0203)  SpO2: 95 % (08/10/21 1019)  Exam:   Physical Exam  Vitals and nursing note reviewed  Constitutional:       Appearance: Normal appearance  Cardiovascular:      Rate and Rhythm: Normal rate and regular rhythm  Pulmonary:      Effort: Pulmonary effort is normal  No respiratory distress  Breath sounds: Normal breath sounds   No wheezing  Abdominal:      General: Abdomen is flat  There is no distension  Palpations: Abdomen is soft  Tenderness: There is no abdominal tenderness  Musculoskeletal:         General: Tenderness present  No swelling  Neurological:      General: No focal deficit present  Mental Status: She is alert and oriented to person, place, and time  Discharge instructions/Information to patient and family:   See after visit summary for information provided to patient and family  Provisions for Follow-Up Care:  See after visit summary for information related to follow-up care and any pertinent home health orders  Disposition:     Home        Planned Readmission: none      Discharge Statement:  I spent 40 minutes discharging the patient  This time was spent on the day of discharge  I had direct contact with the patient on the day of discharge  Greater than 50% of the total time was spent examining patient, answering all patient questions, arranging and discussing plan of care with patient as well as directly providing post-discharge instructions  Additional time then spent on discharge activities  Discharge Medications:  See after visit summary for reconciled discharge medications provided to patient and family        ** Please Note: This note has been constructed using a voice recognition system **

## 2021-08-10 NOTE — ASSESSMENT & PLAN NOTE
Presented with left thumb pain with radiation to left shoulder and left chest pain  Not reproducible  Has neuropathy to left hand  R/o ACS given h/o CAD    FREDA score: 3  EKG: NSR, RBBB  Troponin: <0 02 x3   Pt seen and evaluated by cardiology who did feel any additional inpatient work-up was warranted  Follow-up routine as outpt

## 2021-08-10 NOTE — CASE MANAGEMENT
Patient is cleared for discharge to return back to [facility] for [address]  Spoke with admissions office at the facility and referral placed  Facility is able to accept pt back today  [facility] transport setup for 2pm pick-up  Pt's daughter Jeet Bansal informed of pick-up time

## 2021-08-12 PROBLEM — E86.0 DEHYDRATION: Status: ACTIVE | Noted: 2021-01-01

## 2021-08-12 PROBLEM — R40.0 SOMNOLENCE: Status: ACTIVE | Noted: 2021-01-01

## 2021-08-12 NOTE — ASSESSMENT & PLAN NOTE
· Pt has known neuropathy of the left hand  · On gabapentin, 200 mg PO TID  Continue at current dose

## 2021-08-12 NOTE — ASSESSMENT & PLAN NOTE
· Pt appears dehydrated on exam  Fits clinical picture with staff reports of low oral intake and diarrhea  · Start hypodermoclysis w/ NS 50 cc/hr for 24 hrs

## 2021-08-12 NOTE — ASSESSMENT & PLAN NOTE
· Pt has hx of NSTEMI with prior stent  · Admitted from the ED recently for L thumb pain with radiation to L shoulder and chest   · Cardiac workup including EKG and troponins were negative for acute ACS  · Continue carvedilol, Plavix, and ASA

## 2021-08-12 NOTE — ASSESSMENT & PLAN NOTE
· Pt presented to ED recently with L thumb pain that radiated into her left shoulder and chest    · Cardiac workup was initiated and returned negative for acute disease  Troponins were negative, EKG showed NSR and RBBB  Cardiology was consulted and felt that no further workup was needed  · Follow up routine as outpatient  · Denies pain currently  · Monitor for recurrence

## 2021-08-12 NOTE — ASSESSMENT & PLAN NOTE
Lab Results   Component Value Date    HGBA1C 8 0 (H) 08/10/2021     · Sugar mostly running 200-250  · Metformin has been restarted  Continue 500 mg PO at breakfast    · Continue SSI  · Hypoglycemia protocol  · Accuchecks qAC and qHS

## 2021-08-12 NOTE — ASSESSMENT & PLAN NOTE
· On exam, pt was difficult to arouse but was able to be woken for questioning  Responded appropriately  · Stat CBC and CMP  · Looks dehydrated on exam    · Start hypodermoclysis with NS, 50 cc/hr x 24 hrs  · Recheck BMP and CBC in the AM tomorrow

## 2021-08-12 NOTE — ASSESSMENT & PLAN NOTE
Lab Results   Component Value Date    EGFR 43 08/10/2021    EGFR 37 08/10/2021    EGFR 40 06/28/2021    CREATININE 1 13 08/10/2021    CREATININE 1 27 08/10/2021    CREATININE 1 18 06/28/2021     · Cr is stable  Continue to monitor

## 2021-08-12 NOTE — PROGRESS NOTES
[address] [facility] [address] [facility]  [phone]    [facility]  PROGRESS NOTE        NAME: Low Romano  AGE: 80 y o  SEX: female  :  1930  DATE OF ENCOUNTER: 2021  POS: 31 (SNF)    Assessment and Plan     Chest pain  · Pt presented to ED recently with L thumb pain that radiated into her left shoulder and chest    · Cardiac workup was initiated and returned negative for acute disease  Troponins were negative, EKG showed NSR and RBBB  Cardiology was consulted and felt that no further workup was needed  · Follow up routine as outpatient  · Denies pain currently  · Monitor for recurrence  Paroxysmal atrial fibrillation (HCC)  · Pt remains off AC given previous fall and prescyncope  · Continue carvedilol, ASA, and Plavix  CKD (chronic kidney disease) stage 3, GFR 30-59 ml/min [facility])  Lab Results   Component Value Date    EGFR 43 08/10/2021    EGFR 37 08/10/2021    EGFR 40 2021    CREATININE 1 13 08/10/2021    CREATININE 1 27 08/10/2021    CREATININE 1 18 2021     · Cr is stable  Continue to monitor  CAD (coronary artery disease)  · Pt has hx of NSTEMI with prior stent  · Admitted from the ED recently for L thumb pain with radiation to L shoulder and chest   · Cardiac workup including EKG and troponins were negative for acute ACS  · Continue carvedilol, Plavix, and ASA  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 (CODE) ([facility] )     Lab Results   Component Value Date    HGBA1C 8 0 (H) 08/10/2021     · Sugar mostly running 200-250  · Metformin has been restarted  Continue 500 mg PO at breakfast    · Continue SSI  · Hypoglycemia protocol  · Accuchecks qAC and qHS  Neuropathy  · Pt has known neuropathy of the left hand  · On gabapentin, 200 mg PO TID  Continue at current dose  Hypertension  · BPs fairly stable  · Continue home regimen including Coreg, amlodipine, and clonidine  Hyperlipidemia  · Stable, continue on statin at current dose  Somnolence  · On exam, pt was difficult to arouse but was able to be woken for questioning  Responded appropriately  · Stat CBC and CMP  · Looks dehydrated on exam    · Start hypodermoclysis with NS, 50 cc/hr x 24 hrs  · Recheck BMP and CBC in the AM tomorrow  Dehydration  · Pt appears dehydrated on exam  Fits clinical picture with staff reports of low oral intake and diarrhea  · Start hypodermoclysis w/ NS 50 cc/hr for 24 hrs  Spoke with her daughter David Mcdermott  Discussed the current situation, she wishes to put pt on "DO NOT REHOSPITALIZE" order  Wishes to be kept updated with pt's condition  Spoke with daughter Loetta Aschoff as well, she states that she is agreeable to the current plan, though reports that the pt would also be like this occasionally at home  Mara Kumar MD  Geriatric Medicine  8/12/2021       Chief Complaint   Patient seen and examined for follow up on chronic conditions  History of Present Illness     49-year-old female with underlying paroxysmal atrial fibrillation, benign essential hypertension, CAD with history of NSTEMI, familial hypercholesterolemia, popliteal artery aneurysm, SVT, ambulatory dysfunction, CKD stage 3, osteoarthritis of multiple joints, recurrent UTI, diabetes type 2  Pt seen today in her room for her return to subacute rehab after a brief hospital observational stay  She was recently seen in the ED on 8/10 for left thumb pain that woke her up out of sleep with radiation to her shoulder and chest  Denied dyspnea, cough, fever, n/v  Prior hx of CAD and NSTEMI warranted further cardiac workup  EKG showed Q waves in inferior leads and complete RBBB  Troponins, CK-MB, and lipase were negative  Admitted for left arm pain and chest pain, monitored closely, and discharged       Functional status: Her MOCA score on 06/30/2021 is 13/30   Nonambulatory at baseline, remains 1-assist for transfers  She is seen today lying in her recliner  She is somnolent and difficult to arouse  Answered questions appropriately  Denies pain in her thumb at this time  Denies chest pain, shortness of breath  Reporting diarrhea  Unable to provide further ROS  Repeated the phrase "please send me home " Per staff, she has had low oral intake  The following portions of the patient's history were reviewed and updated as appropriate: allergies, current medications, past family history, past medical history, past social history, past surgical history and problem list     Review of Systems     A complete review of systems was performed  All negative, except as per HPI  History     Past Medical History:   Diagnosis Date    Anemia     Atrial fibrillation (HCC)     CAD (coronary artery disease)     Cancer (HCC)     Carotid artery obstruction     Coronary artery disease     s/p stent x4     Disease of thyroid gland     TIA    GERD (gastroesophageal reflux disease)     HL (hearing loss)     Wears hearing aids    Hyperlipidemia     Hypertension     Mesenteric ischemia, chronic (HCC)     TIA (transient ischemic attack)      Allergies   Allergen Reactions    Adhesive [Medical Tape]      Paper tape okay    Ezetimibe     Fenofibrate     Flecainide     Levaquin [Levofloxacin] Other (See Comments)     Muscle weakness    Lovastatin     Sulfa Antibiotics     Thioridazine        Objective     Vital Signs  BP: 138/79       HR:72 T:97 1 F    RR:19 O2Sat:95% RA W:138 8 lbs  Physical Exam  Constitutional:       Appearance: She is well-developed  She is ill-appearing (Chronically)  She is not diaphoretic  Comments: Pt is somnolent and difficult to arouse  HENT:      Head: Normocephalic and atraumatic  Right Ear: External ear normal       Left Ear: External ear normal       Mouth/Throat:      Mouth: Mucous membranes are dry  Pharynx: No oropharyngeal exudate     Eyes:      General: No scleral icterus  Right eye: No discharge  Left eye: No discharge  Conjunctiva/sclera: Conjunctivae normal       Pupils: Pupils are equal, round, and reactive to light  Neck:      Vascular: No JVD  Trachea: No tracheal deviation  Cardiovascular:      Rate and Rhythm: Normal rate and regular rhythm  Heart sounds: Normal heart sounds  No murmur heard  No friction rub  No gallop  Pulmonary:      Effort: No respiratory distress  Breath sounds: Normal breath sounds  Abdominal:      General: Bowel sounds are normal  There is no distension  Palpations: Abdomen is soft  Tenderness: There is no abdominal tenderness  Musculoskeletal:         General: No tenderness or deformity  Skin:     Coloration: Skin is not pale  Findings: No erythema or rash  Psychiatric:         Thought Content:  Thought content normal          Judgment: Judgment normal            Pertinent Laboratory/Diagnostic Studies   8/9/2021  CBC WITH DIFF  HEMOGLOBIN 10 0 g/dL 11 5-14 5 L Final  HEMATOCRIT 29 5 % 35 0-43 0 L Final  WBC 7 7 thou/cmm 4 0-10 0 Final  RBC 3 14 mill/cmm 3 70-4 70 L Final  PLATELET COUNT 916 thou/cmm 140-350 Final  MPV 7 3 fL 7 5-11 3 L Final  MCV 94 fL  Final  MCH 32 0 pg 26 0-34 0 Final  MCHC 34 0 g/dL 32 0-37 0 Final  RDW 14 0 % 12 0-16 0 Final  DIFFERENTIAL TYPE AUTO Final  ABSOLUTE NEUT 4 5 thou/cmm 1 8-7 8 Final  ABSOLUTE LYMPH 2 4 thou/cmm 1 0-3 0 Final  ABSOLUTE MONO 0 6 thou/cmm 0 3-1 0 Final  ABSOLUTE EOS 0 2 thou/cmm 0 0-0 5 Final  ABSOLUTE BASO 0 0 thou/cmm 0 0-0 1 Final  NEUTROPHILS 57 % Final  LYMPHOCYTES 31 % Final  MONOCYTES 8 % Final  EOSINOPHILS 3 % Final  BASOPHILS 1 % Final    8/9/21  COMP METAB PANEL  GLUCOSE 142 mg/dL 65-99 H Final  BUN 25 mg/dL 7-25 Final  CREATININE 0 97 mg/dL 0 40-1 10 Final  SODIUM 139 mmol/L 135-145 Final  POTASSIUM 3 6 mmol/L 3 5-5 2 Final  CHLORIDE 105 mmol/L 100-109 Final  CARBON DIOXIDE 23 mmol/L 23-31 Final  CALCIUM 8 6 mg/dL 8 5-10 1 Final  ALKALINE PHOSPHATASE 67 U/L  Final  ALBUMIN 2 9 g/dL 3 5-4 8 L Final  BILIRUBIN,TOTAL 0 5 mg/dL 0 2-1 0 Final  Use of this assay is not recommended for patients undergoing treatment   with eltrombopag due to the potential for falsely elevated results  PROTEIN, TOTAL 5 6 g/dL 6 3-8 3 L Final  AST 7 U/L <41 Final  ALT 12 U/L <56 Final  ANION GAP 11 3-11 Final  eGFR, NON  AM 51 >60 L Final  eGFR,  AMER 59 >60 L Final    Current Medications     Current Medications Reviewed and updated in EMR  Monthly orders reviewed and signed in chart  Please note:  Voice-recognition software may have been used in the preparation of this document  Occasional wrong word or "sound-alike" substitutions may have occurred due to the inherent limitations of voice recognition software  Interpretation should be guided by context

## 2021-08-22 PROBLEM — F33.40 RECURRENT MAJOR DEPRESSIVE DISORDER, IN REMISSION (HCC): Status: ACTIVE | Noted: 2021-01-01

## 2021-08-22 NOTE — ASSESSMENT & PLAN NOTE
· History of depression, on Escitalopram 10 mg daily  · Mood currently stable, patient states that she was feeling down due to her not being able to see her family  · Continue current dose of Escitalopram as this is the highest dose recommended in the geriatric population     · Continue supportive care and frequent reassurance

## 2021-08-31 NOTE — PROGRESS NOTES
[address]  [address] [facility]  [phone]  [address]   Acute Visit Note  POS 32      NAME: Jonathon Fonseca  AGE: 80 y o  SEX: female  :  1930  DATE OF ENCOUNTER: 21    Chief Complaint   Patient seen and examined for dysuria and right flank pain    History of Present Illness     63-year-old female with underlying paroxysmal atrial fibrillation, benign essential hypertension, CAD with history of non STEMI, familial hypercholesterolemia, popliteal artery aneurysm, SVT, ambulatory dysfunction, CKD stage 3, osteoarthritis of multiple joints, stage II pressure injury of bilateral buttocks, stage 1-2 left heel pressure injury  Present on admission, recurrent UTI, diabetes type 2,seen and examined today to follow-up on acute and chronic medical conditions in subacute rehab  Patient is status post hospitalization from 2021-2021 after suffering a fall that resulted in inferior chin contusion and right arm abrasion  She was found to have a UTI during her hospital stay and treated with a 3 day course of IV Ancef  She was discharged to [facility] for sub-acute rehab services       Patient is ambulating with roller walker 12 ft with Min assist with close wheelchair to follow  She is a mod assist for upper and lower body ADLs, dependent for toileting  Her Brantley score on 2021 is 13/30  Patient's PT/ OT services are complete  She was continuing subacute rehab services for wound care  She has a history of left medial heel unstageable pressure injury, right buttock stage II pressure injury and scarring to left buttock and sacrum from previous pressure injuries all present on hospital admission  Upon examination patient complains of urinary burning and right flank pain  She states that she thinks that she has a UTI    She denies fever, chills, chest pain, sob, abdominal pain, nausea, vomiting, diarrhea, headache, dizziness or visual disturbance        The following portions of the patient's history were reviewed and updated as appropriate: allergies, current medications, past family history, past medical history, past social history, past surgical history and problem list     Review of Systems     A review of systems was performed  All negative, except as per HPI  History     Past Medical History:   Diagnosis Date    Anemia     Atrial fibrillation ([address] )     CAD (coronary artery disease)     Cancer (HCC)     Carotid artery obstruction     Coronary artery disease     s/p stent x4     Disease of thyroid gland     TIA    GERD (gastroesophageal reflux disease)     HL (hearing loss)     Wears hearing aids    Hyperlipidemia     Hypertension     Mesenteric ischemia, chronic (HCC)     TIA (transient ischemic attack)      Past Surgical History:   Procedure Laterality Date    ANGIOPLASTY  01/01/2009    x4, stent placed in [facility] Right 01/01/2005    CARDIAC CATHETERIZATION      CATARACT EXTRACTION      CHOLECYSTECTOMY      COLONOSCOPY  02/26/2016    CORONARY ANGIOPLASTY      stent x4 07/11/1996x1 10/09/2009 x3    CORONARY STENT PLACEMENT      HYSTERECTOMY      REPLACEMENT TOTAL KNEE Right      Family History   Problem Relation Age of Onset    Leukemia Father     Hypertension Sister     Heart attack Brother 46    Hypertension Brother     Sudden death Brother 46        scd    Heart failure Maternal Grandmother     Hypertension Maternal Grandmother     Stroke Maternal Grandfather     Hypertension Daughter     Anuerysm Neg Hx     Clotting disorder Neg Hx     Arrhythmia Neg Hx     Hyperlipidemia Neg Hx      Social History     Socioeconomic History    Marital status:       Spouse name: Not on file    Number of children: Not on file    Years of education: Not on file    Highest education level: Not on file   Occupational History    Not on file   Tobacco Use    Smoking status: Never Smoker    Smokeless tobacco: Never Used   Vaping Use    Vaping Use: Never used   Substance and Sexual Activity    Alcohol use: No    Drug use: No    Sexual activity: Not on file   Other Topics Concern    Not on file   Social History Narrative    Most recent tobacco use screenin2019      Exercise level:   None      Caffeine intake:   None      Diet:   Regular      Social Determinants of Health     Financial Resource Strain: Low Risk     Difficulty of Paying Living Expenses: Not hard at all   Food Insecurity: No Food Insecurity    Worried About Running Out of Food in the Last Year: Never true    Tess of Food in the Last Year: Never true   Transportation Needs: No Transportation Needs    Lack of Transportation (Medical): No    Lack of Transportation (Non-Medical): No   Physical Activity: Unknown    Days of Exercise per Week: 0 days    Minutes of Exercise per Session: Not on file   Stress: No Stress Concern Present    Feeling of Stress : Not at all   Social Connections:     Frequency of Communication with Friends and Family:     Frequency of Social Gatherings with Friends and Family:     Attends Hinduism Services:     Active Member of Clubs or Organizations:     Attends Club or Organization Meetings:     Marital Status:    Intimate Partner Violence:     Fear of Current or Ex-Partner:     Emotionally Abused:     Physically Abused:     Sexually Abused:       Allergies   Allergen Reactions    Adhesive [Medical Tape]      Paper tape okay    Ezetimibe     Fenofibrate     Flecainide     Levaquin [Levofloxacin] Other (See Comments)     Muscle weakness    Lovastatin     Sulfa Antibiotics     Thioridazine        Objective     Vital Signs  BP: 134/61       HR: 83 T: 97 6     RR: 18  O2Sat: 95% RA W: 141 2 lbs  General: NAD, Well Nourished, Well Developed  CV: S1, S2, normal rate, regular rhythm, no murmur appreciated  Pulmonary: Lung sounds clear to air, no wheezing, rhonchi, rales  Abdominal:BS + x4 in all quadrants, soft, no mass, no tenderness  Skin: Warm, dry  Psych: Alert and oriented times 3, no mood, no affect    Pertinent Laboratory/Diagnostic Studies:  Reviewed in nursing home EMR  Current Medications     Current Medications Reviewed and updated in Nursing Home EMR      Assessment and Plan     Recurrent UTI  · Patient with signs and symptoms of UTI with dysuria and right flank pain  · Will order UAC&S to rule out UTI  · Encourage adequate PO fluid intake    Hypertension  · BP currently stable and controlled at 134/61  · Continue Coreg, amlodipine and clonidine at current doses  · Will monitor electrolytes and renal function      [address]  08/31/21

## 2021-09-02 NOTE — ASSESSMENT & PLAN NOTE
· BP currently stable and controlled at 134/61  · Continue Coreg, amlodipine and clonidine at current doses  · Will monitor electrolytes and renal function

## 2021-09-02 NOTE — ASSESSMENT & PLAN NOTE
· Patient with signs and symptoms of UTI with dysuria and right flank pain  · Will order UAC&S to rule out UTI  · Encourage adequate PO fluid intake

## 2021-09-09 NOTE — ASSESSMENT & PLAN NOTE
· Stage ll pressure ulcer of right buttock  · Continue dressing changes per wound care orders  · Continue to consult wound care weekly  · Continue to encourage frequent changes in position and offloading  · Encourage protein intake to assist in wound healing

## 2021-09-09 NOTE — ASSESSMENT & PLAN NOTE
Lab Results   Component Value Date    HGBA1C 8 0 (H) 08/10/2021     · Average Bgs running between 200-250  · Continue Metformin 500 mg PO Daily with breakfast   · Will monitor BMP for renal status  · Continue hypoglycemia protocol  · Continue Accuchecks qAC and qHS

## 2021-09-09 NOTE — PROGRESS NOTES
[address] [facility] [address]  ([address] [facility]) [address] [facility]   [address]  Progress Note        NAME: Rohit Ruelas  AGE: 80 y o  SEX: female  :  1930  DATE OF ENCOUNTER: 2021    Chief Complaint   Patient seen and examined for follow up on chronic conditions  History of Present Illness     George  is a pleasant 80 y o  female seen and examined today for follow up at [facility] for the following acute and chronic conditions, stage ll pressure injury of bilateral buttocks, stage 1-2 left heel pressure injury, recurrent UTI, and DM type 2  Patient is watching television in her wheelchair and reports she is doing not too bad today  Patient denies any pain this morning, but states she usually has some first thing when she wakes up and gets assistance to get up out of bed  Patient denies any chest pain, sob, dizzyness, nausea/vomiting, diarrhea/constipation, burning upon urination or dysuria   Patient reports her appetite is well and she is drinking an adequate amount of fluids      Patient's PT/OT services are complete with patient ambulating with roller walker approx 12 feet wih min assist with close wheelchair follow  ADLs status is upper body mod assist, lower body is mod assist, and toileting is dependent  Patient does require set-up for feeding  The following portions of the patient's history were reviewed and updated as appropriate: allergies, current medications, past family history, past medical history, past social history, past surgical history and problem list     Review of Systems     A review of systems was performed  All negative, except as per HPI      History     Past Medical History:   Diagnosis Date    Anemia     Atrial fibrillation ([facility] [address] )     CAD (coronary artery disease)     Cancer (HCC)     Carotid artery obstruction     Coronary artery disease     s/p stent x4     Disease of thyroid gland     TIA    GERD (gastroesophageal reflux disease)  HL (hearing loss)     Wears hearing aids    Hyperlipidemia     Hypertension     Mesenteric ischemia, chronic (HCC)     TIA (transient ischemic attack)      Past Surgical History:   Procedure Laterality Date    ANGIOPLASTY  01/01/2009    x4, stent placed in [address] Right 2005    CARDIAC CATHETERIZATION      CATARACT EXTRACTION      CHOLECYSTECTOMY      COLONOSCOPY  2016    CORONARY ANGIOPLASTY      stent x4 07/11/1996x1 10/09/2009 x3    CORONARY STENT PLACEMENT      HYSTERECTOMY      REPLACEMENT TOTAL KNEE Right      Family History   Problem Relation Age of Onset    Leukemia Father     Hypertension Sister     Heart attack Brother 46    Hypertension Brother     Sudden death Brother 46        scd    Heart failure Maternal Grandmother     Hypertension Maternal Grandmother     Stroke Maternal Grandfather     Hypertension Daughter     Anuerysm Neg Hx     Clotting disorder Neg Hx     Arrhythmia Neg Hx     Hyperlipidemia Neg Hx      Social History     Socioeconomic History    Marital status:       Spouse name: Not on file    Number of children: Not on file    Years of education: Not on file    Highest education level: Not on file   Occupational History    Not on file   Tobacco Use    Smoking status: Never Smoker    Smokeless tobacco: Never Used   Vaping Use    Vaping Use: Never used   Substance and Sexual Activity    Alcohol use: No    Drug use: No    Sexual activity: Not on file   Other Topics Concern    Not on file   Social History Narrative    Most recent tobacco use screenin2019      Exercise level:   None      Caffeine intake:   None      Diet:   Regular      Social Determinants of Health     Financial Resource Strain: Low Risk     Difficulty of Paying Living Expenses: Not hard at all   Food Insecurity: No Food Insecurity    Worried About Running Out of Food in the Last Year: Never true    Tess of Food in the Last Year: Never true   Transportation Needs: No Transportation Needs    Lack of Transportation (Medical): No    Lack of Transportation (Non-Medical): No   Physical Activity: Unknown    Days of Exercise per Week: 0 days    Minutes of Exercise per Session: Not on file   Stress: No Stress Concern Present    Feeling of Stress : Not at all   Social Connections:     Frequency of Communication with Friends and Family:     Frequency of Social Gatherings with Friends and Family:     Attends Confucianist Services:     Active Member of Clubs or Organizations:     Attends Club or Organization Meetings:     Marital Status:    Intimate Partner Violence:     Fear of Current or Ex-Partner:     Emotionally Abused:     Physically Abused:     Sexually Abused: Allergies   Allergen Reactions    Adhesive [Medical Tape]      Paper tape okay    Ezetimibe     Fenofibrate     Flecainide     Levaquin [Levofloxacin] Other (See Comments)     Muscle weakness    Lovastatin     Sulfa Antibiotics     Thioridazine        Objective     Vital Signs  BP: 150/67      HR:70 T:97 8    RR:20 O2Sat:95% W:144 3  General: NAD, Well Nourished, Well Developed  Oral: Oropharynx Moist and Clear  Neck: Supple, +ROM  CV:  normal rate,irregular rhythm  Pulmonary: Lung sounds clear to air, no wheezing, rhonchi, rales  Abdominal:BS + x4 in all quadrants, soft, no mass, no tenderness  Extremities: No edema, +ROM, +Strength  Skin: Warm, Dry,  no rash, no erythema present, no ecchymosis present  Psych: Alert and oriented times 3, good judgement    Pertinent Laboratory/Diagnostic Studies:  URINALYSIS  SPECIFIC GRAVITY, UR 1 009 1 003-1 030 Final  pH, URINE 7 4 5-8 0 Final  PROTEIN Negative mg/dL NEG Final  GLUCOSE Negative mg/dL NEG Final  KETONE Negative mg/dL NEG Final  BLOOD Negative mg/dL NEG Final  LEUKOCYTE ESTERASE  /uL NEG A Final  NITRITE Positive NEG A Final  COMMENT (URINALYSIS) Microscopic  to follow    Final  RBC 0-2 /hpf 0-2 Final  WBC 11-20 /hpf 0-5 Final  BACTERIA 2+ NEG A Final  SQUAMOUS EPITH CELLS 3-5 /lpf 0-5 Final  TRANS EPITH CELLS 0-2 /lpf 0-1    Microbiology Report  Below  Final  SPECIMEN DESCRIPTION : Urine  SPECIAL REQUESTS : None  CULTURE RESULTS :   >100,000 col/mL **KLEBSIELLA PNEUMONIAE**  REPORT STATUS : Final 09/02/2021  ORGANISM : >100,000 col/mL **KLEBSIELLA PNEUMONIAE**  SENSITIVITY : GRAM NEGATIVE SUSCEPTIBILITY  PIP/TAZO : <=4 Susceptible  CEFEPIME : <=1 Susceptible  AZTREONAM : <=1 Susceptible  MEROPENEM : <=0 25 Susceptible  GENTAMICIN : <=1 Susceptible  CIPRO : <=0 25 Susceptible  TRIMETHOPRIM-SUL : <=20 Susceptible  AMPICILLIN : >=32 Resistant  CEFAZOLIN : <=4 Susceptible  CEFTRIAXONE : <=1 Susceptible  NITROFURANTOIN : 64 Intermediate  AMPICILLIN/SULBACTAM : 4 Susceptible  CEFTAZIDIME : <=1 Susceptible        Current Medications     Current Medications Reviewed and updated in Nursing Home EMR  Assessment and Plan     Pressure injury of left heel, unstageable ([facility] )  · Left heel unstageable pressure ulcer  · Continue frequent dressing changes  · Consult wound care weekly  · Encourage protein intake to assist in wound healing  Pressure injury of right buttock, stage 2 (HCC)  · Stage ll pressure ulcer of right buttock  · Continue dressing changes per wound care orders  · Continue to consult wound care weekly  · Continue to encourage frequent changes in position and offloading  · Encourage protein intake to assist in wound healing  Recurrent UTI  · Continue to encourage PO intake with frequent toileting schedule  · Teach patient proper wiping technique  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 (CODE) ([facility] )     Lab Results   Component Value Date    HGBA1C 8 0 (H) 08/10/2021     · Average Bgs running between 200-250  · Continue Metformin 500 mg PO Daily with breakfast   · Will monitor BMP for renal status  · Continue hypoglycemia protocol    · Continue Accuchecks qAC and [facility]        [address] [facility]  Geriatric Medicine  9/9/2021

## 2021-09-09 NOTE — ASSESSMENT & PLAN NOTE
· Left heel unstageable pressure ulcer  · Continue frequent dressing changes  · Consult wound care weekly  · Encourage protein intake to assist in wound healing

## 2021-09-09 NOTE — ASSESSMENT & PLAN NOTE
· Continue to encourage PO intake with frequent toileting schedule  · Teach patient proper wiping technique

## 2021-10-11 PROBLEM — E08.622: Status: RESOLVED | Noted: 2020-09-09 | Resolved: 2021-01-01

## 2021-10-11 PROBLEM — E11.9 TYPE 2 DIABETES MELLITUS (HCC): Status: ACTIVE | Noted: 2021-01-01

## 2021-10-18 PROBLEM — R10.31 RIGHT LOWER QUADRANT ABDOMINAL PAIN: Status: ACTIVE | Noted: 2021-01-01

## 2022-01-01 ENCOUNTER — NURSING HOME VISIT (OUTPATIENT)
Dept: GERIATRICS | Facility: OTHER | Age: 87
End: 2022-01-01
Payer: MEDICARE

## 2022-01-01 ENCOUNTER — TELEPHONE (OUTPATIENT)
Dept: GERIATRICS | Age: 87
End: 2022-01-01

## 2022-01-01 ENCOUNTER — TELEPHONE (OUTPATIENT)
Dept: OTHER | Facility: OTHER | Age: 87
End: 2022-01-01

## 2022-01-01 ENCOUNTER — NURSING HOME VISIT (OUTPATIENT)
Dept: GERIATRICS | Age: 87
End: 2022-01-01
Payer: MEDICARE

## 2022-01-01 DIAGNOSIS — R26.2 AMBULATORY DYSFUNCTION: ICD-10-CM

## 2022-01-01 DIAGNOSIS — Z71.89 GOALS OF CARE, COUNSELING/DISCUSSION: ICD-10-CM

## 2022-01-01 DIAGNOSIS — E43 SEVERE PROTEIN-CALORIE MALNUTRITION (HCC): Primary | ICD-10-CM

## 2022-01-01 DIAGNOSIS — R55 VASOVAGAL SYNCOPE: Primary | ICD-10-CM

## 2022-01-01 DIAGNOSIS — I48.0 PAROXYSMAL ATRIAL FIBRILLATION (HCC): ICD-10-CM

## 2022-01-01 DIAGNOSIS — F41.8 DEPRESSION WITH ANXIETY: ICD-10-CM

## 2022-01-01 DIAGNOSIS — K59.01 SLOW TRANSIT CONSTIPATION: ICD-10-CM

## 2022-01-01 DIAGNOSIS — N18.30 STAGE 3 CHRONIC KIDNEY DISEASE, UNSPECIFIED WHETHER STAGE 3A OR 3B CKD (HCC): ICD-10-CM

## 2022-01-01 DIAGNOSIS — E11.9 TYPE 2 DIABETES MELLITUS WITHOUT COMPLICATION, WITHOUT LONG-TERM CURRENT USE OF INSULIN (HCC): ICD-10-CM

## 2022-01-01 DIAGNOSIS — J02.9 SORE THROAT: ICD-10-CM

## 2022-01-01 DIAGNOSIS — I10 BENIGN ESSENTIAL HYPERTENSION: ICD-10-CM

## 2022-01-01 DIAGNOSIS — R53.1 GENERALIZED WEAKNESS: ICD-10-CM

## 2022-01-01 DIAGNOSIS — Z71.89 ENCOUNTER FOR MEDICATION REVIEW AND COUNSELING: Primary | ICD-10-CM

## 2022-01-01 DIAGNOSIS — R10.84 GENERALIZED ABDOMINAL PAIN: Primary | ICD-10-CM

## 2022-01-01 DIAGNOSIS — K21.9 GASTROESOPHAGEAL REFLUX DISEASE, UNSPECIFIED WHETHER ESOPHAGITIS PRESENT: ICD-10-CM

## 2022-01-01 DIAGNOSIS — N30.01 ACUTE CYSTITIS WITH HEMATURIA: ICD-10-CM

## 2022-01-01 PROCEDURE — 99309 SBSQ NF CARE MODERATE MDM 30: CPT | Performed by: NURSE PRACTITIONER

## 2022-01-01 PROCEDURE — 99308 SBSQ NF CARE LOW MDM 20: CPT | Performed by: NURSE PRACTITIONER

## 2022-01-01 PROCEDURE — 99310 SBSQ NF CARE HIGH MDM 45: CPT | Performed by: NURSE PRACTITIONER

## 2022-01-03 NOTE — PROGRESS NOTES
[address] [facility] [address]  ([address] [facility]) [address] [facility]   [address]  Progress Note        NAME: Eva Brown  AGE: 80 y o  SEX: female  :  1930  DATE OF ENCOUNTER: 1/3/2022    Chief Complaint   Patient seen and examined for follow up on chronic conditions  History of Present Illness     Eva Brown is a 80 y o  female LTC resident of [facility] with underlying medical conditions of  Type 2 DM, hypertension, CAD, atrial fibrillation, osteoarthritis, CKD stage 3, neuropathy, hyperlipidemia, anemia, and ambulatory dysfunction  The patient is being seen and examined today for follow-up on acute and chronic medical conditions  Upon examination, the patient is sitting in her recliner, alert, cooperative, and in no acute distress  She denies pain, sob, chest pain, abdominal pain, fever, chills, nausea/vomiting, diarrhea/constipation, or dysuria  The patient reports she has a good appetite and is drinking an adequate amount of fluids  Nursing reports that the patient had syncopal episode x 2 yesterday, 22 s/p continent bm lasting approximately 30 to 45 seconds in duration each time  After gaining consciousness patient was alert and oriented x 3 with some intermittent confusion to situation  The patient's vital signs were reported within normal parameters  The patient reports she is feeling well today and is still unable to recall the syncopal events that occurred  The following portions of the patient's history were reviewed and updated as appropriate: allergies, current medications, past family history, past medical history, past social history, past surgical history and problem list     Review of Systems     A review of systems was performed  All negative, except as per HPI      History     Past Medical History:   Diagnosis Date    Anemia     Atrial fibrillation (HCC)     CAD (coronary artery disease)     Cancer (HCC)     Carotid artery obstruction  Coronary artery disease     s/p stent x4     Disease of thyroid gland     TIA    GERD (gastroesophageal reflux disease)     HL (hearing loss)     Wears hearing aids    Hyperlipidemia     Hypertension     Mesenteric ischemia, chronic (HCC)     Syncopal episodes 2022    TIA (transient ischemic attack)      Past Surgical History:   Procedure Laterality Date    ANGIOPLASTY  01/01/2009    x4, stent placed in [address] [facility] Right 2005    CARDIAC CATHETERIZATION      CATARACT EXTRACTION      CHOLECYSTECTOMY      COLONOSCOPY  2016    CORONARY ANGIOPLASTY      stent x4 07/11/1996x1 10/09/2009 x3    CORONARY STENT PLACEMENT      HYSTERECTOMY      REPLACEMENT TOTAL KNEE Right      Family History   Problem Relation Age of Onset    Leukemia Father     Hypertension Sister     Heart attack Brother 46    Hypertension Brother     Sudden death Brother 46        scd    Heart failure Maternal Grandmother     Hypertension Maternal Grandmother     Stroke Maternal Grandfather     Hypertension Daughter     Anuerysm Neg Hx     Clotting disorder Neg Hx     Arrhythmia Neg Hx     Hyperlipidemia Neg Hx      Social History     Socioeconomic History    Marital status:       Spouse name: Not on file    Number of children: Not on file    Years of education: Not on file    Highest education level: Not on file   Occupational History    Not on file   Tobacco Use    Smoking status: Never Smoker    Smokeless tobacco: Never Used   Vaping Use    Vaping Use: Never used   Substance and Sexual Activity    Alcohol use: No    Drug use: No    Sexual activity: Not on file   Other Topics Concern    Not on file   Social History Narrative    Most recent tobacco use screenin2019      Exercise level:   None      Caffeine intake:   None      Diet:   Regular      Social Determinants of Health     Financial Resource Strain: Not on file   Food Insecurity: Not on file   Transportation Needs: Not on file   Physical Activity: Not on file   Stress: Not on file   Social Connections: Not on file   Intimate Partner Violence: Not on file   Housing Stability: Not on file     Allergies   Allergen Reactions    Adhesive [Medical Tape]      Paper tape okay    Ezetimibe     Fenofibrate     Flecainide     Levaquin [Levofloxacin] Other (See Comments)     Muscle weakness    Lovastatin     Sulfa Antibiotics     Thioridazine        Objective     Vital Signs  BP: 108/50      HR:81 T:97 3    RR:22 O2Sat:95% W:136  General: NAD, Well Nourished, Well Developed  Oral: Oropharynx Moist and Clear  Neck: Supple, +ROM  CV: S1, S2, normal rate, irregular rhythm, no murmur appreciated  Pulmonary: Lung sounds clear to air, no wheezing, rhonchi, rales  Abdominal:BS + x4 in all quadrants, soft, no mass, no tenderness  Extremities: No edema, +ROM, +Strength  Skin: Warm, Dry, no lesions, no rash, no erythema present, no ecchymosis present  Psych: Alert and oriented times 3, no mood, no affect, good judgement    Pertinent Laboratory/Diagnostic Studies:  Labs will be drawn 1/4/22       Current Medications     Current Medications Reviewed and updated in Nursing Home EMR  Assessment and Plan     Syncopal episodes  · Per nursing report patient had syncopal episodes x 2 on 1/2/22 s/p continent bm lasting approximately 30 to 45 seconds in duration each time  · Per nursing report after patient regained consciousness patient was alert and oriented x 3 with some intermittent confusion to situation  · Patient reports feeling well and appears at baseline    · Will have CBC and CMP drawn  · Continue to monitor    Paroxysmal atrial fibrillation (HCC)  · Continue Carvedilol 125 mg Q 12 hr rate control  · Continue Aspirin 81 mg for anticoag  · Follow-up with Cardiology as needed      Benign essential hypertension  · BP today 108/50  · Continue Clonidine, Amlodipine, and Carvedilol  · Avoid hypotension  · Will continue to monitor BMP      CKD (chronic kidney disease) stage 3, GFR 30-59 ml/min ([facility])  · Continue Lasix and Potassium  · Will continue to monitor BMP  · Avoid nephrotoxins and NSAIDS  · Avoid hypotension  · Encourage PO fluid intake      Ambulatory dysfunction   Maintain fall and safety precautions   Encourage use of call bell   Encourage use of assistive device at all times   Assist with transfers, mobility, and ADLs        [address] [facility]  Geriatric Medicine  1/3/2022

## 2022-01-09 PROBLEM — R55 SYNCOPAL EPISODES: Status: ACTIVE | Noted: 2022-01-01

## 2022-01-09 NOTE — ASSESSMENT & PLAN NOTE
· Continue Carvedilol 125 mg Q 12 hr rate control  · Continue Aspirin 81 mg for anticoag  · Follow-up with Cardiology as needed

## 2022-01-09 NOTE — ASSESSMENT & PLAN NOTE
· Continue Lasix and Potassium  · Will continue to monitor BMP  · Avoid nephrotoxins and NSAIDS  · Avoid hypotension  · Encourage PO fluid intake

## 2022-01-09 NOTE — ASSESSMENT & PLAN NOTE
· Per nursing report patient had syncopal episodes x 2 on 1/2/22 s/p continent bm lasting approximately 30 to 45 seconds in duration each time  · Per nursing report after patient regained consciousness patient was alert and oriented x 3 with some intermittent confusion to situation  · Patient reports feeling well and appears at baseline    · Will have CBC and CMP drawn  · Continue to monitor

## 2022-01-09 NOTE — ASSESSMENT & PLAN NOTE
 Maintain fall and safety precautions   Encourage use of call bell   Encourage use of assistive device at all times   Assist with transfers, mobility, and ADLs

## 2022-01-09 NOTE — ASSESSMENT & PLAN NOTE
· BP today 108/50  · Continue Clonidine, Amlodipine, and Carvedilol  · Avoid hypotension  · Will continue to monitor BMP

## 2022-01-13 NOTE — PROGRESS NOTES
[address]  [address] [facility]  [phone]  [address] [place] [address]   Acute Visit Note  [address]      NAME: Eva Brown  AGE: 80 y o  SEX: female  :  1930  DATE OF ENCOUNTER: 2022    Chief Complaint   Patient seen and examined for vasovagal episode    History of Present Illness     Aditya Aguilar is a 80year old female patient with multiple co-morbidities including but not limited to hypertension, atrial fibrillation, CKD stage 3, neuropathy, hyperlipidemia, Type 2 DM, CAD, osteoarthritis, ambulatory dysfunction seen and examined per nursing request for vasovagal episodes  Nursing reports that today when the patient was in the bathroom, she had a very large, hard BM followed by 2 vasovagal episodes lasting 8 to 10 seconds  This is the second episode, on 22, patient experienced 2 vasovagal episodes in the bathroom lasting 30 to 45 seconds each  Patient was assessed by nursing, no injuries noted, no neuro focal deficits  Upon examination, patient is awake, alert and in no acute distress  She denies chest pain, sob, abdominal pain, n/v/d, headache, dizziness or visual disturbance  The following portions of the patient's history were reviewed and updated as appropriate: allergies, current medications, past family history, past medical history, past social history, past surgical history and problem list     Review of Systems     A review of systems was performed  All negative, except as per HPI      History     Past Medical History:   Diagnosis Date    Anemia     Atrial fibrillation ([facility] [address] )     CAD (coronary artery disease)     Cancer (HCC)     Carotid artery obstruction     Coronary artery disease     s/p stent x4     Disease of thyroid gland     TIA    GERD (gastroesophageal reflux disease)     HL (hearing loss)     Wears hearing aids    Hyperlipidemia     Hypertension     Mesenteric ischemia, chronic (HCC)     Syncopal episodes 2022    TIA (transient ischemic attack)      Past Surgical History:   Procedure Laterality Date    ANGIOPLASTY  01/01/2009    x4, [address] placed [address] 2005    CARDIAC CATHETERIZATION      CATARACT EXTRACTION      CHOLECYSTECTOMY      COLONOSCOPY  2016    CORONARY ANGIOPLASTY      stent x4 07/11/1996x1 10/09/2009 x3    CORONARY STENT PLACEMENT      HYSTERECTOMY      REPLACEMENT TOTAL KNEE Right      Family History   Problem Relation Age of Onset    Leukemia Father     Hypertension Sister     Heart attack Brother 46    Hypertension Brother     Sudden death Brother 46        scd    Heart failure Maternal Grandmother     Hypertension Maternal Grandmother     Stroke Maternal Grandfather     Hypertension Daughter     Anuerysm Neg Hx     Clotting disorder Neg Hx     Arrhythmia Neg Hx     Hyperlipidemia Neg Hx      Social History     Socioeconomic History    Marital status:       Spouse name: Not on file    Number of children: Not on file    Years of education: Not on file    Highest education level: Not on file   Occupational History    Not on file   Tobacco Use    Smoking status: Never Smoker    Smokeless tobacco: Never Used   Vaping Use    Vaping Use: Never used   Substance and Sexual Activity    Alcohol use: No    Drug use: No    Sexual activity: Not on file   Other Topics Concern    Not on file   Social History Narrative    Most recent tobacco use screenin2019      Exercise level:   None      Caffeine intake:   None      Diet:   Regular      Social Determinants of Health     Financial Resource Strain: Not on file   Food Insecurity: Not on file   Transportation Needs: Not on file   Physical Activity: Not on file   Stress: Not on file   Social Connections: Not on file   Intimate Partner Violence: Not on file   Housing Stability: Not on file     Allergies   Allergen Reactions    Adhesive [Medical Tape]      Paper tape okay    Ezetimibe     Fenofibrate     Flecainide     Levaquin [Levofloxacin] Other (See Comments)     Muscle weakness    Lovastatin     Sulfa Antibiotics     Thioridazine        Objective     Vital Signs  BP: 120/70       HR: 63 T 97 8   RR:16 O2Sat: 98% RA W: 137 9 lbs  General: NAD, Non-toxic, frail and deconditioned  CV: S1, S2, normal rate, irregular rhythm, no murmur appreciated  Pulmonary: Lung sounds clear to air, no wheezing, rhonchi, rales  Abdominal:BS + x4 in all quadrants, soft, no mass, no tenderness  Extremities: No edema, +ROM, +Strength  Skin: Warm, Dry, no lesions, no rash, no erythema present, no ecchymosis present  Neurological: No cranial nerve deficits  Psych: Alert and oriented times 3, no mood, no affect    Pertinent Laboratory/Diagnostic Studies:  Reviewed in nursing home EMR  Current Medications     Current Medications Reviewed and updated in Nursing Home EMR      Assessment and Plan     Syncopal episodes  · Two syncopal episodes after having a large, hard BM  · Will order blood work to rule out underlying causes  · Encourage adequate PO fluid intake  · Continue to monitor frequent neuro checks per facility protocol    Slow transit constipation  · Report of vasovagal episodes when having bowel movements  · Continue sorbitol daily  · Will order Colace 100 mg PO bid    Type 2 diabetes mellitus ([facility] )    Lab Results   Component Value Date    HGBA1C 8 0 (H) 08/10/2021   · A1C at goal of 8 0%  · Continue insulin sliding scale and Metformin  · Avoid hypoglycemia  · Continue a healthy diet and exercise as tolerated    Benign essential hypertension  · BP stable today at 120/70  · Continue Clonidine, Amlodipine and Carvedilol  · Monitor for hypotension  · CMP ordered to monitor electrolytes and renal function      [address] [facility]  01/13/2022

## 2022-01-16 PROBLEM — K59.01 SLOW TRANSIT CONSTIPATION: Status: ACTIVE | Noted: 2022-01-01

## 2022-01-17 NOTE — ASSESSMENT & PLAN NOTE
Lab Results   Component Value Date    HGBA1C 8 0 (H) 08/10/2021   · A1C at goal of 8 0%  · Continue insulin sliding scale and Metformin  · Avoid hypoglycemia  · Continue a healthy diet and exercise as tolerated

## 2022-01-17 NOTE — ASSESSMENT & PLAN NOTE
· BP stable today at 120/70  · Continue Clonidine, Amlodipine and Carvedilol  · Monitor for hypotension  · CMP ordered to monitor electrolytes and renal function

## 2022-01-17 NOTE — ASSESSMENT & PLAN NOTE
· Report of vasovagal episodes when having bowel movements  · Continue sorbitol daily  · Will order Colace 100 mg PO bid

## 2022-01-17 NOTE — ASSESSMENT & PLAN NOTE
· Two syncopal episodes after having a large, hard BM  · Will order blood work to rule out underlying causes  · Encourage adequate PO fluid intake  · Continue to monitor frequent neuro checks per facility protocol

## 2022-01-19 NOTE — PROGRESS NOTES
[address] [facility] [address]  ([address] [facility]) [facility]   [address]  Progress Note        NAME: Riaz Brooks  AGE: 80 y o  SEX: female  :  1930  DATE OF ENCOUNTER:2022    Chief Complaint   Patient seen and examined for follow up on chronic conditions  History of Present Illness     Riaz Brooks is a 80 y o  female LT resident of [facility] with underlying medical conditions of  Type 2 DM, hypertension, CAD, atrial fibrillation, osteoarthritis, CKD stage 3, neuropathy, hyperlipidemia, anemia, and ambulatory dysfunction      The patient is being seen and examined today by request to discuss her medication regimen  Upon examination, the patient is sitting in her recliner, alert, cooperative, and in no acute distress  She denies pain, sob, chest pain, abdominal pain, fever, chills, nausea/vomiting, diarrhea/constipation, or dysuria  The patient reports she has a good appetite and is drinking an adequate amount of fluids  The patient would like to discuss how she is taking her medications and see if there is a way if we can change the administration so that she is not taking so many all at once in the morning and if there are any medications that can be eliminated  We sat down and reviewed all of her medications and she is no longer having GERD/heartburn and would like to try discontinuing her pantoprazole  It was also decided that some of her medications could be moved to the noon and night time hours for administration from morning  The following portions of the patient's history were reviewed and updated as appropriate: allergies, current medications, past family history, past medical history, past social history, past surgical history and problem list     Review of Systems     A review of systems was performed  All negative, except as per HPI      History     Past Medical History:   Diagnosis Date    Anemia     Atrial fibrillation ([address] )     CAD (coronary artery disease)     Cancer ([facility] [address]     Carotid artery obstruction     Coronary artery disease     s/p stent x4     Disease of thyroid gland     TIA    Encounter for medication review and counseling 2022    GERD (gastroesophageal reflux disease)     HL (hearing loss)     Wears hearing aids    Hyperlipidemia     Hypertension     Mesenteric ischemia, chronic (HCC)     Syncopal episodes 2022    TIA (transient ischemic attack)      Past Surgical History:   Procedure Laterality Date    ANGIOPLASTY  01/01/2009    x4, stent placed in [facility] Right 2005    CARDIAC CATHETERIZATION      CATARACT EXTRACTION      CHOLECYSTECTOMY      COLONOSCOPY  2016    CORONARY ANGIOPLASTY      stent x4 07/11/1996x1 10/09/2009 x3    CORONARY STENT PLACEMENT      HYSTERECTOMY      REPLACEMENT TOTAL KNEE Right      Family History   Problem Relation Age of Onset    Leukemia Father     Hypertension Sister     Heart attack Brother 46    Hypertension Brother     Sudden death Brother 46        scd    Heart failure Maternal Grandmother     Hypertension Maternal Grandmother     Stroke Maternal Grandfather     Hypertension Daughter     Anuerysm Neg Hx     Clotting disorder Neg Hx     Arrhythmia Neg Hx     Hyperlipidemia Neg Hx      Social History     Socioeconomic History    Marital status:       Spouse name: Not on file    Number of children: Not on file    Years of education: Not on file    Highest education level: Not on file   Occupational History    Not on file   Tobacco Use    Smoking status: Never Smoker    Smokeless tobacco: Never Used   Vaping Use    Vaping Use: Never used   Substance and Sexual Activity    Alcohol use: No    Drug use: No    Sexual activity: Not on file   Other Topics Concern    Not on file   Social History Narrative    Most recent tobacco use screenin2019      Exercise level:   None      Caffeine intake:   None      Diet:   Regular      Social Determinants of Health     Financial Resource Strain: Not on file   Food Insecurity: Not on file   Transportation Needs: Not on file   Physical Activity: Not on file   Stress: Not on file   Social Connections: Not on file   Intimate Partner Violence: Not on file   Housing Stability: Not on file     Allergies   Allergen Reactions    Adhesive [Medical Tape]      Paper tape okay    Ezetimibe     Fenofibrate     Flecainide     Levaquin [Levofloxacin] Other (See Comments)     Muscle weakness    Lovastatin     Sulfa Antibiotics     Thioridazine        Objective     Vital Signs  BP: 104/52      HR:63 T:97 3    RR:16 O2Sat:94% W:137 9  General: NAD, Well Nourished, Well Developed  Oral: Oropharynx Moist and Clear  Neck: Supple, +ROM  CV: S1, S2, normal rate, regular rhythm, no murmur appreciated  Pulmonary: Lung sounds clear to air, no wheezing, rhonchi, rales  Abdominal:BS + x4 in all quadrants, soft, no mass, no tenderness  Extremities: No edema, +ROM, +Strength  Skin: Warm, Dry, no lesions, no rash, no erythema present, no ecchymosis present  Neurological: CN 2-12 intact, PERRLA  Psych: Alert and oriented times 3, no mood, no affect, good judgement    Pertinent Laboratory/Diagnostic Studies:  HEMATOCRIT 27 1 % 35 0-43 0 L Final  HEMOGLOBIN 9 1 g/dL 11 5-14 5 L Final  BASIC METABOLIC PNL  GLUCOSE 199 mg/dL 65-99 H Final  BUN 21 mg/dL 7-25 Final  CREATININE 1 06 mg/dL 0 40-1 10 Final  SODIUM 141 mmol/L 135-145 Final  POTASSIUM 3 9 mmol/L 3 5-5 2 Final  CHLORIDE 108 mmol/L 100-109 Final  CARBON DIOXIDE 25 mmol/L 23-31 Final  CALCIUM 8 8 mg/dL 8 5-10 1 Final  ANION GAP 8 3-11 Final  eGFR, NON  AM 46 >60 L Final  eGFR,  AMER 53 >60 L Final      Current Medications     Current Medications Reviewed and updated in Nursing Home EMR  Assessment and Plan     Encounter for medication review and counseling  · Patient feels she is taking too many pills the a m  and wanted to review and discuss discontinuing medication and/or changing administration times  · Reviewed all medications with patient including purpose, administration and side effects  · Discussed with patient moving certain medications from morning administration to noon/evening administered to lessen burden of taking so many pills in the a m  · Patient agreeable to this plan      GERD (gastroesophageal reflux disease)  · Patient denies further problems with GERD/heartburn  · Agreeable to discontinue pantoprazole  · Patient counseled to avoid spicy and citrus foods      [address] [facility]  Geriatric Medicine  1/19/2022

## 2022-01-22 PROBLEM — Z71.89 ENCOUNTER FOR MEDICATION REVIEW AND COUNSELING: Status: ACTIVE | Noted: 2022-01-01

## 2022-01-22 NOTE — ASSESSMENT & PLAN NOTE
· Patient denies further problems with GERD/heartburn  · Agreeable to discontinue pantoprazole  · Patient counseled to avoid spicy and citrus foods

## 2022-01-22 NOTE — ASSESSMENT & PLAN NOTE
· Patient feels she is taking too many pills the a m  and wanted to review and discuss discontinuing medication and/or changing administration times  · Reviewed all medications with patient including purpose, administration and side effects  · Discussed with patient moving certain medications from morning administration to noon/evening administered to lessen burden of taking so many pills in the a m  · Patient agreeable to this plan

## 2022-02-04 NOTE — PROGRESS NOTES
[facility]  [address] [facility] [place] [address]  [phone] [facility]   Acute Visit Note  [address]        NAME: N  C   AGE: 80 y o  SEX: female  :  1930  DATE OF ENCOUNTER: 2022     Chief Complaint   Patient seen and examined for follow up on chronic conditions  History of Present Illness      The patient is a 80year old female with multiple comorbidities including but not limited to HTN, atrial fibrillation, CKD, DM, CAD, osteoarthritis, and ambulatory dysfunction  She is seen today for reports of not feeling well per nursing staff  Upon assessment, the patient was found sleeping in her bed  She immediately stated that she is not doing well today  She states that she is sick and that her stomach hurts  Her abdomen was tender upon palpation  She denies any nausea or vomiting  It is noted that the patient does not have very regular BMs but it was marked that she did have a large BM today  She also stated that her throat is a little sore as well  Her vital signs are stable  The following portions of the patient's history were reviewed and updated as appropriate: allergies, current medications, past family history, past medical history, past social history, past surgical history and problem list      Review of Systems      A review of systems was performed  All negative, except as per HPI       History      Past Medical History:   Diagnosis Date    Anemia      Atrial fibrillation ([facility] [address] )      CAD (coronary artery disease)      Cancer (HCC)      Carotid artery obstruction      Coronary artery disease       s/p stent x4    Disease of thyroid gland       TIA    Encounter for medication review and counseling 2022    GERD (gastroesophageal reflux disease)      HL (hearing loss)       Wears hearing aids    Hyperlipidemia      Hypertension      Mesenteric ischemia, chronic (HCC)      Syncopal episodes 2022    TIA (transient ischemic attack)        Past Surgical History:   Procedure Laterality Date    ANGIOPLASTY   01/01/2009     x4, stent placed in [address] Right 2005    CARDIAC CATHETERIZATION        CATARACT EXTRACTION        CHOLECYSTECTOMY        COLONOSCOPY   2016    CORONARY ANGIOPLASTY         stent x4 07/11/1996x1 10/09/2009 x3    CORONARY STENT PLACEMENT        HYSTERECTOMY        REPLACEMENT TOTAL KNEE Right        Family History   Problem Relation Age of Onset    Leukemia Father      Hypertension Sister      Heart attack Brother 46    Hypertension Brother      Sudden death Brother 46     scd    Heart failure Maternal Grandmother      Hypertension Maternal Grandmother      Stroke Maternal Grandfather      Hypertension Daughter      Anuerysm Neg Hx      Clotting disorder Neg Hx      Arrhythmia Neg Hx      Hyperlipidemia Neg Hx        Social History      Socioeconomic History    Marital status:         Spouse name: Not on file    Number of children: Not on file    Years of education: Not on file    Highest education level: Not on file   Occupational History    Not on file   Tobacco Use    Smoking status: Never Smoker    Smokeless tobacco: Never Used   Vaping Use    Vaping Use: Never used   Substance and Sexual Activity    Alcohol use: No    Drug use: No    Sexual activity: Not on file   Other Topics Concern    Not on file   Social History Narrative     Most recent tobacco use screenin2019      Exercise level:   None      Caffeine intake:   None      Diet:   Regular            Social Determinants of Health      Financial Resource Strain: Not on file   Food Insecurity: Not on file   Transportation Needs: Not on file   Physical Activity: Not on file   Stress: Not on file   Social Connections: Not on file   Intimate Partner Violence: Not on file   Housing Stability: Not on file      Allergies   Allergen Reactions    Adhesive [Medical Tape]         Paper tape okay    Ezetimibe      Fenofibrate      Flecainide      Levaquin [Levofloxacin] Other (See Comments)       Muscle weakness    Lovastatin      Sulfa Antibiotics      Thioridazine                 Objective      Vital Signs  BP: 134/72  HR: 63   T: 98 4 F    RR: 16      O2Sat: 96% RA    W: 137 9lbs  General: NAD, Well Nourished, Well Developed  Oral: Oropharynx Moist and Clear, complaints of throat soreness  Neck: Supple, +ROM  CV: S1, S2, normal rate, regular rhythm, no murmur appreciated  Pulmonary: Lung sounds clear to air, no wheezing, rhonchi, rales  Abdominal:BS + x4 in all quadrants, soft, no mass, positive tenderness  Extremities: No edema, +ROM, +Strength  Skin: Warm, Dry, no lesions, no rash, no erythema present, no ecchymosis present  Neurological: CN 2-12 intact, PERRLA  Psych: Alert and oriented to person, forgetful     Pertinent Laboratory/Diagnostic Studies:       HGB/HCT     HEMATOCRIT 27 1 % 35 0-43 0 L Final            HEMOGLOBIN 9 1 g/dL 11 5-14 5 L Final          BASIC METABOLIC PNL     GLUCOSE 745 mg/dL 65-99 H Final            BUN 21 mg/dL 7-25  Final            CREATININE 1 06 mg/dL 0 40-1 10  Final            SODIUM 141 mmol/L 135-145  Final            POTASSIUM 3 9 mmol/L 3 5-5 2  Final            CHLORIDE 108 mmol/L 100-109  Final            CARBON DIOXIDE 25 mmol/L 23-31  Final            CALCIUM 8 8 mg/dL 8 5-10 1  Final            ANION GAP 8  3-11  Final            eGFR, NON  AM 46  >60 L Final            eGFR,  AMER 53  >60 L Final            eGFR COMMENT (Note)    Final     Units: mL/min per 1 73 square meters                                           Normal Function or Mild Renal    Disease (if clinically at risk):  >or=60  Moderately Decreased:                30-59  Severely Decreased:                  15-29  Renal Failure:                         <15                                           Please note that the eGFR is based on the CKD-EPI calculation, and is  not intended to be used for drug dosing  Note: Calculated GFR may not be an accurate indicator of renal  function if the patient's renal function is not in a steady state  Current Medications      Current Medications Reviewed and updated in Nursing Home EMR       Assessment and Plan      Generalized Abdominal pain         Patient reports not feeling well and having abdominal pain with positive tenderness upon palpation            Noted that patient did have a large BM this afternoon, denies any nausea or vomiting            Will consider  abdominal XR if s/s persist            Consider ordering PPI therapy given patient throat soreness as well            Follow up with routine labs     Sore throat         Negative upon assessment            Consider PPI therapy given additional abdominal tenderness     HTN         BP stable and controlled            Continue BP medications at current doses            Monitor for hypotension given patients history of syncopal episodes            Continue to monitor BPs as ordered     DM         hgbA1C at goal            Continue metformin and insulin as needed            Encourage healthy diet and exercise as tolerated     CKD         Follow up with routine blood work            Avoid nephrotoxins            Encourage fluid intake     Atrial Fibrillation         HR stable and controlled            Continue rate control medications            Not currently on any AC other than ASA     Ambulatory dysfunction         Continue PT/OT to optimize mobility and function            Encourage assistance with ambulation and use of assistive devices       Kulwant Camejo, [facility]  02/04/2022

## 2022-02-07 PROBLEM — J02.9 SORE THROAT: Status: ACTIVE | Noted: 2022-01-01

## 2022-02-07 PROBLEM — R10.84 GENERALIZED ABDOMINAL PAIN: Status: ACTIVE | Noted: 2022-01-01

## 2022-02-14 PROBLEM — Z71.89 GOALS OF CARE, COUNSELING/DISCUSSION: Status: ACTIVE | Noted: 2020-12-15

## 2022-02-14 PROBLEM — F41.8 DEPRESSION WITH ANXIETY: Status: ACTIVE | Noted: 2021-01-01

## 2022-02-14 PROBLEM — N30.00 ACUTE CYSTITIS: Status: ACTIVE | Noted: 2022-01-01

## 2022-02-14 PROBLEM — E43 SEVERE PROTEIN-CALORIE MALNUTRITION (HCC): Status: ACTIVE | Noted: 2022-01-01

## 2022-02-14 NOTE — ASSESSMENT & PLAN NOTE
· History of frequent UTIs  · Currently positive for UTI  · Continue Nitrofurantoin   · Encourage adequate PO fluid intake

## 2022-02-14 NOTE — ASSESSMENT & PLAN NOTE
· Appetite decreased, patient only eating 25-50% of her meals  · 10 lb weight loss reported since 01/11/2022  ·  Continue protein supplementation  · Encourage adequate PO intake as tolerated  · Will consult hospice services to evaluate and treat

## 2022-02-14 NOTE — PROGRESS NOTES
[address] [address]  [phone]  [address] [place] [address]   Progress Note  POS 32      NAME: Eva Oneill  AGE: 80 y o  SEX: female  :  1930  DATE OF ENCOUNTER: 2022    Chief Complaint   Patient seen and examined for follow up on chronic conditions  History of Present Illness     Ann-eMarie Carrasco is a 80year old female patient with underlying atrial fibrillation, benign essential hypertension, CAD with history of NSTEMI, familial hypercholesterolemia, popliteal artery aneurysm, SVT, ambulatory dysfunction, CKD stage 3, osteoarthritis of multiple joints, recurrent UTI, type 2 diabetes mellitus  She is seen and examined today in long term care at [facility]  to follow-up on acute and chronic medical conditions  Patient is reported to be declining over the past several months  Her appetite is reported to be decreased for the past several weeks,she is only eating between 25 to 50% of her meals  She has a reported weight loss of 10 lbs since 2022  She has had multiple vasovagal episodes in the bathroom where she was unresponsive for several seconds  She has a history of frequent UTIs and is currently receiving PO Nitrofurantoin for an acute UTI  Nursing had a meeting with family today and they expressed that they wish to keep their mother comfortable, they do not wish for aggressive interventions or further hospitalizations  The following portions of the patient's history were reviewed and updated as appropriate: allergies, current medications, past family history, past medical history, past social history, past surgical history and problem list     Review of Systems     A review of systems was performed  All negative, except as per HPI      History     Past Medical History:   Diagnosis Date    Anemia     Atrial fibrillation ([address] )     CAD (coronary artery disease)     Cancer (HCC)     Carotid artery obstruction     Coronary artery disease     s/p stent x4     Disease of thyroid gland     TIA    Encounter for medication review and counseling 2022    GERD (gastroesophageal reflux disease)     HL (hearing loss)     Wears hearing aids    Hyperlipidemia     Hypertension     Mesenteric ischemia, chronic (HCC)     Syncopal episodes 2022    TIA (transient ischemic attack)      Past Surgical History:   Procedure Laterality Date    ANGIOPLASTY  01/01/2009    x4, stent placed in [address] Right 2005    CARDIAC CATHETERIZATION      CATARACT EXTRACTION      CHOLECYSTECTOMY      COLONOSCOPY  2016    CORONARY ANGIOPLASTY      stent x4 07/11/1996x1 10/09/2009 x3    CORONARY STENT PLACEMENT      HYSTERECTOMY      REPLACEMENT TOTAL KNEE Right      Family History   Problem Relation Age of Onset    Leukemia Father     Hypertension Sister     Heart attack Brother 46    Hypertension Brother     Sudden death Brother 46        scd    Heart failure Maternal Grandmother     Hypertension Maternal Grandmother     Stroke Maternal Grandfather     Hypertension Daughter     Anuerysm Neg Hx     Clotting disorder Neg Hx     Arrhythmia Neg Hx     Hyperlipidemia Neg Hx      Social History     Socioeconomic History    Marital status:       Spouse name: Not on file    Number of children: Not on file    Years of education: Not on file    Highest education level: Not on file   Occupational History    Not on file   Tobacco Use    Smoking status: Never Smoker    Smokeless tobacco: Never Used   Vaping Use    Vaping Use: Never used   Substance and Sexual Activity    Alcohol use: No    Drug use: No    Sexual activity: Not on file   Other Topics Concern    Not on file   Social History Narrative    Most recent tobacco use screenin2019      Exercise level:   None      Caffeine intake:   None      Diet:   Regular      Social Determinants of Health     Financial Resource Strain: Not on file Food Insecurity: Not on file   Transportation Needs: Not on file   Physical Activity: Not on file   Stress: Not on file   Social Connections: Not on file   Intimate Partner Violence: Not on file   Housing Stability: Not on file     Allergies   Allergen Reactions    Adhesive [Medical Tape]      Paper tape okay    Ezetimibe     Fenofibrate     Flecainide     Levaquin [Levofloxacin] Other (See Comments)     Muscle weakness    Lovastatin     Sulfa Antibiotics     Thioridazine        Objective     Vital Signs  BP: 132/66     HR: 77  T: 97 5    RR: 16  O2Sat: 97% RA W: 127 9 lbs  General: NAD, Ill appearing, frail and deconditioned  Oral: Oropharynx Moist and Clear  Neck: Supple, +ROM  CV: S1, S2, normal rate, regular rhythm, no murmur appreciated  Pulmonary: Lung sounds clear to air, no wheezing, rhonchi, rales  Abdominal:BS + x4 in all quadrants, soft, no mass, no tenderness  Extremities: No edema, +ROM, +Strength  Skin: Warm, Dry, no lesions, no rash, no erythema present, no ecchymosis present  Neurological: CN 2-12 intact, PERRLA  Psych: Alert and oriented times 3, no mood, no affect, good judgement    Pertinent Laboratory/Diagnostic Studies:  02/08/2022  GLUCOSE 157 mg/dL 65-99 H Final              BUN 19 mg/dL 7-25  Final             CREATININE 0 91 mg/dL 0 40-1 10  Final             SODIUM 140 mmol/L 135-145  Final             POTASSIUM 3 8 mmol/L 3 5-5 2  Final             CHLORIDE 106 mmol/L 100-109  Final             CARBON DIOXIDE 25 mmol/L 23-31  Final             CALCIUM 9 1 mg/dL 8 5-10 1  Final             ALKALINE PHOSPHATASE 65 U/L   Final             ALBUMIN 2 6 g/dL 3 5-4 8 L Final             BILIRUBIN,TOTAL 0 3 mg/dL 0 2-1 0  Final     Use of this assay is not recommended for patients undergoing treatment   with eltrombopag due to the potential for falsely elevated results         PROTEIN, TOTAL 5 6 g/dL 6 3-8 3 L Final             AST 9 U/L <41  Final             ALT 14 U/L <56  Final           ANION GAP 9  3-11  Final             eGFR, NON  AM 55  >60 L Final             eGFR,  AMER 64  >60  Final             eGFR COMMENT (Note)    Final      Units: mL/min per 1 73 square meters                                              Normal Function or Mild Renal     Disease (if clinically at risk):  >or=60   Moderately Decreased:                30-59   Severely Decreased:                  15-29   Renal Failure:                         <15                                              Please note that the eGFR is based on the CKD-EPI calculation, and is   not intended to be used for drug dosing  Note: Calculated GFR may not be an accurate indicator of renal   function if the patient's renal function is not in a steady state       CBC WITH DIFF      HEMOGLOBIN 9 2 g/dL 11 5-14 5 L Final              HEMATOCRIT 28 1 % 35 0-43 0 L Final             WBC 10 5 thou/cmm 4 0-10 0 H Final             RBC 3 09 mill/cmm 3 70-4 70 L Final             PLATELET COUNT 135 thou/cmm 140-350  Final             MPV 6 8 fL 7 5-11 3 L Final             MCV 91 fL   Final             MCH 29 9 pg 26 0-34 0  Final             MCHC 32 8 g/dL 32 0-37 0  Final             RDW 15 0 % 12 0-16 0  Final             DIFFERENTIAL TYPE AUTO    Final             ABSOLUTE NEUT 7 1 thou/cmm 1 8-7 8  Final             ABSOLUTE LYMPH 2 3 thou/cmm 1 0-3 0  Final             ABSOLUTE MONO 0 9 thou/cmm 0 3-1 0  Final             ABSOLUTE EOS 0 2 thou/cmm 0 0-0 5  Final             ABSOLUTE BASO 0 0 thou/cmm 0 0-0 1  Final             NEUTROPHILS 67 %   Final             LYMPHOCYTES 22 %   Final             MONOCYTES 9 %   Final             EOSINOPHILS 2 %   Final             BASOPHILS 0 %   Final                        Current Medications     Current Medications Reviewed and updated in Nursing Home EMR      Assessment and Plan     Severe protein-calorie malnutrition ([address] )  · Appetite decreased, patient only eating 25-50% of her meals  · 10 lb weight loss reported since 01/11/2022  ·  Continue protein supplementation  · Encourage adequate PO intake as tolerated  · Will consult hospice services to evaluate and treat      CKD (chronic kidney disease) stage 3, GFR 30-59 ml/min ([facility])  Lab Results   Component Value Date    EGFR 43 08/10/2021    EGFR 37 08/10/2021    EGFR 40 06/28/2021    CREATININE 1 13 08/10/2021    CREATININE 1 27 08/10/2021    CREATININE 1 18 06/28/2021   · Creatinine stable at 0 91 on 02/08/2022  · Avoid nephrotoxins and hypotension   · Encourage adequate fluid intake    Paroxysmal atrial fibrillation (HCC)  · Rate controlled  · Continue Carvedilol 12 5 mg two times daily  · Continue ASA 81 mg for AC      Type 2 diabetes mellitus ([facility] )    Lab Results   Component Value Date    HGBA1C 8 0 (H) 08/10/2021   · A1C at goal of 8 0% per AGS and ADA guidlines  · Continue insulin sliding scale and Metformin  · Avoid hypoglycemia  · Continue a healthy diet and exercise as tolerated    Ambulatory dysfunction  · Patient primarily non-ambulatory at baseline  · Continue to provide assistance with transfers and ADLs in LTC  · Maintain fall precautions    Depression with anxiety  · Mood currently stable, patient is reported to experience periods of increased anxiety  · Lorazepam ordered every 12 hours prn for anxiety  · Continue frequent reassurance and supportive care    Generalized weakness  · Multifactorial  · Encourage adequate PO intake  · Monitor skin integrity  · Maintain fall precautions    Goals of care, counseling/discussion  · Family expressed goal of care to be that of comfort   They do not wish for further aggressive treatments, labs, diagnostic imaging or hospitalizations  · Will consult hospice services to evaluate and treat for protein calorie malnutrition    Acute cystitis  · History of frequent UTIs  · Currently positive for UTI  · Continue Nitrofurantoin   · Encourage adequate PO fluid intake      [facility]  2/14/2022

## 2022-02-14 NOTE — ASSESSMENT & PLAN NOTE
· Rate controlled  · Continue Carvedilol 12 5 mg two times daily  · Continue ASA 81 mg for [facility]

## 2022-02-14 NOTE — ASSESSMENT & PLAN NOTE
· Patient primarily non-ambulatory at baseline  · Continue to provide assistance with transfers and ADLs in LTC  · Maintain fall precautions

## 2022-02-14 NOTE — ASSESSMENT & PLAN NOTE
· Multifactorial  · Encourage adequate PO intake  · Monitor skin integrity  · Maintain fall precautions

## 2022-02-14 NOTE — ASSESSMENT & PLAN NOTE
Lab Results   Component Value Date    EGFR 43 08/10/2021    EGFR 37 08/10/2021    EGFR 40 06/28/2021    CREATININE 1 13 08/10/2021    CREATININE 1 27 08/10/2021    CREATININE 1 18 06/28/2021   · Creatinine stable at 0 91 on 02/08/2022  · Avoid nephrotoxins and hypotension   · Encourage adequate fluid intake

## 2022-02-14 NOTE — ASSESSMENT & PLAN NOTE
· Mood currently stable, patient is reported to experience periods of increased anxiety     · Lorazepam ordered every 12 hours prn for anxiety  · Continue frequent reassurance and supportive care

## 2022-02-14 NOTE — ASSESSMENT & PLAN NOTE
· Family expressed goal of care to be that of comfort   They do not wish for further aggressive treatments, labs, diagnostic imaging or hospitalizations  · Will consult hospice services to evaluate and treat for protein calorie malnutrition

## 2022-02-14 NOTE — ASSESSMENT & PLAN NOTE
Lab Results   Component Value Date    HGBA1C 8 0 (H) 08/10/2021   · A1C at goal of 8 0% per AGS and ADA guidlines  · Continue insulin sliding scale and Metformin  · Avoid hypoglycemia  · Continue a healthy diet and exercise as tolerated

## 2022-02-17 NOTE — TELEPHONE ENCOUNTER
[address] called to relay that they are picking up the pt today for severe protein-calorie malnutrition
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
## 2023-01-04 NOTE — ASSESSMENT & PLAN NOTE
Lab Results   Component Value Date    HGBA1C 7 5 (H) 12/21/2020       Recent Labs     12/24/20  0608 12/24/20  1248   POCGLU 126 173*       Blood Sugar Average: Last 72 hrs:    · Last A1c shows poor control  · Patient is currently taking 7 5 mg daily of prednisone secondary to osteoarthritis  · Will have to optimize sugar control  · Insulin sliding scale    · Accu-Cheks q i d   (P) 149 5 Discharged

## 2023-04-26 NOTE — ASSESSMENT & PLAN NOTE
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